# Patient Record
Sex: FEMALE | Race: WHITE | Employment: OTHER | URBAN - METROPOLITAN AREA
[De-identification: names, ages, dates, MRNs, and addresses within clinical notes are randomized per-mention and may not be internally consistent; named-entity substitution may affect disease eponyms.]

---

## 2017-01-09 RX ORDER — LEVOTHYROXINE SODIUM 0.05 MG/1
TABLET ORAL
Qty: 90 TABLET | Refills: 2 | Status: SHIPPED | OUTPATIENT
Start: 2017-01-09 | End: 2017-05-06

## 2017-04-08 RX ORDER — FLUOXETINE HYDROCHLORIDE 20 MG/1
CAPSULE ORAL
Qty: 90 CAPSULE | Refills: 0 | Status: SHIPPED | OUTPATIENT
Start: 2017-04-08 | End: 2017-07-09

## 2017-04-28 NOTE — TELEPHONE ENCOUNTER
Per pt, she is waiting for her rx med and she is at the pharmacy [facility] and would like to know if Dr Federico Gibson can send her rx med now. pls see msg below. Pls call pt to her phone# on file when it was fax to the pharmacy. Pls advise.

## 2017-04-28 NOTE — TELEPHONE ENCOUNTER
Pt states that she is in [place] and forget her medications. Pt would like a 5 day supply of the generic Prozac and synthroid. Pt did not have the names. Pt would like them phoned into the [facility] [phone].  Pt states that she is out of

## 2017-05-06 RX ORDER — FLUOXETINE HYDROCHLORIDE 20 MG/1
20 CAPSULE ORAL
Qty: 90 CAPSULE | Refills: 0 | OUTPATIENT
Start: 2017-05-06

## 2017-05-06 RX ORDER — LEVOTHYROXINE SODIUM 0.05 MG/1
50 TABLET ORAL
Qty: 90 TABLET | Refills: 2 | Status: SHIPPED | OUTPATIENT
Start: 2017-05-06 | End: 2017-10-06

## 2017-05-06 NOTE — TELEPHONE ENCOUNTER
Dr. Martin Valentin, Rx request for Levothyroxine and Fluoxetine, please see message below. UNABLE to fill d/t being out of state. Thank you.     Hypothyroid Medications  Protocol Criteria:  Appointment scheduled in the past 12 months or the next 3 months  TSH resulte

## 2017-05-08 ENCOUNTER — TELEPHONE (OUTPATIENT)
Dept: INTERNAL MEDICINE CLINIC | Facility: CLINIC | Age: 72
End: 2017-05-08

## 2017-05-08 NOTE — TELEPHONE ENCOUNTER
Pt asking to cancel prescription for Levothyroxine that was sent to mail order, pt does not want to be charged. Please advise.

## 2017-05-09 NOTE — TELEPHONE ENCOUNTER
Attempted to call pt to clarify her request.    Spoke to [facility], they have 2 prescriptions for Levothyroxine. One written in January 2017 that is due to be shipped in July 2017.   The second one was written on 5/6/18 and was due to be shipped in J

## 2017-05-10 NOTE — TELEPHONE ENCOUNTER
Advised patient of Leif Goins RN's note below. Patient verbalized understanding and stated that is now back in [place].

## 2017-07-10 RX ORDER — FLUOXETINE HYDROCHLORIDE 20 MG/1
CAPSULE ORAL
Qty: 90 CAPSULE | Refills: 0 | Status: SHIPPED | OUTPATIENT
Start: 2017-07-10 | End: 2017-10-09

## 2017-07-20 ENCOUNTER — PATIENT MESSAGE (OUTPATIENT)
Dept: INTERNAL MEDICINE CLINIC | Facility: CLINIC | Age: 72
End: 2017-07-20

## 2017-07-21 NOTE — TELEPHONE ENCOUNTER
From: Justin Juarez  To: Russell Swift MD  Sent: 7/20/2017 1:45 PM CDT  Subject: Other    Please update my health information. I received a flu vaccine and pneumococcal vaccine on 10/20/2016 at [address] [place] [address]., Velia Anderson    Thank you.     Thurman Frankel

## 2017-08-04 ENCOUNTER — OFFICE VISIT (OUTPATIENT)
Dept: INTERNAL MEDICINE CLINIC | Facility: CLINIC | Age: 72
End: 2017-08-04

## 2017-08-04 VITALS
HEIGHT: 62 IN | HEART RATE: 60 BPM | WEIGHT: 217 LBS | BODY MASS INDEX: 39.93 KG/M2 | SYSTOLIC BLOOD PRESSURE: 96 MMHG | DIASTOLIC BLOOD PRESSURE: 65 MMHG

## 2017-08-04 DIAGNOSIS — Z00.00 ROUTINE GENERAL MEDICAL EXAMINATION AT A HEALTH CARE FACILITY: Primary | ICD-10-CM

## 2017-08-04 DIAGNOSIS — E89.0 POSTOPERATIVE HYPOTHYROIDISM: ICD-10-CM

## 2017-08-04 DIAGNOSIS — Z13.1 SCREENING FOR DIABETES MELLITUS: ICD-10-CM

## 2017-08-04 DIAGNOSIS — Z12.39 SCREENING FOR MALIGNANT NEOPLASM OF BREAST: ICD-10-CM

## 2017-08-04 DIAGNOSIS — Z13.820 SCREENING FOR OSTEOPOROSIS: ICD-10-CM

## 2017-08-04 PROCEDURE — G0439 PPPS, SUBSEQ VISIT: HCPCS | Performed by: INTERNAL MEDICINE

## 2017-08-04 NOTE — PROGRESS NOTES
HPI:    Patient ID: Norman Andrews is a 80year old female.     Patient presents for annual wellness exam.      HISTORY:  Past Medical History:   Diagnosis Date   • H/O bone density study 5/6/15   • Lipid screening 11-   • Post-operative hypothyroidi to person, place, and time. She appears well-developed and well-nourished. Cardiovascular: Normal rate, regular rhythm, normal heart sounds and intact distal pulses. Pulmonary/Chest: Effort normal and breath sounds normal.   Abdominal: Soft.  Bowel geraldine

## 2017-08-14 ENCOUNTER — APPOINTMENT (OUTPATIENT)
Dept: LAB | Age: 72
End: 2017-08-14
Attending: INTERNAL MEDICINE
Payer: MEDICARE

## 2017-08-14 DIAGNOSIS — Z13.1 SCREENING FOR DIABETES MELLITUS: ICD-10-CM

## 2017-08-14 DIAGNOSIS — E89.0 POSTOPERATIVE HYPOTHYROIDISM: ICD-10-CM

## 2017-08-14 LAB
ALBUMIN SERPL BCP-MCNC: 3.7 G/DL (ref 3.5–4.8)
ALBUMIN/GLOB SERPL: 1.4 {RATIO} (ref 1–2)
ALP SERPL-CCNC: 92 U/L (ref 32–100)
ALT SERPL-CCNC: 19 U/L (ref 14–54)
ANION GAP SERPL CALC-SCNC: 8 MMOL/L (ref 0–18)
AST SERPL-CCNC: 23 U/L (ref 15–41)
BILIRUB SERPL-MCNC: 0.3 MG/DL (ref 0.3–1.2)
BUN SERPL-MCNC: 17 MG/DL (ref 8–20)
BUN/CREAT SERPL: 19.5 (ref 10–20)
CALCIUM SERPL-MCNC: 9.2 MG/DL (ref 8.5–10.5)
CHLORIDE SERPL-SCNC: 106 MMOL/L (ref 95–110)
CO2 SERPL-SCNC: 24 MMOL/L (ref 22–32)
CREAT SERPL-MCNC: 0.87 MG/DL (ref 0.5–1.5)
GLOBULIN PLAS-MCNC: 2.7 G/DL (ref 2.5–3.7)
GLUCOSE SERPL-MCNC: 87 MG/DL (ref 70–99)
OSMOLALITY UR CALC.SUM OF ELEC: 287 MOSM/KG (ref 275–295)
POTASSIUM SERPL-SCNC: 4.6 MMOL/L (ref 3.3–5.1)
PROT SERPL-MCNC: 6.4 G/DL (ref 5.9–8.4)
SODIUM SERPL-SCNC: 138 MMOL/L (ref 136–144)
TSH SERPL-ACNC: 4.01 UIU/ML (ref 0.45–5.33)

## 2017-08-14 PROCEDURE — 84443 ASSAY THYROID STIM HORMONE: CPT

## 2017-08-14 PROCEDURE — 36415 COLL VENOUS BLD VENIPUNCTURE: CPT

## 2017-08-14 PROCEDURE — 80053 COMPREHEN METABOLIC PANEL: CPT

## 2017-09-05 ENCOUNTER — HOSPITAL ENCOUNTER (OUTPATIENT)
Dept: BONE DENSITY | Age: 72
Discharge: HOME | End: 2017-09-05
Attending: INTERNAL MEDICINE
Payer: MEDICARE

## 2017-09-05 ENCOUNTER — HOSPITAL ENCOUNTER (OUTPATIENT)
Dept: MAMMOGRAPHY | Age: 72
Discharge: HOME | End: 2017-09-05
Attending: INTERNAL MEDICINE
Payer: MEDICARE

## 2017-09-05 DIAGNOSIS — Z12.39 SCREENING FOR MALIGNANT NEOPLASM OF BREAST: ICD-10-CM

## 2017-09-05 DIAGNOSIS — Z13.820 SCREENING FOR OSTEOPOROSIS: ICD-10-CM

## 2017-09-05 PROCEDURE — 77067 SCR MAMMO BI INCL CAD: CPT | Performed by: INTERNAL MEDICINE

## 2017-09-05 PROCEDURE — 77080 DXA BONE DENSITY AXIAL: CPT | Performed by: INTERNAL MEDICINE

## 2017-09-12 ENCOUNTER — TELEPHONE (OUTPATIENT)
Dept: OTHER | Age: 72
End: 2017-09-12

## 2017-09-12 DIAGNOSIS — M85.80 OSTEOPENIA, UNSPECIFIED LOCATION: Primary | ICD-10-CM

## 2017-09-12 NOTE — TELEPHONE ENCOUNTER
LMTCB transfer to triage      Notes Recorded by Ute Jaime MD on 9/6/2017 at 9:55 AM CDT  Severe osteopenia not yet osteoporosis.  Please have her get vit d level then schedule ov to disc options

## 2017-09-13 ENCOUNTER — APPOINTMENT (OUTPATIENT)
Dept: LAB | Age: 72
End: 2017-09-13
Attending: INTERNAL MEDICINE
Payer: MEDICARE

## 2017-09-13 DIAGNOSIS — M85.80 OSTEOPENIA, UNSPECIFIED LOCATION: ICD-10-CM

## 2017-09-13 LAB — 25(OH)D3 SERPL-MCNC: 35.6 NG/ML

## 2017-09-13 PROCEDURE — 36415 COLL VENOUS BLD VENIPUNCTURE: CPT

## 2017-09-13 PROCEDURE — 82306 VITAMIN D 25 HYDROXY: CPT

## 2017-09-21 ENCOUNTER — OFFICE VISIT (OUTPATIENT)
Dept: INTERNAL MEDICINE CLINIC | Facility: CLINIC | Age: 72
End: 2017-09-21

## 2017-09-21 VITALS
BODY MASS INDEX: 39 KG/M2 | HEART RATE: 54 BPM | WEIGHT: 214.31 LBS | SYSTOLIC BLOOD PRESSURE: 115 MMHG | DIASTOLIC BLOOD PRESSURE: 76 MMHG

## 2017-09-21 DIAGNOSIS — M85.89 OSTEOPENIA OF MULTIPLE SITES: Primary | ICD-10-CM

## 2017-09-21 PROCEDURE — G0463 HOSPITAL OUTPT CLINIC VISIT: HCPCS | Performed by: INTERNAL MEDICINE

## 2017-09-21 PROCEDURE — 99213 OFFICE O/P EST LOW 20 MIN: CPT | Performed by: INTERNAL MEDICINE

## 2017-09-21 RX ORDER — RISEDRONATE SODIUM 35 MG/1
35 TABLET, FILM COATED ORAL
Qty: 12 TABLET | Refills: 3 | Status: SHIPPED | OUTPATIENT
Start: 2017-09-21 | End: 2018-08-05

## 2017-09-21 NOTE — PROGRESS NOTES
HPI:    Patient ID: Haven Montes is a 80year old female. HPI    Bone Loss  This is a chronic problem. The first episode began more than one year ago. The problem occurs daily and is gradually improving.  The patient recently had a bone density test do Oral Capsule Delayed Release Take  by mouth. Disp:  Rfl:      Allergies:No Known Allergies   PHYSICAL EXAM:   Physical Exam   Constitutional: She appears well-developed and well-nourished. No distress. HENT:   Head: Normocephalic and atraumatic.    Eyes: described in this documentation. All medical record entries made by the scribe were at my direction and in my presence.   I have reviewed the chart and discharge instructions (if applicable) and agree that the record reflects my personal performance and is

## 2017-10-02 ENCOUNTER — OFFICE VISIT (OUTPATIENT)
Dept: INTERNAL MEDICINE CLINIC | Facility: CLINIC | Age: 72
End: 2017-10-02

## 2017-10-02 ENCOUNTER — TELEPHONE (OUTPATIENT)
Dept: OTHER | Age: 72
End: 2017-10-02

## 2017-10-02 VITALS
SYSTOLIC BLOOD PRESSURE: 97 MMHG | WEIGHT: 215.38 LBS | DIASTOLIC BLOOD PRESSURE: 63 MMHG | HEART RATE: 53 BPM | BODY MASS INDEX: 39 KG/M2

## 2017-10-02 DIAGNOSIS — S76.312A HAMSTRING MUSCLE STRAIN, LEFT, INITIAL ENCOUNTER: Primary | ICD-10-CM

## 2017-10-02 PROCEDURE — 99213 OFFICE O/P EST LOW 20 MIN: CPT | Performed by: INTERNAL MEDICINE

## 2017-10-02 PROCEDURE — G0463 HOSPITAL OUTPT CLINIC VISIT: HCPCS | Performed by: INTERNAL MEDICINE

## 2017-10-02 NOTE — TELEPHONE ENCOUNTER
Pt seeking appt for today -c/c pain behind left knee x a week no swelling hurts to walk-ASA helps but comes back-appt made

## 2017-10-06 ENCOUNTER — PATIENT MESSAGE (OUTPATIENT)
Dept: INTERNAL MEDICINE CLINIC | Facility: CLINIC | Age: 72
End: 2017-10-06

## 2017-10-06 RX ORDER — LEVOTHYROXINE SODIUM 0.05 MG/1
50 TABLET ORAL
Qty: 90 TABLET | Refills: 2
Start: 2017-10-06

## 2017-10-06 RX ORDER — LEVOTHYROXINE SODIUM 0.05 MG/1
50 TABLET ORAL
Qty: 90 TABLET | Refills: 0 | Status: SHIPPED
Start: 2017-10-06 | End: 2018-01-04

## 2017-10-06 RX ORDER — LEVOTHYROXINE SODIUM 0.05 MG/1
TABLET ORAL
Qty: 90 TABLET | Refills: 2 | OUTPATIENT
Start: 2017-10-06

## 2017-10-06 NOTE — TELEPHONE ENCOUNTER
Pending Prescriptions Disp Refills    LEVOTHYROXINE SODIUM 50 MCG Oral Tab [Pharmacy Med Name: L-THYROXINE (SYNTHROID) TABS 50MCG] 90 tablet 2     Sig: TAKE 1 TABLET DAILY           Last Office Visit with PCP: 10/2/2017  Last Blood Pressures:  BP Reading

## 2017-10-06 NOTE — TELEPHONE ENCOUNTER
Hypothyroid Medications Medication refilled for 90 days per protocol for Levothryroxine.     Protocol Criteria:  Appointment scheduled in the past 12 months or the next 3 months  TSH resulted in the past 12 months that is normal  Recent Outpatient Visits

## 2017-10-06 NOTE — TELEPHONE ENCOUNTER
Pt is calling for status of her medication refill request. Pt would like to know why her medication has been denied and she can be reached at [phone].

## 2017-10-07 NOTE — TELEPHONE ENCOUNTER
Called pt and informed that rx sent in May 2017 was for #90 plus 2 refills. Pt should not need a refill yet.

## 2017-10-10 RX ORDER — FLUOXETINE HYDROCHLORIDE 20 MG/1
CAPSULE ORAL
Qty: 90 CAPSULE | Refills: 0 | Status: SHIPPED | OUTPATIENT
Start: 2017-10-10 | End: 2018-01-07

## 2017-10-10 NOTE — TELEPHONE ENCOUNTER
From: Belen Lundberg  To: Luis Eldridge MD  Sent: 10/6/2017 8:34 PM CDT  Subject: Prescription Question    I received an email from my [employer] mail delivery this evening that my request for the renewal of my generic synthroid has been denied.  I ma

## 2017-10-10 NOTE — PROGRESS NOTES
HPI:    Patient ID: Jose Antonio Arshad is a 80year old female. Knee Pain    The pain is present in the left knee. This is a new problem. The current episode started in the past 7 days. There has been no history of extremity trauma.  The problem occurs const There is some tenderness to palpation of the distal insertion of the left lateral hamstring. Decreased range of motion left knee. Slightly. Right knee normal.   Skin: Skin is warm and dry. She is not diaphoretic.    Psychiatric: She has a normal mood and

## 2018-01-05 RX ORDER — LEVOTHYROXINE SODIUM 0.05 MG/1
TABLET ORAL
Qty: 90 TABLET | Refills: 0 | Status: SHIPPED | OUTPATIENT
Start: 2018-01-05 | End: 2018-04-04

## 2018-01-05 NOTE — TELEPHONE ENCOUNTER
Refilled per protocol    Hypothyroid Medications  Protocol Criteria:  Appointment scheduled in the past 12 months or the next 3 months  TSH resulted in the past 12 months that is normal  Recent Outpatient Visits            3 months ago Hamstring muscle str

## 2018-01-08 RX ORDER — FLUOXETINE HYDROCHLORIDE 20 MG/1
CAPSULE ORAL
Qty: 90 CAPSULE | Refills: 3 | Status: SHIPPED | OUTPATIENT
Start: 2018-01-08 | End: 2019-01-06

## 2018-04-05 RX ORDER — LEVOTHYROXINE SODIUM 0.05 MG/1
TABLET ORAL
Qty: 90 TABLET | Refills: 0 | Status: SHIPPED | OUTPATIENT
Start: 2018-04-05 | End: 2018-07-03

## 2018-07-05 RX ORDER — LEVOTHYROXINE SODIUM 0.05 MG/1
TABLET ORAL
Qty: 90 TABLET | Refills: 0 | Status: SHIPPED | OUTPATIENT
Start: 2018-07-05 | End: 2018-10-03

## 2018-08-05 RX ORDER — RISEDRONATE SODIUM 35 MG/1
TABLET, FILM COATED ORAL
Qty: 12 TABLET | Refills: 3 | Status: SHIPPED | OUTPATIENT
Start: 2018-08-05 | End: 2019-07-09

## 2018-08-24 ENCOUNTER — OFFICE VISIT (OUTPATIENT)
Dept: INTERNAL MEDICINE CLINIC | Facility: CLINIC | Age: 73
End: 2018-08-24
Payer: MEDICARE

## 2018-08-24 ENCOUNTER — APPOINTMENT (OUTPATIENT)
Dept: LAB | Age: 73
End: 2018-08-24
Attending: INTERNAL MEDICINE
Payer: MEDICARE

## 2018-08-24 VITALS
DIASTOLIC BLOOD PRESSURE: 62 MMHG | BODY MASS INDEX: 38.65 KG/M2 | HEART RATE: 56 BPM | WEIGHT: 218.13 LBS | TEMPERATURE: 98 F | SYSTOLIC BLOOD PRESSURE: 98 MMHG | HEIGHT: 63 IN

## 2018-08-24 DIAGNOSIS — Z00.00 ROUTINE GENERAL MEDICAL EXAMINATION AT A HEALTH CARE FACILITY: Primary | ICD-10-CM

## 2018-08-24 DIAGNOSIS — Z12.39 SCREENING BREAST EXAMINATION: ICD-10-CM

## 2018-08-24 DIAGNOSIS — E89.0 POSTOPERATIVE HYPOTHYROIDISM: ICD-10-CM

## 2018-08-24 LAB — TSH SERPL-ACNC: 1.87 UIU/ML (ref 0.45–5.33)

## 2018-08-24 PROCEDURE — 84443 ASSAY THYROID STIM HORMONE: CPT

## 2018-08-24 PROCEDURE — 36415 COLL VENOUS BLD VENIPUNCTURE: CPT

## 2018-08-24 PROCEDURE — G0439 PPPS, SUBSEQ VISIT: HCPCS | Performed by: INTERNAL MEDICINE

## 2018-08-24 NOTE — PROGRESS NOTES
HPI:    Patient ID: Geraldine Melgoza is a 71year old female. Pt presents for annual Medicare physical. She states some mild fatigue but denies any light-headedness or dizziness. Thyroid Problem   This is a chronic problem.  The current episode start Calcium Carbonate-Vitamin D (CALCIUM-VITAMIN D) 600-200 MG-UNIT Oral Cap Take  by mouth. Disp:  Rfl:    Multiple Vitamins Oral Tab Take  by mouth.  Disp:  Rfl:    Omega-3 Fatty Acids (KP OMEGA-3 FISH OIL) 1200 MG Oral Capsule Delayed Release Take  by mout more than 10 pounds without trying?: 2 - No    Has your appetite been poor?: No    Type of Diet: Balanced         How would you describe your daily physical activity?: Moderate    How would you describe your current health state?: Good    How do you mainta No    Do you have a living will?: No     Hearing Assessment (Required for AWV/SWV)      Hearing Screening    Time taken:  8/24/2018 10:11 AM  Screening Method:  Questionnaire  I have a problem hearing over the telephone:  No I have trouble following the co (FSB)Annually   Glucose (mg/dL)   Date Value   08/14/2017 87   10/27/2008 93   ----------  GLUCOSE (P) (mg/dL)   Date Value   08/03/2016 94   ---------- No flowsheet data found.    Cardiovascular Disease Screening     LDL Annually LDL Cholesterol Calc (mg/d applicable     SPECIFIC DISEASE MONITORING Internal Lab or Procedure External Lab or Procedure   Annual Monitoring of Persistent     Medications (ACE/ARB, digoxin, diuretics)    Potassium  Annually Potassium (mmol/L)   Date Value   08/14/2017 4.6   10/27/2 this documentation has been prepared under the direction and in the presence of DIANE Carlson MD.   Electronically Signed: Frandy Sumner, 8/24/2018, 10:04 AM.    IDIANE MD,  personally performed the services described in this documentati

## 2018-09-18 ENCOUNTER — HOSPITAL ENCOUNTER (OUTPATIENT)
Dept: MAMMOGRAPHY | Age: 73
Discharge: HOME | End: 2018-09-18
Attending: INTERNAL MEDICINE
Payer: MEDICARE

## 2018-09-18 DIAGNOSIS — Z12.39 SCREENING BREAST EXAMINATION: ICD-10-CM

## 2018-09-18 PROCEDURE — 77067 SCR MAMMO BI INCL CAD: CPT | Performed by: INTERNAL MEDICINE

## 2018-10-04 RX ORDER — LEVOTHYROXINE SODIUM 0.05 MG/1
TABLET ORAL
Qty: 90 TABLET | Refills: 0 | Status: SHIPPED | OUTPATIENT
Start: 2018-10-04 | End: 2019-01-01

## 2019-01-02 RX ORDER — LEVOTHYROXINE SODIUM 0.05 MG/1
TABLET ORAL
Qty: 90 TABLET | Refills: 0 | Status: SHIPPED | OUTPATIENT
Start: 2019-01-02 | End: 2019-04-01

## 2019-01-02 NOTE — TELEPHONE ENCOUNTER
Refill passed per [facility] protocol.   Hypothyroid Medications  Protocol Criteria:  Appointment scheduled in the past 12 months or the next 3 months  TSH resulted in the past 12 months that is normal  Recent Outpatient Visits            4 months ago Routine general medical examination at a health care facility    [facility], Tea Travis MD    Office Visit    1 year ago Hamstring muscle strain, left, initial encounter    [address] MD    Office Visit    1 year ago Osteopenia of multiple sites    [facility], Tea Travis MD    Office Visit    1 year ago Routine general medical examination at a health care facility    [facility], Tea Travis MD    Office Visit    2 years ago [address] Martino MD    Office Visit          Lab Results   Component Value Date    TSH 1.87 08/24/2018    [facility] 2.74 08/03/2016

## 2019-01-07 RX ORDER — FLUOXETINE HYDROCHLORIDE 20 MG/1
CAPSULE ORAL
Qty: 90 CAPSULE | Refills: 0 | Status: SHIPPED | OUTPATIENT
Start: 2019-01-07 | End: 2019-04-07

## 2019-04-02 RX ORDER — LEVOTHYROXINE SODIUM 0.05 MG/1
TABLET ORAL
Qty: 90 TABLET | Refills: 0 | Status: SHIPPED | OUTPATIENT
Start: 2019-04-02 | End: 2019-07-01

## 2019-04-02 NOTE — TELEPHONE ENCOUNTER
Refill passed per [facility] protocol  Hypothyroid Medications  Protocol Criteria:  Appointment scheduled in the past 12 months or the next 3 months  TSH resulted in the past 12 months that is normal  Recent Outpatient Visits            7 months ago Routine general medical examination at a health care facility    [address] Rosi Chough, MD    Office Visit    1 year ago Hamstring muscle strain, left, initial encounter    Aracelis Jacob MD    Office Visit    1 year ago Osteopenia of multiple sites    [address] Rosi Chough, MD    Office Visit    1 year ago Routine general medical examination at a health care facility    [address] Rosi Chough, MD    Office Visit    2 years ago [address] Aracelis Martino MD    Office Visit          Lab Results   Component Value Date    TSH 1.87 08/24/2018    [facility] 2.74 08/03/2016

## 2019-04-02 NOTE — TELEPHONE ENCOUNTER
Refill passed per [facility] protocol  Hypothyroid Medications  Protocol Criteria:  Appointment scheduled in the past 12 months or the next 3 months  TSH resulted in the past 12 months that is normal  Recent Outpatient Visits            7 months ago Routine general medical examination at a health care facility    [facility]    Office Visit    1 year ago Hamstring muscle strain, left, initial encounter    [address] Connor MD    Office Visit    1 year ago Osteopenia of multiple sites    [facility]    Office Visit    1 year ago Routine general medical examination at a health care facility    [facility], Lalo Wesley MD    Office Visit    [facility]    [facility]          Lab Results   Component Value Date    TSH 1.87 08/24/2018    [facility] 2.74 08/03/2016

## 2019-04-08 RX ORDER — FLUOXETINE HYDROCHLORIDE 20 MG/1
CAPSULE ORAL
Qty: 90 CAPSULE | Refills: 0 | Status: SHIPPED | OUTPATIENT
Start: 2019-04-08 | End: 2019-04-16

## 2019-04-08 NOTE — TELEPHONE ENCOUNTER
Failed protocol due to no appt. rx sent per protocol 90 days. Routed to call center to assist pt with scheduling appointment. 24 Quant message sent to the patient to schedule follow up.    Requested Prescriptions     Pending Prescriptions Disp Refills   • FL

## 2019-04-16 ENCOUNTER — OFFICE VISIT (OUTPATIENT)
Dept: INTERNAL MEDICINE CLINIC | Facility: CLINIC | Age: 74
End: 2019-04-16
Payer: MEDICARE

## 2019-04-16 VITALS
WEIGHT: 218.13 LBS | DIASTOLIC BLOOD PRESSURE: 60 MMHG | SYSTOLIC BLOOD PRESSURE: 101 MMHG | TEMPERATURE: 100 F | BODY MASS INDEX: 39 KG/M2 | HEART RATE: 69 BPM

## 2019-04-16 DIAGNOSIS — G25.0 BENIGN ESSENTIAL TREMOR: ICD-10-CM

## 2019-04-16 DIAGNOSIS — E89.0 POSTOPERATIVE HYPOTHYROIDISM: Primary | ICD-10-CM

## 2019-04-16 DIAGNOSIS — M25.561 CHRONIC PAIN OF RIGHT KNEE: ICD-10-CM

## 2019-04-16 DIAGNOSIS — M54.2 NECK PAIN: ICD-10-CM

## 2019-04-16 DIAGNOSIS — G89.29 CHRONIC PAIN OF RIGHT KNEE: ICD-10-CM

## 2019-04-16 DIAGNOSIS — M79.641 HAND PAIN, RIGHT: ICD-10-CM

## 2019-04-16 PROCEDURE — G0463 HOSPITAL OUTPT CLINIC VISIT: HCPCS | Performed by: INTERNAL MEDICINE

## 2019-04-16 PROCEDURE — 99215 OFFICE O/P EST HI 40 MIN: CPT | Performed by: INTERNAL MEDICINE

## 2019-04-16 RX ORDER — FLUOXETINE HYDROCHLORIDE 20 MG/1
20 CAPSULE ORAL
Qty: 90 CAPSULE | Refills: 0 | Status: SHIPPED | OUTPATIENT
Start: 2019-04-16 | End: 2019-09-11

## 2019-04-16 NOTE — PROGRESS NOTES
HPI:    Patient ID: Unique Erazo is a 68year old female. Patient presents with: Anxiety: fup   Pain: c/o right groin pain. Neck Pain  Finger Pain: bilateral index and thumbs.    Knee Pain: right    HPI  Hand pain  Patient c/o thumb/index finger pain gait problem. Patient states that stretches/exercises that her daughter-in-law, who is a PT, recommended provide significant relief of Sx. Review of Systems   Constitutional: Negative for chills and diaphoresis.    HENT: Negative for dental problem, faci LEVOTHYROXINE SODIUM 50 MCG Oral Tab TAKE 1 TABLET DAILY Disp: 90 tablet Rfl: 0   RISEDRONATE SODIUM 35 MG Oral Tab TAKE 1 TABLET EVERY 7 DAYS Disp: 12 tablet Rfl: 3   Melatonin 5 MG Oral Tab Take 1 tablet by mouth daily.  Disp:  Rfl:    Calcium Carbonate is not disoriented. She displays tremor. Positive for right forearm/hand tremor. Skin: Skin is warm and dry. No rash noted. No erythema. Psychiatric: She has a normal mood and affect.  Her speech is normal and behavior is normal. Judgment and thought negatives include no associated numbness. PMHx of osteoarthritis.  Physical exam unremarkable.    (M25.561,  G89.29) Chronic pain of right knee  Plan: Patient presents today for continual treatment of dull right knee pain constantly with associated limited

## 2019-05-06 ENCOUNTER — PATIENT MESSAGE (OUTPATIENT)
Dept: INTERNAL MEDICINE CLINIC | Facility: CLINIC | Age: 74
End: 2019-05-06

## 2019-05-06 ENCOUNTER — OFFICE VISIT (OUTPATIENT)
Dept: INTERNAL MEDICINE CLINIC | Facility: CLINIC | Age: 74
End: 2019-05-06
Payer: MEDICARE

## 2019-05-06 VITALS — TEMPERATURE: 99 F | HEART RATE: 60 BPM | SYSTOLIC BLOOD PRESSURE: 116 MMHG | DIASTOLIC BLOOD PRESSURE: 81 MMHG

## 2019-05-06 DIAGNOSIS — M54.2 NECK PAIN: Primary | ICD-10-CM

## 2019-05-06 PROCEDURE — 99214 OFFICE O/P EST MOD 30 MIN: CPT | Performed by: INTERNAL MEDICINE

## 2019-05-06 PROCEDURE — G0463 HOSPITAL OUTPT CLINIC VISIT: HCPCS | Performed by: INTERNAL MEDICINE

## 2019-05-06 NOTE — TELEPHONE ENCOUNTER
From: Jeannine Fiore  To: Josue Phillip MD  Sent: 5/6/2019 12:37 PM CDT  Subject: Visit Follow-up Question    I just had an appointment with Dr. Ruchi Desouza concerning my neck and groin pain. When I got home, I noticed the referral for PT was only for my neck.

## 2019-05-06 NOTE — PROGRESS NOTES
HPI:    Patient ID: Belen Lundberg is a 68year old female. Patient presents with:  Neck Pain: fup   Other: right groin pain.  fup     HPI  Back pain  Patient c/o left cervical spine pain, as well as lower right back pain that radiates through right leg/gr pain. Negative for neck stiffness. Skin: Negative for color change and pallor. Neurological: Positive for numbness (lower right leg). Negative for dizziness, tremors, facial asymmetry and speech difficulty.    Psychiatric/Behavioral: Negative for agitat Conjunctivae and EOM are normal.   Neck: Normal range of motion. Neck supple. Cardiovascular: Normal rate, regular rhythm, normal heart sounds and intact distal pulses. Exam reveals no gallop and no friction rub. No murmur heard.   Pulmonary/Chest: Effo 25 08/03/2016    ALT 17 11/27/2012          ASSESSMENT/PLAN:   Neck pain  (primary encounter diagnosis)    (M54.2) Neck pain  (primary encounter diagnosis)  Plan: Patient c/o left cervical spine pain, as well as lower right back pain that radiates through

## 2019-05-06 NOTE — TELEPHONE ENCOUNTER
Dr. Sari Narvaez Pt is asking about Groin pain. (see copied note below)     I thought I was also getting a PT referral for my groin/leg pain.   He did tell me to take 4 ibprofens 3 times a day.  Is that all I need to do for that problem? Coni Gandhi

## 2019-05-07 NOTE — TELEPHONE ENCOUNTER
Message sent to pt.      Josue Phillip MD routed conversation to [facility]/Cma 13 hours ago (8:55 PM)      Josue Phillip MD 13 hours ago (8:52 PM)         Please let pt know there is a referral for PT in the systrem         Documentation

## 2019-05-29 ENCOUNTER — OFFICE VISIT (OUTPATIENT)
Dept: PHYSICAL THERAPY | Age: 74
End: 2019-05-29
Attending: INTERNAL MEDICINE
Payer: MEDICARE

## 2019-05-29 DIAGNOSIS — R10.30 INGUINAL PAIN, UNSPECIFIED LATERALITY: ICD-10-CM

## 2019-05-29 DIAGNOSIS — M54.50 LOW BACK PAIN WITHOUT SCIATICA, UNSPECIFIED BACK PAIN LATERALITY, UNSPECIFIED CHRONICITY: ICD-10-CM

## 2019-05-29 DIAGNOSIS — M54.2 NECK PAIN: ICD-10-CM

## 2019-05-29 PROCEDURE — 97110 THERAPEUTIC EXERCISES: CPT | Performed by: PHYSICAL THERAPIST

## 2019-05-29 PROCEDURE — 97161 PT EVAL LOW COMPLEX 20 MIN: CPT | Performed by: PHYSICAL THERAPIST

## 2019-05-29 NOTE — PROGRESS NOTES
P.T. EVALUATION:   Referring Physician: Dr. Rachelle Colón  Diagnosis: Low back pain without sciatica, unspecified back pain laterality, unspecified chronicity (M54.5)  Inguinal pain, unspecified laterality (R10.30)    Neck pain (M54.2)  Date of Onset: 1/1/2019 Adriano worse than L    Strength/MMT: Upper back strength is grossly 3/5 into extension, Trunk strength is grossly 3/5.  R hip is grossly 4/5 into all planes of motion and 5/5 throughout    Sensation: WNL throughout B UE and LE to light touch    Special tests: (-) findings, precautions, and treatment options and has agreed to actively participate in planning and for this course of care. Thank you for your referral. Please co-sign or sign and return this letter via fax as soon as possible to [phone].  If you h

## 2019-05-31 ENCOUNTER — OFFICE VISIT (OUTPATIENT)
Dept: PHYSICAL THERAPY | Age: 74
End: 2019-05-31
Attending: INTERNAL MEDICINE
Payer: MEDICARE

## 2019-05-31 DIAGNOSIS — M54.2 NECK PAIN: ICD-10-CM

## 2019-05-31 PROCEDURE — 97110 THERAPEUTIC EXERCISES: CPT | Performed by: PHYSICAL THERAPIST

## 2019-05-31 NOTE — PROGRESS NOTES
Physical Therapy Treatment and Discharge Summary    Patient Name: Natalie Herrera MRN: [id number]  Date: 5/31/2019  Referring Physician: Dr. Reza Shay    Diagnosis: Low back pain without sciatica, unspecified back pain laterality, unspecified more than 1/10 during activity. 3. Increase cervical spine ROM to WNL into all planes to improve neck mobility to WNL. 4. Increase upper back and trunk strength to 5/5 throughout to be able to follow good posture when resuming previous activities.   5. In

## 2019-06-04 ENCOUNTER — APPOINTMENT (OUTPATIENT)
Dept: PHYSICAL THERAPY | Age: 74
End: 2019-06-04
Attending: INTERNAL MEDICINE
Payer: MEDICARE

## 2019-06-06 ENCOUNTER — APPOINTMENT (OUTPATIENT)
Dept: PHYSICAL THERAPY | Age: 74
End: 2019-06-06
Attending: INTERNAL MEDICINE
Payer: MEDICARE

## 2019-06-10 ENCOUNTER — APPOINTMENT (OUTPATIENT)
Dept: PHYSICAL THERAPY | Age: 74
End: 2019-06-10
Attending: INTERNAL MEDICINE
Payer: MEDICARE

## 2019-06-12 ENCOUNTER — APPOINTMENT (OUTPATIENT)
Dept: PHYSICAL THERAPY | Age: 74
End: 2019-06-12
Attending: INTERNAL MEDICINE
Payer: MEDICARE

## 2019-06-13 ENCOUNTER — OFFICE VISIT (OUTPATIENT)
Dept: PHYSICAL THERAPY | Age: 74
End: 2019-06-13
Attending: INTERNAL MEDICINE
Payer: MEDICARE

## 2019-06-13 NOTE — PROGRESS NOTES
Physical Therapy Progress Report    Patient Name: Mg Napier MRN: [id number]  Date: 6/13/2019  Referring Physician: Dr. Thea Nicholas    Diagnosis: Low back pain without sciatica, unspecified back pain laterality, unspecified chronicity (M54.5) Danny Joseph, PT

## 2019-06-17 ENCOUNTER — APPOINTMENT (OUTPATIENT)
Dept: PHYSICAL THERAPY | Age: 74
End: 2019-06-17
Attending: INTERNAL MEDICINE
Payer: MEDICARE

## 2019-06-19 ENCOUNTER — APPOINTMENT (OUTPATIENT)
Dept: PHYSICAL THERAPY | Age: 74
End: 2019-06-19
Attending: INTERNAL MEDICINE
Payer: MEDICARE

## 2019-06-20 ENCOUNTER — TELEPHONE (OUTPATIENT)
Dept: INTERNAL MEDICINE CLINIC | Facility: CLINIC | Age: 74
End: 2019-06-20

## 2019-06-20 DIAGNOSIS — M25.559 ARTHRALGIA OF HIP, UNSPECIFIED LATERALITY: Primary | ICD-10-CM

## 2019-06-21 NOTE — TELEPHONE ENCOUNTER
Relayed Nurse message, Pt has pain in right hip /groin,receiveing PT, Pt styated she's trying to do PT exercises at home, PT asked if she has had an Xray    Please sign off referral

## 2019-06-21 NOTE — TELEPHONE ENCOUNTER
----- Message from Karlee Gandhi sent at 6/20/2019  6:44 PM CDT -----  Regarding: RE: Other  Contact: [phone]  I have not yet heard from Doctor Kiana Pittman.  Since I'm not getting the care I feel I need, I would like to make an appointment with Dr. Julia Ayala

## 2019-06-21 NOTE — TELEPHONE ENCOUNTER
Please call patient and find out laterality for the referral. Dr. Sheldon Hermosillo wants her to see Dr. Derick Diggs. I pended referral but need that info. Also let her know she can switch her primary care doctor. She should let her insurance company know.  If she does wan

## 2019-06-24 ENCOUNTER — HOSPITAL ENCOUNTER (OUTPATIENT)
Dept: GENERAL RADIOLOGY | Age: 74
Discharge: HOME | End: 2019-06-24
Attending: INTERNAL MEDICINE
Payer: MEDICARE

## 2019-06-24 ENCOUNTER — OFFICE VISIT (OUTPATIENT)
Dept: INTERNAL MEDICINE CLINIC | Facility: CLINIC | Age: 74
End: 2019-06-24
Payer: MEDICARE

## 2019-06-24 VITALS
WEIGHT: 212 LBS | HEART RATE: 60 BPM | SYSTOLIC BLOOD PRESSURE: 102 MMHG | DIASTOLIC BLOOD PRESSURE: 68 MMHG | RESPIRATION RATE: 12 BRPM | BODY MASS INDEX: 37.56 KG/M2 | HEIGHT: 63 IN | TEMPERATURE: 99 F

## 2019-06-24 DIAGNOSIS — I87.2 PERIPHERAL VENOUS INSUFFICIENCY: ICD-10-CM

## 2019-06-24 DIAGNOSIS — G89.29 CHRONIC NECK PAIN: ICD-10-CM

## 2019-06-24 DIAGNOSIS — M25.551 PAIN OF RIGHT HIP JOINT: Primary | ICD-10-CM

## 2019-06-24 DIAGNOSIS — M25.551 PAIN OF RIGHT HIP JOINT: ICD-10-CM

## 2019-06-24 DIAGNOSIS — M54.2 CHRONIC NECK PAIN: ICD-10-CM

## 2019-06-24 PROCEDURE — 72040 X-RAY EXAM NECK SPINE 2-3 VW: CPT | Performed by: INTERNAL MEDICINE

## 2019-06-24 PROCEDURE — G0463 HOSPITAL OUTPT CLINIC VISIT: HCPCS | Performed by: INTERNAL MEDICINE

## 2019-06-24 PROCEDURE — 99214 OFFICE O/P EST MOD 30 MIN: CPT | Performed by: INTERNAL MEDICINE

## 2019-06-24 PROCEDURE — 73502 X-RAY EXAM HIP UNI 2-3 VIEWS: CPT | Performed by: INTERNAL MEDICINE

## 2019-06-24 NOTE — PROGRESS NOTES
HPI:    Patient ID: Tapan Amin is a 68year old female. Neck Pain    This is a chronic problem. The current episode started more than 1 year ago. The problem occurs constantly. The problem has been gradually improving.  The pain is associated with no Negative. Genitourinary: Negative. Musculoskeletal: Positive for stiffness. Negative for gout and joint swelling.         Right groin pain            Current Outpatient Medications:  FLUoxetine HCl 20 MG Oral Cap Take 1 capsule (20 mg total) by mouth (M25.551) Pain of right hip joint  (primary encounter diagnosis)  Plan: XR HIP W OR WO PELVIS 2 OR 3 VIEWS, RIGHT         (CPT=73502)        Suspect she has OA of the right hip; we will get xray of hip joint; she will continue with physical therapy for n

## 2019-06-25 ENCOUNTER — TELEPHONE (OUTPATIENT)
Dept: INTERNAL MEDICINE CLINIC | Facility: CLINIC | Age: 74
End: 2019-06-25

## 2019-06-25 DIAGNOSIS — M16.11 OSTEOARTHRITIS OF ONE HIP, RIGHT: Primary | ICD-10-CM

## 2019-06-27 ENCOUNTER — APPOINTMENT (OUTPATIENT)
Dept: PHYSICAL THERAPY | Age: 74
End: 2019-06-27
Attending: INTERNAL MEDICINE
Payer: MEDICARE

## 2019-07-01 RX ORDER — LEVOTHYROXINE SODIUM 0.05 MG/1
50 TABLET ORAL
Qty: 90 TABLET | Refills: 0 | Status: SHIPPED | OUTPATIENT
Start: 2019-07-01 | End: 2019-09-30

## 2019-07-01 NOTE — TELEPHONE ENCOUNTER
Refill request   Current Outpatient Medications:   •  LEVOTHYROXINE SODIUM 50 MCG Oral Tab, TAKE 1 TABLET DAILY, Disp: 90 tablet, Rfl: 0

## 2019-07-02 NOTE — TELEPHONE ENCOUNTER
Refill passed per [facility] protocol.   Hypothyroid Medications  Protocol Criteria:  Appointment scheduled in the past 12 months or the next 3 months  TSH resulted in the past 12 months that is normal  Recent Outpatient Visits            1 week ago Pa

## 2019-07-09 RX ORDER — RISEDRONATE SODIUM 35 MG/1
TABLET, FILM COATED ORAL
Qty: 12 TABLET | Refills: 1 | Status: SHIPPED | OUTPATIENT
Start: 2019-07-09 | End: 2019-12-24

## 2019-07-09 NOTE — TELEPHONE ENCOUNTER
[facility] received requesting refill for Risedronate sod tabs 35mg take 1 tab every 7 days. Message routed to RN.

## 2019-07-10 NOTE — TELEPHONE ENCOUNTER
Refill passed per [address] protocol.   Refill Protocol Appointment Criteria  · Appointment scheduled in the past 12 months or in the next 3 months  Recent Outpatient Visits            2 weeks ago Pain of right hip joint    [address]

## 2019-07-23 PROBLEM — M16.11 PRIMARY OSTEOARTHRITIS OF RIGHT HIP: Status: ACTIVE | Noted: 2019-07-23

## 2019-07-23 PROBLEM — M85.80 OSTEOPENIA: Status: ACTIVE | Noted: 2019-07-23

## 2019-08-05 ENCOUNTER — HOSPITAL ENCOUNTER (OUTPATIENT)
Dept: MRI IMAGING | Age: 74
Discharge: HOME | End: 2019-08-05
Attending: ORTHOPAEDIC SURGERY
Payer: MEDICARE

## 2019-08-05 DIAGNOSIS — M25.551 PAIN OF RIGHT HIP JOINT: ICD-10-CM

## 2019-08-05 PROCEDURE — 73721 MRI JNT OF LWR EXTRE W/O DYE: CPT | Performed by: ORTHOPAEDIC SURGERY

## 2019-08-08 PROBLEM — M43.16 SPONDYLOLISTHESIS OF LUMBAR REGION: Status: ACTIVE | Noted: 2019-08-08

## 2019-08-10 ENCOUNTER — PATIENT MESSAGE (OUTPATIENT)
Dept: INTERNAL MEDICINE CLINIC | Facility: CLINIC | Age: 74
End: 2019-08-10

## 2019-08-10 NOTE — TELEPHONE ENCOUNTER
See other [employer] message 8/10/19. From: Paul Kimball  To: Zaheer Tinoco MD  Sent: 8/10/2019  7:38 AM CDT  Subject: Test Results Question    This is in regard to my previous message.  The ovarian cyst appears to be 3.1 cm and my follow-up vis

## 2019-08-13 ENCOUNTER — HOSPITAL ENCOUNTER (OUTPATIENT)
Dept: GENERAL RADIOLOGY | Age: 74
Discharge: HOME | End: 2019-08-13
Attending: ORTHOPAEDIC SURGERY
Payer: MEDICARE

## 2019-08-13 ENCOUNTER — HOSPITAL ENCOUNTER (OUTPATIENT)
Dept: MRI IMAGING | Age: 74
Discharge: HOME | End: 2019-08-13
Attending: ORTHOPAEDIC SURGERY
Payer: MEDICARE

## 2019-08-13 DIAGNOSIS — M25.551 RIGHT HIP PAIN: ICD-10-CM

## 2019-08-13 DIAGNOSIS — M54.42 CHRONIC LOW BACK PAIN WITH BILATERAL SCIATICA: ICD-10-CM

## 2019-08-13 DIAGNOSIS — M85.80 OSTEOPENIA, UNSPECIFIED LOCATION: ICD-10-CM

## 2019-08-13 DIAGNOSIS — M54.41 CHRONIC LOW BACK PAIN WITH BILATERAL SCIATICA: ICD-10-CM

## 2019-08-13 DIAGNOSIS — M16.11 OSTEOARTHRITIS OF RIGHT HIP: ICD-10-CM

## 2019-08-13 DIAGNOSIS — M43.16 SPONDYLOLISTHESIS OF LUMBAR REGION: ICD-10-CM

## 2019-08-13 DIAGNOSIS — G89.29 CHRONIC LOW BACK PAIN WITH BILATERAL SCIATICA: ICD-10-CM

## 2019-08-13 PROCEDURE — 72148 MRI LUMBAR SPINE W/O DYE: CPT | Performed by: ORTHOPAEDIC SURGERY

## 2019-08-13 PROCEDURE — 72100 X-RAY EXAM L-S SPINE 2/3 VWS: CPT | Performed by: ORTHOPAEDIC SURGERY

## 2019-08-14 PROBLEM — M54.42 CHRONIC BILATERAL LOW BACK PAIN WITH BILATERAL SCIATICA: Status: ACTIVE | Noted: 2019-08-14

## 2019-08-14 PROBLEM — G89.29 CHRONIC BILATERAL LOW BACK PAIN WITH BILATERAL SCIATICA: Status: ACTIVE | Noted: 2019-08-14

## 2019-08-14 PROBLEM — S32.401D CLOSED NONDISPLACED FRACTURE OF RIGHT ACETABULUM WITH ROUTINE HEALING: Status: ACTIVE | Noted: 2019-08-14

## 2019-08-14 PROBLEM — M54.41 CHRONIC BILATERAL LOW BACK PAIN WITH BILATERAL SCIATICA: Status: ACTIVE | Noted: 2019-08-14

## 2019-08-15 PROBLEM — D16.6: Status: ACTIVE | Noted: 2019-08-15

## 2019-08-22 ENCOUNTER — HOSPITAL ENCOUNTER (OUTPATIENT)
Dept: ULTRASOUND IMAGING | Age: 74
Discharge: HOME | End: 2019-08-22
Attending: INTERNAL MEDICINE
Payer: MEDICARE

## 2019-08-22 DIAGNOSIS — N83.202 LEFT OVARIAN CYST: ICD-10-CM

## 2019-08-22 PROCEDURE — 76830 TRANSVAGINAL US NON-OB: CPT | Performed by: INTERNAL MEDICINE

## 2019-08-22 PROCEDURE — 76856 US EXAM PELVIC COMPLETE: CPT | Performed by: INTERNAL MEDICINE

## 2019-08-27 ENCOUNTER — OFFICE VISIT (OUTPATIENT)
Dept: INTERNAL MEDICINE CLINIC | Facility: CLINIC | Age: 74
End: 2019-08-27
Payer: MEDICARE

## 2019-08-27 VITALS
HEIGHT: 63 IN | SYSTOLIC BLOOD PRESSURE: 124 MMHG | TEMPERATURE: 98 F | RESPIRATION RATE: 12 BRPM | DIASTOLIC BLOOD PRESSURE: 70 MMHG | HEART RATE: 75 BPM | BODY MASS INDEX: 37.21 KG/M2 | WEIGHT: 210 LBS

## 2019-08-27 DIAGNOSIS — Z12.31 ENCOUNTER FOR SCREENING MAMMOGRAM FOR BREAST CANCER: ICD-10-CM

## 2019-08-27 DIAGNOSIS — M16.11 PRIMARY OSTEOARTHRITIS OF RIGHT HIP: ICD-10-CM

## 2019-08-27 DIAGNOSIS — M81.0 AGE-RELATED OSTEOPOROSIS WITHOUT CURRENT PATHOLOGICAL FRACTURE: ICD-10-CM

## 2019-08-27 DIAGNOSIS — I87.2 PERIPHERAL VENOUS INSUFFICIENCY: ICD-10-CM

## 2019-08-27 DIAGNOSIS — N83.202 LEFT OVARIAN CYST: ICD-10-CM

## 2019-08-27 DIAGNOSIS — Z00.00 MEDICARE ANNUAL WELLNESS VISIT, SUBSEQUENT: Primary | ICD-10-CM

## 2019-08-27 DIAGNOSIS — E89.0 POSTOPERATIVE HYPOTHYROIDISM: ICD-10-CM

## 2019-08-27 PROCEDURE — G0439 PPPS, SUBSEQ VISIT: HCPCS | Performed by: INTERNAL MEDICINE

## 2019-08-28 PROBLEM — M85.80 OSTEOPENIA: Status: RESOLVED | Noted: 2019-07-23 | Resolved: 2019-08-28

## 2019-08-28 PROBLEM — M54.41 CHRONIC BILATERAL LOW BACK PAIN WITH BILATERAL SCIATICA: Status: RESOLVED | Noted: 2019-08-14 | Resolved: 2019-08-28

## 2019-08-28 PROBLEM — M54.42 CHRONIC BILATERAL LOW BACK PAIN WITH BILATERAL SCIATICA: Status: RESOLVED | Noted: 2019-08-14 | Resolved: 2019-08-28

## 2019-08-28 PROBLEM — S32.401D CLOSED NONDISPLACED FRACTURE OF RIGHT ACETABULUM WITH ROUTINE HEALING: Status: RESOLVED | Noted: 2019-08-14 | Resolved: 2019-08-28

## 2019-08-28 PROBLEM — S76.312A HAMSTRING MUSCLE STRAIN, LEFT, INITIAL ENCOUNTER: Status: RESOLVED | Noted: 2017-10-02 | Resolved: 2019-08-28

## 2019-08-28 PROBLEM — G89.29 CHRONIC BILATERAL LOW BACK PAIN WITH BILATERAL SCIATICA: Status: RESOLVED | Noted: 2019-08-14 | Resolved: 2019-08-28

## 2019-08-30 ENCOUNTER — LAB ENCOUNTER (OUTPATIENT)
Dept: LAB | Age: 74
End: 2019-08-30
Attending: INTERNAL MEDICINE
Payer: MEDICARE

## 2019-08-30 DIAGNOSIS — E89.0 POSTOPERATIVE HYPOTHYROIDISM: ICD-10-CM

## 2019-08-30 DIAGNOSIS — Z00.00 MEDICARE ANNUAL WELLNESS VISIT, SUBSEQUENT: ICD-10-CM

## 2019-08-30 DIAGNOSIS — M81.0 AGE-RELATED OSTEOPOROSIS WITHOUT CURRENT PATHOLOGICAL FRACTURE: ICD-10-CM

## 2019-08-30 LAB
25(OH)D3 SERPL-MCNC: 44.4 NG/ML (ref 30–100)
ALBUMIN SERPL-MCNC: 3.4 G/DL (ref 3.4–5)
ALBUMIN/GLOB SERPL: 1 {RATIO} (ref 1–2)
ALP LIVER SERPL-CCNC: 74 U/L (ref 55–142)
ALT SERPL-CCNC: 19 U/L (ref 13–56)
ANION GAP SERPL CALC-SCNC: 6 MMOL/L (ref 0–18)
AST SERPL-CCNC: 16 U/L (ref 15–37)
BASOPHILS # BLD AUTO: 0.08 X10(3) UL (ref 0–0.2)
BASOPHILS NFR BLD AUTO: 1.2 %
BILIRUB SERPL-MCNC: 0.3 MG/DL (ref 0.1–2)
BUN BLD-MCNC: 24 MG/DL (ref 7–18)
BUN/CREAT SERPL: 27.3 (ref 10–20)
CALCIUM BLD-MCNC: 8.9 MG/DL (ref 8.5–10.1)
CHLORIDE SERPL-SCNC: 112 MMOL/L (ref 98–112)
CHOLEST SMN-MCNC: 125 MG/DL (ref ?–200)
CO2 SERPL-SCNC: 25 MMOL/L (ref 21–32)
CREAT BLD-MCNC: 0.88 MG/DL (ref 0.55–1.02)
DEPRECATED RDW RBC AUTO: 51.4 FL (ref 35.1–46.3)
EOSINOPHIL # BLD AUTO: 0.23 X10(3) UL (ref 0–0.7)
EOSINOPHIL NFR BLD AUTO: 3.3 %
ERYTHROCYTE [DISTWIDTH] IN BLOOD BY AUTOMATED COUNT: 13.4 % (ref 11–15)
GLOBULIN PLAS-MCNC: 3.4 G/DL (ref 2.8–4.4)
GLUCOSE BLD-MCNC: 83 MG/DL (ref 70–99)
HCT VFR BLD AUTO: 40.1 % (ref 35–48)
HDLC SERPL-MCNC: 45 MG/DL (ref 40–59)
HGB BLD-MCNC: 13.2 G/DL (ref 12–16)
IMM GRANULOCYTES # BLD AUTO: 0.01 X10(3) UL (ref 0–1)
IMM GRANULOCYTES NFR BLD: 0.1 %
LDLC SERPL CALC-MCNC: 61 MG/DL (ref ?–100)
LYMPHOCYTES # BLD AUTO: 2.06 X10(3) UL (ref 1–4)
LYMPHOCYTES NFR BLD AUTO: 29.9 %
M PROTEIN MFR SERPL ELPH: 6.8 G/DL (ref 6.4–8.2)
MCH RBC QN AUTO: 34.2 PG (ref 26–34)
MCHC RBC AUTO-ENTMCNC: 32.9 G/DL (ref 31–37)
MCV RBC AUTO: 103.9 FL (ref 80–100)
MONOCYTES # BLD AUTO: 0.68 X10(3) UL (ref 0.1–1)
MONOCYTES NFR BLD AUTO: 9.9 %
NEUTROPHILS # BLD AUTO: 3.83 X10 (3) UL (ref 1.5–7.7)
NEUTROPHILS # BLD AUTO: 3.83 X10(3) UL (ref 1.5–7.7)
NEUTROPHILS NFR BLD AUTO: 55.6 %
NONHDLC SERPL-MCNC: 80 MG/DL (ref ?–130)
OSMOLALITY SERPL CALC.SUM OF ELEC: 299 MOSM/KG (ref 275–295)
PATIENT FASTING: YES
PATIENT FASTING: YES
PLATELET # BLD AUTO: 228 10(3)UL (ref 150–450)
POTASSIUM SERPL-SCNC: 4.2 MMOL/L (ref 3.5–5.1)
RBC # BLD AUTO: 3.86 X10(6)UL (ref 3.8–5.3)
SODIUM SERPL-SCNC: 143 MMOL/L (ref 136–145)
TRIGL SERPL-MCNC: 96 MG/DL (ref 30–149)
TSI SER-ACNC: 2.65 MIU/ML (ref 0.36–3.74)
VLDLC SERPL CALC-MCNC: 19 MG/DL (ref 0–30)
WBC # BLD AUTO: 6.9 X10(3) UL (ref 4–11)

## 2019-08-30 PROCEDURE — 82306 VITAMIN D 25 HYDROXY: CPT

## 2019-08-30 PROCEDURE — 36415 COLL VENOUS BLD VENIPUNCTURE: CPT

## 2019-08-30 PROCEDURE — 80061 LIPID PANEL: CPT

## 2019-08-30 PROCEDURE — 85025 COMPLETE CBC W/AUTO DIFF WBC: CPT

## 2019-08-30 PROCEDURE — 84443 ASSAY THYROID STIM HORMONE: CPT

## 2019-08-30 PROCEDURE — 80053 COMPREHEN METABOLIC PANEL: CPT

## 2019-09-03 ENCOUNTER — OFFICE VISIT (OUTPATIENT)
Dept: PAIN CLINIC | Facility: HOSPITAL | Age: 74
End: 2019-09-03
Attending: ORTHOPAEDIC SURGERY
Payer: MEDICARE

## 2019-09-03 ENCOUNTER — TELEPHONE (OUTPATIENT)
Dept: INTERNAL MEDICINE CLINIC | Facility: CLINIC | Age: 74
End: 2019-09-03

## 2019-09-03 DIAGNOSIS — D75.89 MACROCYTOSIS WITHOUT ANEMIA: Primary | ICD-10-CM

## 2019-09-03 DIAGNOSIS — M16.11 PRIMARY OSTEOARTHRITIS OF RIGHT HIP: Primary | ICD-10-CM

## 2019-09-03 PROCEDURE — 99201 HC OUTPT EVAL AND MGNT NEW PT LEVEL 1: CPT

## 2019-09-03 NOTE — PROGRESS NOTES
Pt presents to [facility] ambulatory for Right hip pain and right groin pain referred by Dr. Holden James. Pt started back in 02/2019, started as a spasm all over body and pt, felt nauseous, sweaty and eventually passed out.  Pt noticed since that day she had R hip p

## 2019-09-03 NOTE — CHRONIC PAIN
Initial Consultation Note  CC: right hip pain   HISTORY OF PRESENT ILLNESS:  Jeannie Philip is a 68year old old female referred to the pain clinic by Dr. Jacinta Grajeda for right hip pain. The pain has been present since February 2019.   She states that she was 2012    • GABBY NEEDLE LOCALIZATION 1 SITE LEFT (CPT=19281)  1980s or early 90s   • OTHER SURGICAL HISTORY      s/p thyroidectomy for benign thyroid nodules   • THYROIDECTOMY     • TONSILLECTOMY         REVIEW OF SYSTEMS:   Bowel/Bladder Incontinence: as abo Food insecurity:        Worry: Not on file        Inability: Not on file      Transportation needs:        Medical: Not on file        Non-medical: Not on file    Tobacco Use      Smoking status: Former Smoker        Packs/day: 1.00      Smokeless tobacco: answers questions appropriately   Head: normocephalic, atraumatic  Eyes: anicteric; no injection  Ears: no obvious deformities noted   Nose: externally grossly within normal limits, no unusual discharge or rhinorrhea   Throat: lips grossly within normal li osseous lesion is evident. A well-circumscribed intrinsically T1 hyperintense, T2 hyperintense lesion involving the L1 segment of the vertebral body measures 1.6 x 1.3 x 1.6 cm, likely reflecting a hemangioma.  Multilevel   reciprocal endplate signal abnor configuration of the thecal sac, bilateral subarticular zone impingement of the descending L5 nerve roots, and mild right greater than left foraminal encroachment of the exiting L4 nerve roots.   L5-S1:   A moderate, diffuse disc bulge is accentuated by sli osteoarthritis of right hip with subchondral cystic changes in the femoral head, and subchondral cystic change and edema in the acetabulum.   A subchondral insufficiency fracture anterior superior acetabulum with associated edema   (image 14 series 5, image Patient to follow up two weeks following the injection      Comprehensive analgesic plan was formulated. Conservative vs. Aggressive measures were discussed at length including pharmacotherapy (eg.  Anti- inflammatories, muscle relaxants, neuropathic medi

## 2019-09-04 ENCOUNTER — TELEPHONE (OUTPATIENT)
Dept: OBGYN CLINIC | Facility: CLINIC | Age: 74
End: 2019-09-04

## 2019-09-04 ENCOUNTER — OFFICE VISIT (OUTPATIENT)
Dept: OBGYN CLINIC | Facility: CLINIC | Age: 74
End: 2019-09-04
Payer: MEDICARE

## 2019-09-04 VITALS
HEART RATE: 56 BPM | BODY MASS INDEX: 37 KG/M2 | SYSTOLIC BLOOD PRESSURE: 109 MMHG | WEIGHT: 211.19 LBS | DIASTOLIC BLOOD PRESSURE: 74 MMHG

## 2019-09-04 DIAGNOSIS — N95.0 POSTMENOPAUSAL BLEEDING: Primary | ICD-10-CM

## 2019-09-04 DIAGNOSIS — R93.89 INCREASED ENDOMETRIAL STRIPE THICKNESS: ICD-10-CM

## 2019-09-04 PROCEDURE — 99213 OFFICE O/P EST LOW 20 MIN: CPT | Performed by: OBSTETRICS & GYNECOLOGY

## 2019-09-04 NOTE — TELEPHONE ENCOUNTER
Schedule endosee with possible biopsy for this patient:  **Do referral if needed. **    Timing: anytime    Diagnosis:  bleed, thickened endometrial stripe    Blood tests: n/a    Medication prep: n/a    Pain med prep: aleve 2 tabs one hour before    Procedure room: needed    Rep Needed: No    Additional equipment: none

## 2019-09-04 NOTE — TELEPHONE ENCOUNTER
CALLED PT'S SECONDARY INSURANCE, [employer] AND WAS TOLD NO PRIOR AUTH IS NEEDED SINCE MEDICARE IS PRIMARY INSURANCE. SPOKE [facility] IS ALREADY SCHEDULED FOR 9-26-19 AT 1:40PM.  PT AWARE TO TAKE 2 ALEVE 1 HOUR PRIOR TO APPT. SHE HAD NO QUESTIONS AT THIS TIME BUT  ENCOURAGED TO CALL BACK WITH ANY QUESTIONS OR CONCERNS.

## 2019-09-05 ENCOUNTER — DOCUMENTATION ONLY (OUTPATIENT)
Dept: PAIN CLINIC | Facility: HOSPITAL | Age: 74
End: 2019-09-05

## 2019-09-05 NOTE — PROGRESS NOTES
Procedure code 68189 --scheduled for 09/09/19    No PA needed per pt's insurance    Order form sent to the surgery center, confirmation rcv'd

## 2019-09-06 ENCOUNTER — APPOINTMENT (OUTPATIENT)
Dept: LAB | Age: 74
End: 2019-09-06
Attending: INTERNAL MEDICINE
Payer: MEDICARE

## 2019-09-06 LAB
FOLATE SERPL-MCNC: >20 NG/ML (ref 8.7–?)
VIT B12 SERPL-MCNC: 636 PG/ML (ref 193–986)

## 2019-09-06 PROCEDURE — 36415 COLL VENOUS BLD VENIPUNCTURE: CPT | Performed by: INTERNAL MEDICINE

## 2019-09-06 PROCEDURE — 82607 VITAMIN B-12: CPT | Performed by: INTERNAL MEDICINE

## 2019-09-06 PROCEDURE — 82746 ASSAY OF FOLIC ACID SERUM: CPT | Performed by: INTERNAL MEDICINE

## 2019-09-08 ENCOUNTER — TELEPHONE (OUTPATIENT)
Dept: INTERNAL MEDICINE CLINIC | Facility: CLINIC | Age: 74
End: 2019-09-08

## 2019-09-08 DIAGNOSIS — D75.89 MACROCYTOSIS WITHOUT ANEMIA: Primary | ICD-10-CM

## 2019-09-10 NOTE — TELEPHONE ENCOUNTER
Current Outpatient Medications:   ••  FLUoxetine HCl 20 MG Oral Cap, Take 1 capsule (20 mg total) by mouth once daily. , Disp: 90 capsule, Rfl: 0  •

## 2019-09-11 PROBLEM — N95.0 POSTMENOPAUSAL BLEEDING: Status: ACTIVE | Noted: 2019-09-11

## 2019-09-11 PROBLEM — R93.89 INCREASED ENDOMETRIAL STRIPE THICKNESS: Status: ACTIVE | Noted: 2019-09-11

## 2019-09-11 RX ORDER — FLUOXETINE HYDROCHLORIDE 20 MG/1
20 CAPSULE ORAL
Qty: 90 CAPSULE | Refills: 1 | Status: SHIPPED | OUTPATIENT
Start: 2019-09-11 | End: 2020-03-10

## 2019-09-11 NOTE — TELEPHONE ENCOUNTER
Refill passed per [facility] protocol.   Refill Protocol Appointment Criteria  · Appointment scheduled in the past 6 months or in the next 3 months  Recent Outpatient Visits            1 week ago Postmenopausal bleeding    [facility]

## 2019-09-11 NOTE — PROGRESS NOTES
Veena Rubin is a 68year old female  No LMP recorded. Patient is postmenopausal. Patient presents with:  Gyn Problem: thickening in pelvis, brownish discharge x 5-6 months.  Hx D&C w/ Dr. Arlette Luna over 6 years for ?mass inside uterus -- does not recall Comment: rarely      Drug use: No      Sexual activity: Not on file    Lifestyle      Physical activity:        Days per week: Not on file        Minutes per session: Not on file      Stress: Not on file    Relationships      Social connections:         Ta MG Oral Capsule Delayed Release, Take  by mouth., Disp: , Rfl:     ALLERGIES:  No Known Allergies      Review of Systems:  Constitutional:    denies fatigue, night sweats, hot flashes  Cardiovascular:   denies chest pain or palpitations  Respiratory:    de (CPT=73721), 8/05/2019, 9:24. [address] [place] [address] Dr Michael, MRI SPINE LUMBAR (CPT=72148), 8/13/2019, 13:22. INDICATIONS: Postmenopausal times 15 years ovarian cyst, left side  TECHNIQUE: Pelvic ultrasound using transabdominal and transvaginal technique.   A

## 2019-09-13 ENCOUNTER — OFFICE VISIT (OUTPATIENT)
Dept: HEMATOLOGY/ONCOLOGY | Facility: HOSPITAL | Age: 74
End: 2019-09-13
Attending: INTERNAL MEDICINE
Payer: MEDICARE

## 2019-09-13 VITALS
TEMPERATURE: 98 F | HEIGHT: 63 IN | DIASTOLIC BLOOD PRESSURE: 68 MMHG | RESPIRATION RATE: 18 BRPM | SYSTOLIC BLOOD PRESSURE: 123 MMHG | WEIGHT: 208.75 LBS | BODY MASS INDEX: 36.99 KG/M2 | HEART RATE: 51 BPM | OXYGEN SATURATION: 97 %

## 2019-09-13 DIAGNOSIS — D75.89 MACROCYTOSIS: Primary | ICD-10-CM

## 2019-09-13 PROCEDURE — 99204 OFFICE O/P NEW MOD 45 MIN: CPT | Performed by: INTERNAL MEDICINE

## 2019-09-14 NOTE — CONSULTS
[facility]    PATIENT'S NAME: NEIDA NELA A   CONSULTING PHYSICIAN: Osman Gunter MD   PATIENT ACCOUNT #: [de-identified] LOCATION: [address] [place] [address] RECORD #: [id number] YOB: 1945   CONSULTATION DATE: 09/13/2019       CANCER CENT Temperature 98.1 Fahrenheit, blood pressure 123/68, pulse 51, respiratory rate 16, pulse ox 97% on room air. HEENT:  Moist mucous membranes. Oropharynx clear. NECK:  Supple. LUNGS:  Symmetric expansion, nonlabored breathing.   HEART:  Good distal pulses

## 2019-09-20 ENCOUNTER — HOSPITAL ENCOUNTER (OUTPATIENT)
Dept: MAMMOGRAPHY | Age: 74
Discharge: HOME | End: 2019-09-20
Attending: INTERNAL MEDICINE
Payer: MEDICARE

## 2019-09-20 ENCOUNTER — NURSE TRIAGE (OUTPATIENT)
Dept: INTERNAL MEDICINE CLINIC | Facility: CLINIC | Age: 74
End: 2019-09-20

## 2019-09-20 DIAGNOSIS — Z12.31 ENCOUNTER FOR SCREENING MAMMOGRAM FOR BREAST CANCER: ICD-10-CM

## 2019-09-20 PROCEDURE — 77063 BREAST TOMOSYNTHESIS BI: CPT | Performed by: INTERNAL MEDICINE

## 2019-09-20 PROCEDURE — 77067 SCR MAMMO BI INCL CAD: CPT | Performed by: INTERNAL MEDICINE

## 2019-09-20 NOTE — TELEPHONE ENCOUNTER
Action Requested: Summary for Provider     []  Critical Lab, Recommendations Needed  [] Need Additional Advice  []   FYI    []   Need Orders  [] Need Medications Sent to Pharmacy  []  Other     SUMMARY: Per protocol, advised office visit.  Appointment sched

## 2019-09-20 NOTE — TELEPHONE ENCOUNTER
----- Message from Karlee Gandhi sent at 9/20/2019  9:57 AM CDT -----  Regarding: Other  Contact: [phone]  Along with my groin pain and hip arthritis problems, I fell on my back Sept. 4th and it still is very painful!  Will it eventually get better w

## 2019-09-23 ENCOUNTER — TELEPHONE (OUTPATIENT)
Dept: INTERNAL MEDICINE CLINIC | Facility: CLINIC | Age: 74
End: 2019-09-23

## 2019-09-23 ENCOUNTER — OFFICE VISIT (OUTPATIENT)
Dept: INTERNAL MEDICINE CLINIC | Facility: CLINIC | Age: 74
End: 2019-09-23
Payer: MEDICARE

## 2019-09-23 ENCOUNTER — OFFICE VISIT (OUTPATIENT)
Dept: PAIN CLINIC | Facility: HOSPITAL | Age: 74
End: 2019-09-23
Attending: ANESTHESIOLOGY
Payer: MEDICARE

## 2019-09-23 VITALS
HEIGHT: 63 IN | SYSTOLIC BLOOD PRESSURE: 106 MMHG | HEART RATE: 76 BPM | DIASTOLIC BLOOD PRESSURE: 68 MMHG | WEIGHT: 206 LBS | BODY MASS INDEX: 36.5 KG/M2

## 2019-09-23 DIAGNOSIS — M16.11 PRIMARY OSTEOARTHRITIS OF RIGHT HIP: Primary | ICD-10-CM
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
## 2019-09-23 DIAGNOSIS — T14.8XXA MUSCLE STRAIN: Primary | ICD-10-CM

## 2019-09-23 PROCEDURE — G0463 HOSPITAL OUTPT CLINIC VISIT: HCPCS | Performed by: NURSE PRACTITIONER

## 2019-09-23 PROCEDURE — 99211 OFF/OP EST MAY X REQ PHY/QHP: CPT

## 2019-09-23 PROCEDURE — 99214 OFFICE O/P EST MOD 30 MIN: CPT | Performed by: NURSE PRACTITIONER

## 2019-09-23 RX ORDER — TIZANIDINE HYDROCHLORIDE 2 MG/1
2 CAPSULE, GELATIN COATED ORAL 3 TIMES DAILY
Qty: 30 CAPSULE | Refills: 0 | Status: SHIPPED | OUTPATIENT
Start: 2019-09-23 | End: 2019-09-23 | Stop reason: CLARIF

## 2019-09-23 RX ORDER — TIZANIDINE 2 MG/1
TABLET ORAL
Qty: 40 TABLET | Refills: 0 | Status: SHIPPED | OUTPATIENT
Start: 2019-09-23 | End: 2019-11-15 | Stop reason: ALTCHOICE

## 2019-09-23 NOTE — PROGRESS NOTES
Patient arrived at pain clinic today with , ambulatory using a cane. Here for 2 week follow up after injection to right hip, states she feels 100% denies any pain. Patient states she had a recent fall at home 9/4/2019.  Takes Aleve 2 tabs BID   MD in

## 2019-09-23 NOTE — TELEPHONE ENCOUNTER
Donemelony Bedolla    Patient will need a X-ray of her Spine, Pelvis, and Hip. Patient called and she will go to the [place] location to complete.     Dorothy Salcedo, ANP

## 2019-09-23 NOTE — PROGRESS NOTES
HPI:    Patient ID: Bella Kearns is a 68year old female. HPI- Patient here for lower back pain. Patient fell on Sept 4th. Patient fell in her house on a flat surface. She had an appointment on Sept 11 down at [facility].  She was being evaluated for a hema MG-UNIT Oral Cap Take  by mouth. Disp:  Rfl:    Multiple Vitamins Oral Tab Take  by mouth. Disp:  Rfl:    Omega-3 Fatty Acids (KP OMEGA-3 FISH OIL) 1200 MG Oral Capsule Delayed Release Take  by mouth.  Disp:  Rfl:      Allergies:No Known Allergies   PHYSICA instructed her to see her PCP. Her pain is in her lower back. She points to the right lower back. She has been taking aleve and a lidocaine patch. She still feels discomfort when she moves. Plan  - Muscle relaxant Zanaflex  2mg.  Take Zanaflex at night a

## 2019-09-23 NOTE — PATIENT INSTRUCTIONS
Back Sprain or Strain    Injury to the muscles (strain) or ligaments (sprain) around the spine can be troubling.  Injury may occur after a sudden forceful twisting or bending such as in a car accident, after a simple awkward movement, or after lifting pepe · You can alternate the ice and heat. Talk with your healthcare provider to find out the best treatment or therapy for your back pain. · Therapeutic massage can help relax the back muscles without stretching them. · Be aware of safe lifting methods.  James Cobb Call your healthcare provider right away if any of the following occur:  · Pain gets worse or spreads to your arms or legs  · Weakness or numbness in one or both arms or legs  · Numbness in the groin or genital area  Date Last Reviewed: 6/1/2016 © 2000-20

## 2019-09-23 NOTE — CHRONIC PAIN
Initial Consultation Note  CC: right hip pain   HISTORY OF PRESENT ILLNESS:  Jeremy Bone is a 68year old old female referred to the pain clinic by dr Donal Ho  for right hip pain. The pain has been present since February 2019.   She states that she was Surgical History:   Procedure Laterality Date   • BREAST BIOPSY Left 1990   • COLONOSCOPY      2012    • D & C  2013    \"mass\" inside uterus -- Dr. Rey Villa    s/p thyroidectomy for benign thyroid nodules   • TONSILLECTOMY resource strain: Not on file      Food insecurity:        Worry: Not on file        Inability: Not on file      Transportation needs:        Medical: Not on file        Non-medical: Not on file    Tobacco Use      Smoking status: Former Smoker        Packs appropriate disposition and demeanor, answers questions appropriately   Head: normocephalic, atraumatic  Eyes: anicteric; no injection  Ears: no obvious deformities noted   Nose: externally grossly within normal limits, no unusual discharge or rhinorrhea No fracture or suspicious osseous lesion is evident. A well-circumscribed intrinsically T1 hyperintense, T2 hyperintense lesion involving the L1 segment of the vertebral body measures 1.6 x 1.3 x 1.6 cm, likely reflecting a hemangioma.  Multileve spinal canal stenosis with a trefoil configuration of the thecal sac, bilateral subarticular zone impingement of the descending L5 nerve roots, and mild right greater than left foraminal encroachment of the exiting L4 nerve roots.   L5-S1:   A moderate, dif series 7     HIP JOINT:        Moderate osteoarthritis of right hip with subchondral cystic changes in the femoral head, and subchondral cystic change and edema in the acetabulum.   A subchondral insufficiency fracture anterior superior acetabulum with asso injection helped her out a lot  Tylenol as needed for pain control she can repeat the injection if needed       Comprehensive analgesic plan was formulated. Conservative vs. Aggressive measures were discussed at length including pharmacotherapy (eg.  Anti-

## 2019-09-24 ENCOUNTER — HOSPITAL ENCOUNTER (OUTPATIENT)
Dept: GENERAL RADIOLOGY | Age: 74
Discharge: HOME | End: 2019-09-24
Attending: NURSE PRACTITIONER
Payer: MEDICARE

## 2019-09-24 DIAGNOSIS — T14.8XXA MUSCLE STRAIN: ICD-10-CM
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
## 2019-09-24 PROCEDURE — 72110 X-RAY EXAM L-2 SPINE 4/>VWS: CPT | Performed by: NURSE PRACTITIONER

## 2019-09-24 PROCEDURE — 73502 X-RAY EXAM HIP UNI 2-3 VIEWS: CPT | Performed by: NURSE PRACTITIONER

## 2019-09-24 NOTE — ASSESSMENT & PLAN NOTE
A/P Patient fell on Sept 4th in her house on a flat surface. She fell backwards and she did not hither head. She had no LOC. She did not seek catracho medical attention after the fall. She had an appointment down at [facility] for evaluation of her spine.

## 2019-09-25 ENCOUNTER — TELEPHONE (OUTPATIENT)
Dept: INTERNAL MEDICINE CLINIC | Facility: CLINIC | Age: 74
End: 2019-09-25

## 2019-09-25 NOTE — TELEPHONE ENCOUNTER
Coni Gandhi      Follow up phone call on XR of Lumbar spine. She said she is feeling better. Moderate acute superior central endplate compression fracture at the L2 level has developed. She can follow up with her ortho. Dr. Christi Kearns.

## 2019-09-26 ENCOUNTER — TELEPHONE (OUTPATIENT)
Dept: OBGYN CLINIC | Facility: CLINIC | Age: 74
End: 2019-09-26

## 2019-09-26 ENCOUNTER — OFFICE VISIT (OUTPATIENT)
Dept: OBGYN CLINIC | Facility: CLINIC | Age: 74
End: 2019-09-26
Payer: MEDICARE

## 2019-09-26 VITALS
DIASTOLIC BLOOD PRESSURE: 78 MMHG | BODY MASS INDEX: 37 KG/M2 | SYSTOLIC BLOOD PRESSURE: 123 MMHG | WEIGHT: 207.81 LBS | HEART RATE: 57 BPM

## 2019-09-26 DIAGNOSIS — R93.89 INCREASED ENDOMETRIAL STRIPE THICKNESS: ICD-10-CM

## 2019-09-26 DIAGNOSIS — N95.0 POSTMENOPAUSAL BLEEDING: ICD-10-CM

## 2019-09-26 PROCEDURE — 58555 HYSTEROSCOPY DX SEP PROC: CPT | Performed by: OBSTETRICS & GYNECOLOGY

## 2019-09-26 RX ORDER — TIZANIDINE HYDROCHLORIDE 2 MG/1
CAPSULE, GELATIN COATED ORAL
COMMUNITY
Start: 2019-09-23 | End: 2019-10-16

## 2019-09-26 NOTE — TELEPHONE ENCOUNTER
Please schedule the following surgery once medical clearance obtained:    Procedure: operative hysteroscopic polypectomy    Date: any time    Diagnosis: endometrial polyp,  bleed    Admission:Day surgery    Anesth: general    Preop Medical Clearance needed:  Yes    Additional Orders: routine orders    Bowel Prep: No    Additional equipment:  Myosure    Rep needed: Yes    Additional surgery time needed: none    IPA tubal / hyst form signed: not applicable    Comments / Orders to Nurse: medical clearance needed    Discussed possible complications including but not limited to: Alternatives to surgical intervention discussed with patient in detail., Likely consequences of not undergoing procedure discussed with patient. , anesthesia risks, bleeding, infection, injury, bowel / bladder, perforation of uterus and postop DVT / PE

## 2019-09-26 NOTE — PROCEDURES
Endosee Procedure     LMP: n/a  Birth control method(s) used: n/a    Consent signed. Procedure discussed with the patient in detail including indication, risks, benefits, alternatives and complications.     Indication:  thickened endometrial strip,  b

## 2019-09-27 NOTE — TELEPHONE ENCOUNTER
Lmtcb. Called to inform pt that pre op clearance was needed and to discuss tentative procedure dates.

## 2019-10-02 RX ORDER — LEVOTHYROXINE SODIUM 0.05 MG/1
TABLET ORAL
Qty: 90 TABLET | Refills: 1 | Status: SHIPPED | OUTPATIENT
Start: 2019-10-02 | End: 2020-03-27

## 2019-10-02 NOTE — TELEPHONE ENCOUNTER
Hypothyroid Medications  Protocol Criteria:  Appointment scheduled in the past 12 months or the next 3 months  TSH resulted in the past 12 months that is normal  Recent Outpatient Visits            6 days ago Increased endometrial stripe thickness    [place]

## 2019-10-14 DIAGNOSIS — S32.020A COMPRESSION FRACTURE OF L2 LUMBAR VERTEBRA (HCC): Primary | ICD-10-CM

## 2019-10-14 PROBLEM — S32.020D WEDGE COMPRESSION FRACTURE OF SECOND LUMBAR VERTEBRA WITH ROUTINE HEALING: Status: ACTIVE | Noted: 2019-10-14

## 2019-10-14 NOTE — PLAN OF CARE
· You are scheduled to have a Kyphoplasty of Lumbar 2 With Dr Jeannie Perry   · Do NOT eat or drink anything 4 Hours prior to the procedure  · Medications you are allowed to take can be taken with a sip of water.    Use Betasept/ Hibiclens soap for 3 consecutive da

## 2019-10-17 ENCOUNTER — HOSPITAL ENCOUNTER (OUTPATIENT)
Dept: INTERVENTIONAL RADIOLOGY/VASCULAR | Facility: HOSPITAL | Age: 74
Discharge: HOME | End: 2019-10-17
Attending: RADIOLOGY | Admitting: RADIOLOGY
Payer: MEDICARE

## 2019-10-17 VITALS
RESPIRATION RATE: 15 BRPM | SYSTOLIC BLOOD PRESSURE: 100 MMHG | WEIGHT: 205 LBS | HEART RATE: 54 BPM | BODY MASS INDEX: 36 KG/M2 | DIASTOLIC BLOOD PRESSURE: 56 MMHG | OXYGEN SATURATION: 99 %

## 2019-10-17 DIAGNOSIS — S32.020A COMPRESSION FRACTURE OF L2 LUMBAR VERTEBRA (HCC): ICD-10-CM

## 2019-10-17 PROCEDURE — 0QU03JZ SUPPLEMENT LUMBAR VERTEBRA WITH SYNTHETIC SUBSTITUTE, PERCUTANEOUS APPROACH: ICD-10-PCS | Performed by: RADIOLOGY

## 2019-10-17 PROCEDURE — 99152 MOD SED SAME PHYS/QHP 5/>YRS: CPT

## 2019-10-17 PROCEDURE — 0QS03ZZ REPOSITION LUMBAR VERTEBRA, PERCUTANEOUS APPROACH: ICD-10-PCS | Performed by: RADIOLOGY

## 2019-10-17 PROCEDURE — 22514 PERQ VERTEBRAL AUGMENTATION: CPT

## 2019-10-17 PROCEDURE — 99153 MOD SED SAME PHYS/QHP EA: CPT

## 2019-10-17 RX ORDER — MIDAZOLAM HYDROCHLORIDE 1 MG/ML
INJECTION INTRAMUSCULAR; INTRAVENOUS
Status: COMPLETED
Start: 2019-10-17 | End: 2019-10-17

## 2019-10-17 RX ORDER — ONDANSETRON 2 MG/ML
4 INJECTION INTRAMUSCULAR; INTRAVENOUS ONCE
Status: COMPLETED | OUTPATIENT
Start: 2019-10-17 | End: 2019-10-17

## 2019-10-17 RX ORDER — SODIUM CHLORIDE 9 MG/ML
INJECTION, SOLUTION INTRAVENOUS CONTINUOUS
Status: DISCONTINUED | OUTPATIENT
Start: 2019-10-17 | End: 2019-10-17

## 2019-10-17 RX ORDER — ONDANSETRON 2 MG/ML
INJECTION INTRAMUSCULAR; INTRAVENOUS
Status: COMPLETED
Start: 2019-10-17 | End: 2019-10-17

## 2019-10-17 RX ORDER — SODIUM CHLORIDE 9 MG/ML
INJECTION, SOLUTION INTRAVENOUS
Status: COMPLETED
Start: 2019-10-17 | End: 2019-10-17

## 2019-10-17 RX ORDER — LIDOCAINE HYDROCHLORIDE 20 MG/ML
INJECTION, SOLUTION EPIDURAL; INFILTRATION; INTRACAUDAL; PERINEURAL
Status: COMPLETED
Start: 2019-10-17 | End: 2019-10-17

## 2019-10-17 RX ORDER — SODIUM CHLORIDE 9 MG/ML
INJECTION, SOLUTION INTRAVENOUS
Status: DISCONTINUED
Start: 2019-10-17 | End: 2019-10-17

## 2019-10-17 RX ORDER — CEFAZOLIN SODIUM/WATER 2 G/20 ML
SYRINGE (ML) INTRAVENOUS
Status: COMPLETED
Start: 2019-10-17 | End: 2019-10-17

## 2019-10-17 RX ADMIN — ONDANSETRON 4 MG: 2 INJECTION INTRAMUSCULAR; INTRAVENOUS at 13:41:00

## 2019-10-17 NOTE — IVS NOTE
DISCHARGE NOTE     Pt is able to sit up and ambulate with cane . Procedural site remains dry and intact    No signs and symptoms of bleeding/hematoma noted. Instruction provided, patient/family verbalizes understanding.    Dr. Anca Sharma spoke with patient/fa

## 2019-10-25 ENCOUNTER — OFFICE VISIT (OUTPATIENT)
Dept: PHYSICAL THERAPY | Age: 74
End: 2019-10-25
Attending: RADIOLOGY
Payer: MEDICARE

## 2019-10-25 PROCEDURE — 97110 THERAPEUTIC EXERCISES: CPT | Performed by: PHYSICAL THERAPIST

## 2019-10-25 PROCEDURE — 97161 PT EVAL LOW COMPLEX 20 MIN: CPT | Performed by: PHYSICAL THERAPIST

## 2019-10-25 NOTE — PROGRESS NOTES
P.T. EVALUATION:   Referring Physician: Dr. Jeannie Perry  Diagnosis: Compression fracture of L2 vertebra, initial encounter [facility]) (S32.020A)    Date of Onset: 10/17/2019 Date of Service: 10/25/2019     PATIENT SUMMARY   Sameera Zhao is a 68year old y/o female Ambulatory into department. Palpation: (+) tenderness over lower back area with deep palpation    ROM: Trunk ROM is minimally limited into all planes. Complains of mild back pain with repeated motion into all planes. No radicular symptoms were reported. manual therapy, therapeutic exercises, therapeutic activity, and instructions on a home program.    Education or treatment limitation: None    Rehab Potential:excellent    Patient/Family/Caregiver was advised of these findings, precautions, and treatment o

## 2019-10-29 ENCOUNTER — PATIENT MESSAGE (OUTPATIENT)
Dept: INTERNAL MEDICINE CLINIC | Facility: CLINIC | Age: 74
End: 2019-10-29

## 2019-10-29 ENCOUNTER — TELEPHONE (OUTPATIENT)
Dept: OBGYN CLINIC | Facility: CLINIC | Age: 74
End: 2019-10-29

## 2019-10-29 ENCOUNTER — OFFICE VISIT (OUTPATIENT)
Dept: PHYSICAL THERAPY | Age: 74
End: 2019-10-29
Attending: RADIOLOGY
Payer: MEDICARE

## 2019-10-29 DIAGNOSIS — N84.0 ENDOMETRIAL POLYP: Primary | ICD-10-CM

## 2019-10-29 PROCEDURE — 97110 THERAPEUTIC EXERCISES: CPT

## 2019-10-29 NOTE — TELEPHONE ENCOUNTER
SherriLÃ¡nzanost message sent. CSS=please call patient and assist for pre op OV. From: Khadijah Godwin  To:  Breezy Griggs MD  Sent: 10/29/2019  3:45 PM CDT  Subject: Other    I couldn't see where to schedule an appointment with Dr. Justin Bautista so I'm sendi

## 2019-10-29 NOTE — PROGRESS NOTES
Diagnosis: Compression fracture of L2 vertebra, initial encounter ([facility] [address] (S32.020A)    Date of Onset: 10/17/2019        Next MD visit: none scheduled  Fall Risk: standard         Precautions: n/a        Date of Onset: 10/17/2019  Medication Changes since la

## 2019-10-30 NOTE — TELEPHONE ENCOUNTER
She has medical clearance appt Nov 15th so you may schedule her surgery Nov 19th but warn her it may need to pushed back to Dec if she is not cleared by 18th.   Please schedule the following surgery:    Procedure: hysteroscopic polypectomy    Date: Nov 19th am -- make sure medically cleared by 18th    Diagnosis: endometrial polyp    Admission:Day surgery    Anesth: general    Preop Medical Clearance needed:  Yes    Additional Orders: routine orders    Bowel Prep: No    Additional equipment:  Myosure    Rep needed: Yes    Additional surgery time needed: none    IPA tubal / hyst form signed: not applicable    Comments / Orders to Nurse: none

## 2019-10-31 ENCOUNTER — APPOINTMENT (OUTPATIENT)
Dept: PHYSICAL THERAPY | Age: 74
End: 2019-10-31
Attending: RADIOLOGY
Payer: MEDICARE

## 2019-10-31 ENCOUNTER — TELEPHONE (OUTPATIENT)
Dept: PHYSICAL THERAPY | Age: 74
End: 2019-10-31

## 2019-10-31 NOTE — TELEPHONE ENCOUNTER
Pt confirms her Pre-Op appt is on 11/15. Notified pt [facility] is looking to schedule surgery on 11/19 but pt has to be cleared by 11/18. Advised pt to notify the clinic of clearance status. Informed pt message will be forwarded to staff to call pt with date, time and instructions. Pt is appreciative.

## 2019-10-31 NOTE — TELEPHONE ENCOUNTER
If she did, then tell her I need a letter from that doctor -- based on note I see from PCP RN -- appt is Nov 15th

## 2019-10-31 NOTE — TELEPHONE ENCOUNTER
Patient is scheduled 12/2/19 9:30am  hysteroscopic polypectomy [facility]. Pat orders. Instructions routed via my chart and the pt was informed.

## 2019-11-04 ENCOUNTER — OFFICE VISIT (OUTPATIENT)
Dept: PHYSICAL THERAPY | Age: 74
End: 2019-11-04
Attending: RADIOLOGY
Payer: MEDICARE

## 2019-11-04 PROCEDURE — 97110 THERAPEUTIC EXERCISES: CPT | Performed by: PHYSICAL THERAPIST

## 2019-11-04 NOTE — TELEPHONE ENCOUNTER
Pt states the letter recommends that pt's check with their insurance to see if procedure is covered. Pt states she spoke to someone on our office and was told not to worry, that medicare and her supplemental insurance will cover her surgery. Does pt still need to double check? Pt informed I will send message to the surgery scheduler for answer. Pt also states the letter says PAT with contact her for pre op testing. Pt states she already has an appt with her pcp for that. Pt informed PAT may still need further testing. Message to [address] [place] [address] for recs on insurance coverage.

## 2019-11-04 NOTE — PROGRESS NOTES
Diagnosis: Compression fracture of L2 vertebra, initial encounter ([facility] [address] (S32.020A)    Date of Onset: 10/17/2019        Next MD visit: none scheduled  Fall Risk: standard         Precautions: n/a        Date of Onset: 10/17/2019  Medication Changes since la report back pain of not more than 1/10 during activity. 3. Increase trunk ROM to [facility] into all planes to improve overall mobility.   4. Increase strength to grossly 5/5 throughout to be able to resume previous activities without any major postural deviation

## 2019-11-04 NOTE — TELEPHONE ENCOUNTER
Spoke with pt and advised that medicare does not require PA and clarified that she is scheduled for pre op clearance and the RN thought she was referring to PAT testing, which is completed within a week of her scheduled procedure.

## 2019-11-06 ENCOUNTER — OFFICE VISIT (OUTPATIENT)
Dept: PHYSICAL THERAPY | Age: 74
End: 2019-11-06
Attending: RADIOLOGY
Payer: MEDICARE

## 2019-11-06 PROCEDURE — 97110 THERAPEUTIC EXERCISES: CPT

## 2019-11-06 NOTE — PROGRESS NOTES
Diagnosis: Compression fracture of L2 vertebra, initial encounter ([facility] [address] (S32.020A)    Date of Onset: 10/17/2019        Next MD visit: none scheduled  Fall Risk: standard         Precautions: n/a        Date of Onset: 10/17/2019  Medication Changes since la extension 4 bands 10 x 2 sets (setting 4)  - R/L L/E shuttle knee extension 3 bands 10 x 2 each     - standing B heel raises (level 3) 10 x 2 sets  - standing R/L hip abduction/extension 10 x 2 sets each  - nu-step seat 8 level 5 x 7 minutes with U/E (sett

## 2019-11-11 ENCOUNTER — APPOINTMENT (OUTPATIENT)
Dept: PHYSICAL THERAPY | Age: 74
End: 2019-11-11
Attending: RADIOLOGY
Payer: MEDICARE

## 2019-11-13 ENCOUNTER — OFFICE VISIT (OUTPATIENT)
Dept: PHYSICAL THERAPY | Age: 74
End: 2019-11-13
Attending: CLINICAL NURSE SPECIALIST
Payer: MEDICARE

## 2019-11-13 PROCEDURE — 97110 THERAPEUTIC EXERCISES: CPT

## 2019-11-13 NOTE — PROGRESS NOTES
Diagnosis: Compression fracture of L2 vertebra, initial encounter ([facility] [address] (S32.020A)    Date of Onset: 10/17/2019        Next MD visit: none scheduled  Fall Risk: standard         Precautions: n/a        Date of Onset: 10/17/2019  Medication Changes since la bridging 10 x 5 sec hold  - hooklying R/L marching with 2.5lb wts 10 x 2  - hooklying adductor squeeze with ball 10 x 2  - sidelying R/L hip abduction 10 x 2 sets each  - Green sport cord shoulder rows 10 x 2  - red sport cord shoulder extensions 10 x 2  -

## 2019-11-15 ENCOUNTER — OFFICE VISIT (OUTPATIENT)
Dept: INTERNAL MEDICINE CLINIC | Facility: CLINIC | Age: 74
End: 2019-11-15
Payer: MEDICARE

## 2019-11-15 VITALS
RESPIRATION RATE: 12 BRPM | HEIGHT: 63 IN | HEART RATE: 76 BPM | TEMPERATURE: 98 F | WEIGHT: 208 LBS | BODY MASS INDEX: 36.86 KG/M2 | DIASTOLIC BLOOD PRESSURE: 70 MMHG | SYSTOLIC BLOOD PRESSURE: 110 MMHG

## 2019-11-15 DIAGNOSIS — Z01.818 PREOP GENERAL PHYSICAL EXAM: Primary | ICD-10-CM

## 2019-11-15 DIAGNOSIS — N84.0 UTERINE POLYP: ICD-10-CM

## 2019-11-15 DIAGNOSIS — E03.9 ACQUIRED HYPOTHYROIDISM: ICD-10-CM

## 2019-11-15 PROCEDURE — 93010 ELECTROCARDIOGRAM REPORT: CPT | Performed by: INTERNAL MEDICINE

## 2019-11-15 PROCEDURE — 99214 OFFICE O/P EST MOD 30 MIN: CPT | Performed by: INTERNAL MEDICINE

## 2019-11-15 PROCEDURE — G0463 HOSPITAL OUTPT CLINIC VISIT: HCPCS | Performed by: INTERNAL MEDICINE

## 2019-11-15 PROCEDURE — 93005 ELECTROCARDIOGRAM TRACING: CPT | Performed by: INTERNAL MEDICINE

## 2019-11-15 NOTE — PROGRESS NOTES
HPI:    Patient ID: Jeannine Fiore is a 69year old female.     Patient presents today for preop medical clearance as requested by Dr. Cabrera Ruddy gynecologist for upcoming hysteroscopic polypectomy scheduled for December 2, 2019 to be done at [facility] Right Ear: External ear normal.   Left Ear: External ear normal.   Nose: Nose normal.   Mouth/Throat: Oropharynx is clear and moist. No oropharyngeal exudate. Eyes: Pupils are equal, round, and reactive to light.  Conjunctivae and EOM are normal. Right encounter       Imaging & Referrals:  None       #[id number]

## 2019-11-20 ENCOUNTER — OFFICE VISIT (OUTPATIENT)
Dept: PHYSICAL THERAPY | Age: 74
End: 2019-11-20
Attending: RADIOLOGY
Payer: MEDICARE

## 2019-11-20 PROCEDURE — 97110 THERAPEUTIC EXERCISES: CPT

## 2019-11-20 NOTE — PROGRESS NOTES
Diagnosis: Compression fracture of L2 vertebra, initial encounter ([facility] [address] (S32.020A)    Date of Onset: 10/17/2019        Next MD visit: none scheduled  Fall Risk: standard         Precautions: n/a        Date of Onset: 10/17/2019  Medication Changes since la bands 10 x 3  - single LE shuttle 4 bands 10 x 3  - B heel raises 10 x 3  - R/L fwd step up opposite L/E march on 6 inch step 10 x 2 sets each   - Nustep level 7 x 7 minutes with U/E seat 8 - seated posture correction exercises  - supine hamstring stretch Assessment: Advanced proprioceptive exercises to address deficit without pain noted only fatigue. PT and patient goals are in progress. Goals: to be met in 6 weeks  1.  Patient will be independent with HEP, its progression, and management of re

## 2019-11-22 ENCOUNTER — OFFICE VISIT (OUTPATIENT)
Dept: PHYSICAL THERAPY | Age: 74
End: 2019-11-22
Attending: RADIOLOGY
Payer: MEDICARE

## 2019-11-22 PROCEDURE — 97110 THERAPEUTIC EXERCISES: CPT

## 2019-11-22 NOTE — PROGRESS NOTES
Diagnosis: Compression fracture of L2 vertebra, initial encounter ([facility] [address] (S32.020A)    Date of Onset: 10/17/2019        Next MD visit: none scheduled  Fall Risk: standard         Precautions: n/a        Date of Onset: 10/17/2019  Medication Changes since la correction exercises  - supine hamstring stretch 10 x 5 sec hold   - supine trunk rotation stretch 10x  - bridging 12 x 2 sets for 5 sec hold  - hooklying R/L marching with 2.5lb wts 10 x 3  - hooklying adductor squeeze with ball 10 x 2 for 5 second holds hooklying R/L BKFO with red t-band 10 x 2 sets each   - bridging 10 x 1 set  - hooklying PPT 10 x 2 sets for 5 second holds  - hooklying PPT R/L march 10 x 2 sets each  - sidelying R/L hip abduction 10 x 2 sets each  - B L/E shuttle knee extension 4 bands

## 2019-11-25 ENCOUNTER — OFFICE VISIT (OUTPATIENT)
Dept: PHYSICAL THERAPY | Age: 74
End: 2019-11-25
Attending: RADIOLOGY
Payer: MEDICARE

## 2019-11-25 PROCEDURE — 97110 THERAPEUTIC EXERCISES: CPT | Performed by: PHYSICAL THERAPIST

## 2019-11-25 NOTE — PROGRESS NOTES
Diagnosis: Compression fracture of L2 vertebra, initial encounter ([facility] [address] (S32.020A)    Date of Onset: 10/17/2019        Next MD visit: none scheduled  Fall Risk: standard         Precautions: n/a        Date of Onset: 10/17/2019  Medication Changes since la eyes closed 2 x 1 minute   - standing R/L hip abduction/extension with RTB above knee  - standing B heel raises on slantboard (level 3) 3 x 10  - R/L fwd step up opposite L/E march on 6 inch step 10 x 3 sets each   - Green sport cord shoulder rows 10 x 3

## 2019-11-27 ENCOUNTER — OFFICE VISIT (OUTPATIENT)
Dept: INTERNAL MEDICINE CLINIC | Facility: CLINIC | Age: 74
End: 2019-11-27
Payer: MEDICARE

## 2019-11-27 ENCOUNTER — APPOINTMENT (OUTPATIENT)
Dept: PHYSICAL THERAPY | Age: 74
End: 2019-11-27
Attending: RADIOLOGY
Payer: MEDICARE

## 2019-11-27 VITALS
SYSTOLIC BLOOD PRESSURE: 114 MMHG | HEIGHT: 63 IN | HEART RATE: 81 BPM | BODY MASS INDEX: 36.32 KG/M2 | WEIGHT: 205 LBS | DIASTOLIC BLOOD PRESSURE: 74 MMHG

## 2019-11-27 DIAGNOSIS — B35.1 ONYCHOMYCOSIS OF GREAT TOE: Primary | ICD-10-CM

## 2019-11-27 DIAGNOSIS — R22.1 NODULE OF SKIN OF NECK: ICD-10-CM

## 2019-11-27 PROCEDURE — G0463 HOSPITAL OUTPT CLINIC VISIT: HCPCS | Performed by: INTERNAL MEDICINE

## 2019-11-27 PROCEDURE — 99212 OFFICE O/P EST SF 10 MIN: CPT | Performed by: INTERNAL MEDICINE

## 2019-11-27 NOTE — PROGRESS NOTES
History of Present Illness   Patient ID: Kacie Villalta is a 69year old female.   Chief Complaint: Fungus Nails (bilateral great toes ) and Growth (back of head at neck ,mild itch ,denies bleed or pain x 3 weeks denies personal and family HX of SC)      Fu ANIONGAP 6 08/30/2019    GFR >59 10/27/2008    GFRNAA 66 08/30/2019    GFRAA 76 08/30/2019    CA 8.9 08/30/2019    OSMOCALC 299 (H) 08/30/2019    ALKPHO 74 08/30/2019    AST 16 08/30/2019    ALT 19 08/30/2019    ALKPHOS 89 08/03/2016    BILT 0.3 08/30/2 hypothyroidism    • Postmenopausal atrophic vaginitis 1/29/2015      Reviewed:  Family History   Problem Relation Age of Onset   • Stroke Father    • No Known Problems Sister    • Heart Disorder Brother    • Breast Cancer Neg        Reviewed:  Past Surgica (R22.1) Nodule of skin of neck  Plan: DERM - INTERNAL        Has 2 skin nodules on neck, one on the back and one at the side possibly cysts. Pt told to have derm evaluate             Follow Up:   No follow-ups on file.       Viktoriya Jacobs MD  Int

## 2019-11-29 ENCOUNTER — HOSPITAL ENCOUNTER (INPATIENT)
Facility: HOSPITAL | Age: 74
LOS: 5 days | Discharge: HOME | DRG: 329 | End: 2019-12-04
Attending: EMERGENCY MEDICINE | Admitting: HOSPITALIST
Payer: MEDICARE

## 2019-11-29 ENCOUNTER — APPOINTMENT (OUTPATIENT)
Dept: GENERAL RADIOLOGY | Facility: HOSPITAL | Age: 74
DRG: 329 | End: 2019-11-29
Attending: EMERGENCY MEDICINE
Payer: MEDICARE

## 2019-11-29 ENCOUNTER — ANESTHESIA (OUTPATIENT)
Dept: SURGERY | Facility: HOSPITAL | Age: 74
DRG: 329 | End: 2019-11-29
Payer: MEDICARE

## 2019-11-29 ENCOUNTER — ANESTHESIA EVENT (OUTPATIENT)
Dept: SURGERY | Facility: HOSPITAL | Age: 74
DRG: 329 | End: 2019-11-29
Payer: MEDICARE

## 2019-11-29 ENCOUNTER — APPOINTMENT (OUTPATIENT)
Dept: CT IMAGING | Facility: HOSPITAL | Age: 74
DRG: 329 | End: 2019-11-29
Attending: EMERGENCY MEDICINE
Payer: MEDICARE

## 2019-11-29 DIAGNOSIS — R10.9 ABDOMINAL PAIN, ACUTE: Primary | ICD-10-CM

## 2019-11-29 DIAGNOSIS — R11.2 NAUSEA AND VOMITING, INTRACTABILITY OF VOMITING NOT SPECIFIED, UNSPECIFIED VOMITING TYPE: ICD-10-CM

## 2019-11-29 DIAGNOSIS — K56.609 SMALL BOWEL OBSTRUCTION (HCC): ICD-10-CM

## 2019-11-29 DIAGNOSIS — R31.9 HEMATURIA, UNSPECIFIED TYPE: ICD-10-CM

## 2019-11-29 DIAGNOSIS — M54.50 BACK PAIN, LUMBOSACRAL: ICD-10-CM

## 2019-11-29 DIAGNOSIS — K55.9 ISCHEMIC BOWEL DISEASE (HCC): ICD-10-CM

## 2019-11-29 PROBLEM — R79.89 AZOTEMIA: Status: ACTIVE | Noted: 2019-11-29

## 2019-11-29 PROBLEM — E87.2 METABOLIC ACIDOSIS: Status: ACTIVE | Noted: 2019-11-29

## 2019-11-29 PROBLEM — R73.9 HYPERGLYCEMIA: Status: ACTIVE | Noted: 2019-11-29

## 2019-11-29 PROBLEM — E87.20 METABOLIC ACIDOSIS: Status: ACTIVE | Noted: 2019-11-29

## 2019-11-29 PROCEDURE — 0D9670Z DRAINAGE OF STOMACH WITH DRAINAGE DEVICE, VIA NATURAL OR ARTIFICIAL OPENING: ICD-10-PCS | Performed by: SURGERY

## 2019-11-29 PROCEDURE — 74177 CT ABD & PELVIS W/CONTRAST: CPT | Performed by: EMERGENCY MEDICINE

## 2019-11-29 PROCEDURE — 0DB80ZZ EXCISION OF SMALL INTESTINE, OPEN APPROACH: ICD-10-PCS | Performed by: SURGERY

## 2019-11-29 PROCEDURE — 44120 REMOVAL OF SMALL INTESTINE: CPT | Performed by: SURGERY

## 2019-11-29 PROCEDURE — 99221 1ST HOSP IP/OBS SF/LOW 40: CPT | Performed by: SURGERY

## 2019-11-29 PROCEDURE — 71045 X-RAY EXAM CHEST 1 VIEW: CPT | Performed by: EMERGENCY MEDICINE

## 2019-11-29 PROCEDURE — 0DNW0ZZ RELEASE PERITONEUM, OPEN APPROACH: ICD-10-PCS | Performed by: SURGERY

## 2019-11-29 PROCEDURE — 72100 X-RAY EXAM L-S SPINE 2/3 VWS: CPT | Performed by: EMERGENCY MEDICINE

## 2019-11-29 PROCEDURE — 99223 1ST HOSP IP/OBS HIGH 75: CPT | Performed by: HOSPITALIST

## 2019-11-29 RX ORDER — ONDANSETRON 2 MG/ML
4 INJECTION INTRAMUSCULAR; INTRAVENOUS ONCE AS NEEDED
Status: DISCONTINUED | OUTPATIENT
Start: 2019-11-29 | End: 2019-11-29 | Stop reason: HOSPADM

## 2019-11-29 RX ORDER — ONDANSETRON 2 MG/ML
4 INJECTION INTRAMUSCULAR; INTRAVENOUS EVERY 6 HOURS PRN
Status: DISCONTINUED | OUTPATIENT
Start: 2019-11-29 | End: 2019-12-04

## 2019-11-29 RX ORDER — LIDOCAINE HYDROCHLORIDE 10 MG/ML
INJECTION, SOLUTION EPIDURAL; INFILTRATION; INTRACAUDAL; PERINEURAL AS NEEDED
Status: DISCONTINUED | OUTPATIENT
Start: 2019-11-29 | End: 2019-11-29 | Stop reason: SURG

## 2019-11-29 RX ORDER — HYDROMORPHONE HYDROCHLORIDE 1 MG/ML
0.2 INJECTION, SOLUTION INTRAMUSCULAR; INTRAVENOUS; SUBCUTANEOUS EVERY 5 MIN PRN
Status: DISCONTINUED | OUTPATIENT
Start: 2019-11-29 | End: 2019-11-29 | Stop reason: HOSPADM

## 2019-11-29 RX ORDER — SODIUM CHLORIDE, SODIUM LACTATE, POTASSIUM CHLORIDE, CALCIUM CHLORIDE 600; 310; 30; 20 MG/100ML; MG/100ML; MG/100ML; MG/100ML
INJECTION, SOLUTION INTRAVENOUS CONTINUOUS
Status: DISCONTINUED | OUTPATIENT
Start: 2019-11-29 | End: 2019-12-04

## 2019-11-29 RX ORDER — SODIUM CHLORIDE, SODIUM LACTATE, POTASSIUM CHLORIDE, CALCIUM CHLORIDE 600; 310; 30; 20 MG/100ML; MG/100ML; MG/100ML; MG/100ML
INJECTION, SOLUTION INTRAVENOUS CONTINUOUS PRN
Status: DISCONTINUED | OUTPATIENT
Start: 2019-11-29 | End: 2019-11-29

## 2019-11-29 RX ORDER — MORPHINE SULFATE 2 MG/ML
2 INJECTION, SOLUTION INTRAMUSCULAR; INTRAVENOUS EVERY 2 HOUR PRN
Status: DISCONTINUED | OUTPATIENT
Start: 2019-11-29 | End: 2019-12-04

## 2019-11-29 RX ORDER — NALOXONE HYDROCHLORIDE 0.4 MG/ML
80 INJECTION, SOLUTION INTRAMUSCULAR; INTRAVENOUS; SUBCUTANEOUS AS NEEDED
Status: DISCONTINUED | OUTPATIENT
Start: 2019-11-29 | End: 2019-11-29 | Stop reason: HOSPADM

## 2019-11-29 RX ORDER — ONDANSETRON 2 MG/ML
4 INJECTION INTRAMUSCULAR; INTRAVENOUS ONCE
Status: COMPLETED | OUTPATIENT
Start: 2019-11-29 | End: 2019-11-29

## 2019-11-29 RX ORDER — MORPHINE SULFATE 4 MG/ML
2 INJECTION, SOLUTION INTRAMUSCULAR; INTRAVENOUS ONCE
Status: COMPLETED | OUTPATIENT
Start: 2019-11-29 | End: 2019-11-29

## 2019-11-29 RX ORDER — SODIUM CHLORIDE 9 MG/ML
INJECTION, SOLUTION INTRAVENOUS CONTINUOUS
Status: DISCONTINUED | OUTPATIENT
Start: 2019-11-29 | End: 2019-12-04

## 2019-11-29 RX ORDER — HALOPERIDOL 5 MG/ML
0.25 INJECTION INTRAMUSCULAR ONCE AS NEEDED
Status: DISCONTINUED | OUTPATIENT
Start: 2019-11-29 | End: 2019-11-29 | Stop reason: HOSPADM

## 2019-11-29 RX ORDER — MORPHINE SULFATE 4 MG/ML
4 INJECTION, SOLUTION INTRAMUSCULAR; INTRAVENOUS EVERY 10 MIN PRN
Status: DISCONTINUED | OUTPATIENT
Start: 2019-11-29 | End: 2019-11-29 | Stop reason: HOSPADM

## 2019-11-29 RX ORDER — HYDROCODONE BITARTRATE AND ACETAMINOPHEN 5; 325 MG/1; MG/1
1 TABLET ORAL AS NEEDED
Status: DISCONTINUED | OUTPATIENT
Start: 2019-11-29 | End: 2019-11-29 | Stop reason: HOSPADM

## 2019-11-29 RX ORDER — SODIUM CHLORIDE 9 MG/ML
INJECTION, SOLUTION INTRAVENOUS CONTINUOUS PRN
Status: DISCONTINUED | OUTPATIENT
Start: 2019-11-29 | End: 2019-11-29 | Stop reason: SURG

## 2019-11-29 RX ORDER — MORPHINE SULFATE 4 MG/ML
4 INJECTION, SOLUTION INTRAMUSCULAR; INTRAVENOUS EVERY 2 HOUR PRN
Status: DISCONTINUED | OUTPATIENT
Start: 2019-11-29 | End: 2019-12-04

## 2019-11-29 RX ORDER — SODIUM CHLORIDE, SODIUM LACTATE, POTASSIUM CHLORIDE, CALCIUM CHLORIDE 600; 310; 30; 20 MG/100ML; MG/100ML; MG/100ML; MG/100ML
INJECTION, SOLUTION INTRAVENOUS CONTINUOUS
Status: DISCONTINUED | OUTPATIENT
Start: 2019-11-29 | End: 2019-11-29 | Stop reason: HOSPADM

## 2019-11-29 RX ORDER — NEOSTIGMINE METHYLSULFATE 0.5 MG/ML
INJECTION INTRAVENOUS AS NEEDED
Status: DISCONTINUED | OUTPATIENT
Start: 2019-11-29 | End: 2019-11-29 | Stop reason: SURG

## 2019-11-29 RX ORDER — MORPHINE SULFATE 2 MG/ML
1 INJECTION, SOLUTION INTRAMUSCULAR; INTRAVENOUS EVERY 2 HOUR PRN
Status: DISCONTINUED | OUTPATIENT
Start: 2019-11-29 | End: 2019-12-04

## 2019-11-29 RX ORDER — HYDROMORPHONE HYDROCHLORIDE 1 MG/ML
0.6 INJECTION, SOLUTION INTRAMUSCULAR; INTRAVENOUS; SUBCUTANEOUS EVERY 5 MIN PRN
Status: DISCONTINUED | OUTPATIENT
Start: 2019-11-29 | End: 2019-11-29 | Stop reason: HOSPADM

## 2019-11-29 RX ORDER — SODIUM CHLORIDE 9 MG/ML
INJECTION, SOLUTION INTRAVENOUS CONTINUOUS PRN
Status: DISCONTINUED | OUTPATIENT
Start: 2019-11-29 | End: 2019-11-29

## 2019-11-29 RX ORDER — HYDROCODONE BITARTRATE AND ACETAMINOPHEN 5; 325 MG/1; MG/1
2 TABLET ORAL AS NEEDED
Status: DISCONTINUED | OUTPATIENT
Start: 2019-11-29 | End: 2019-11-29 | Stop reason: HOSPADM

## 2019-11-29 RX ORDER — PROCHLORPERAZINE EDISYLATE 5 MG/ML
5 INJECTION INTRAMUSCULAR; INTRAVENOUS ONCE AS NEEDED
Status: DISCONTINUED | OUTPATIENT
Start: 2019-11-29 | End: 2019-11-29 | Stop reason: HOSPADM

## 2019-11-29 RX ORDER — MORPHINE SULFATE 4 MG/ML
2 INJECTION, SOLUTION INTRAMUSCULAR; INTRAVENOUS EVERY 10 MIN PRN
Status: DISCONTINUED | OUTPATIENT
Start: 2019-11-29 | End: 2019-11-29 | Stop reason: HOSPADM

## 2019-11-29 RX ORDER — GLYCOPYRROLATE 0.2 MG/ML
INJECTION INTRAMUSCULAR; INTRAVENOUS AS NEEDED
Status: DISCONTINUED | OUTPATIENT
Start: 2019-11-29 | End: 2019-11-29 | Stop reason: SURG

## 2019-11-29 RX ORDER — ROCURONIUM BROMIDE 10 MG/ML
INJECTION, SOLUTION INTRAVENOUS AS NEEDED
Status: DISCONTINUED | OUTPATIENT
Start: 2019-11-29 | End: 2019-11-29 | Stop reason: SURG

## 2019-11-29 RX ORDER — HYDROMORPHONE HYDROCHLORIDE 1 MG/ML
0.4 INJECTION, SOLUTION INTRAMUSCULAR; INTRAVENOUS; SUBCUTANEOUS EVERY 5 MIN PRN
Status: DISCONTINUED | OUTPATIENT
Start: 2019-11-29 | End: 2019-11-29 | Stop reason: HOSPADM

## 2019-11-29 RX ORDER — ONDANSETRON 2 MG/ML
INJECTION INTRAMUSCULAR; INTRAVENOUS AS NEEDED
Status: DISCONTINUED | OUTPATIENT
Start: 2019-11-29 | End: 2019-11-29 | Stop reason: SURG

## 2019-11-29 RX ORDER — MORPHINE SULFATE 10 MG/ML
6 INJECTION, SOLUTION INTRAMUSCULAR; INTRAVENOUS EVERY 10 MIN PRN
Status: DISCONTINUED | OUTPATIENT
Start: 2019-11-29 | End: 2019-11-29 | Stop reason: HOSPADM

## 2019-11-29 RX ORDER — SODIUM CHLORIDE 0.9 % (FLUSH) 0.9 %
3 SYRINGE (ML) INJECTION AS NEEDED
Status: DISCONTINUED | OUTPATIENT
Start: 2019-11-29 | End: 2019-12-04

## 2019-11-29 RX ADMIN — LIDOCAINE HYDROCHLORIDE 50 MG: 10 INJECTION, SOLUTION EPIDURAL; INFILTRATION; INTRACAUDAL; PERINEURAL at 16:45:00

## 2019-11-29 RX ADMIN — GLYCOPYRROLATE 0.6 MG: 0.2 INJECTION INTRAMUSCULAR; INTRAVENOUS at 17:27:00

## 2019-11-29 RX ADMIN — ROCURONIUM BROMIDE 30 MG: 10 INJECTION, SOLUTION INTRAVENOUS at 16:55:00

## 2019-11-29 RX ADMIN — ONDANSETRON 4 MG: 2 INJECTION INTRAMUSCULAR; INTRAVENOUS at 17:18:00

## 2019-11-29 RX ADMIN — NEOSTIGMINE METHYLSULFATE 4 MG: 0.5 INJECTION INTRAVENOUS at 17:27:00

## 2019-11-29 RX ADMIN — SODIUM CHLORIDE: 9 INJECTION, SOLUTION INTRAVENOUS at 17:17:00

## 2019-11-29 RX ADMIN — SODIUM CHLORIDE: 9 INJECTION, SOLUTION INTRAVENOUS at 16:42:00

## 2019-11-29 NOTE — ED INITIAL ASSESSMENT (HPI)
Triage: pt arrived via EMS for abdominal pain and back pain 10/10 starting this am @0715, pt reports back surgery L2  kyphoplastyabout one month ago , pt reports shortness of breath with pain

## 2019-11-29 NOTE — H&P
[facility]    PATIENT'S NAME: Senait Rondon LITTLE   ATTENDING PHYSICIAN: Ralph Gu MD   PATIENT ACCOUNT#:   [id number]    LOCATION:  [place] [address]  MEDICAL RECORD #:   [id number]       YOB: 1945  ADMISSION DATE:       11/29/20 review of systems negative. PHYSICAL EXAMINATION:    GENERAL:  Alert. Oriented to time, place, and person. Moderate to severe distress. VITAL SIGNS:  Temperature 98.3, pulse 52, respiratory rate 24, blood pressure 130/93, pulse ox 100% on room air.

## 2019-11-29 NOTE — ED NOTES
Pt still with pain and nausea, remedicated per [facility], Dr. Chasity Landry aware, awaiting CT results

## 2019-11-29 NOTE — CONSULTS
[facility]    PATIENT'S NAME: NELA Wakler LITTLE   ATTENDING PHYSICIAN: Ralph Gu MD   CONSULTING PHYSICIAN: Angelina Stanley MD   PATIENT ACCOUNT#:   [id number]    LOCATION:  [place] [address]  MEDICAL RECORD #:   [id number]       DA you for this consultation.      Dictated By Yun Parish MD  d: 11/29/2019 16:18:09  t: 11/29/2019 16:44:37  Job [id number]  /

## 2019-11-29 NOTE — ED NOTES
Report to [facility] on floor OR calling for patient, Evangelina Gustafson from OR called for report at same, pt to OR first,  took all belonging, last liquid a sip of water @ 0830 with synthyroid and last food @ 2200 last night

## 2019-11-29 NOTE — ANESTHESIA PREPROCEDURE EVALUATION
Anesthesia PreOp Note    HPI:     Magda Mckenzie is a 69year old female who presents for preoperative consultation requested by: Marques Aguilar MD    Date of Surgery: 11/29/2019    Procedure(s):  EXPLORATORY LAPAROTOMY  Indication: Ischemic bowel disease Date Noted: 10/24/2008        Past Medical History:   Diagnosis Date   • Anxiety    • Back problem    • Disorder of thyroid    • High cholesterol    • Osteoarthritis    • Post-operative hypothyroidism    • Postmenopausal atrophic vaginitis 1/29/2015 Occupational History      Not on file    Social Needs      Financial resource strain: Not on file      Food insecurity:        Worry: Not on file        Inability: Not on file      Transportation needs:        Medical: Not on file        Non-medical: Not o 11/29/2019    MCV 98.7 11/29/2019    MCH 33.3 11/29/2019    MCHC 33.8 11/29/2019    RDW 13.7 11/29/2019    .0 11/29/2019     Lab Results   Component Value Date     11/29/2019    K 3.6 11/29/2019     (H) 11/29/2019    CO2 15.0 (L) 11/29/2 planned.   Eve Calvillo  11/29/2019 4:33 PM

## 2019-11-29 NOTE — ANESTHESIA POSTPROCEDURE EVALUATION
Patient: Sameera Ng    Procedure Summary     Date:  11/29/19 Room / Location:  [facility] OR 05 / [facility] OR    Anesthesia Start:  1318 Anesthesia Stop:  6160    Procedure:  EXPLORATORY LAPAROTOMY (N/A Abdomen) Diagnosis:       Ischemic bowel disease (H

## 2019-11-29 NOTE — ED NOTES
NG tube placed per orders, 14 Fr  To R nare @ 65 immediately with green output, hooked up to low intermittent suction, xray called for placement xray and Dr. Maribel Boyd at bedside

## 2019-11-29 NOTE — BRIEF OP NOTE
Pre-Operative Diagnosis: Ischemic bowel disease ([facility] [address] [K55.9]     Post-Operative Diagnosis: Ischemic bowel disease ([facility] [address] [K55.9] , secondary to internal hernia, approximately 2 feet of necrotic bowel     Procedure Performed:   Procedure(s):  EXPLORATORY LA

## 2019-11-29 NOTE — ANESTHESIA PROCEDURE NOTES
Airway  Urgency: Elective    Difficult airway    General Information and Staff    Patient location during procedure: OR  Anesthesiologist: Christie Morales DO  Resident/CRNA: Fernando Cardenasr, CRNA  Performed: CRNA     Indications and Patient Condition  I

## 2019-11-29 NOTE — ED PROVIDER NOTES
Patient Seen in: [facility] AND [facility] Emergency Department      History   Patient presents with:  Abdomen/Flank Pain (GI/)  Back Pain (musculoskeletal)    Stated Complaint: abdominal pain and back pain    HPI    Patient presents to the emergency departmen distress. Breath sounds: Normal breath sounds. Abdominal:      General: There is no distension. Palpations: Abdomen is soft. Tenderness: There is generalized tenderness. There is no guarding or rebound.    Musculoskeletal: Normal range of m Date: 11/29/2019  CONCLUSION:  1. Status post L2 kyphoplasty new from September 24, 2019. 2. The rest of the findings are stable. 3. Demineralization. 4. Osteoarthritis. 5. Osteitis pubis. Dictated by (CST):  Ron Chaidez MD on 11/29/2019 at 12:19 11/29/2019  2:52 PM         IVF, abx and pain medicine. NG tube placed. Discussed CT results with Dr. Willi Maynard. Will admit to Surgical floor.       Critical Care Documentation    There were greater than 30 minutes of critical care time spent directly on this p

## 2019-11-30 PROCEDURE — 99232 SBSQ HOSP IP/OBS MODERATE 35: CPT | Performed by: HOSPITALIST

## 2019-11-30 RX ORDER — ECHINACEA PURPUREA EXTRACT 125 MG
1 TABLET ORAL
Status: DISCONTINUED | OUTPATIENT
Start: 2019-11-30 | End: 2019-12-04

## 2019-11-30 NOTE — PROGRESS NOTES
[facility]    Progress Note    Allene Epley Patient Status:  Inpatient    1945 MRN [id number]   Location [facility] 4W/SW/SE Attending Heather Taylor MD   Hosp Day # 1 PCP Babs Wade MD     Assessment and Plan: I.V. -- 200 --    Volume (mL) (0.9% NaCl infusion) -- 1000 --    IV PIGGYBACK  --  100  --    Volume (mL) (Piperacillin Sod-Tazobactam So (ZOSYN) 3.375 g in dextrose 5 % 100 mL ADD-vantage) -- 100 --    Total Intake -- 1300 --       Output    Urine  -- Pelvis Iv Contrast, No Oral (er)    Result Date: 11/29/2019  CONCLUSION:   1. Findings compatible with high-grade/closed loop small bowel obstruction with discrete distal transition point in the central abdomen (series 2, image 78 and series 5, image 35).

## 2019-11-30 NOTE — PLAN OF CARE
Problem: PAIN - ADULT  Goal: Verbalizes/displays adequate comfort level or patient's stated pain goal  Description  INTERVENTIONS:  - Encourage pt to monitor pain and request assistance  - Assess pain using appropriate pain scale  - Administer analgesics including physical limitations  - Instruct pt to call for assistance with activity based on assessment  - Modify environment to reduce risk of injury  - Provide assistive devices as appropriate  - Consider OT/PT consult to assist with strengthening/mobilit mobilization and activity  - Obtain nutritional consult as needed  - Establish a toileting routine/schedule  - Consider collaborating with pharmacy to review patient's medication profile  Outcome: Progressing     Problem: SKIN/TISSUE [address]

## 2019-11-30 NOTE — PROGRESS NOTES
[facility]    Progress Note    Armathew Colin Patient Status:  Inpatient    1945 MRN [id number]   Location [facility] 4W/SW/SE Attending Kaleigh Curiel MD   Hosp Day # 1 PCP Vignesh Boo MD       Subjective:   Otis Gram Osteoarthritis. 5. Osteitis pubis. Dictated by (CST): Herber Prieto MD on 11/29/2019 at 12:19     Approved by (CST): Herber Prieto MD on 11/29/2019 at 12:23          Xr Chest Ap Portable  (cpt=71045)    Result Date: 11/29/2019  CONCLUSION:  1. 12-lead    Result Date: 11/29/2019  ECG Report  Interpretation  -------------------------- P-wave not clearly visible  Atrial fib Low voltage in precordial leads.  -Anteroseptal infarct -age undetermined.  ABNORMAL When compared with ECG of 01/11/2013 09

## 2019-11-30 NOTE — PLAN OF CARE
Patient up to room 438 from PACU s/p Diagnostic Lap by Dr. Sami Clarke. No pain overnight. NG to LIS. NPO status maintained. Larson intact. KYAW drain output closely monitored. Surgical Dressing CDI. Call light in reach. Will continue to monitor.   Problem: Patient/

## 2019-11-30 NOTE — OPERATIVE REPORT
[facility]    PATIENT'S NAME: Kaelyn Valentinokatelin FITCH   ATTENDING PHYSICIAN: Edgar Hancock MD   OPERATING PHYSICIAN: Vignesh Vela MD   PATIENT ACCOUNT#:   [id number]    LOCATION:  [address] [place] [address] RECORD #:   [id number]       DATE OF BIRTH:  [birth year]

## 2019-12-01 PROCEDURE — 99233 SBSQ HOSP IP/OBS HIGH 50: CPT | Performed by: HOSPITALIST

## 2019-12-01 RX ORDER — POTASSIUM CHLORIDE 14.9 MG/ML
20 INJECTION INTRAVENOUS ONCE
Status: COMPLETED | OUTPATIENT
Start: 2019-12-01 | End: 2019-12-01

## 2019-12-01 RX ORDER — BISACODYL 10 MG
10 SUPPOSITORY, RECTAL RECTAL
Status: DISCONTINUED | OUTPATIENT
Start: 2019-12-01 | End: 2019-12-04

## 2019-12-01 NOTE — PLAN OF CARE
Problem: Patient/Family Goals  Goal: Patient/Family Long Term Goal  Description  Patient's Long Term Goal: Return home    Interventions:  - Tolerating meals  -Pain management  - Ambulation  - See additional Care Plan goals for specific interventions   Ou period  Description  INTERVENTIONS  - Monitor WBC  - Administer growth factors as ordered  - Implement neutropenic guidelines  Outcome: Progressing     Problem: SAFETY ADULT - FALL  Goal: Free from fall injury  Description  INTERVENTIONS:  - Assess pt freq ordered antiemetic medications  - Provide nonpharmacologic comfort measures as appropriate  - Advance diet as tolerated, if ordered  - Obtain nutritional consult as needed  - Evaluate fluid balance  Outcome: Progressing  Goal: Maintains or returns to basel

## 2019-12-01 NOTE — PROGRESS NOTES
[facility]    Progress Note    Norman Andrews Patient Status:  Inpatient    1945 MRN [id number]   Location [facility] 4W/SW/SE Attending Kourtney Goyal MD   Hosp Day # 2 PCP Villa Stone MD       Subjective:   Rao Brothers anastamosis, lavage and KYAW placement- on 11/29  -POD # 2  -Pain control  -anit-emetics  -NG-out    Other medical Hx  HL  DJD   OA  Hypothyroidism    Prophylaxis        Greater than 35 minutes spent, >50% spent counseling re: treatment plan and work up.

## 2019-12-01 NOTE — PLAN OF CARE
A&Ox4. Upx1 walker. Midline dressing CDI. Left KYAW bloody output. [email or web address] Larson in place. NPO except ice chips for swab, tolerating well. Abd rounded but soft, pt denies any nausea at this time. Morphine for pain with relief. SCDs in place.  IS encourag techniques  - Monitor for opioid side effects  - Notify MD/LIP if interventions unsuccessful or patient reports new pain  - Anticipate increased pain with activity and pre-medicate as appropriate  Outcome: Progressing     Problem: RISK FOR INFECTION - ADUL or social support system  Outcome: Progressing     Problem: GASTROINTESTINAL - ADULT  Goal: Minimal or absence of nausea and vomiting  Description  INTERVENTIONS:  - Maintain adequate hydration with IV or PO as ordered and tolerated  - Nasogastric tube to

## 2019-12-01 NOTE — PLAN OF CARE
Problem: Patient/Family Goals  Goal: Patient/Family Long Term Goal  Description  Patient's Long Term Goal: Return home    Interventions:  - Tolerating meals  -Pain management  - Ambulation  - See additional Care Plan goals for specific interventions   Ou period  Description  INTERVENTIONS  - Monitor WBC  - Administer growth factors as ordered  - Implement neutropenic guidelines  Outcome: Progressing     Problem: SAFETY ADULT - FALL  Goal: Free from fall injury  Description  INTERVENTIONS:  - Assess pt freq ordered antiemetic medications  - Provide nonpharmacologic comfort measures as appropriate  - Advance diet as tolerated, if ordered  - Obtain nutritional consult as needed  - Evaluate fluid balance  Outcome: Progressing  Goal: Maintains or returns to basel also using her incentive spirometer as instructed. KYAW drain with serosanguinous output. Dressing to surgical site is clean, dry and intact. Plan of care reviewed with patient, in which she verbalized understanding.

## 2019-12-01 NOTE — PROGRESS NOTES
[facility]    Progress Note    Jeremy Must Patient Status:  Inpatient    1945 MRN [id number]   [facility] 4W/SW/SE Attending Leopold Mcalpine, MD   Hosp Day # 2 PCP Sue Villa MD     Assessment and Plan: 12/01/2019    HCT 28.8 (L) 12/01/2019    .0 12/01/2019    CREATSERUM 0.69 12/01/2019    BUN 13 12/01/2019     12/01/2019    K 3.8 12/01/2019     (H) 12/01/2019    CO2 27.0 12/01/2019    GLU 94 12/01/2019    CA 7.8 (L) 12/01/2019    ALB 3 intraperitoneal air at this time. Surgical assessment is recommended. 2. Small volume free fluid in the pelvis and along the hepatic margin, most likely reactive in nature. 3. Small hiatal hernia. 4. Colonic diverticulosis.  5. Probable bilateral renal cy

## 2019-12-02 ENCOUNTER — APPOINTMENT (OUTPATIENT)
Dept: PHYSICAL THERAPY | Age: 74
End: 2019-12-02
Attending: RADIOLOGY
Payer: MEDICARE

## 2019-12-02 PROCEDURE — 99233 SBSQ HOSP IP/OBS HIGH 50: CPT | Performed by: HOSPITALIST

## 2019-12-02 RX ORDER — ACETAMINOPHEN 10 MG/ML
1000 INJECTION, SOLUTION INTRAVENOUS ONCE
Status: COMPLETED | OUTPATIENT
Start: 2019-12-02 | End: 2019-12-02

## 2019-12-02 RX ORDER — ACETAMINOPHEN 325 MG/1
650 TABLET ORAL EVERY 6 HOURS PRN
Status: DISCONTINUED | OUTPATIENT
Start: 2019-12-02 | End: 2019-12-04

## 2019-12-02 NOTE — PLAN OF CARE
Problem: Patient/Family Goals  Goal: Patient/Family Short Term Goal  Description  Patient's Short Term Goal: Pain management    Interventions:   - PRN medications  - See additional Care Plan goals for specific interventions   Outcome: Progressing     Pro ordered  - Evaluate effectiveness of ordered antiemetic medications  - Provide nonpharmacologic comfort measures as appropriate  - Advance diet as tolerated, if ordered  - Obtain nutritional consult as needed  - Evaluate fluid balance  Outcome: Progressing

## 2019-12-02 NOTE — PROGRESS NOTES
[facility]    Progress Note    Paul Kimball Patient Status:  Inpatient    1945 MRN [id number]   Location [facility] 4W/SW/SE Attending Diana Segal MD   Hosp Day # 3 PCP Cassie Rose MD     Assessment and Plan: 12/02/2019     12/02/2019    CO2 28.0 12/02/2019    GLU 94 12/02/2019    CA 8.1 (L) 12/02/2019    ALB 3.5 11/29/2019    ALKPHO 88 11/29/2019    BILT 0.3 11/29/2019    TP 7.0 11/29/2019    AST 22 11/29/2019    ALT 22 11/29/2019    INR 1.1 (L) 10/23/20

## 2019-12-02 NOTE — PROGRESS NOTES
[facility]    Progress Note    Kacie Gear Patient Status:  Inpatient    1945 MRN [id number]   Location [facility] 4W/SW/SE Attending Galo Little MD   Hosp Day # 3 PCP Jeff Woodson MD       Subjective:   Patience Pollard primary anastamosis, lavage and KYAW placement- on 11/29  -POD # 3  -Pain control  -anit-emetics  -NG-out 11/30  -NPO advance diet per surgery    Other medical Hx  HL  DJD   OA  Hypothyroidism    Prophylaxis    Greater than 35 minutes spent, >50% spent couns

## 2019-12-02 NOTE — SPIRITUAL CARE NOTE
Pt stated that she had surgery last Friday and now she is recovering. Pt was very grateful for the quick diagnoses and surgery otherwise she said she could have . Patient identified as a Alevism.   provided a thanksgiving prayer for her timely

## 2019-12-03 PROCEDURE — 99232 SBSQ HOSP IP/OBS MODERATE 35: CPT | Performed by: HOSPITALIST

## 2019-12-03 NOTE — PROGRESS NOTES
[facility]    Progress Note    Burnzak Nazario Patient Status:  Inpatient    1945 MRN [id number]   Location [facility] 4W/SW/SE Attending Abena Coburn MD   Hosp Day # 4 PCP Jocelyne Patterson MD     Assessment and Plan: 11/29/2019    AST 22 11/29/2019    ALT 22 11/29/2019    INR 1.1 (L) 10/23/2007    TSH 2.650 08/30/2019     11/29/2019    MG 2.3 12/02/2019    TROP <0.045 11/29/2019    B12 636 09/06/2019                     Ora Dugan MD  12/3/2019

## 2019-12-03 NOTE — PROGRESS NOTES
[facility]    Progress Note    Allene Epley Patient Status:  Inpatient    1945 MRN [id number]   Location [facility] 4W/SW/SE Attending Heather Taylor MD   Hosp Day # 4 PCP Babs Wade MD       Subjective:   Haydee Libman control  -anit-emetics  -NG-out 11/30  -tolerating diet    Other medical Hx  HL  DJD   OA  Hypothyroidism    Prophylaxis    Dispo--> home soon    Greater than 35 minutes spent, >50% spent counseling re: treatment plan and work up.       Loris Primrose, MD

## 2019-12-03 NOTE — PLAN OF CARE
Pt is POD #3-4. Vital signs within normal limits. PRN Tylenol provided for headache. Alert and oriented x4. On room air. Tolerating clear liquids. Not yet passing gas. Voids freely. Dressing changed per order. Ambulates independently.  SCDs for DVT prophyla response  - Consider cultural and social influences on pain and pain management  - Manage/alleviate anxiety  - Utilize distraction and/or relaxation techniques  - Monitor for opioid side effects  - Notify MD/LIP if interventions unsuccessful or patient rep discharge planning if the patient needs post-hospital services based on physician/LIP order or complex needs related to functional status, cognitive ability or social support system  Outcome: Progressing     Problem: GASTROINTESTINAL - ADULT  Goal: Minimal

## 2019-12-04 ENCOUNTER — APPOINTMENT (OUTPATIENT)
Dept: PHYSICAL THERAPY | Age: 74
End: 2019-12-04
Attending: RADIOLOGY
Payer: MEDICARE

## 2019-12-04 VITALS
BODY MASS INDEX: 36.32 KG/M2 | TEMPERATURE: 99 F | HEIGHT: 63 IN | WEIGHT: 205 LBS | DIASTOLIC BLOOD PRESSURE: 69 MMHG | SYSTOLIC BLOOD PRESSURE: 130 MMHG | OXYGEN SATURATION: 100 % | RESPIRATION RATE: 18 BRPM | HEART RATE: 65 BPM

## 2019-12-04 PROCEDURE — 99239 HOSP IP/OBS DSCHRG MGMT >30: CPT | Performed by: HOSPITALIST

## 2019-12-04 RX ORDER — POTASSIUM CHLORIDE 20 MEQ/1
40 TABLET, EXTENDED RELEASE ORAL EVERY 4 HOURS
Status: COMPLETED | OUTPATIENT
Start: 2019-12-04 | End: 2019-12-04

## 2019-12-04 NOTE — PROGRESS NOTES
[facility]    Progress Note    Cornelius Lopes Patient Status:  Inpatient    1945 MRN [id number]   Location [facility] 4W/SW/SE Attending Argelia Holden MD   Hosp Day # 5 PCP Cande Hernandez MD     Assessment and Plan: Sherri Velázquez MD  12/4/2019

## 2019-12-04 NOTE — CM/SW NOTE
12/04/19 1100   Discharge disposition   Expected discharge disposition Home or Self   Discharge transportation Private car     Per nursing round cm made aware pt is ready for dc. IM given.  at bedside.  Both verbalize being ready for dc to home t

## 2019-12-04 NOTE — DIETARY NOTE
Educated pt and  on low fiber diet with progression to higher fiber diet as tolerated. Handout and contact information provided.       [address], [address] [facility]   Clinical Dietitian [id number]

## 2019-12-04 NOTE — DISCHARGE SUMMARY
[facility]    Discharge Summary    Jose Antonio Arshad Patient Status:  Inpatient     MRN [id number]   Location [facility] 4W/SW/SE Attending Leatha Elizondo MD   Hosp Day # 5 PCP Gunnar Addison MD     Date of Admiss organomegaly  Extremities: extremities normal, atraumatic, no cyanosis or edema  Psychiatric: calm        History of Present Illness:   Per Dr. Jaswinder Da Silva:  Closed loops small bowel obstruction.   HISTORY OF PRESENT ILLNESS:  The patient is a Take 1 capsule (20 mg total) by mouth once daily. Quantity:  90 capsule  Refills:  1     KP OMEGA-3 FISH OIL 1200 MG Cpdr      Take by mouth daily.    Refills:  0     Levothyroxine Sodium 50 MCG Tabs  Commonly known as:  SYNTHROID      TAKE 1 TABLET DAILY

## 2019-12-04 NOTE — PLAN OF CARE
Pass flatus. BM +.  UA remains to be collected. Jocy Ramseyick still in place. Low output. Sof diet. Tolerates well.   Biggest complaint is headache, treated w/ tylenol    Problem: Patient/Family Goals  Goal: Patient/Family Long Term Goal  Description  Patient's L appropriate  Outcome: Progressing     Problem: RISK FOR INFECTION - ADULT  Goal: Absence of fever/infection during anticipated neutropenic period  Description  INTERVENTIONS  - Monitor WBC  - Administer growth factors as ordered  - Implement neutropenic gu hydration with IV or PO as ordered and tolerated  - Nasogastric tube to low intermittent suction as ordered  - Evaluate effectiveness of ordered antiemetic medications  - Provide nonpharmacologic comfort measures as appropriate  - Advance diet as tolerated

## 2019-12-05 ENCOUNTER — PATIENT OUTREACH (OUTPATIENT)
Dept: CASE MANAGEMENT | Age: 74
End: 2019-12-05

## 2019-12-05 ENCOUNTER — NURSE TRIAGE (OUTPATIENT)
Dept: INTERNAL MEDICINE CLINIC | Facility: CLINIC | Age: 74
End: 2019-12-05

## 2019-12-05 DIAGNOSIS — K56.609 SMALL BOWEL OBSTRUCTION (HCC): ICD-10-CM

## 2019-12-05 DIAGNOSIS — Z02.9 ENCOUNTERS FOR ADMINISTRATIVE PURPOSE: ICD-10-CM

## 2019-12-05 PROCEDURE — 1111F DSCHRG MED/CURRENT MED MERGE: CPT

## 2019-12-05 NOTE — TELEPHONE ENCOUNTER
Patient dc'd 12/4/2019. She states that she woke up this morning \"breathless\". She denies having a hard time breathing but states it's more of a shortness of breath. She states that she is taking more frequent breaths.  She states that she has this even w

## 2019-12-05 NOTE — PROGRESS NOTES
Initial Post Discharge Follow Up   Discharge Date: 12/4/19  Contact Date: 12/5/2019    Consent Verification:  Assessment Completed With: Patient  HIPAA Verified?   Yes    Discharge Dx:   Abdominal pain, acute         General:   • How have you been since EVERY 7 DAYS 12 tablet 1   • Melatonin 5 MG Oral Tab Take 1 tablet by mouth nightly. • Calcium Carbonate-Vitamin D (CALCIUM-VITAMIN D) 600-200 MG-UNIT Oral Cap Take by mouth daily.  Takes 2 tabs in am and 1 tab at HS      • Multiple Vitamins Oral Tab wake up this morning a little breathless. She states it's more of a shortness of breath where she is taking more frequent breaths. She is not in any distress and states that she can take a deep breath without any problems.  She states that she has this tom

## 2019-12-05 NOTE — TELEPHONE ENCOUNTER
Action Requested: Summary for Provider     []  Critical Lab, Recommendations Needed  [] Need Additional Advice  []   FYI    []   Need Orders  [] Need Medications Sent to Pharmacy  []  Other     SUMMARY: Patient called- see note below.   She states she feels

## 2019-12-06 ENCOUNTER — TELEPHONE (OUTPATIENT)
Dept: OTHER | Age: 74
End: 2019-12-06

## 2019-12-06 ENCOUNTER — APPOINTMENT (OUTPATIENT)
Dept: PHYSICAL THERAPY | Age: 74
End: 2019-12-06
Attending: CLINICAL NURSE SPECIALIST
Payer: MEDICARE

## 2019-12-06 NOTE — TELEPHONE ENCOUNTER
Patient states returning a phone call, does not know regarding what, no message  Patient was called yesterday just returned home from hospital stay. Patient is feeling better each day. No shortness of breath, no cough, no fever.   Has an appointment with GAURAV

## 2019-12-09 ENCOUNTER — TELEPHONE (OUTPATIENT)
Dept: OBGYN CLINIC | Facility: CLINIC | Age: 74
End: 2019-12-09

## 2019-12-09 ENCOUNTER — OFFICE VISIT (OUTPATIENT)
Dept: SURGERY | Facility: CLINIC | Age: 74
End: 2019-12-09
Payer: MEDICARE

## 2019-12-09 ENCOUNTER — APPOINTMENT (OUTPATIENT)
Dept: PHYSICAL THERAPY | Age: 74
End: 2019-12-09
Attending: CLINICAL NURSE SPECIALIST
Payer: MEDICARE

## 2019-12-09 ENCOUNTER — APPOINTMENT (OUTPATIENT)
Dept: LAB | Age: 74
End: 2019-12-09
Attending: HOSPITALIST
Payer: MEDICARE

## 2019-12-09 VITALS — WEIGHT: 201 LBS | HEIGHT: 63 IN | BODY MASS INDEX: 35.61 KG/M2

## 2019-12-09 DIAGNOSIS — N84.0 ENDOMETRIAL POLYP: Primary | ICD-10-CM

## 2019-12-09 DIAGNOSIS — Z98.890 POST-OPERATIVE STATE: Primary | ICD-10-CM

## 2019-12-09 DIAGNOSIS — R31.9 HEMATURIA, UNSPECIFIED TYPE: ICD-10-CM

## 2019-12-09 PROCEDURE — 99024 POSTOP FOLLOW-UP VISIT: CPT | Performed by: SURGERY

## 2019-12-09 PROCEDURE — 81003 URINALYSIS AUTO W/O SCOPE: CPT

## 2019-12-09 PROCEDURE — G0463 HOSPITAL OUTPT CLINIC VISIT: HCPCS | Performed by: SURGERY

## 2019-12-09 NOTE — PROGRESS NOTES
Postoperative Patient Follow-up      12/9/2019    HPI  Patient presents with:  Post-Op: Emergency Colon rescection at [facility] on 11-29-19. Patient reports going to the ED at [facility] on 11-29-19 for severe pain in the abdomin and discharged on 12-4-19.   Here toda

## 2019-12-10 NOTE — TELEPHONE ENCOUNTER
Patient scheduled 1/20/20 9:30am hysteroscopic polypectomy CAROLE. Pat orders routed. Instructions were routed and pt was informed.    Pt asked if she needed pre op clearance and was advised that most likely, but, either myself or rn staff will follow up to c

## 2019-12-10 NOTE — TELEPHONE ENCOUNTER
Please schedule the following surgery:     Procedure: hysteroscopic polypectomy     Date: Nov 19th am -- make sure medically cleared by 18th     Diagnosis: endometrial polyp     Admission:Day surgery     Anesth: general     Preop Medical Clearance needed:

## 2019-12-12 ENCOUNTER — OFFICE VISIT (OUTPATIENT)
Dept: INTERNAL MEDICINE CLINIC | Facility: CLINIC | Age: 74
End: 2019-12-12
Payer: MEDICARE

## 2019-12-12 VITALS
TEMPERATURE: 98 F | SYSTOLIC BLOOD PRESSURE: 109 MMHG | HEIGHT: 63 IN | DIASTOLIC BLOOD PRESSURE: 68 MMHG | BODY MASS INDEX: 35.44 KG/M2 | HEART RATE: 56 BPM | WEIGHT: 200 LBS

## 2019-12-12 DIAGNOSIS — K56.609 SBO (SMALL BOWEL OBSTRUCTION) (HCC): Primary | ICD-10-CM

## 2019-12-12 DIAGNOSIS — E03.9 ACQUIRED HYPOTHYROIDISM: ICD-10-CM

## 2019-12-12 PROCEDURE — 99495 TRANSJ CARE MGMT MOD F2F 14D: CPT | Performed by: INTERNAL MEDICINE

## 2019-12-12 PROCEDURE — 1111F DSCHRG MED/CURRENT MED MERGE: CPT | Performed by: INTERNAL MEDICINE

## 2019-12-13 PROBLEM — R79.89 AZOTEMIA: Status: RESOLVED | Noted: 2019-11-29 | Resolved: 2019-12-13

## 2019-12-13 PROBLEM — M54.2 CHRONIC NECK PAIN: Status: RESOLVED | Noted: 2019-06-24 | Resolved: 2019-12-13

## 2019-12-13 PROBLEM — R10.9 ABDOMINAL PAIN, ACUTE: Status: RESOLVED | Noted: 2019-11-29 | Resolved: 2019-12-13

## 2019-12-13 PROBLEM — R11.2 NAUSEA AND VOMITING, INTRACTABILITY OF VOMITING NOT SPECIFIED, UNSPECIFIED VOMITING TYPE: Status: RESOLVED | Noted: 2019-11-29 | Resolved: 2019-12-13

## 2019-12-13 PROBLEM — R73.9 HYPERGLYCEMIA: Status: RESOLVED | Noted: 2019-11-29 | Resolved: 2019-12-13

## 2019-12-13 PROBLEM — E87.20 METABOLIC ACIDOSIS: Status: RESOLVED | Noted: 2019-11-29 | Resolved: 2019-12-13

## 2019-12-13 PROBLEM — G89.29 CHRONIC NECK PAIN: Status: RESOLVED | Noted: 2019-06-24 | Resolved: 2019-12-13

## 2019-12-13 PROBLEM — E87.2 METABOLIC ACIDOSIS: Status: RESOLVED | Noted: 2019-11-29 | Resolved: 2019-12-13
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
## 2019-12-14 NOTE — PROGRESS NOTES
HPI:    Jose Antonio Arshad is a 69year old female here today for hospital follow up.    She was discharged from [facility]patient hospital[facility] [place] [facility]  to Home   Admission Date: 11/29/19   Discharge Date: 12/4/19  Hospital Discharge Diagnoses (since 11/13/2019) Calcium Carbonate-Vitamin D (CALCIUM-VITAMIN D) 600-200 MG-UNIT Oral Cap, Take by mouth daily. Takes 2 tabs in am and 1 tab at HS   Multiple Vitamins Oral Tab, Take 1 tablet by mouth daily.     Omega-3 Fatty Acids ( OMEGA-3 FISH OIL) 1200 MG Oral Capsu following:    Height as of this encounter: 5' 3\" (1.6 m). Weight as of this encounter: 200 lb (90.7 kg).    /68 (BP Location: Right arm, Patient Position: Sitting, Cuff Size: large)   Pulse 56   Temp 97.6 °F (36.4 °C) (Oral)   Ht 5' 3\" (1.6 m) Elijah Jaime MD, 12/13/2019

## 2019-12-24 RX ORDER — RISEDRONATE SODIUM 35 MG/1
TABLET, FILM COATED ORAL
Qty: 12 TABLET | Refills: 1 | Status: SHIPPED | OUTPATIENT
Start: 2019-12-24 | End: 2020-06-09

## 2019-12-24 NOTE — TELEPHONE ENCOUNTER
Refill passed per [place] [employer] [place], [facility] protocol.   Refill Protocol Appointment Criteria  · Appointment scheduled in the past 12 months or in the next 3 months  Recent Outpatient Visits            1 week ago SBO (small bowel obstruction) [facility])    [facility], [facility],

## 2020-01-13 ENCOUNTER — APPOINTMENT (OUTPATIENT)
Dept: LAB | Age: 75
End: 2020-01-13
Attending: INTERNAL MEDICINE
Payer: MEDICARE

## 2020-01-13 ENCOUNTER — LAB ENCOUNTER (OUTPATIENT)
Dept: LAB | Age: 75
End: 2020-01-13
Attending: INTERNAL MEDICINE
Payer: MEDICARE

## 2020-01-13 ENCOUNTER — OFFICE VISIT (OUTPATIENT)
Dept: INTERNAL MEDICINE CLINIC | Facility: CLINIC | Age: 75
End: 2020-01-13
Payer: MEDICARE

## 2020-01-13 VITALS
TEMPERATURE: 98 F | DIASTOLIC BLOOD PRESSURE: 62 MMHG | WEIGHT: 199.44 LBS | HEART RATE: 66 BPM | RESPIRATION RATE: 17 BRPM | SYSTOLIC BLOOD PRESSURE: 102 MMHG | BODY MASS INDEX: 35.34 KG/M2 | HEIGHT: 63 IN

## 2020-01-13 DIAGNOSIS — Z01.818 PREOP GENERAL PHYSICAL EXAM: ICD-10-CM

## 2020-01-13 DIAGNOSIS — N84.0 ENDOMETRIAL POLYP: ICD-10-CM

## 2020-01-13 DIAGNOSIS — E03.9 ACQUIRED HYPOTHYROIDISM: ICD-10-CM

## 2020-01-13 DIAGNOSIS — Z01.818 PREOP GENERAL PHYSICAL EXAM: Primary | ICD-10-CM

## 2020-01-13 PROBLEM — K56.609 SMALL BOWEL OBSTRUCTION (HCC): Status: RESOLVED | Noted: 2019-11-29 | Resolved: 2020-01-13

## 2020-01-13 LAB
ANION GAP SERPL CALC-SCNC: 6 MMOL/L (ref 0–18)
BASOPHILS # BLD AUTO: 0.06 X10(3) UL (ref 0–0.2)
BASOPHILS NFR BLD AUTO: 0.9 %
BUN BLD-MCNC: 19 MG/DL (ref 7–18)
BUN/CREAT SERPL: 20.9 (ref 10–20)
CALCIUM BLD-MCNC: 8.8 MG/DL (ref 8.5–10.1)
CHLORIDE SERPL-SCNC: 110 MMOL/L (ref 98–112)
CO2 SERPL-SCNC: 26 MMOL/L (ref 21–32)
CREAT BLD-MCNC: 0.91 MG/DL (ref 0.55–1.02)
DEPRECATED RDW RBC AUTO: 52.6 FL (ref 35.1–46.3)
EOSINOPHIL # BLD AUTO: 0.13 X10(3) UL (ref 0–0.7)
EOSINOPHIL NFR BLD AUTO: 2 %
ERYTHROCYTE [DISTWIDTH] IN BLOOD BY AUTOMATED COUNT: 13.3 % (ref 11–15)
GLUCOSE BLD-MCNC: 67 MG/DL (ref 70–99)
HCT VFR BLD AUTO: 39.5 % (ref 35–48)
HGB BLD-MCNC: 12.6 G/DL (ref 12–16)
IMM GRANULOCYTES # BLD AUTO: 0.01 X10(3) UL (ref 0–1)
IMM GRANULOCYTES NFR BLD: 0.2 %
LYMPHOCYTES # BLD AUTO: 1.78 X10(3) UL (ref 1–4)
LYMPHOCYTES NFR BLD AUTO: 27.6 %
MCH RBC QN AUTO: 33.6 PG (ref 26–34)
MCHC RBC AUTO-ENTMCNC: 31.9 G/DL (ref 31–37)
MCV RBC AUTO: 105.3 FL (ref 80–100)
MONOCYTES # BLD AUTO: 0.76 X10(3) UL (ref 0.1–1)
MONOCYTES NFR BLD AUTO: 11.8 %
NEUTROPHILS # BLD AUTO: 3.72 X10 (3) UL (ref 1.5–7.7)
NEUTROPHILS # BLD AUTO: 3.72 X10(3) UL (ref 1.5–7.7)
NEUTROPHILS NFR BLD AUTO: 57.5 %
OSMOLALITY SERPL CALC.SUM OF ELEC: 295 MOSM/KG (ref 275–295)
PATIENT FASTING Y/N/NP: NO
PLATELET # BLD AUTO: 297 10(3)UL (ref 150–450)
POTASSIUM SERPL-SCNC: 4.5 MMOL/L (ref 3.5–5.1)
RBC # BLD AUTO: 3.75 X10(6)UL (ref 3.8–5.3)
SODIUM SERPL-SCNC: 142 MMOL/L (ref 136–145)
WBC # BLD AUTO: 6.5 X10(3) UL (ref 4–11)

## 2020-01-13 PROCEDURE — 80048 BASIC METABOLIC PNL TOTAL CA: CPT

## 2020-01-13 PROCEDURE — 36415 COLL VENOUS BLD VENIPUNCTURE: CPT

## 2020-01-13 PROCEDURE — 93010 ELECTROCARDIOGRAM REPORT: CPT | Performed by: INTERNAL MEDICINE

## 2020-01-13 PROCEDURE — 99214 OFFICE O/P EST MOD 30 MIN: CPT | Performed by: INTERNAL MEDICINE

## 2020-01-13 PROCEDURE — 93005 ELECTROCARDIOGRAM TRACING: CPT

## 2020-01-13 PROCEDURE — G0463 HOSPITAL OUTPT CLINIC VISIT: HCPCS | Performed by: INTERNAL MEDICINE

## 2020-01-13 PROCEDURE — 85025 COMPLETE CBC W/AUTO DIFF WBC: CPT

## 2020-01-13 NOTE — PROGRESS NOTES
HPI:    Patient ID: Jeannine Fiore is a 69year old female.     Patient presents today for preop physical has been requested by Dr. Mary Razo for a planned hysteroscopy hysteroscopic endometrial polypectomy scheduled in 1 week at [facility].  This has bee • Multiple Vitamins Oral Tab Take 1 tablet by mouth daily. • Omega-3 Fatty Acids (KP OMEGA-3 FISH OIL) 1200 MG Oral Capsule Delayed Release Take by mouth daily.          Allergies:No Known Allergies   PHYSICAL EXAM:   Physical Exam   Constitutiona planned. Addendum: cbc showed normal wbc and no anemia, bmp was normal.  ekg done in hospital showed atrial braydycardia, no ST T wave changes when compared to old ekg.    Patient may proceed with her surgery.     (N84.0) Endometrial polyp  Plan: as pe

## 2020-01-15 NOTE — TELEPHONE ENCOUNTER
MESSAGE TO DR. Brian Singh. CAN YOU REVIEW LABS AND LET US KNOW IF PT IS CLEARED FOR HER SURGERY ON MON, 1-20-20?

## 2020-01-19 NOTE — H&P
[facility] Patient Status:  Hospital Outpatient Surgery     MRN [id number]   Location [place] [address] Attending Michelle Humphrey MD   Hosp Day # 0 [facility] Ruchi Cesar • Visual impairment     glasses       Past Surgical History:  Past Surgical History:   Procedure Laterality Date   • BREAST BIOPSY Left 1990   • COLONOSCOPY      2012    • D & C  2013    \"mass\" inside uterus -- Dr. Wesley Oliveira   • EXPLORATORY LAPAROTOMY N/A 11 without tenderness on motion   Uterus: normal in size, contour, position, mobility, without tenderness   Adnexa: normal without masses or tenderness   Perineum: normal   Anus: no hemorroids   Extremities: No calf tenderness        Results:     Data Review: Negative. Bladder appears normal.                =====  CONCLUSION:                1. Postmenopausal uterus with peripheral dystrophic calcifications. 2. Abnormally thickened endometrium measures 7.4 mm.   Differential would include medication

## 2020-01-20 ENCOUNTER — ANESTHESIA (OUTPATIENT)
Dept: SURGERY | Facility: HOSPITAL | Age: 75
End: 2020-01-20
Payer: MEDICARE

## 2020-01-20 ENCOUNTER — HOSPITAL ENCOUNTER (OUTPATIENT)
Facility: HOSPITAL | Age: 75
Setting detail: HOSPITAL OUTPATIENT SURGERY
Discharge: HOME | End: 2020-01-20
Attending: OBSTETRICS & GYNECOLOGY | Admitting: OBSTETRICS & GYNECOLOGY
Payer: MEDICARE

## 2020-01-20 ENCOUNTER — ANESTHESIA EVENT (OUTPATIENT)
Dept: SURGERY | Facility: HOSPITAL | Age: 75
End: 2020-01-20
Payer: MEDICARE

## 2020-01-20 VITALS
HEIGHT: 63 IN | BODY MASS INDEX: 34.55 KG/M2 | RESPIRATION RATE: 17 BRPM | HEART RATE: 59 BPM | OXYGEN SATURATION: 100 % | WEIGHT: 195 LBS | DIASTOLIC BLOOD PRESSURE: 69 MMHG | SYSTOLIC BLOOD PRESSURE: 129 MMHG | TEMPERATURE: 98 F

## 2020-01-20 DIAGNOSIS — N84.0 ENDOMETRIAL POLYP: ICD-10-CM

## 2020-01-20 PROCEDURE — 58558 HYSTEROSCOPY BIOPSY: CPT | Performed by: OBSTETRICS & GYNECOLOGY

## 2020-01-20 PROCEDURE — 0UB98ZX EXCISION OF UTERUS, VIA NATURAL OR ARTIFICIAL OPENING ENDOSCOPIC, DIAGNOSTIC: ICD-10-PCS | Performed by: OBSTETRICS & GYNECOLOGY

## 2020-01-20 RX ORDER — ONDANSETRON 4 MG/1
4 TABLET, FILM COATED ORAL EVERY 8 HOURS PRN
Status: CANCELLED | OUTPATIENT
Start: 2020-01-20

## 2020-01-20 RX ORDER — SODIUM CHLORIDE, SODIUM LACTATE, POTASSIUM CHLORIDE, CALCIUM CHLORIDE 600; 310; 30; 20 MG/100ML; MG/100ML; MG/100ML; MG/100ML
INJECTION, SOLUTION INTRAVENOUS CONTINUOUS
Status: DISCONTINUED | OUTPATIENT
Start: 2020-01-20 | End: 2020-01-20

## 2020-01-20 RX ORDER — HYDROMORPHONE HYDROCHLORIDE 1 MG/ML
0.2 INJECTION, SOLUTION INTRAMUSCULAR; INTRAVENOUS; SUBCUTANEOUS EVERY 5 MIN PRN
Status: DISCONTINUED | OUTPATIENT
Start: 2020-01-20 | End: 2020-01-20

## 2020-01-20 RX ORDER — SODIUM CHLORIDE 0.9 % (FLUSH) 0.9 %
10 SYRINGE (ML) INJECTION AS NEEDED
Status: CANCELLED | OUTPATIENT
Start: 2020-01-20

## 2020-01-20 RX ORDER — NALOXONE HYDROCHLORIDE 0.4 MG/ML
80 INJECTION, SOLUTION INTRAMUSCULAR; INTRAVENOUS; SUBCUTANEOUS AS NEEDED
Status: DISCONTINUED | OUTPATIENT
Start: 2020-01-20 | End: 2020-01-20

## 2020-01-20 RX ORDER — EPHEDRINE SULFATE 50 MG/ML
INJECTION, SOLUTION INTRAVENOUS AS NEEDED
Status: DISCONTINUED | OUTPATIENT
Start: 2020-01-20 | End: 2020-01-20 | Stop reason: SURG

## 2020-01-20 RX ORDER — CEFAZOLIN SODIUM/WATER 2 G/20 ML
2 SYRINGE (ML) INTRAVENOUS ONCE
Status: DISCONTINUED | OUTPATIENT
Start: 2020-01-20 | End: 2020-01-20

## 2020-01-20 RX ORDER — ONDANSETRON 2 MG/ML
INJECTION INTRAMUSCULAR; INTRAVENOUS AS NEEDED
Status: DISCONTINUED | OUTPATIENT
Start: 2020-01-20 | End: 2020-01-20 | Stop reason: SURG

## 2020-01-20 RX ORDER — DEXAMETHASONE SODIUM PHOSPHATE 4 MG/ML
VIAL (ML) INJECTION AS NEEDED
Status: DISCONTINUED | OUTPATIENT
Start: 2020-01-20 | End: 2020-01-20 | Stop reason: SURG

## 2020-01-20 RX ORDER — HYDROMORPHONE HYDROCHLORIDE 1 MG/ML
0.4 INJECTION, SOLUTION INTRAMUSCULAR; INTRAVENOUS; SUBCUTANEOUS EVERY 5 MIN PRN
Status: DISCONTINUED | OUTPATIENT
Start: 2020-01-20 | End: 2020-01-20

## 2020-01-20 RX ORDER — ONDANSETRON 2 MG/ML
4 INJECTION INTRAMUSCULAR; INTRAVENOUS EVERY 8 HOURS PRN
Status: CANCELLED | OUTPATIENT
Start: 2020-01-20

## 2020-01-20 RX ORDER — PROCHLORPERAZINE EDISYLATE 5 MG/ML
5 INJECTION INTRAMUSCULAR; INTRAVENOUS ONCE AS NEEDED
Status: DISCONTINUED | OUTPATIENT
Start: 2020-01-20 | End: 2020-01-20

## 2020-01-20 RX ORDER — HYDROMORPHONE HYDROCHLORIDE 1 MG/ML
0.6 INJECTION, SOLUTION INTRAMUSCULAR; INTRAVENOUS; SUBCUTANEOUS EVERY 5 MIN PRN
Status: DISCONTINUED | OUTPATIENT
Start: 2020-01-20 | End: 2020-01-20

## 2020-01-20 RX ORDER — MORPHINE SULFATE 10 MG/ML
6 INJECTION, SOLUTION INTRAMUSCULAR; INTRAVENOUS EVERY 10 MIN PRN
Status: DISCONTINUED | OUTPATIENT
Start: 2020-01-20 | End: 2020-01-20

## 2020-01-20 RX ORDER — ONDANSETRON 2 MG/ML
4 INJECTION INTRAMUSCULAR; INTRAVENOUS ONCE AS NEEDED
Status: DISCONTINUED | OUTPATIENT
Start: 2020-01-20 | End: 2020-01-20

## 2020-01-20 RX ORDER — MORPHINE SULFATE 4 MG/ML
2 INJECTION, SOLUTION INTRAMUSCULAR; INTRAVENOUS EVERY 10 MIN PRN
Status: DISCONTINUED | OUTPATIENT
Start: 2020-01-20 | End: 2020-01-20

## 2020-01-20 RX ORDER — KETOROLAC TROMETHAMINE 30 MG/ML
INJECTION, SOLUTION INTRAMUSCULAR; INTRAVENOUS AS NEEDED
Status: DISCONTINUED | OUTPATIENT
Start: 2020-01-20 | End: 2020-01-20 | Stop reason: SURG

## 2020-01-20 RX ORDER — MORPHINE SULFATE 4 MG/ML
4 INJECTION, SOLUTION INTRAMUSCULAR; INTRAVENOUS EVERY 10 MIN PRN
Status: DISCONTINUED | OUTPATIENT
Start: 2020-01-20 | End: 2020-01-20

## 2020-01-20 RX ORDER — HALOPERIDOL 5 MG/ML
0.25 INJECTION INTRAMUSCULAR ONCE AS NEEDED
Status: DISCONTINUED | OUTPATIENT
Start: 2020-01-20 | End: 2020-01-20

## 2020-01-20 RX ORDER — LIDOCAINE HYDROCHLORIDE 10 MG/ML
INJECTION, SOLUTION EPIDURAL; INFILTRATION; INTRACAUDAL; PERINEURAL AS NEEDED
Status: DISCONTINUED | OUTPATIENT
Start: 2020-01-20 | End: 2020-01-20 | Stop reason: SURG

## 2020-01-20 RX ORDER — HYDROCODONE BITARTRATE AND ACETAMINOPHEN 5; 325 MG/1; MG/1
1 TABLET ORAL AS NEEDED
Status: DISCONTINUED | OUTPATIENT
Start: 2020-01-20 | End: 2020-01-20

## 2020-01-20 RX ORDER — FAMOTIDINE 20 MG/1
20 TABLET ORAL ONCE
Status: COMPLETED | OUTPATIENT
Start: 2020-01-20 | End: 2020-01-20

## 2020-01-20 RX ORDER — ACETAMINOPHEN 500 MG
1000 TABLET ORAL ONCE
Status: COMPLETED | OUTPATIENT
Start: 2020-01-20 | End: 2020-01-20

## 2020-01-20 RX ORDER — HYDROCODONE BITARTRATE AND ACETAMINOPHEN 5; 325 MG/1; MG/1
2 TABLET ORAL AS NEEDED
Status: DISCONTINUED | OUTPATIENT
Start: 2020-01-20 | End: 2020-01-20

## 2020-01-20 RX ADMIN — DEXAMETHASONE SODIUM PHOSPHATE 4 MG: 4 MG/ML VIAL (ML) INJECTION at 09:10:00

## 2020-01-20 RX ADMIN — KETOROLAC TROMETHAMINE 15 MG: 30 INJECTION, SOLUTION INTRAMUSCULAR; INTRAVENOUS at 09:30:00

## 2020-01-20 RX ADMIN — ONDANSETRON 4 MG: 2 INJECTION INTRAMUSCULAR; INTRAVENOUS at 09:10:00

## 2020-01-20 RX ADMIN — LIDOCAINE HYDROCHLORIDE 50 MG: 10 INJECTION, SOLUTION EPIDURAL; INFILTRATION; INTRACAUDAL; PERINEURAL at 09:01:00

## 2020-01-20 RX ADMIN — EPHEDRINE SULFATE 15 MG: 50 INJECTION, SOLUTION INTRAVENOUS at 09:16:00

## 2020-01-20 RX ADMIN — SODIUM CHLORIDE, SODIUM LACTATE, POTASSIUM CHLORIDE, CALCIUM CHLORIDE: 600; 310; 30; 20 INJECTION, SOLUTION INTRAVENOUS at 09:33:00

## 2020-01-20 NOTE — ANESTHESIA PREPROCEDURE EVALUATION
Anesthesia PreOp Note    HPI:     Khadijah Godwin is a 69year old female who presents for preoperative consultation requested by: Negar Juarez MD    Date of Surgery: 1/20/2020    Procedure(s):   MYOMECTOMY HYSTEROSCOPIC  Indication: Endometrial polyp [B9 Performed by Trenton Stein MD at [facility] OR   • [address] [place] [address]    s/p thyroidectomy for benign thyroid nodules   • SPINE SURGERY PROCEDURE UNLISTED  10/2019    kyphoplasty    • THYROIDECTOMY  1986    nodules removed   • TONSILLECTOMY of education: Not on file      Highest education level: Not on file    Occupational History      Not on file    Social Needs      Financial resource strain: Not on file      Food insecurity:        Worry: Not on file        Inability: Not on file      Suri Gallego 01/13/2020    RBC 3.75 (L) 01/13/2020    HGB 12.6 01/13/2020    HCT 39.5 01/13/2020    .3 (H) 01/13/2020    MCH 33.6 01/13/2020    MCHC 31.9 01/13/2020    RDW 13.3 01/13/2020    .0 01/13/2020     Lab Results   Component Value Date     0

## 2020-01-20 NOTE — ANESTHESIA PROCEDURE NOTES
Airway  Urgency: Elective      General Information and Staff    Patient location during procedure: OR  Anesthesiologist: Nila Clarke MD  Resident/CRNA: Jamie Soriano CRNA  Performed: CRNA     Indications and Patient Condition  Indications for airwa

## 2020-01-20 NOTE — OPERATIVE REPORT
[address] [facility] Detailed Operative Note    Shelley Jorge Patient Status:  Hospital Outpatient Surgery     MRN [id number]   Location [facility] POST ANESTHESIA CARE UNIT Attending Kee Love MD   Hosp Day # 0 PCP

## 2020-01-20 NOTE — ANESTHESIA POSTPROCEDURE EVALUATION
Patient: Cornelius Lopes    Procedure Summary     Date:  01/20/20 Room / Location:  [facility] OR  / [facility] OR    Anesthesia Start:  0600 Anesthesia Stop:      Procedure:  MYOMECTOMY HYSTEROSCOPIC (N/A Uterus) Diagnosis:       Endometrial polyp      (Endo

## 2020-01-20 NOTE — INTERVAL H&P NOTE
Pre-op Diagnosis: Endometrial polyp [N84.0]    The above referenced H&P was reviewed by Neri Daly MD on 1/20/2020, the patient was examined and no significant changes have occurred in the patient's condition since the H&P was performed.   I discussed

## 2020-01-21 ENCOUNTER — TELEPHONE (OUTPATIENT)
Dept: OBGYN CLINIC | Facility: CLINIC | Age: 75
End: 2020-01-21

## 2020-01-21 NOTE — TELEPHONE ENCOUNTER
CAROLE has been trying to reach pt regarding test results. [address] message sent to pt. Per CAROLE if pt calls back tonight she is available on her cell.  CAROLE is available tomorrow (1/22/2020) on her cell except between 9am-10am. CAROLE placed appt for pt on hold to

## 2020-01-22 ENCOUNTER — OFFICE VISIT (OUTPATIENT)
Dept: OBGYN CLINIC | Facility: CLINIC | Age: 75
End: 2020-01-22
Payer: MEDICARE

## 2020-01-22 VITALS — DIASTOLIC BLOOD PRESSURE: 76 MMHG | HEART RATE: 60 BPM | SYSTOLIC BLOOD PRESSURE: 148 MMHG

## 2020-01-22 DIAGNOSIS — C54.1 ADENOCARCINOMA OF ENDOMETRIUM (HCC): Primary | ICD-10-CM

## 2020-01-22 PROCEDURE — 99213 OFFICE O/P EST LOW 20 MIN: CPT | Performed by: OBSTETRICS & GYNECOLOGY

## 2020-01-23 ENCOUNTER — TELEPHONE (OUTPATIENT)
Dept: OBGYN CLINIC | Facility: CLINIC | Age: 75
End: 2020-01-23

## 2020-01-23 DIAGNOSIS — C54.1 ENDOMETRIAL CANCER (HCC): Primary | ICD-10-CM

## 2020-01-23 PROBLEM — R93.89 INCREASED ENDOMETRIAL STRIPE THICKNESS: Status: RESOLVED | Noted: 2019-09-11 | Resolved: 2020-01-23

## 2020-01-24 NOTE — PROGRESS NOTES
HPI:    Patient ID: Nel Bedolla is a 69year old female. HPI   with LMP around age 55 and recent history of  bleeding. SHe saw Dr Mony Holden who performed office [facility] hysteroscopy and polypoid structure seen.  She scheduled and performed outpa Take 1 capsule (20 mg total) by mouth once daily. (Patient taking differently: Take 20 mg by mouth nightly.  ) 90 capsule 1   • Melatonin 5 MG Oral Tab Take 1 tablet by mouth nightly.        • Calcium Carbonate-Vitamin D (CALCIUM-VITAMIN D) 600-200 MG-UNIT

## 2020-01-24 NOTE — TELEPHONE ENCOUNTER
REVIEWED POSSIBLE DATES WITH BASSAM:  SAT 2-1  MORNING MORNING  FRI 2-14 AFTERNOON  FRI 2-21 AFTERNOON  SAT 2-22 MORNING  BASSAM IS AWARE I NEED TO GET OTHER OPTIONS FROM DELMY AND CONFIRM DATES I JUST GAVE HER.

## 2020-01-24 NOTE — TELEPHONE ENCOUNTER
OB GYN SURGICAL SCHEDULING    Assessment: Adenocarcinoma of Endometrium, grade 2    Pre-Operative Procedure:  TLH- BSO and staging procedure with DaVinci    Side: bilateral    In / on:     Date:  ASAP    Admission:  AM Admit    Anesthesia: General    Addit

## 2020-01-24 NOTE — TELEPHONE ENCOUNTER
BASSAM CALLED BACK TO INFORM ME ROBOTICS ARE NOT DONE ON THE WEEKENDS. WILL HAVE TO GET MORE OPTIONS FROM DELMY AND CALL HER BACK Monday.

## 2020-01-29 ENCOUNTER — PATIENT MESSAGE (OUTPATIENT)
Dept: OBGYN CLINIC | Facility: CLINIC | Age: 75
End: 2020-01-29

## 2020-01-29 NOTE — TELEPHONE ENCOUNTER
From: Aide Zelaya  To: Deepa West DO  Sent: 1/29/2020 7:35 AM CST  Subject: Other    Good morning Dr. Jenifer Jones,    I don't want to be a pest, but I was wondering how the scheduling of my upcoming surgery is coming along.     Thank you,    Kevin Staplteon

## 2020-01-29 NOTE — TELEPHONE ENCOUNTER
BASSAM SAID 2-7 IS NOT AN OPTION. SHE WILL CHECK WITH DR. Vamshi Hercules FOR FRI, 2-14. I CONFIRMED WITH SURGERY THERE IS A 12:30PM OPENING ON 2-14. PT NOTIFIED.   I REASSURED HER I AM EXPECTING A CONFIRMATION CALL THIS AFTERNOON FROM [facility] BUT WILL CALL HER JOSE M

## 2020-01-29 NOTE — TELEPHONE ENCOUNTER
BASSAM CALLED BACK AND OFFERED TUES, 2-4-20 AT 7:30AM WITH DR. Neena Swanson. THE ROBOTIC S1 ROOM IS AVAILABLE. [facility] APPROVED. TRIED TO CALL PT BUT MESSAGE STATES VOICE MAIL BOX DOES NOT WORK.

## 2020-01-30 ENCOUNTER — TELEPHONE (OUTPATIENT)
Dept: OBGYN CLINIC | Facility: CLINIC | Age: 75
End: 2020-01-30

## 2020-01-30 NOTE — TELEPHONE ENCOUNTER
MESSAGE REVIEWED WITH DELMY OVER THE PHONE. HE AGREED WITH INSTRUCTIONS. PT NOTIFIED DELMY CONFIRMED INSTRUCTIONS GIVEN EARLIER.

## 2020-01-30 NOTE — TELEPHONE ENCOUNTER
PT GOT A 10 OUNCE BOTTLE OF MAG CITRATE. STATES DIRECTIONS ARE FOR TAKING DAILY DOSES. ADVISED TO TAKE 1/2 BOTTLE AROUND 4 PM AND DRINK THE OTHER HALF OF THE BOTTLE AROUND 6 PM.  WILL ALSO CONFIRM WITH DELMY AND CALL BACK IF DIRECTIONS ARE DIFFERENT.

## 2020-01-31 ENCOUNTER — LAB ENCOUNTER (OUTPATIENT)
Dept: LAB | Age: 75
End: 2020-01-31
Attending: OBSTETRICS & GYNECOLOGY
Payer: MEDICARE

## 2020-01-31 DIAGNOSIS — Z01.818 PREOP TESTING: ICD-10-CM

## 2020-01-31 LAB
ANTIBODY SCREEN: POSITIVE
DIRECT COOMBS POLY: NEGATIVE
RH BLOOD TYPE: POSITIVE

## 2020-01-31 PROCEDURE — 86905 BLOOD TYPING RBC ANTIGENS: CPT

## 2020-01-31 PROCEDURE — 86901 BLOOD TYPING SEROLOGIC RH(D): CPT

## 2020-01-31 PROCEDURE — 86870 RBC ANTIBODY IDENTIFICATION: CPT

## 2020-01-31 PROCEDURE — 86850 RBC ANTIBODY SCREEN: CPT

## 2020-01-31 PROCEDURE — 36415 COLL VENOUS BLD VENIPUNCTURE: CPT

## 2020-01-31 PROCEDURE — 86880 COOMBS TEST DIRECT: CPT

## 2020-01-31 PROCEDURE — 86077 PHYS BLOOD BANK SERV XMATCH: CPT

## 2020-01-31 PROCEDURE — 86900 BLOOD TYPING SEROLOGIC ABO: CPT

## 2020-01-31 RX ORDER — METOCLOPRAMIDE 10 MG/1
10 TABLET ORAL ONCE
Status: CANCELLED | OUTPATIENT
Start: 2020-01-31 | End: 2020-01-31

## 2020-02-03 LAB
C ANTIGEN: NEGATIVE
K ANTIGEN: NEGATIVE
M ANTIGEN: NEGATIVE
RGTSCRN: 6

## 2020-02-04 ENCOUNTER — HOSPITAL ENCOUNTER (OUTPATIENT)
Facility: HOSPITAL | Age: 75
Discharge: HOME | End: 2020-02-05
Attending: OBSTETRICS & GYNECOLOGY | Admitting: OBSTETRICS & GYNECOLOGY
Payer: MEDICARE

## 2020-02-04 ENCOUNTER — ANESTHESIA (OUTPATIENT)
Dept: SURGERY | Facility: HOSPITAL | Age: 75
End: 2020-02-04
Payer: MEDICARE

## 2020-02-04 ENCOUNTER — ANESTHESIA EVENT (OUTPATIENT)
Dept: SURGERY | Facility: HOSPITAL | Age: 75
End: 2020-02-04
Payer: MEDICARE

## 2020-02-04 DIAGNOSIS — C54.1 ENDOMETRIAL CANCER (HCC): ICD-10-CM

## 2020-02-04 DIAGNOSIS — Z01.818 PREOP TESTING: Primary | ICD-10-CM

## 2020-02-04 PROBLEM — Z90.710 STATUS POST LAPAROSCOPIC HYSTERECTOMY: Status: ACTIVE | Noted: 2020-02-04

## 2020-02-04 PROCEDURE — 0UT24ZZ RESECTION OF BILATERAL OVARIES, PERCUTANEOUS ENDOSCOPIC APPROACH: ICD-10-PCS | Performed by: OBSTETRICS & GYNECOLOGY

## 2020-02-04 PROCEDURE — 88363 XM ARCHIVE TISSUE MOLEC ANAL: CPT | Performed by: OBSTETRICS & GYNECOLOGY

## 2020-02-04 PROCEDURE — 0UT94ZZ RESECTION OF UTERUS, PERCUTANEOUS ENDOSCOPIC APPROACH: ICD-10-PCS | Performed by: OBSTETRICS & GYNECOLOGY

## 2020-02-04 PROCEDURE — 0UT74ZZ RESECTION OF BILATERAL FALLOPIAN TUBES, PERCUTANEOUS ENDOSCOPIC APPROACH: ICD-10-PCS | Performed by: OBSTETRICS & GYNECOLOGY

## 2020-02-04 PROCEDURE — 88309 TISSUE EXAM BY PATHOLOGIST: CPT | Performed by: OBSTETRICS & GYNECOLOGY

## 2020-02-04 PROCEDURE — 88341 IMHCHEM/IMCYTCHM EA ADD ANTB: CPT | Performed by: OBSTETRICS & GYNECOLOGY

## 2020-02-04 PROCEDURE — 0TJB8ZZ INSPECTION OF BLADDER, VIA NATURAL OR ARTIFICIAL OPENING ENDOSCOPIC: ICD-10-PCS | Performed by: OBSTETRICS & GYNECOLOGY

## 2020-02-04 PROCEDURE — 88342 IMHCHEM/IMCYTCHM 1ST ANTB: CPT | Performed by: OBSTETRICS & GYNECOLOGY

## 2020-02-04 PROCEDURE — 88305 TISSUE EXAM BY PATHOLOGIST: CPT | Performed by: OBSTETRICS & GYNECOLOGY

## 2020-02-04 PROCEDURE — 81288 MLH1 GENE: CPT

## 2020-02-04 PROCEDURE — 88112 CYTOPATH CELL ENHANCE TECH: CPT | Performed by: OBSTETRICS & GYNECOLOGY

## 2020-02-04 PROCEDURE — 88307 TISSUE EXAM BY PATHOLOGIST: CPT | Performed by: OBSTETRICS & GYNECOLOGY

## 2020-02-04 PROCEDURE — 07BC4ZX EXCISION OF PELVIS LYMPHATIC, PERCUTANEOUS ENDOSCOPIC APPROACH, DIAGNOSTIC: ICD-10-PCS | Performed by: OBSTETRICS & GYNECOLOGY

## 2020-02-04 PROCEDURE — 86922 COMPATIBILITY TEST ANTIGLOB: CPT

## 2020-02-04 PROCEDURE — 8E0W4CZ ROBOTIC ASSISTED PROCEDURE OF TRUNK REGION, PERCUTANEOUS ENDOSCOPIC APPROACH: ICD-10-PCS | Performed by: OBSTETRICS & GYNECOLOGY

## 2020-02-04 RX ORDER — ONDANSETRON 2 MG/ML
4 INJECTION INTRAMUSCULAR; INTRAVENOUS ONCE AS NEEDED
Status: COMPLETED | OUTPATIENT
Start: 2020-02-04 | End: 2020-02-04

## 2020-02-04 RX ORDER — SODIUM CHLORIDE, SODIUM LACTATE, POTASSIUM CHLORIDE, CALCIUM CHLORIDE 600; 310; 30; 20 MG/100ML; MG/100ML; MG/100ML; MG/100ML
INJECTION, SOLUTION INTRAVENOUS CONTINUOUS
Status: DISCONTINUED | OUTPATIENT
Start: 2020-02-04 | End: 2020-02-04

## 2020-02-04 RX ORDER — SODIUM CHLORIDE 9 MG/ML
INJECTION, SOLUTION INTRAVENOUS CONTINUOUS PRN
Status: DISCONTINUED | OUTPATIENT
Start: 2020-02-04 | End: 2020-02-04 | Stop reason: SURG

## 2020-02-04 RX ORDER — DOCUSATE SODIUM 100 MG/1
100 CAPSULE, LIQUID FILLED ORAL DAILY
Status: DISCONTINUED | OUTPATIENT
Start: 2020-02-04 | End: 2020-02-05

## 2020-02-04 RX ORDER — HYDROCODONE BITARTRATE AND ACETAMINOPHEN 5; 325 MG/1; MG/1
2 TABLET ORAL AS NEEDED
Status: DISCONTINUED | OUTPATIENT
Start: 2020-02-04 | End: 2020-02-04 | Stop reason: HOSPADM

## 2020-02-04 RX ORDER — SODIUM CHLORIDE, SODIUM LACTATE, POTASSIUM CHLORIDE, CALCIUM CHLORIDE 600; 310; 30; 20 MG/100ML; MG/100ML; MG/100ML; MG/100ML
INJECTION, SOLUTION INTRAVENOUS CONTINUOUS
Status: DISCONTINUED | OUTPATIENT
Start: 2020-02-04 | End: 2020-02-05

## 2020-02-04 RX ORDER — HEPARIN SODIUM 5000 [USP'U]/ML
5000 INJECTION, SOLUTION INTRAVENOUS; SUBCUTANEOUS ONCE
Status: COMPLETED | OUTPATIENT
Start: 2020-02-04 | End: 2020-02-04

## 2020-02-04 RX ORDER — ONDANSETRON 4 MG/1
4 TABLET, FILM COATED ORAL EVERY 8 HOURS PRN
Status: DISCONTINUED | OUTPATIENT
Start: 2020-02-04 | End: 2020-02-05

## 2020-02-04 RX ORDER — SODIUM CHLORIDE 0.9 % (FLUSH) 0.9 %
10 SYRINGE (ML) INJECTION AS NEEDED
Status: DISCONTINUED | OUTPATIENT
Start: 2020-02-04 | End: 2020-02-05

## 2020-02-04 RX ORDER — INDOCYANINE GREEN AND WATER 25 MG
KIT INJECTION AS NEEDED
Status: DISCONTINUED | OUTPATIENT
Start: 2020-02-04 | End: 2020-02-04

## 2020-02-04 RX ORDER — BUPIVACAINE HYDROCHLORIDE 5 MG/ML
INJECTION, SOLUTION EPIDURAL; INTRACAUDAL AS NEEDED
Status: DISCONTINUED | OUTPATIENT
Start: 2020-02-04 | End: 2020-02-04

## 2020-02-04 RX ORDER — HYDROMORPHONE HYDROCHLORIDE 1 MG/ML
0.6 INJECTION, SOLUTION INTRAMUSCULAR; INTRAVENOUS; SUBCUTANEOUS EVERY 5 MIN PRN
Status: DISCONTINUED | OUTPATIENT
Start: 2020-02-04 | End: 2020-02-04 | Stop reason: HOSPADM

## 2020-02-04 RX ORDER — MORPHINE SULFATE 4 MG/ML
4 INJECTION, SOLUTION INTRAMUSCULAR; INTRAVENOUS EVERY 10 MIN PRN
Status: DISCONTINUED | OUTPATIENT
Start: 2020-02-04 | End: 2020-02-04 | Stop reason: HOSPADM

## 2020-02-04 RX ORDER — METOCLOPRAMIDE HYDROCHLORIDE 5 MG/ML
10 INJECTION INTRAMUSCULAR; INTRAVENOUS EVERY 6 HOURS PRN
Status: DISCONTINUED | OUTPATIENT
Start: 2020-02-04 | End: 2020-02-05

## 2020-02-04 RX ORDER — MORPHINE SULFATE 10 MG/ML
6 INJECTION, SOLUTION INTRAMUSCULAR; INTRAVENOUS EVERY 10 MIN PRN
Status: DISCONTINUED | OUTPATIENT
Start: 2020-02-04 | End: 2020-02-04 | Stop reason: HOSPADM

## 2020-02-04 RX ORDER — FLUOXETINE HYDROCHLORIDE 20 MG/1
20 CAPSULE ORAL NIGHTLY
Status: DISCONTINUED | OUTPATIENT
Start: 2020-02-04 | End: 2020-02-05

## 2020-02-04 RX ORDER — MIDAZOLAM HYDROCHLORIDE 1 MG/ML
INJECTION INTRAMUSCULAR; INTRAVENOUS AS NEEDED
Status: DISCONTINUED | OUTPATIENT
Start: 2020-02-04 | End: 2020-02-04 | Stop reason: SURG

## 2020-02-04 RX ORDER — HYDROCODONE BITARTRATE AND ACETAMINOPHEN 5; 325 MG/1; MG/1
1 TABLET ORAL EVERY 4 HOURS PRN
Status: DISCONTINUED | OUTPATIENT
Start: 2020-02-04 | End: 2020-02-05

## 2020-02-04 RX ORDER — CEFAZOLIN SODIUM/WATER 2 G/20 ML
2 SYRINGE (ML) INTRAVENOUS ONCE
Status: COMPLETED | OUTPATIENT
Start: 2020-02-04 | End: 2020-02-04

## 2020-02-04 RX ORDER — FUROSEMIDE 10 MG/ML
INJECTION INTRAMUSCULAR; INTRAVENOUS AS NEEDED
Status: DISCONTINUED | OUTPATIENT
Start: 2020-02-04 | End: 2020-02-04 | Stop reason: SURG

## 2020-02-04 RX ORDER — HYDROMORPHONE HYDROCHLORIDE 1 MG/ML
0.2 INJECTION, SOLUTION INTRAMUSCULAR; INTRAVENOUS; SUBCUTANEOUS EVERY 5 MIN PRN
Status: DISCONTINUED | OUTPATIENT
Start: 2020-02-04 | End: 2020-02-04 | Stop reason: HOSPADM

## 2020-02-04 RX ORDER — KETOROLAC TROMETHAMINE 15 MG/ML
15 INJECTION, SOLUTION INTRAMUSCULAR; INTRAVENOUS EVERY 6 HOURS
Status: COMPLETED | OUTPATIENT
Start: 2020-02-04 | End: 2020-02-05

## 2020-02-04 RX ORDER — NALOXONE HYDROCHLORIDE 0.4 MG/ML
80 INJECTION, SOLUTION INTRAMUSCULAR; INTRAVENOUS; SUBCUTANEOUS AS NEEDED
Status: DISCONTINUED | OUTPATIENT
Start: 2020-02-04 | End: 2020-02-04 | Stop reason: HOSPADM

## 2020-02-04 RX ORDER — FAMOTIDINE 20 MG/1
20 TABLET ORAL ONCE
Status: COMPLETED | OUTPATIENT
Start: 2020-02-04 | End: 2020-02-04

## 2020-02-04 RX ORDER — BISACODYL 10 MG
10 SUPPOSITORY, RECTAL RECTAL
Status: DISCONTINUED | OUTPATIENT
Start: 2020-02-04 | End: 2020-02-05

## 2020-02-04 RX ORDER — KETOROLAC TROMETHAMINE 30 MG/ML
INJECTION, SOLUTION INTRAMUSCULAR; INTRAVENOUS AS NEEDED
Status: DISCONTINUED | OUTPATIENT
Start: 2020-02-04 | End: 2020-02-04 | Stop reason: SURG

## 2020-02-04 RX ORDER — GLYCOPYRROLATE 0.2 MG/ML
INJECTION INTRAMUSCULAR; INTRAVENOUS AS NEEDED
Status: DISCONTINUED | OUTPATIENT
Start: 2020-02-04 | End: 2020-02-04 | Stop reason: SURG

## 2020-02-04 RX ORDER — EPHEDRINE SULFATE 50 MG/ML
INJECTION, SOLUTION INTRAVENOUS AS NEEDED
Status: DISCONTINUED | OUTPATIENT
Start: 2020-02-04 | End: 2020-02-04 | Stop reason: SURG

## 2020-02-04 RX ORDER — ONDANSETRON 2 MG/ML
INJECTION INTRAMUSCULAR; INTRAVENOUS AS NEEDED
Status: DISCONTINUED | OUTPATIENT
Start: 2020-02-04 | End: 2020-02-04 | Stop reason: SURG

## 2020-02-04 RX ORDER — ACETAMINOPHEN 500 MG
1000 TABLET ORAL ONCE
Status: COMPLETED | OUTPATIENT
Start: 2020-02-04 | End: 2020-02-04

## 2020-02-04 RX ORDER — ONDANSETRON 2 MG/ML
4 INJECTION INTRAMUSCULAR; INTRAVENOUS EVERY 8 HOURS PRN
Status: DISCONTINUED | OUTPATIENT
Start: 2020-02-04 | End: 2020-02-05

## 2020-02-04 RX ORDER — HYDROMORPHONE HYDROCHLORIDE 1 MG/ML
0.4 INJECTION, SOLUTION INTRAMUSCULAR; INTRAVENOUS; SUBCUTANEOUS EVERY 5 MIN PRN
Status: DISCONTINUED | OUTPATIENT
Start: 2020-02-04 | End: 2020-02-04 | Stop reason: HOSPADM

## 2020-02-04 RX ORDER — MORPHINE SULFATE 4 MG/ML
2 INJECTION, SOLUTION INTRAMUSCULAR; INTRAVENOUS EVERY 10 MIN PRN
Status: DISCONTINUED | OUTPATIENT
Start: 2020-02-04 | End: 2020-02-04 | Stop reason: HOSPADM

## 2020-02-04 RX ORDER — NEOSTIGMINE METHYLSULFATE 0.5 MG/ML
INJECTION INTRAVENOUS AS NEEDED
Status: DISCONTINUED | OUTPATIENT
Start: 2020-02-04 | End: 2020-02-04 | Stop reason: SURG

## 2020-02-04 RX ORDER — DEXAMETHASONE SODIUM PHOSPHATE 4 MG/ML
VIAL (ML) INJECTION AS NEEDED
Status: DISCONTINUED | OUTPATIENT
Start: 2020-02-04 | End: 2020-02-04 | Stop reason: SURG

## 2020-02-04 RX ORDER — HEPARIN SODIUM 5000 [USP'U]/ML
5000 INJECTION, SOLUTION INTRAVENOUS; SUBCUTANEOUS EVERY 8 HOURS SCHEDULED
Status: DISCONTINUED | OUTPATIENT
Start: 2020-02-04 | End: 2020-02-05

## 2020-02-04 RX ORDER — PROCHLORPERAZINE EDISYLATE 5 MG/ML
5 INJECTION INTRAMUSCULAR; INTRAVENOUS ONCE AS NEEDED
Status: DISCONTINUED | OUTPATIENT
Start: 2020-02-04 | End: 2020-02-04 | Stop reason: HOSPADM

## 2020-02-04 RX ORDER — SODIUM CHLORIDE, SODIUM LACTATE, POTASSIUM CHLORIDE, CALCIUM CHLORIDE 600; 310; 30; 20 MG/100ML; MG/100ML; MG/100ML; MG/100ML
INJECTION, SOLUTION INTRAVENOUS CONTINUOUS
Status: DISCONTINUED | OUTPATIENT
Start: 2020-02-04 | End: 2020-02-04 | Stop reason: HOSPADM

## 2020-02-04 RX ORDER — ROCURONIUM BROMIDE 10 MG/ML
INJECTION, SOLUTION INTRAVENOUS AS NEEDED
Status: DISCONTINUED | OUTPATIENT
Start: 2020-02-04 | End: 2020-02-04 | Stop reason: SURG

## 2020-02-04 RX ORDER — HYDROCODONE BITARTRATE AND ACETAMINOPHEN 5; 325 MG/1; MG/1
1 TABLET ORAL AS NEEDED
Status: DISCONTINUED | OUTPATIENT
Start: 2020-02-04 | End: 2020-02-04 | Stop reason: HOSPADM

## 2020-02-04 RX ORDER — LIDOCAINE HYDROCHLORIDE 10 MG/ML
INJECTION, SOLUTION EPIDURAL; INFILTRATION; INTRACAUDAL; PERINEURAL AS NEEDED
Status: DISCONTINUED | OUTPATIENT
Start: 2020-02-04 | End: 2020-02-04 | Stop reason: SURG

## 2020-02-04 RX ORDER — LEVOTHYROXINE SODIUM 0.05 MG/1
50 TABLET ORAL
Status: DISCONTINUED | OUTPATIENT
Start: 2020-02-05 | End: 2020-02-05

## 2020-02-04 RX ORDER — HALOPERIDOL 5 MG/ML
0.25 INJECTION INTRAMUSCULAR ONCE AS NEEDED
Status: DISCONTINUED | OUTPATIENT
Start: 2020-02-04 | End: 2020-02-04 | Stop reason: HOSPADM

## 2020-02-04 RX ORDER — POLYETHYLENE GLYCOL 3350 17 G/17G
17 POWDER, FOR SOLUTION ORAL DAILY PRN
Status: DISCONTINUED | OUTPATIENT
Start: 2020-02-04 | End: 2020-02-05

## 2020-02-04 RX ADMIN — GLYCOPYRROLATE 0.2 MG: 0.2 INJECTION INTRAMUSCULAR; INTRAVENOUS at 08:15:00

## 2020-02-04 RX ADMIN — ONDANSETRON 4 MG: 2 INJECTION INTRAMUSCULAR; INTRAVENOUS at 09:38:00

## 2020-02-04 RX ADMIN — CEFAZOLIN SODIUM/WATER 2 G: 2 G/20 ML SYRINGE (ML) INTRAVENOUS at 07:50:00

## 2020-02-04 RX ADMIN — SODIUM CHLORIDE: 9 INJECTION, SOLUTION INTRAVENOUS at 08:26:00

## 2020-02-04 RX ADMIN — NEOSTIGMINE METHYLSULFATE 4 MG: 0.5 INJECTION INTRAVENOUS at 09:51:00

## 2020-02-04 RX ADMIN — SODIUM CHLORIDE: 9 INJECTION, SOLUTION INTRAVENOUS at 07:45:00

## 2020-02-04 RX ADMIN — MIDAZOLAM HYDROCHLORIDE 2 MG: 1 INJECTION INTRAMUSCULAR; INTRAVENOUS at 07:30:00

## 2020-02-04 RX ADMIN — KETOROLAC TROMETHAMINE 15 MG: 30 INJECTION, SOLUTION INTRAMUSCULAR; INTRAVENOUS at 09:39:00

## 2020-02-04 RX ADMIN — FUROSEMIDE 10 MG: 10 INJECTION INTRAMUSCULAR; INTRAVENOUS at 10:00:00

## 2020-02-04 RX ADMIN — EPHEDRINE SULFATE 10 MG: 50 INJECTION, SOLUTION INTRAVENOUS at 08:23:00

## 2020-02-04 RX ADMIN — GLYCOPYRROLATE 0.6 MG: 0.2 INJECTION INTRAMUSCULAR; INTRAVENOUS at 09:51:00

## 2020-02-04 RX ADMIN — DEXAMETHASONE SODIUM PHOSPHATE 4 MG: 4 MG/ML VIAL (ML) INJECTION at 07:37:00

## 2020-02-04 RX ADMIN — SODIUM CHLORIDE: 9 INJECTION, SOLUTION INTRAVENOUS at 09:54:00

## 2020-02-04 RX ADMIN — ROCURONIUM BROMIDE 50 MG: 10 INJECTION, SOLUTION INTRAVENOUS at 07:37:00

## 2020-02-04 RX ADMIN — ROCURONIUM BROMIDE 10 MG: 10 INJECTION, SOLUTION INTRAVENOUS at 08:26:00

## 2020-02-04 RX ADMIN — SODIUM CHLORIDE, SODIUM LACTATE, POTASSIUM CHLORIDE, CALCIUM CHLORIDE: 600; 310; 30; 20 INJECTION, SOLUTION INTRAVENOUS at 07:37:00

## 2020-02-04 RX ADMIN — LIDOCAINE HYDROCHLORIDE 50 MG: 10 INJECTION, SOLUTION EPIDURAL; INFILTRATION; INTRACAUDAL; PERINEURAL at 07:37:00

## 2020-02-04 RX ADMIN — SODIUM CHLORIDE, SODIUM LACTATE, POTASSIUM CHLORIDE, CALCIUM CHLORIDE: 600; 310; 30; 20 INJECTION, SOLUTION INTRAVENOUS at 08:26:00

## 2020-02-04 NOTE — OPERATIVE REPORT
MRN: [id number]    Patient Name: Anton Porter  Date: 2/4/2020      DATE OF PROCEDURE: 2/4/2020      SURGEON: Susan Larios, [facility]    PREOPERATIVE DIAGNOSIS: Clinical stage 1, grade 2 adenocarcinoma of   the endometrium.     PO A Veress needle was used to create a pneumoperitoneum. We then placed the left lateral robot port. We used the ligasure device to resect the adhesions of the anterior abdominal wall and omentum careful to note the bowel to be out of harms way.   We then m obturator space and identified the obturator nerve. We then developed the retroperitoneal spaces on the left side. We identified the ureter. We noted the sentinel lymph node with the in fared camera.   This was on the external ileac artery and vein near t

## 2020-02-04 NOTE — H&P
[facility]    History and Physical    Maverick Minium Patient Status:  Outpatient in a Bed    1945 MRN [id number]   Location [place] [address] Attending Georgi Monteiro, [facility] Tad Chris Day # 0 PCP Garrett Patel MD THYROIDECTOMY  1986    nodules removed   • TONSILLECTOMY       Family History   Problem Relation Age of Onset   • Stroke Father    • No Known Problems Sister    • Heart Disorder Brother    • Breast Cancer Neg      Social History:  Social History    Tobacco 01/13/2020    K Negative 01/31/2020     01/13/2020    CO2 26.0 01/13/2020    GLU 67 (L) 01/13/2020    CA 8.8 01/13/2020    ALB 3.5 11/29/2019    ALKPHO 88 11/29/2019    BILT 0.3 11/29/2019    TP 7.0 11/29/2019    AST 22 11/29/2019    ALT 22 11/29/201

## 2020-02-04 NOTE — ANESTHESIA PREPROCEDURE EVALUATION
Anesthesia PreOp Note    HPI:     Sierra Cason is a 69year old female who presents for preoperative consultation requested by: Osmani Prescott DO    Date of Surgery: 2/4/2020    Procedure(s):  ROBOT-ASSISTED LAPAROSCOPIC HYSTERECTOMY  ROBOT-ASSISTED L COLONOSCOPY      2012    • D & C  2013    \"mass\" inside uterus -- Dr. Kuldip Deutsch   • [address] N/A 11/29/2019    Performed by Ilan Ontiveros MD at [facility] OR   • MYOMECTOMY HYSTEROSCOPIC N/A 1/20/2020    Performed by aMrco Padilla MD at [facility] Brother    • Breast Cancer Neg      Social History    Socioeconomic History      Marital status:       Spouse name: Not on file      Number of children: Not on file      Years of education: Not on file      Highest education level: Not on file    Oc Bike Helmet: Not Asked        Seat Belt: Not Asked        Self-Exams: Not Asked    Social History Narrative      Not on file      Available pre-op labs reviewed.   Lab Results   Component Value Date    WBC 6.5 01/13/2020    RBC 3.75 (L) 01/13/2020    HGB management. All of the patient's questions were answered to the best of my ability. The patient desires the anesthetic management as planned.   Ely Landaverde  2/4/2020 6:44 AM

## 2020-02-04 NOTE — BRIEF OP NOTE
Pre-Operative Diagnosis: Endometrial cancer ([facility] [C54.1]     Post-Operative Diagnosis: Endometrial cancer ([facility] [C54.1] , intraperitoneal adhesions     Procedure Performed:   Procedure(s):  Total laparoscopic hysterectomy with bilateral salpingo-oophorect

## 2020-02-04 NOTE — INTERVAL H&P NOTE
Pre-op Diagnosis: Endometrial cancer ([facility] [C54.1]    The above referenced H&P was reviewed by Bob Abbasi. [address] [place] [address] on 2/4/2020, the patient was examined and no significant changes have occurred in the patient's condition since the H&P was performed.   I di

## 2020-02-04 NOTE — PLAN OF CARE
Pt admitted to 4se. VS stable. Pt c/o nausea- reglan ordered. Toradol given for pain.      Problem: Patient Centered Care  Goal: Patient preferences are identified and integrated in the patient's plan of care  Description  Interventions:  - What would you l patient/family/discharge partner  - Complete POLST form as appropriate  - Assess patient's ability to be responsible for managing their own health  - Refer to Case Management Department for coordinating discharge planning if the patient needs post-hospital

## 2020-02-04 NOTE — ANESTHESIA PROCEDURE NOTES
Airway    General Information and Staff    Patient location during procedure: OR  Anesthesiologist: Desire Robles MD  Resident/CRNA: Blessing Xavier CRNA    Indications and Patient Condition  Indications for airway management: airway protection

## 2020-02-04 NOTE — ANESTHESIA POSTPROCEDURE EVALUATION
Patient: Tapan Amin    Procedure Summary     Date:  02/04/20 Room / Location:  [facility] OR  / [facility] OR    Anesthesia Start:  0730 Anesthesia Stop:  7963    Procedure:  ROBOT-ASSISTED LAPAROSCOPIC HYSTERECTOMY (Bilateral ) Diagnosis:       Endometr

## 2020-02-05 VITALS
DIASTOLIC BLOOD PRESSURE: 75 MMHG | WEIGHT: 193 LBS | TEMPERATURE: 98 F | HEIGHT: 63 IN | BODY MASS INDEX: 34.2 KG/M2 | SYSTOLIC BLOOD PRESSURE: 123 MMHG | OXYGEN SATURATION: 99 % | HEART RATE: 72 BPM | RESPIRATION RATE: 18 BRPM

## 2020-02-05 LAB
BASOPHILS # BLD AUTO: 0.03 X10(3) UL (ref 0–0.2)
BASOPHILS NFR BLD AUTO: 0.3 %
DEPRECATED RDW RBC AUTO: 48.1 FL (ref 35.1–46.3)
EOSINOPHIL # BLD AUTO: 0 X10(3) UL (ref 0–0.7)
EOSINOPHIL NFR BLD AUTO: 0 %
ERYTHROCYTE [DISTWIDTH] IN BLOOD BY AUTOMATED COUNT: 12.7 % (ref 11–15)
HCT VFR BLD AUTO: 32.5 % (ref 35–48)
HGB BLD-MCNC: 11 G/DL (ref 12–16)
IMM GRANULOCYTES # BLD AUTO: 0.03 X10(3) UL (ref 0–1)
IMM GRANULOCYTES NFR BLD: 0.3 %
LYMPHOCYTES # BLD AUTO: 1.4 X10(3) UL (ref 1–4)
LYMPHOCYTES NFR BLD AUTO: 14.6 %
MCH RBC QN AUTO: 35.3 PG (ref 26–34)
MCHC RBC AUTO-ENTMCNC: 33.8 G/DL (ref 31–37)
MCV RBC AUTO: 104.2 FL (ref 80–100)
MONOCYTES # BLD AUTO: 1.1 X10(3) UL (ref 0.1–1)
MONOCYTES NFR BLD AUTO: 11.5 %
NEUTROPHILS # BLD AUTO: 7 X10 (3) UL (ref 1.5–7.7)
NEUTROPHILS # BLD AUTO: 7 X10(3) UL (ref 1.5–7.7)
NEUTROPHILS NFR BLD AUTO: 73.3 %
PLATELET # BLD AUTO: 204 10(3)UL (ref 150–450)
RBC # BLD AUTO: 3.12 X10(6)UL (ref 3.8–5.3)
WBC # BLD AUTO: 9.6 X10(3) UL (ref 4–11)

## 2020-02-05 PROCEDURE — 85025 COMPLETE CBC W/AUTO DIFF WBC: CPT | Performed by: OBSTETRICS & GYNECOLOGY

## 2020-02-05 RX ORDER — IBUPROFEN 600 MG/1
600 TABLET ORAL EVERY 6 HOURS PRN
Qty: 20 TABLET | Refills: 0 | Status: SHIPPED | OUTPATIENT
Start: 2020-02-05 | End: 2020-03-11

## 2020-02-05 RX ORDER — HYDROCODONE BITARTRATE AND ACETAMINOPHEN 5; 325 MG/1; MG/1
1 TABLET ORAL EVERY 6 HOURS PRN
Qty: 15 TABLET | Refills: 0 | Status: SHIPPED | OUTPATIENT
Start: 2020-02-05 | End: 2020-06-24

## 2020-02-05 NOTE — DISCHARGE SUMMARY
[facility]  Discharge Summary    Dedrick Flowers Patient Status:  Inpatient    1945 MRN [id number]   Location [facility] 4W/SW/SE Attending Cheyenne Del Rosario MD   Hosp Day # 1 PCP Val Perez MD           Date of A

## 2020-02-05 NOTE — PLAN OF CARE
Problem: Patient Centered Care  Goal: Patient preferences are identified and integrated in the patient's plan of care  Description  Interventions:  - What would you like us to know as we care for you?   - Provide timely, complete, and accurate informatio their own health  - Refer to Case Management Department for coordinating discharge planning if the patient needs post-hospital services based on physician/LIP order or complex needs related to functional status, cognitive ability or social support system

## 2020-02-05 NOTE — OPERATIVE REPORT
[facility]    PATIENT'S NAME: NELA CARRASQUILLO LITTLE   ATTENDING PHYSICIAN: Tonya Pham DO   OPERATING PHYSICIAN: Lidia Koroma.  DO Parisa   PATIENT ACCOUNT#:   [de-identified]    LOCATION:  [address] [place] [address] #:   [id number]       DATE OF BIRTH: needle was placed, placement tested, and insufflation was begun. After adequate pneumoperitoneum was observed, the Veress needle was removed, and a 5 mm Visiport trocar was placed.   Through this, we used the Visiport with the scope to enter the peritoneal the uterosacral ligament on the right. Once this was accomplished, we were ready for colpotomy. We performed this without difficulty, releasing the uterine specimen. The uterine specimen with adnexa attached was brought out vaginally by Dr. Domitila Henderson.   I t

## 2020-02-05 NOTE — PLAN OF CARE
Pt voiding freely. Pain managed with PRN Toradol. She is tolerating a general diet and passing gas. Discharge instructions given to patient. All prescriptions given. IV removed and patient discharged with .     Problem: Patient Centered Care care, etc)  - Arrange for interpreters to assist at discharge as needed  - Consider post-discharge preferences of patient/family/discharge partner  - Complete POLST form as appropriate  - Assess patient's ability to be responsible for managing their own he

## 2020-02-05 NOTE — PROGRESS NOTES
[facility]    OB/GYNE Progress Note      Doneta Chioma Patient Status:  Inpatient    1945 MRN [id number]   Location [facility] 4W/SW/SE Attending Harlan Terry MD   Hosp Day # 1 PCP Claudene Newborn, MD [place] GLU 67 (L) 01/13/2020    CA 8.8 01/13/2020    ALB 3.5 11/29/2019    ALKPHO 88 11/29/2019    BILT 0.3 11/29/2019    TP 7.0 11/29/2019    AST 22 11/29/2019    ALT 22 11/29/2019    INR 1.1 (L) 10/23/2007    TSH 2.650 08/30/2019     11/29/2019       Lab

## 2020-02-08 LAB
BLOOD TYPE BARCODE: 5100
BLOOD TYPE BARCODE: 9500

## 2020-02-10 ENCOUNTER — LAB ENCOUNTER (OUTPATIENT)
Dept: LAB | Age: 75
End: 2020-02-10
Attending: INTERNAL MEDICINE
Payer: MEDICARE

## 2020-02-10 ENCOUNTER — OFFICE VISIT (OUTPATIENT)
Dept: INTERNAL MEDICINE CLINIC | Facility: CLINIC | Age: 75
End: 2020-02-10
Payer: MEDICARE

## 2020-02-10 ENCOUNTER — PATIENT MESSAGE (OUTPATIENT)
Dept: OBGYN CLINIC | Facility: CLINIC | Age: 75
End: 2020-02-10

## 2020-02-10 ENCOUNTER — TELEPHONE (OUTPATIENT)
Dept: INTERNAL MEDICINE CLINIC | Facility: CLINIC | Age: 75
End: 2020-02-10

## 2020-02-10 ENCOUNTER — NURSE TRIAGE (OUTPATIENT)
Dept: INTERNAL MEDICINE CLINIC | Facility: CLINIC | Age: 75
End: 2020-02-10

## 2020-02-10 VITALS
TEMPERATURE: 98 F | HEART RATE: 66 BPM | BODY MASS INDEX: 32.99 KG/M2 | SYSTOLIC BLOOD PRESSURE: 120 MMHG | DIASTOLIC BLOOD PRESSURE: 76 MMHG | WEIGHT: 198 LBS | HEIGHT: 65 IN

## 2020-02-10 DIAGNOSIS — T14.8XXA BRUISING: ICD-10-CM

## 2020-02-10 DIAGNOSIS — T14.8XXA BRUISING: Primary | ICD-10-CM

## 2020-02-10 LAB
BASOPHILS # BLD AUTO: 0.07 X10(3) UL (ref 0–0.2)
BASOPHILS NFR BLD AUTO: 0.8 %
DEPRECATED RDW RBC AUTO: 48 FL (ref 35.1–46.3)
EOSINOPHIL # BLD AUTO: 0.7 X10(3) UL (ref 0–0.7)
EOSINOPHIL NFR BLD AUTO: 8.2 %
ERYTHROCYTE [DISTWIDTH] IN BLOOD BY AUTOMATED COUNT: 12.7 % (ref 11–15)
HCT VFR BLD AUTO: 35.7 % (ref 35–48)
HGB BLD-MCNC: 11.8 G/DL (ref 12–16)
IMM GRANULOCYTES # BLD AUTO: 0.02 X10(3) UL (ref 0–1)
IMM GRANULOCYTES NFR BLD: 0.2 %
LYMPHOCYTES # BLD AUTO: 2.31 X10(3) UL (ref 1–4)
LYMPHOCYTES NFR BLD AUTO: 27 %
MCH RBC QN AUTO: 34.3 PG (ref 26–34)
MCHC RBC AUTO-ENTMCNC: 33.1 G/DL (ref 31–37)
MCV RBC AUTO: 103.8 FL (ref 80–100)
MONOCYTES # BLD AUTO: 0.81 X10(3) UL (ref 0.1–1)
MONOCYTES NFR BLD AUTO: 9.5 %
NEUTROPHILS # BLD AUTO: 4.66 X10 (3) UL (ref 1.5–7.7)
NEUTROPHILS # BLD AUTO: 4.66 X10(3) UL (ref 1.5–7.7)
NEUTROPHILS NFR BLD AUTO: 54.3 %
PLATELET # BLD AUTO: 280 10(3)UL (ref 150–450)
RBC # BLD AUTO: 3.44 X10(6)UL (ref 3.8–5.3)
WBC # BLD AUTO: 8.6 X10(3) UL (ref 4–11)

## 2020-02-10 PROCEDURE — 99214 OFFICE O/P EST MOD 30 MIN: CPT | Performed by: INTERNAL MEDICINE

## 2020-02-10 PROCEDURE — 85025 COMPLETE CBC W/AUTO DIFF WBC: CPT

## 2020-02-10 PROCEDURE — 36415 COLL VENOUS BLD VENIPUNCTURE: CPT

## 2020-02-10 PROCEDURE — G0463 HOSPITAL OUTPT CLINIC VISIT: HCPCS | Performed by: INTERNAL MEDICINE

## 2020-02-10 NOTE — TELEPHONE ENCOUNTER
Talked to the patient who stated that she had a total hysterectomy  done. On Saturday her abdomin was swollen and bruised. Today is better and the bruise and swelling are going down.     She called her surgeon who stated she should have it checked by her

## 2020-02-10 NOTE — TELEPHONE ENCOUNTER
Spoke with patient (verified name and ), OV made and agrees.     Future Appointments   Date Time Provider Renay Graves   2/10/2020 11:30 AM Mara Peabody, MD [facility]

## 2020-02-10 NOTE — TELEPHONE ENCOUNTER
Patient would like to know if Dr. Sandy Escalante see her today since she is having some bruising and swelling where she had surgery.  Patient already talked to the surgeon and was advised to see speak with her pcp for further eval.     Patient stated that today the ar

## 2020-02-10 NOTE — TELEPHONE ENCOUNTER
From: Magda Mckenzie  To: Bob Abbasi. Arabella Covington,   Sent: 2/10/2020 8:28 AM CST  Subject: Other    Please disregard my previous message. The bruising and pain are fading and the swelling is going down. Thank you.   Dorian Devine

## 2020-02-10 NOTE — TELEPHONE ENCOUNTER
Please reply to pool: EM RN TRIAGE    Action Requested: Summary for Provider     []  Critical Lab, Recommendations Needed  [x] Need Additional Advice  []   FYI    [x]   Need Orders  [] Need Medications Sent to Pharmacy  []  Other     SUMMARY: Only wants to

## 2020-02-10 NOTE — PROGRESS NOTES
HPI:    Patient ID: Khadijah Godwin is a 69year old female. Bruising   This is a new (pt had noted bruising around the abdominal incisions and below it .) problem. The current episode started in the past 7 days (3 days). The problem occurs constantly.  Dayanara Fung 2/10/2020 ) 15 tablet 0   • ibuprofen 600 MG Oral Tab Take 1 tablet (600 mg total) by mouth every 6 (six) hours as needed for Pain.  (Patient not taking: Reported on 2/10/2020 ) 20 tablet 0     Allergies:No Known Allergies   PHYSICAL EXAM:   Physical Exam fading already and likely due to bp cuff during her surgery last week. She has no signs to suggest bleeding disorder. We will do cbc.  Pt had been taking advil since after her surgery so I told her to stop taking this and can switch to tylenol I fshe still

## 2020-03-10 ENCOUNTER — OFFICE VISIT (OUTPATIENT)
Dept: OBGYN CLINIC | Facility: CLINIC | Age: 75
End: 2020-03-10
Payer: MEDICARE

## 2020-03-10 VITALS — HEART RATE: 61 BPM | SYSTOLIC BLOOD PRESSURE: 119 MMHG | DIASTOLIC BLOOD PRESSURE: 76 MMHG

## 2020-03-10 DIAGNOSIS — Z12.31 SCREENING MAMMOGRAM, ENCOUNTER FOR: ICD-10-CM

## 2020-03-10 DIAGNOSIS — Z98.890 POST-OPERATIVE STATE: Primary | ICD-10-CM

## 2020-03-10 PROCEDURE — 99024 POSTOP FOLLOW-UP VISIT: CPT | Performed by: OBSTETRICS & GYNECOLOGY

## 2020-03-11 RX ORDER — FLUOXETINE HYDROCHLORIDE 20 MG/1
CAPSULE ORAL
Qty: 90 CAPSULE | Refills: 1 | Status: SHIPPED | OUTPATIENT
Start: 2020-03-11 | End: 2020-09-05

## 2020-03-11 NOTE — TELEPHONE ENCOUNTER
Refill passed per [place] [facility] protocol. However pls review drug-drug interaction  Drug-Drug: ibuprofen and FLUoxetine HCl   Toxic effects may be increased with concurrent administration of ibuprofen and Selective Serotonin Reuptake Inhibitors.  The ri

## 2020-03-13 ENCOUNTER — APPOINTMENT (OUTPATIENT)
Dept: HEMATOLOGY/ONCOLOGY | Facility: HOSPITAL | Age: 75
End: 2020-03-13
Attending: INTERNAL MEDICINE
Payer: MEDICARE

## 2020-03-16 NOTE — PROGRESS NOTES
POST OP visit: Presents with Brooke  today. No complaints with respect to pain, bleeding, bowel or bladder. Using no analgesics. Saw Dr Jf Stein and also the radiation oncologist. She will start RADS soon. PE: Incisions healed well.  Abdomen is non-distende

## 2020-03-19 ENCOUNTER — TELEPHONE (OUTPATIENT)
Dept: OBGYN CLINIC | Facility: CLINIC | Age: 75
End: 2020-03-19

## 2020-03-27 RX ORDER — LEVOTHYROXINE SODIUM 0.05 MG/1
TABLET ORAL
Qty: 90 TABLET | Refills: 3 | Status: SHIPPED | OUTPATIENT
Start: 2020-03-27 | End: 2021-01-11

## 2020-06-09 RX ORDER — RISEDRONATE SODIUM 35 MG/1
TABLET, FILM COATED ORAL
Qty: 12 TABLET | Refills: 1 | Status: SHIPPED | OUTPATIENT
Start: 2020-06-09 | End: 2020-11-25

## 2020-06-22 ENCOUNTER — OFFICE VISIT (OUTPATIENT)
Dept: INTERNAL MEDICINE CLINIC | Facility: CLINIC | Age: 75
End: 2020-06-22
Payer: MEDICARE

## 2020-06-22 ENCOUNTER — NURSE TRIAGE (OUTPATIENT)
Dept: INTERNAL MEDICINE CLINIC | Facility: CLINIC | Age: 75
End: 2020-06-22

## 2020-06-22 VITALS
WEIGHT: 186 LBS | TEMPERATURE: 98 F | DIASTOLIC BLOOD PRESSURE: 69 MMHG | RESPIRATION RATE: 12 BRPM | BODY MASS INDEX: 34.23 KG/M2 | HEART RATE: 61 BPM | HEIGHT: 62 IN | SYSTOLIC BLOOD PRESSURE: 104 MMHG

## 2020-06-22 DIAGNOSIS — M25.551 HIP PAIN, RIGHT: Primary | ICD-10-CM

## 2020-06-22 PROCEDURE — G0463 HOSPITAL OUTPT CLINIC VISIT: HCPCS | Performed by: INTERNAL MEDICINE

## 2020-06-22 PROCEDURE — 99214 OFFICE O/P EST MOD 30 MIN: CPT | Performed by: INTERNAL MEDICINE

## 2020-06-22 NOTE — TELEPHONE ENCOUNTER
Please reply to pool: EM RN TRIAGE    Action Requested: Summary for Provider     []  Critical Lab, Recommendations Needed  [] Need Additional Advice  []   FYI    []   Need Orders  [] Need Medications Sent to Pharmacy  []  Other     SUMMARY: Offered office

## 2020-06-24 ENCOUNTER — HOSPITAL ENCOUNTER (OUTPATIENT)
Dept: GENERAL RADIOLOGY | Facility: HOSPITAL | Age: 75
Discharge: HOME | End: 2020-06-24
Attending: ORTHOPAEDIC SURGERY
Payer: MEDICARE

## 2020-06-24 ENCOUNTER — OFFICE VISIT (OUTPATIENT)
Dept: ORTHOPEDICS CLINIC | Facility: CLINIC | Age: 75
End: 2020-06-24
Payer: MEDICARE

## 2020-06-24 VITALS — HEIGHT: 62 IN | WEIGHT: 186 LBS | BODY MASS INDEX: 34.23 KG/M2

## 2020-06-24 DIAGNOSIS — M16.11 PRIMARY OSTEOARTHRITIS OF RIGHT HIP: ICD-10-CM

## 2020-06-24 DIAGNOSIS — M25.551 RIGHT HIP PAIN: ICD-10-CM

## 2020-06-24 DIAGNOSIS — M25.551 RIGHT HIP PAIN: Primary | ICD-10-CM

## 2020-06-24 PROCEDURE — G0463 HOSPITAL OUTPT CLINIC VISIT: HCPCS | Performed by: ORTHOPAEDIC SURGERY

## 2020-06-24 PROCEDURE — 73502 X-RAY EXAM HIP UNI 2-3 VIEWS: CPT | Performed by: ORTHOPAEDIC SURGERY

## 2020-06-24 PROCEDURE — 99204 OFFICE O/P NEW MOD 45 MIN: CPT | Performed by: ORTHOPAEDIC SURGERY

## 2020-06-24 NOTE — PROGRESS NOTES
NURSING INTAKE COMMENTS: Patient presents with:  Consult: right groin pain -- Onset over a year. Saw Dr Ren Gabriel for this and  tried PT. Pt wanting to discuss surgery. Rates pain 6/10 today. RLE has numbness and tingling. Xrays taken 09/2019.        HPI: T removed   • TONSILLECTOMY       Current Outpatient Medications   Medication Sig Dispense Refill   • VITAMIN B COMPLEX-C OR      • RISEDRONATE SODIUM 35 MG Oral Tab TAKE 1 TABLET EVERY 7 DAYS 12 tablet 1   • LEVOTHYROXINE SODIUM 50 MCG Oral Tab TAKE 1 TABLE or anxiety  HEMATOLOGIC: no hx of blood dyscrasia  ENDOCRINE: no thyroid or diabetes issues  ALL/ASTHMA: no new hx of severe allergy or asthma    Physical Examination:    Ht 5' 2\" (1.575 m)   Wt 186 lb (84.4 kg)   BMI 34.02 kg/m²   Constitutional: appears Surgery has been scheduled pending medical clearance. Plan: We specifically discussed the anterior versus the posterior approach.   She has a fairly large abdominal pannus which would increase the risk of wound infection somewhat through the anterior a

## 2020-08-25 ENCOUNTER — TELEPHONE (OUTPATIENT)
Dept: ORTHOPEDICS CLINIC | Facility: CLINIC | Age: 75
End: 2020-08-25

## 2020-08-25 NOTE — TELEPHONE ENCOUNTER
Spoke with pt in regards to dental clearance for upcoming surgery with Dr Merritt Segura on 9/11/2020, per pt saw dentist on 8/24/2020 and was advised clearance will be faxed to office.  As of 8/25/2020 no clearance received, will contact dentist Dr Josefa Dumont

## 2020-08-28 ENCOUNTER — APPOINTMENT (OUTPATIENT)
Dept: LAB | Age: 75
End: 2020-08-28
Attending: INTERNAL MEDICINE
Payer: MEDICARE

## 2020-08-28 ENCOUNTER — LAB ENCOUNTER (OUTPATIENT)
Dept: LAB | Age: 75
End: 2020-08-28
Attending: INTERNAL MEDICINE
Payer: MEDICARE

## 2020-08-28 ENCOUNTER — OFFICE VISIT (OUTPATIENT)
Dept: INTERNAL MEDICINE CLINIC | Facility: CLINIC | Age: 75
End: 2020-08-28
Payer: MEDICARE

## 2020-08-28 VITALS
BODY MASS INDEX: 31.36 KG/M2 | DIASTOLIC BLOOD PRESSURE: 78 MMHG | RESPIRATION RATE: 12 BRPM | TEMPERATURE: 99 F | HEART RATE: 64 BPM | SYSTOLIC BLOOD PRESSURE: 117 MMHG | HEIGHT: 63 IN | WEIGHT: 177 LBS

## 2020-08-28 DIAGNOSIS — E03.9 ACQUIRED HYPOTHYROIDISM: ICD-10-CM

## 2020-08-28 DIAGNOSIS — C54.1 ADENOCARCINOMA OF ENDOMETRIUM (HCC): ICD-10-CM

## 2020-08-28 DIAGNOSIS — Z01.818 PREOP TESTING: ICD-10-CM

## 2020-08-28 DIAGNOSIS — Z01.818 PREOP GENERAL PHYSICAL EXAM: Primary | ICD-10-CM

## 2020-08-28 DIAGNOSIS — M16.11 PRIMARY OSTEOARTHRITIS OF RIGHT HIP: ICD-10-CM

## 2020-08-28 DIAGNOSIS — Z01.812 PRE-OPERATIVE LABORATORY EXAMINATION: ICD-10-CM

## 2020-08-28 DIAGNOSIS — M81.0 AGE-RELATED OSTEOPOROSIS WITHOUT CURRENT PATHOLOGICAL FRACTURE: ICD-10-CM

## 2020-08-28 LAB
ALBUMIN SERPL-MCNC: 3.4 G/DL (ref 3.4–5)
ALBUMIN/GLOB SERPL: 0.9 {RATIO} (ref 1–2)
ALP LIVER SERPL-CCNC: 63 U/L (ref 55–142)
ALT SERPL-CCNC: 22 U/L (ref 13–56)
ANION GAP SERPL CALC-SCNC: 6 MMOL/L (ref 0–18)
APTT PPP: 33.1 SECONDS (ref 23.2–35.3)
AST SERPL-CCNC: 30 U/L (ref 15–37)
BASOPHILS # BLD AUTO: 0.06 X10(3) UL (ref 0–0.2)
BASOPHILS NFR BLD AUTO: 1.1 %
BILIRUB SERPL-MCNC: 0.5 MG/DL (ref 0.1–2)
BILIRUB UR QL: NEGATIVE
BUN BLD-MCNC: 20 MG/DL (ref 7–18)
BUN/CREAT SERPL: 18.7 (ref 10–20)
CALCIUM BLD-MCNC: 9.4 MG/DL (ref 8.5–10.1)
CHLORIDE SERPL-SCNC: 110 MMOL/L (ref 98–112)
CO2 SERPL-SCNC: 26 MMOL/L (ref 21–32)
COLOR UR: YELLOW
CREAT BLD-MCNC: 1.07 MG/DL (ref 0.55–1.02)
DEPRECATED RDW RBC AUTO: 49.2 FL (ref 35.1–46.3)
EOSINOPHIL # BLD AUTO: 0.13 X10(3) UL (ref 0–0.7)
EOSINOPHIL NFR BLD AUTO: 2.3 %
ERYTHROCYTE [DISTWIDTH] IN BLOOD BY AUTOMATED COUNT: 13.2 % (ref 11–15)
GLOBULIN PLAS-MCNC: 3.6 G/DL (ref 2.8–4.4)
GLUCOSE BLD-MCNC: 81 MG/DL (ref 70–99)
GLUCOSE UR-MCNC: NEGATIVE MG/DL
HCT VFR BLD AUTO: 37.6 % (ref 35–48)
HGB BLD-MCNC: 12.5 G/DL (ref 12–16)
HGB UR QL STRIP.AUTO: NEGATIVE
IMM GRANULOCYTES # BLD AUTO: 0.01 X10(3) UL (ref 0–1)
IMM GRANULOCYTES NFR BLD: 0.2 %
INR BLD: 1.13 (ref 0.9–1.2)
KETONES UR-MCNC: NEGATIVE MG/DL
LYMPHOCYTES # BLD AUTO: 1.53 X10(3) UL (ref 1–4)
LYMPHOCYTES NFR BLD AUTO: 26.9 %
M PROTEIN MFR SERPL ELPH: 7 G/DL (ref 6.4–8.2)
MCH RBC QN AUTO: 33.9 PG (ref 26–34)
MCHC RBC AUTO-ENTMCNC: 33.2 G/DL (ref 31–37)
MCV RBC AUTO: 101.9 FL (ref 80–100)
MONOCYTES # BLD AUTO: 0.56 X10(3) UL (ref 0.1–1)
MONOCYTES NFR BLD AUTO: 9.9 %
NEUTROPHILS # BLD AUTO: 3.39 X10 (3) UL (ref 1.5–7.7)
NEUTROPHILS # BLD AUTO: 3.39 X10(3) UL (ref 1.5–7.7)
NEUTROPHILS NFR BLD AUTO: 59.6 %
NITRITE UR QL STRIP.AUTO: NEGATIVE
OSMOLALITY SERPL CALC.SUM OF ELEC: 296 MOSM/KG (ref 275–295)
PATIENT FASTING Y/N/NP: YES
PH UR: 6 [PH] (ref 5–8)
PLATELET # BLD AUTO: 208 10(3)UL (ref 150–450)
POTASSIUM SERPL-SCNC: 4.1 MMOL/L (ref 3.5–5.1)
PROT UR-MCNC: NEGATIVE MG/DL
PROTHROMBIN TIME: 14.3 SECONDS (ref 11.8–14.5)
RBC # BLD AUTO: 3.69 X10(6)UL (ref 3.8–5.3)
RBC #/AREA URNS AUTO: 1 /HPF
SODIUM SERPL-SCNC: 142 MMOL/L (ref 136–145)
SP GR UR STRIP: 1.02 (ref 1–1.03)
UROBILINOGEN UR STRIP-ACNC: <2
WBC # BLD AUTO: 5.7 X10(3) UL (ref 4–11)
WBC #/AREA URNS AUTO: 2 /HPF

## 2020-08-28 PROCEDURE — 99214 OFFICE O/P EST MOD 30 MIN: CPT | Performed by: INTERNAL MEDICINE

## 2020-08-28 PROCEDURE — 93005 ELECTROCARDIOGRAM TRACING: CPT

## 2020-08-28 PROCEDURE — 85025 COMPLETE CBC W/AUTO DIFF WBC: CPT

## 2020-08-28 PROCEDURE — 36415 COLL VENOUS BLD VENIPUNCTURE: CPT

## 2020-08-28 PROCEDURE — 85610 PROTHROMBIN TIME: CPT

## 2020-08-28 PROCEDURE — G0463 HOSPITAL OUTPT CLINIC VISIT: HCPCS | Performed by: INTERNAL MEDICINE

## 2020-08-28 PROCEDURE — 87086 URINE CULTURE/COLONY COUNT: CPT

## 2020-08-28 PROCEDURE — 85730 THROMBOPLASTIN TIME PARTIAL: CPT

## 2020-08-28 PROCEDURE — 80053 COMPREHEN METABOLIC PANEL: CPT

## 2020-08-28 PROCEDURE — 81001 URINALYSIS AUTO W/SCOPE: CPT

## 2020-08-28 PROCEDURE — 87641 MR-STAPH DNA AMP PROBE: CPT

## 2020-08-28 PROCEDURE — 93010 ELECTROCARDIOGRAM REPORT: CPT | Performed by: INTERNAL MEDICINE

## 2020-08-28 NOTE — PROGRESS NOTES
HPI:    Patient ID: Belen Lundberg is a 69year old female. Patient presents today for preop medical clearance as requested by Dr Ant Jha for the right hip joint replacement surgery scheduduled on Sept 11,2020 at [facility].  Patient had been diagnosed with sev round, and reactive to light. Conjunctivae and EOM are normal. Right eye exhibits no discharge. Left eye exhibits no discharge. Neck: Normal range of motion. Neck supple. No JVD present.    Cardiovascular: Normal rate, regular rhythm and intact distal pul therapy which she can hold for now and restart 4 weeks postop. No orders of the defined types were placed in this encounter.       Meds This Visit:  Requested Prescriptions      No prescriptions requested or ordered in this encounter       Imaging &

## 2020-08-29 LAB — MRSA DNA SPEC QL NAA+PROBE: NEGATIVE

## 2020-09-01 ENCOUNTER — TELEPHONE (OUTPATIENT)
Dept: INTERNAL MEDICINE CLINIC | Facility: CLINIC | Age: 75
End: 2020-09-01

## 2020-09-01 DIAGNOSIS — R79.89 ELEVATED SERUM CREATININE: Primary | ICD-10-CM

## 2020-09-02 ENCOUNTER — LAB ENCOUNTER (OUTPATIENT)
Dept: LAB | Age: 75
End: 2020-09-02
Attending: INTERNAL MEDICINE
Payer: MEDICARE

## 2020-09-02 DIAGNOSIS — R79.89 ELEVATED SERUM CREATININE: ICD-10-CM

## 2020-09-02 LAB
ANION GAP SERPL CALC-SCNC: 4 MMOL/L (ref 0–18)
BUN BLD-MCNC: 17 MG/DL (ref 7–18)
BUN/CREAT SERPL: 18.9 (ref 10–20)
CALCIUM BLD-MCNC: 8.9 MG/DL (ref 8.5–10.1)
CHLORIDE SERPL-SCNC: 111 MMOL/L (ref 98–112)
CO2 SERPL-SCNC: 26 MMOL/L (ref 21–32)
CREAT BLD-MCNC: 0.9 MG/DL (ref 0.55–1.02)
GLUCOSE BLD-MCNC: 80 MG/DL (ref 70–99)
OSMOLALITY SERPL CALC.SUM OF ELEC: 293 MOSM/KG (ref 275–295)
PATIENT FASTING Y/N/NP: NO
POTASSIUM SERPL-SCNC: 4.1 MMOL/L (ref 3.5–5.1)
SODIUM SERPL-SCNC: 141 MMOL/L (ref 136–145)

## 2020-09-02 PROCEDURE — 80048 BASIC METABOLIC PNL TOTAL CA: CPT

## 2020-09-02 PROCEDURE — 36415 COLL VENOUS BLD VENIPUNCTURE: CPT

## 2020-09-04 ENCOUNTER — OFFICE VISIT (OUTPATIENT)
Dept: ORTHOPEDICS CLINIC | Facility: CLINIC | Age: 75
End: 2020-09-04
Payer: MEDICARE

## 2020-09-04 DIAGNOSIS — M16.11 PRIMARY OSTEOARTHRITIS OF RIGHT HIP: Primary | ICD-10-CM

## 2020-09-04 PROCEDURE — G0463 HOSPITAL OUTPT CLINIC VISIT: HCPCS | Performed by: ORTHOPAEDIC SURGERY

## 2020-09-04 PROCEDURE — 99212 OFFICE O/P EST SF 10 MIN: CPT | Performed by: ORTHOPAEDIC SURGERY

## 2020-09-04 NOTE — PROGRESS NOTES
NURSING INTAKE COMMENTS: Patient presents with:  Pre-Op Exam: Right SHERRI  will have sx on 9/11/2020 - has pain rated as 7/10 on and off - had medical clerance done       HPI: This 69year old female presents today preoperative consultation.   She is schedule 1   • Melatonin 5 MG Oral Tab Take 1 tablet by mouth nightly. • Calcium Carbonate-Vitamin D (CALCIUM-VITAMIN D) 600-200 MG-UNIT Oral Cap Take by mouth daily.  Takes 2 tabs in am and 1 tab at HS      • Multiple Vitamins Oral Tab Take 1 tablet by mouth 08/28/2020    HGB 12.5 08/28/2020    .0 08/28/2020      Lab Results   Component Value Date    GLU 80 09/02/2020    BUN 17 09/02/2020    CREATSERUM 0.90 09/02/2020    GFR >59 10/27/2008    GFRNAA 64 09/02/2020    GFRAA 73 09/02/2020        Assessment

## 2020-09-05 ENCOUNTER — TELEPHONE (OUTPATIENT)
Dept: INTERNAL MEDICINE CLINIC | Facility: CLINIC | Age: 75
End: 2020-09-05

## 2020-09-05 RX ORDER — FLUOXETINE HYDROCHLORIDE 20 MG/1
CAPSULE ORAL
Qty: 90 CAPSULE | Refills: 3 | Status: SHIPPED | OUTPATIENT
Start: 2020-09-05 | End: 2021-02-24

## 2020-09-05 NOTE — TELEPHONE ENCOUNTER
Medical Clearance from Dr. Rashaad Amezquita faxed and confirmed sent to Orthopedic Specialist, Dr. Darin Box. Fax confirmation received.

## 2020-09-08 ENCOUNTER — APPOINTMENT (OUTPATIENT)
Dept: LAB | Age: 75
DRG: 470 | End: 2020-09-08
Attending: ORTHOPAEDIC SURGERY
Payer: MEDICARE

## 2020-09-08 ENCOUNTER — LAB ENCOUNTER (OUTPATIENT)
Dept: LAB | Age: 75
DRG: 470 | End: 2020-09-08
Attending: ORTHOPAEDIC SURGERY
Payer: MEDICARE

## 2020-09-08 DIAGNOSIS — Z01.818 PREOP TESTING: ICD-10-CM

## 2020-09-08 LAB
ANTIBODY SCREEN: NEGATIVE
RH BLOOD TYPE: POSITIVE

## 2020-09-08 PROCEDURE — 86850 RBC ANTIBODY SCREEN: CPT

## 2020-09-08 PROCEDURE — 36415 COLL VENOUS BLD VENIPUNCTURE: CPT

## 2020-09-08 PROCEDURE — 86905 BLOOD TYPING RBC ANTIGENS: CPT

## 2020-09-08 PROCEDURE — 86900 BLOOD TYPING SEROLOGIC ABO: CPT

## 2020-09-08 PROCEDURE — 86901 BLOOD TYPING SEROLOGIC RH(D): CPT

## 2020-09-09 LAB — SARS-COV-2 RNA RESP QL NAA+PROBE: NOT DETECTED

## 2020-09-10 LAB — RGTSCRN: 3

## 2020-09-10 NOTE — H&P
[facility] Patient Status:  Surgery Admit Dk Cota     MRN [id number]   [place] [address] Attending Elijah Marquez, *   Hosp Day # 0 PCP Cassius Rios, obstruction   • ROBOT-ASSISTED LAPAROSCOPIC HYSTERECTOMY Bilateral 2/4/2020    Performed by Osvaldo Lott MD at [facility] OR   • [address]  10/2019    kyphoplasty    • THYROIDECTOMY  1986    nodules removed   • TONSILLECTOMY MG 2.1 12/04/2019    TROP <0.045 11/29/2019    B12 543 12/04/2019       No results found. Assessment/Plan:   She has advanced osteoarthritis of the right hip that has not responded to conservative management.   We discussed right total hip replacemen

## 2020-09-11 ENCOUNTER — APPOINTMENT (OUTPATIENT)
Dept: GENERAL RADIOLOGY | Facility: HOSPITAL | Age: 75
DRG: 470 | End: 2020-09-11
Attending: ORTHOPAEDIC SURGERY
Payer: MEDICARE

## 2020-09-11 ENCOUNTER — APPOINTMENT (OUTPATIENT)
Dept: GENERAL RADIOLOGY | Facility: HOSPITAL | Age: 75
DRG: 470 | End: 2020-09-11
Attending: PHYSICIAN ASSISTANT
Payer: MEDICARE

## 2020-09-11 ENCOUNTER — ANESTHESIA (OUTPATIENT)
Dept: SURGERY | Facility: HOSPITAL | Age: 75
DRG: 470 | End: 2020-09-11
Payer: MEDICARE

## 2020-09-11 ENCOUNTER — ANESTHESIA EVENT (OUTPATIENT)
Dept: SURGERY | Facility: HOSPITAL | Age: 75
DRG: 470 | End: 2020-09-11
Payer: MEDICARE

## 2020-09-11 ENCOUNTER — HOSPITAL ENCOUNTER (INPATIENT)
Facility: HOSPITAL | Age: 75
LOS: 1 days | Discharge: HOME | DRG: 470 | End: 2020-09-12
Attending: ORTHOPAEDIC SURGERY | Admitting: ORTHOPAEDIC SURGERY
Payer: MEDICARE

## 2020-09-11 DIAGNOSIS — M16.11 PRIMARY OSTEOARTHRITIS OF RIGHT HIP: ICD-10-CM

## 2020-09-11 DIAGNOSIS — Z01.818 PREOP TESTING: Primary | ICD-10-CM

## 2020-09-11 LAB
DEPRECATED RDW RBC AUTO: 49.9 FL (ref 35.1–46.3)
ERYTHROCYTE [DISTWIDTH] IN BLOOD BY AUTOMATED COUNT: 13.2 % (ref 11–15)
HCT VFR BLD AUTO: 34.1 % (ref 35–48)
HGB BLD-MCNC: 11.2 G/DL (ref 12–16)
MCH RBC QN AUTO: 33.9 PG (ref 26–34)
MCHC RBC AUTO-ENTMCNC: 32.8 G/DL (ref 31–37)
MCV RBC AUTO: 103.3 FL (ref 80–100)
PLATELET # BLD AUTO: 178 10(3)UL (ref 150–450)
RBC # BLD AUTO: 3.3 X10(6)UL (ref 3.8–5.3)
WBC # BLD AUTO: 7.1 X10(3) UL (ref 4–11)

## 2020-09-11 PROCEDURE — 76000 FLUOROSCOPY <1 HR PHYS/QHP: CPT | Performed by: ORTHOPAEDIC SURGERY

## 2020-09-11 PROCEDURE — 73502 X-RAY EXAM HIP UNI 2-3 VIEWS: CPT | Performed by: PHYSICIAN ASSISTANT

## 2020-09-11 PROCEDURE — 0SR90JA REPLACEMENT OF RIGHT HIP JOINT WITH SYNTHETIC SUBSTITUTE, UNCEMENTED, OPEN APPROACH: ICD-10-PCS | Performed by: ORTHOPAEDIC SURGERY

## 2020-09-11 PROCEDURE — 99232 SBSQ HOSP IP/OBS MODERATE 35: CPT | Performed by: HOSPITALIST

## 2020-09-11 DEVICE — FLAT PE  HC LINER Ø 32 / E
Type: IMPLANTABLE DEVICE | Site: HIP | Status: FUNCTIONAL
Brand: MPACT ACETABULAR SYSTEM

## 2020-09-11 DEVICE — FEMORAL HEAD Ø 32 SIZE M
Type: IMPLANTABLE DEVICE | Site: HIP | Status: FUNCTIONAL
Brand: MECTACER BIOLOX DELTA FEMORAL BALL HEAD

## 2020-09-11 DEVICE — CANCELLOUS BONE SCREW FLAT HEAD Ø 6,5 L35
Type: IMPLANTABLE DEVICE | Site: HIP | Status: FUNCTIONAL
Brand: MPACT ACETABULAR SYSTEM

## 2020-09-11 DEVICE — TOTAL HIP W/CER HEAD: Type: IMPLANTABLE DEVICE | Status: FUNCTIONAL

## 2020-09-11 RX ORDER — HYDROMORPHONE HYDROCHLORIDE 1 MG/ML
0.4 INJECTION, SOLUTION INTRAMUSCULAR; INTRAVENOUS; SUBCUTANEOUS
Status: ACTIVE | OUTPATIENT
Start: 2020-09-11 | End: 2020-09-12

## 2020-09-11 RX ORDER — ONDANSETRON 2 MG/ML
4 INJECTION INTRAMUSCULAR; INTRAVENOUS EVERY 4 HOURS PRN
Status: DISCONTINUED | OUTPATIENT
Start: 2020-09-11 | End: 2020-09-12

## 2020-09-11 RX ORDER — CELECOXIB 200 MG/1
400 CAPSULE ORAL ONCE
Status: COMPLETED | OUTPATIENT
Start: 2020-09-11 | End: 2020-09-11

## 2020-09-11 RX ORDER — DEXTROSE, SODIUM CHLORIDE, AND POTASSIUM CHLORIDE 5; .45; .15 G/100ML; G/100ML; G/100ML
INJECTION INTRAVENOUS CONTINUOUS
Status: DISCONTINUED | OUTPATIENT
Start: 2020-09-11 | End: 2020-09-12

## 2020-09-11 RX ORDER — MORPHINE SULFATE 4 MG/ML
2 INJECTION, SOLUTION INTRAMUSCULAR; INTRAVENOUS EVERY 10 MIN PRN
Status: DISCONTINUED | OUTPATIENT
Start: 2020-09-11 | End: 2020-09-11 | Stop reason: HOSPADM

## 2020-09-11 RX ORDER — FLUOXETINE HYDROCHLORIDE 20 MG/1
20 CAPSULE ORAL NIGHTLY
Status: DISCONTINUED | OUTPATIENT
Start: 2020-09-11 | End: 2020-09-12

## 2020-09-11 RX ORDER — TRANEXAMIC ACID 10 MG/ML
1000 INJECTION, SOLUTION INTRAVENOUS ONCE
Status: COMPLETED | OUTPATIENT
Start: 2020-09-11 | End: 2020-09-11

## 2020-09-11 RX ORDER — POLYETHYLENE GLYCOL 3350 17 G/17G
17 POWDER, FOR SOLUTION ORAL DAILY PRN
Status: DISCONTINUED | OUTPATIENT
Start: 2020-09-11 | End: 2020-09-12

## 2020-09-11 RX ORDER — DIPHENHYDRAMINE HCL 25 MG
25 CAPSULE ORAL EVERY 4 HOURS PRN
Status: ACTIVE | OUTPATIENT
Start: 2020-09-11 | End: 2020-09-12

## 2020-09-11 RX ORDER — PROCHLORPERAZINE EDISYLATE 5 MG/ML
5 INJECTION INTRAMUSCULAR; INTRAVENOUS ONCE AS NEEDED
Status: DISCONTINUED | OUTPATIENT
Start: 2020-09-11 | End: 2020-09-11 | Stop reason: HOSPADM

## 2020-09-11 RX ORDER — DIPHENHYDRAMINE HCL 25 MG
25 CAPSULE ORAL EVERY 4 HOURS PRN
Status: DISCONTINUED | OUTPATIENT
Start: 2020-09-11 | End: 2020-09-12

## 2020-09-11 RX ORDER — ASPIRIN 325 MG
325 TABLET ORAL 2 TIMES DAILY
Status: DISCONTINUED | OUTPATIENT
Start: 2020-09-11 | End: 2020-09-12

## 2020-09-11 RX ORDER — BUPIVACAINE HYDROCHLORIDE 7.5 MG/ML
INJECTION, SOLUTION INTRASPINAL
Status: COMPLETED | OUTPATIENT
Start: 2020-09-11 | End: 2020-09-11

## 2020-09-11 RX ORDER — ACETAMINOPHEN 325 MG/1
650 TABLET ORAL EVERY 6 HOURS PRN
Status: ACTIVE | OUTPATIENT
Start: 2020-09-11 | End: 2020-09-12

## 2020-09-11 RX ORDER — MORPHINE SULFATE 4 MG/ML
4 INJECTION, SOLUTION INTRAMUSCULAR; INTRAVENOUS EVERY 10 MIN PRN
Status: DISCONTINUED | OUTPATIENT
Start: 2020-09-11 | End: 2020-09-11 | Stop reason: HOSPADM

## 2020-09-11 RX ORDER — SENNOSIDES 8.6 MG
17.2 TABLET ORAL NIGHTLY
Status: DISCONTINUED | OUTPATIENT
Start: 2020-09-11 | End: 2020-09-12

## 2020-09-11 RX ORDER — LIDOCAINE HYDROCHLORIDE 10 MG/ML
INJECTION, SOLUTION INFILTRATION; PERINEURAL
Status: COMPLETED | OUTPATIENT
Start: 2020-09-11 | End: 2020-09-11

## 2020-09-11 RX ORDER — LIDOCAINE HYDROCHLORIDE 10 MG/ML
INJECTION, SOLUTION EPIDURAL; INFILTRATION; INTRACAUDAL; PERINEURAL AS NEEDED
Status: DISCONTINUED | OUTPATIENT
Start: 2020-09-11 | End: 2020-09-11 | Stop reason: SURG

## 2020-09-11 RX ORDER — EPHEDRINE SULFATE 50 MG/ML
INJECTION, SOLUTION INTRAVENOUS AS NEEDED
Status: DISCONTINUED | OUTPATIENT
Start: 2020-09-11 | End: 2020-09-11 | Stop reason: SURG

## 2020-09-11 RX ORDER — MORPHINE SULFATE 2 MG/ML
2 INJECTION, SOLUTION INTRAMUSCULAR; INTRAVENOUS EVERY 2 HOUR PRN
Status: DISCONTINUED | OUTPATIENT
Start: 2020-09-11 | End: 2020-09-12

## 2020-09-11 RX ORDER — SODIUM PHOSPHATE, DIBASIC AND SODIUM PHOSPHATE, MONOBASIC 7; 19 G/133ML; G/133ML
1 ENEMA RECTAL ONCE AS NEEDED
Status: DISCONTINUED | OUTPATIENT
Start: 2020-09-11 | End: 2020-09-12

## 2020-09-11 RX ORDER — HYDROMORPHONE HYDROCHLORIDE 1 MG/ML
0.4 INJECTION, SOLUTION INTRAMUSCULAR; INTRAVENOUS; SUBCUTANEOUS EVERY 5 MIN PRN
Status: DISCONTINUED | OUTPATIENT
Start: 2020-09-11 | End: 2020-09-11 | Stop reason: HOSPADM

## 2020-09-11 RX ORDER — BISACODYL 10 MG
10 SUPPOSITORY, RECTAL RECTAL
Status: DISCONTINUED | OUTPATIENT
Start: 2020-09-11 | End: 2020-09-12

## 2020-09-11 RX ORDER — HALOPERIDOL 5 MG/ML
0.5 INJECTION INTRAMUSCULAR ONCE AS NEEDED
Status: ACTIVE | OUTPATIENT
Start: 2020-09-11 | End: 2020-09-11

## 2020-09-11 RX ORDER — HYDROCODONE BITARTRATE AND ACETAMINOPHEN 7.5; 325 MG/1; MG/1
2 TABLET ORAL EVERY 6 HOURS PRN
Status: ACTIVE | OUTPATIENT
Start: 2020-09-11 | End: 2020-09-12

## 2020-09-11 RX ORDER — NALOXONE HYDROCHLORIDE 0.4 MG/ML
80 INJECTION, SOLUTION INTRAMUSCULAR; INTRAVENOUS; SUBCUTANEOUS AS NEEDED
Status: DISCONTINUED | OUTPATIENT
Start: 2020-09-11 | End: 2020-09-11 | Stop reason: HOSPADM

## 2020-09-11 RX ORDER — GLYCOPYRROLATE 0.2 MG/ML
INJECTION, SOLUTION INTRAMUSCULAR; INTRAVENOUS AS NEEDED
Status: DISCONTINUED | OUTPATIENT
Start: 2020-09-11 | End: 2020-09-11 | Stop reason: SURG

## 2020-09-11 RX ORDER — ONDANSETRON 2 MG/ML
4 INJECTION INTRAMUSCULAR; INTRAVENOUS ONCE AS NEEDED
Status: ACTIVE | OUTPATIENT
Start: 2020-09-11 | End: 2020-09-11

## 2020-09-11 RX ORDER — MIDAZOLAM HYDROCHLORIDE 1 MG/ML
INJECTION INTRAMUSCULAR; INTRAVENOUS AS NEEDED
Status: DISCONTINUED | OUTPATIENT
Start: 2020-09-11 | End: 2020-09-11 | Stop reason: SURG

## 2020-09-11 RX ORDER — HYDROCODONE BITARTRATE AND ACETAMINOPHEN 5; 325 MG/1; MG/1
2 TABLET ORAL EVERY 4 HOURS PRN
Status: DISCONTINUED | OUTPATIENT
Start: 2020-09-11 | End: 2020-09-12

## 2020-09-11 RX ORDER — PROCHLORPERAZINE EDISYLATE 5 MG/ML
5 INJECTION INTRAMUSCULAR; INTRAVENOUS ONCE AS NEEDED
Status: ACTIVE | OUTPATIENT
Start: 2020-09-11 | End: 2020-09-11

## 2020-09-11 RX ORDER — LEVOTHYROXINE SODIUM 0.05 MG/1
50 TABLET ORAL
Status: DISCONTINUED | OUTPATIENT
Start: 2020-09-12 | End: 2020-09-12

## 2020-09-11 RX ORDER — ACETAMINOPHEN 500 MG
1000 TABLET ORAL ONCE
Status: DISCONTINUED | OUTPATIENT
Start: 2020-09-11 | End: 2020-09-11 | Stop reason: HOSPADM

## 2020-09-11 RX ORDER — GABAPENTIN 600 MG/1
600 TABLET ORAL ONCE
Status: COMPLETED | OUTPATIENT
Start: 2020-09-11 | End: 2020-09-11

## 2020-09-11 RX ORDER — DIPHENHYDRAMINE HYDROCHLORIDE 50 MG/ML
12.5 INJECTION INTRAMUSCULAR; INTRAVENOUS EVERY 4 HOURS PRN
Status: DISCONTINUED | OUTPATIENT
Start: 2020-09-11 | End: 2020-09-12

## 2020-09-11 RX ORDER — SODIUM CHLORIDE, SODIUM LACTATE, POTASSIUM CHLORIDE, CALCIUM CHLORIDE 600; 310; 30; 20 MG/100ML; MG/100ML; MG/100ML; MG/100ML
INJECTION, SOLUTION INTRAVENOUS CONTINUOUS
Status: DISCONTINUED | OUTPATIENT
Start: 2020-09-11 | End: 2020-09-11 | Stop reason: HOSPADM

## 2020-09-11 RX ORDER — MORPHINE SULFATE 4 MG/ML
4 INJECTION, SOLUTION INTRAMUSCULAR; INTRAVENOUS EVERY 2 HOUR PRN
Status: DISCONTINUED | OUTPATIENT
Start: 2020-09-11 | End: 2020-09-12

## 2020-09-11 RX ORDER — METOCLOPRAMIDE 10 MG/1
10 TABLET ORAL ONCE
Status: DISCONTINUED | OUTPATIENT
Start: 2020-09-11 | End: 2020-09-11 | Stop reason: HOSPADM

## 2020-09-11 RX ORDER — NALOXONE HYDROCHLORIDE 0.4 MG/ML
0.08 INJECTION, SOLUTION INTRAMUSCULAR; INTRAVENOUS; SUBCUTANEOUS
Status: ACTIVE | OUTPATIENT
Start: 2020-09-11 | End: 2020-09-12

## 2020-09-11 RX ORDER — HYDROCODONE BITARTRATE AND ACETAMINOPHEN 5; 325 MG/1; MG/1
1 TABLET ORAL EVERY 4 HOURS PRN
Status: DISCONTINUED | OUTPATIENT
Start: 2020-09-11 | End: 2020-09-12

## 2020-09-11 RX ORDER — FAMOTIDINE 20 MG/1
20 TABLET ORAL ONCE
Status: COMPLETED | OUTPATIENT
Start: 2020-09-11 | End: 2020-09-11

## 2020-09-11 RX ORDER — HYDROCODONE BITARTRATE AND ACETAMINOPHEN 7.5; 325 MG/1; MG/1
1 TABLET ORAL EVERY 6 HOURS PRN
Status: ACTIVE | OUTPATIENT
Start: 2020-09-11 | End: 2020-09-12

## 2020-09-11 RX ORDER — SODIUM CHLORIDE, SODIUM LACTATE, POTASSIUM CHLORIDE, CALCIUM CHLORIDE 600; 310; 30; 20 MG/100ML; MG/100ML; MG/100ML; MG/100ML
INJECTION, SOLUTION INTRAVENOUS CONTINUOUS
Status: DISCONTINUED | OUTPATIENT
Start: 2020-09-11 | End: 2020-09-12

## 2020-09-11 RX ORDER — HYDROCODONE BITARTRATE AND ACETAMINOPHEN 5; 325 MG/1; MG/1
1 TABLET ORAL AS NEEDED
Status: DISCONTINUED | OUTPATIENT
Start: 2020-09-11 | End: 2020-09-11 | Stop reason: HOSPADM

## 2020-09-11 RX ORDER — CEFAZOLIN SODIUM/WATER 2 G/20 ML
2 SYRINGE (ML) INTRAVENOUS ONCE
Status: COMPLETED | OUTPATIENT
Start: 2020-09-11 | End: 2020-09-11

## 2020-09-11 RX ORDER — ACETAMINOPHEN 325 MG/1
650 TABLET ORAL EVERY 4 HOURS PRN
Status: DISCONTINUED | OUTPATIENT
Start: 2020-09-11 | End: 2020-09-12

## 2020-09-11 RX ORDER — CELECOXIB 200 MG/1
200 CAPSULE ORAL EVERY 12 HOURS SCHEDULED
Status: DISCONTINUED | OUTPATIENT
Start: 2020-09-12 | End: 2020-09-12

## 2020-09-11 RX ORDER — DIPHENHYDRAMINE HYDROCHLORIDE 50 MG/ML
25 INJECTION INTRAMUSCULAR; INTRAVENOUS ONCE AS NEEDED
Status: ACTIVE | OUTPATIENT
Start: 2020-09-11 | End: 2020-09-11

## 2020-09-11 RX ORDER — HYDROCODONE BITARTRATE AND ACETAMINOPHEN 5; 325 MG/1; MG/1
2 TABLET ORAL AS NEEDED
Status: DISCONTINUED | OUTPATIENT
Start: 2020-09-11 | End: 2020-09-11 | Stop reason: HOSPADM

## 2020-09-11 RX ORDER — MELATONIN
325
Status: DISCONTINUED | OUTPATIENT
Start: 2020-09-12 | End: 2020-09-12

## 2020-09-11 RX ORDER — DOCUSATE SODIUM 100 MG/1
100 CAPSULE, LIQUID FILLED ORAL 2 TIMES DAILY
Status: DISCONTINUED | OUTPATIENT
Start: 2020-09-11 | End: 2020-09-12

## 2020-09-11 RX ORDER — DIPHENHYDRAMINE HYDROCHLORIDE 50 MG/ML
12.5 INJECTION INTRAMUSCULAR; INTRAVENOUS EVERY 4 HOURS PRN
Status: ACTIVE | OUTPATIENT
Start: 2020-09-11 | End: 2020-09-12

## 2020-09-11 RX ORDER — ONDANSETRON 2 MG/ML
4 INJECTION INTRAMUSCULAR; INTRAVENOUS ONCE AS NEEDED
Status: DISCONTINUED | OUTPATIENT
Start: 2020-09-11 | End: 2020-09-11 | Stop reason: HOSPADM

## 2020-09-11 RX ORDER — HALOPERIDOL 5 MG/ML
0.25 INJECTION INTRAMUSCULAR ONCE AS NEEDED
Status: DISCONTINUED | OUTPATIENT
Start: 2020-09-11 | End: 2020-09-11 | Stop reason: HOSPADM

## 2020-09-11 RX ORDER — HYDROMORPHONE HYDROCHLORIDE 1 MG/ML
0.6 INJECTION, SOLUTION INTRAMUSCULAR; INTRAVENOUS; SUBCUTANEOUS EVERY 5 MIN PRN
Status: DISCONTINUED | OUTPATIENT
Start: 2020-09-11 | End: 2020-09-11 | Stop reason: HOSPADM

## 2020-09-11 RX ORDER — MORPHINE SULFATE 1 MG/ML
INJECTION, SOLUTION EPIDURAL; INTRATHECAL; INTRAVENOUS
Status: COMPLETED | OUTPATIENT
Start: 2020-09-11 | End: 2020-09-11

## 2020-09-11 RX ORDER — METOCLOPRAMIDE HYDROCHLORIDE 5 MG/ML
10 INJECTION INTRAMUSCULAR; INTRAVENOUS EVERY 6 HOURS PRN
Status: DISCONTINUED | OUTPATIENT
Start: 2020-09-11 | End: 2020-09-12

## 2020-09-11 RX ORDER — FAMOTIDINE 20 MG/1
20 TABLET ORAL DAILY
Status: DISCONTINUED | OUTPATIENT
Start: 2020-09-11 | End: 2020-09-12

## 2020-09-11 RX ORDER — PHENYLEPHRINE HCL 10 MG/ML
VIAL (ML) INJECTION AS NEEDED
Status: DISCONTINUED | OUTPATIENT
Start: 2020-09-11 | End: 2020-09-11 | Stop reason: SURG

## 2020-09-11 RX ORDER — MORPHINE SULFATE 10 MG/ML
6 INJECTION, SOLUTION INTRAMUSCULAR; INTRAVENOUS EVERY 10 MIN PRN
Status: DISCONTINUED | OUTPATIENT
Start: 2020-09-11 | End: 2020-09-11 | Stop reason: HOSPADM

## 2020-09-11 RX ORDER — HYDROMORPHONE HYDROCHLORIDE 1 MG/ML
0.2 INJECTION, SOLUTION INTRAMUSCULAR; INTRAVENOUS; SUBCUTANEOUS EVERY 5 MIN PRN
Status: DISCONTINUED | OUTPATIENT
Start: 2020-09-11 | End: 2020-09-11 | Stop reason: HOSPADM

## 2020-09-11 RX ORDER — HYDROMORPHONE HYDROCHLORIDE 1 MG/ML
0.6 INJECTION, SOLUTION INTRAMUSCULAR; INTRAVENOUS; SUBCUTANEOUS
Status: ACTIVE | OUTPATIENT
Start: 2020-09-11 | End: 2020-09-12

## 2020-09-11 RX ORDER — FAMOTIDINE 10 MG/ML
20 INJECTION, SOLUTION INTRAVENOUS DAILY
Status: DISCONTINUED | OUTPATIENT
Start: 2020-09-11 | End: 2020-09-12

## 2020-09-11 RX ORDER — PROCHLORPERAZINE EDISYLATE 5 MG/ML
10 INJECTION INTRAMUSCULAR; INTRAVENOUS EVERY 6 HOURS PRN
Status: DISCONTINUED | OUTPATIENT
Start: 2020-09-11 | End: 2020-09-12

## 2020-09-11 RX ORDER — MORPHINE SULFATE 2 MG/ML
1 INJECTION, SOLUTION INTRAMUSCULAR; INTRAVENOUS EVERY 2 HOUR PRN
Status: DISCONTINUED | OUTPATIENT
Start: 2020-09-11 | End: 2020-09-12

## 2020-09-11 RX ADMIN — EPHEDRINE SULFATE 5 MG: 50 INJECTION, SOLUTION INTRAVENOUS at 09:17:00

## 2020-09-11 RX ADMIN — SODIUM CHLORIDE, SODIUM LACTATE, POTASSIUM CHLORIDE, CALCIUM CHLORIDE: 600; 310; 30; 20 INJECTION, SOLUTION INTRAVENOUS at 08:50:00

## 2020-09-11 RX ADMIN — PHENYLEPHRINE HCL 100 MCG: 10 MG/ML VIAL (ML) INJECTION at 08:41:00

## 2020-09-11 RX ADMIN — SODIUM CHLORIDE, SODIUM LACTATE, POTASSIUM CHLORIDE, CALCIUM CHLORIDE: 600; 310; 30; 20 INJECTION, SOLUTION INTRAVENOUS at 08:51:00

## 2020-09-11 RX ADMIN — MIDAZOLAM HYDROCHLORIDE 1 MG: 1 INJECTION INTRAMUSCULAR; INTRAVENOUS at 08:10:00

## 2020-09-11 RX ADMIN — EPHEDRINE SULFATE 5 MG: 50 INJECTION, SOLUTION INTRAVENOUS at 08:20:00

## 2020-09-11 RX ADMIN — CEFAZOLIN SODIUM/WATER 2 G: 2 G/20 ML SYRINGE (ML) INTRAVENOUS at 08:05:00

## 2020-09-11 RX ADMIN — EPHEDRINE SULFATE 5 MG: 50 INJECTION, SOLUTION INTRAVENOUS at 08:34:00

## 2020-09-11 RX ADMIN — LIDOCAINE HYDROCHLORIDE 5 ML: 10 INJECTION, SOLUTION INFILTRATION; PERINEURAL at 07:50:00

## 2020-09-11 RX ADMIN — PHENYLEPHRINE HCL 100 MCG: 10 MG/ML VIAL (ML) INJECTION at 09:17:00

## 2020-09-11 RX ADMIN — EPHEDRINE SULFATE 5 MG: 50 INJECTION, SOLUTION INTRAVENOUS at 08:26:00

## 2020-09-11 RX ADMIN — GLYCOPYRROLATE 0.2 MG: 0.2 INJECTION, SOLUTION INTRAMUSCULAR; INTRAVENOUS at 08:40:00

## 2020-09-11 RX ADMIN — PHENYLEPHRINE HCL 100 MCG: 10 MG/ML VIAL (ML) INJECTION at 09:07:00

## 2020-09-11 RX ADMIN — MORPHINE SULFATE 0.3 MG: 1 INJECTION, SOLUTION EPIDURAL; INTRATHECAL; INTRAVENOUS at 07:50:00

## 2020-09-11 RX ADMIN — PHENYLEPHRINE HCL 50 MCG: 10 MG/ML VIAL (ML) INJECTION at 08:57:00

## 2020-09-11 RX ADMIN — BUPIVACAINE HYDROCHLORIDE 1.4 ML: 7.5 INJECTION, SOLUTION INTRASPINAL at 07:50:00

## 2020-09-11 RX ADMIN — PHENYLEPHRINE HCL 50 MCG: 10 MG/ML VIAL (ML) INJECTION at 09:22:00

## 2020-09-11 RX ADMIN — EPHEDRINE SULFATE 5 MG: 50 INJECTION, SOLUTION INTRAVENOUS at 08:14:00

## 2020-09-11 RX ADMIN — LIDOCAINE HYDROCHLORIDE 50 MG: 10 INJECTION, SOLUTION EPIDURAL; INFILTRATION; INTRACAUDAL; PERINEURAL at 08:05:00

## 2020-09-11 RX ADMIN — PHENYLEPHRINE HCL 50 MCG: 10 MG/ML VIAL (ML) INJECTION at 09:05:00

## 2020-09-11 RX ADMIN — MIDAZOLAM HYDROCHLORIDE 1 MG: 1 INJECTION INTRAMUSCULAR; INTRAVENOUS at 07:45:00

## 2020-09-11 RX ADMIN — SODIUM CHLORIDE, SODIUM LACTATE, POTASSIUM CHLORIDE, CALCIUM CHLORIDE: 600; 310; 30; 20 INJECTION, SOLUTION INTRAVENOUS at 09:49:00

## 2020-09-11 RX ADMIN — TRANEXAMIC ACID 1000 MG: 10 INJECTION, SOLUTION INTRAVENOUS at 08:05:00

## 2020-09-11 NOTE — CM/SW NOTE
SW received MDO for fresh post op. STEFANIE met with pt to complete an initial assessment. SW confirmed pt's address and phone number with the pt. Pt lives in a 2 level  house with spouse. There is/are 1 steps into the home and 7 steps to the bedrooms.  Pt states

## 2020-09-11 NOTE — OPERATIVE REPORT
OPERATIVE REPORT     PREOPERATIVE DIAGNOSIS:  Osteoarthritis, right hip. POSTOPERATIVE DIAGNOSIS:  Osteoarthritis, right hip. PROCEDURE:  right total hip arthroplasty via anterior approach with C-arm control.      ASSISTANT:  Sheryle Royals, MD;  Emma inclination and version were verified with the C-arm. A Medacta Mpact 2-hole acetabulum was inserted in the appropriate version and inclination and obtained a good press-fit.   A screw was placed in the ilium for supplementary fixation and obtained excelle

## 2020-09-11 NOTE — PLAN OF CARE
Problem: Patient Centered Care  Goal: Patient preferences are identified and integrated in the patient's plan of care  Description  Interventions:  - What would you like us to know as we care for you?  To keep me and my family updated   - Provide timely, for signs and symptoms of hyperglycemia and hypoglycemia  - Administer ordered medications to maintain glucose within target range  - Assess barriers to adequate nutritional intake and initiate nutrition consult as needed  - Instruct patient on self manage unsuccessful or patient reports new pain  - Anticipate increased pain with activity and pre-medicate as appropriate  Outcome: Progressing     Problem: RISK FOR INFECTION - ADULT  Goal: Absence of fever/infection during anticipated neutropenic period  Descr x4. Patient has gotten up to the chair with therapy. Patient denied any dizziness/ drowsiness. Patient has been complaining of immense nausea/ vomiting. Medications have been given to help with the nausea.  Patient has been really unable to tolerate anythin

## 2020-09-11 NOTE — ANESTHESIA POSTPROCEDURE EVALUATION
Patient: Wallie Sprain    Procedure Summary     Date:  09/11/20 Room / Location:  [address] [place] [address] MAIN OR 09 / [address] [facility] MAIN OR    Anesthesia Start:  0740 Anesthesia Stop:  1030    Procedure:  HIP TOTAL ANTERIOR APPROACH (Right Hip) Diagnosis:  (primary osteoarthritis of

## 2020-09-11 NOTE — ANESTHESIA PROCEDURE NOTES
Spinal Block  Date/Time: 9/11/2020 7:50 AM  Performed by: Alyce Westbrook MD  Authorized by: Alyce Westbrook MD       General Information and Staff    Start Time:  9/11/2020 7:40 AM  End Time:  9/11/2020 7:50 AM  Anesthesiologist:  Zeferino Moss

## 2020-09-11 NOTE — ANESTHESIA PREPROCEDURE EVALUATION
Anesthesia PreOp Note    HPI:     Geraldine Melgoza is a 69year old female who presents for preoperative consultation requested by: Sada Nieves MD    Date of Surgery: 9/11/2020    Procedure(s):  HIP TOTAL ANTERIOR APPROACH  Indication: primary os Past Surgical History:   Procedure Laterality Date   • BREAST BIOPSY Left 1990   • COLECTOMY     • COLONOSCOPY      2012    • D & C  2013    \"mass\" inside uterus -- Dr. Dorita Rose   • EXPLORATORY LAPAROTOMY N/A 11/29/2019    Performed by Corrinne Cable, MD 09/11/20 0629  acetaminophen (TYLENOL EXTRA STRENGTH) tab 1,000 mg, 1,000 mg, Oral, Once, Javier Crain MD  Metoclopramide HCl (REGLAN) tab 10 mg, 10 mg, Oral, Once, Javier Crain MD  tranexamic acid (CYKLOKAPRON) IVPB premix 1,000 mg, file    Relationships      Social connections:        Talks on phone: Not on file        Gets together: Not on file        Attends Ca[employer] service: Not on file        Active member of club or organization: Not on file        Attends meetings of clubs or BP:  132/84   Pulse:  80   Resp:  18   Temp:  98 °F (36.7 °C)   TempSrc:  Oral   SpO2:  97%   Weight: 79.4 kg (175 lb) 79.6 kg (175 lb 6.4 oz)   Height: 1.588 m (5' 2.5\")         Anesthesia Evaluation     Patient summary reviewed and Nursing notes revie

## 2020-09-11 NOTE — PROGRESS NOTES
[facility]    Progress Note    Sameera Zhao Patient Status:  Inpatient    1945 MRN [id number]   Location [address] UNIT Attending Evangelista Auguste MD   Hosp Day # 0 PCP Daniel Rockwell MD PT/TO. Hypothyroidism  CONT HOME MEDS.              Results:     Lab Results   Component Value Date    WBC 7.1 09/11/2020    HGB 11.2 (L) 09/11/2020    HCT 34.1 (L) 09/11/2020    .0 09/11/2020    CREATSERUM 0.90 09/02/2020    BUN 17 09/02/2020

## 2020-09-11 NOTE — PHYSICAL THERAPY NOTE
Chart reviewed,  RN approve participation . Pt currently declining participation due to nausea and not feeling well. Therapy will re-attempt for POD 0 later in day as schedule allows. Discussed pt with RN , RN to provide nausea meds .

## 2020-09-11 NOTE — INTERVAL H&P NOTE
Pre-op Diagnosis: primary osteoarthritis of right hip    The above referenced H&P was reviewed by Patty Huff MD on 9/11/2020, the patient was examined and no significant changes have occurred in the patient's condition since the H&P was perform

## 2020-09-11 NOTE — BRIEF OP NOTE
Pre-Operative Diagnosis: primary osteoarthritis of right hip     Post-Operative Diagnosis: primary osteoarthritis of right hip      Procedure Performed:   Procedure(s):  right total hip arthroplasty anterior approach    Surgeon(s) and Role:     * Sandra

## 2020-09-12 VITALS
HEIGHT: 62.5 IN | SYSTOLIC BLOOD PRESSURE: 105 MMHG | WEIGHT: 175.38 LBS | RESPIRATION RATE: 16 BRPM | DIASTOLIC BLOOD PRESSURE: 69 MMHG | HEART RATE: 76 BPM | OXYGEN SATURATION: 98 % | BODY MASS INDEX: 31.47 KG/M2 | TEMPERATURE: 99 F

## 2020-09-12 PROCEDURE — 99239 HOSP IP/OBS DSCHRG MGMT >30: CPT | Performed by: HOSPITALIST

## 2020-09-12 RX ORDER — HYDROCODONE BITARTRATE AND ACETAMINOPHEN 5; 325 MG/1; MG/1
1 TABLET ORAL EVERY 6 HOURS PRN
Qty: 30 TABLET | Refills: 0 | Status: SHIPPED | OUTPATIENT
Start: 2020-09-12 | End: 2020-09-25

## 2020-09-12 NOTE — DISCHARGE SUMMARY
[facility] Hospitalist Discharge Summary   Patient ID:  Juvenal Ricks  [id number]  63 year old  11/14/1945    Admit date: 9/11/2020  Discharge date: 9/12/2020  Primary Care Physician: Marbella Ortega MD   Attending Physician: Giles Dai MD   Cons (CST): Fabio Vee MD on 9/11/2020 at 10:19 AM          Xr Hip W Or Wo Pelvis 2 Or 3 Views, Right (cpt=73502)    Result Date: 9/11/2020  CONCLUSION:  Postoperative changes from a recent right total hip arthroplasty.   The hardware is intact and in cus Code        Discharge Instructions       FU with Surgeon as directed.            Important follow up:      -PCP in [] within 7 days [] within 14 days [] other     Disposition: home  Discharged Condition: good    Hospital Discharge Diagnoses: OA    Lace+ Sco

## 2020-09-12 NOTE — PHYSICAL THERAPY NOTE
PHYSICAL THERAPY HIP TREATMENT NOTE - INPATIENT    Room Number: 432/432-A            Presenting Problem: OA right hip , pt is s/p anterior SHERRI . No anterior precautions noted in chart / pt is Dr Allen Mcdermott patient . pt is WBAT for right LE post sx.    Hue Frank chair with arms (e.g., wheelchair, bedside commode, etc.): A Little   -   Moving from lying on back to sitting on the side of the bed?: A Lot   How much help from another person does the patient currently need. ..   -   Moving to and from a bed to a chair ( program for ROM/strengthening per the instructions provided in preparation for discharge.    Goal #6  Current Status In progress

## 2020-09-12 NOTE — PLAN OF CARE
Papers given for discharge, patient has medications send to her pharmacy per Dr. Kimberly Rivera.    Problem: Patient Centered Care  Goal: Patient preferences are identified and integrated in the patient's plan of care  Description  Interventions:  - What would you range  Description  INTERVENTIONS:  - Monitor Blood Glucose as ordered  - Assess for signs and symptoms of hyperglycemia and hypoglycemia  - Administer ordered medications to maintain glucose within target range  - Assess barriers to adequate nutritional i relaxation techniques  - Monitor for opioid side effects  - Notify MD/LIP if interventions unsuccessful or patient reports new pain  - Anticipate increased pain with activity and pre-medicate as appropriate  Outcome: Progressing     Problem: RISK FOR INFEC cognitive ability or social support system  Outcome: Progressing

## 2020-09-12 NOTE — PROGRESS NOTES
[facility]    Progress Note    Suly Grossman Patient Status:  Inpatient    1945 MRN [id number]   Location [facility] 4W/SW/SE Attending Vahid Edwards MD   Hosp Day # 1 PCP Jimmy Cortez MD        Subjective:   Giovanny Hitchcock 09/02/2020     09/02/2020    K 4.1 09/02/2020     09/02/2020    CO2 26.0 09/02/2020    GLU 80 09/02/2020    CA 8.9 09/02/2020    ALB 3.4 08/28/2020    ALKPHO 63 08/28/2020    BILT 0.5 08/28/2020    TP 7.0 08/28/2020    AST 30 08/28/2020    ALT

## 2020-09-12 NOTE — ANESTHESIA POST-OP FOLLOW-UP NOTE
POD 1. Had moderate N/V yesterday. Much improved today. No HA/pruritis. No other anesthesia followup needed at this time.

## 2020-09-12 NOTE — HOME CARE LIAISON
Spoke with patient, confirmed agreeable to [facility]. All questions addressed and answered.

## 2020-09-12 NOTE — CM/SW NOTE
Per Velma Leigh RN, patient discharging to home today. Velma Leigh confirmed plan with Sylvie w/ [facility].     Horacio Gillis, [address]

## 2020-09-12 NOTE — OCCUPATIONAL THERAPY NOTE
OCCUPATIONAL THERAPY EVALUATION - INPATIENT      Room Number: 432/432-A  Evaluation Date: 9/12/2020  Type of Evaluation: Initial  Presenting Problem: (OA of R hip)    Physician Order: IP Consult to Occupational Therapy  Reason for Therapy: ADL/IADL Dysfunc spv.    DISCHARGE RECOMMENDATIONS  OT Discharge Recommendations: 24 hour care/supervision;Home with home health PT/OT  OT Device Recommendations: Transfer tub bench;Grab bars; Reacher    PLAN  OT Treatment Plan: Balance activities; Energy conservation/work s arms  Shower/Tub and Equipment: Tub-shower combo  Other Equipment: (cane)    Occupation/Status: retired   Hand Dominance: Right  Drives:  Other (Comment)  Patient Regularly Uses: Glasses    Stairs in Home: 7 to bedroom  Assistive Device(s) U Transfer: NT   Chair Transfer: SBA   Car Transfer: NT     Bedroom Mobility: SBA-CGA with RW cues for safety and body mechanics    BALANCE ASSESSMENT  Static Sitting: good  Dynamic Sitting: good  Static Standing: fair+  Dynamic Standing: fair    FUNCTIONAL

## 2020-09-15 LAB — BLOOD TYPE BARCODE: 5100

## 2020-09-17 ENCOUNTER — TELEPHONE (OUTPATIENT)
Dept: ORTHOPEDICS CLINIC | Facility: CLINIC | Age: 75
End: 2020-09-17

## 2020-09-17 NOTE — TELEPHONE ENCOUNTER
Patient had surgery 9/11 and has a 3wk f/u with Dr. Eligio Dinh. Her aftercare summary instructs her to also scheduled a 2wk post op with Dr. Liz Mcclelland. Patient is asking if she needs to see both drs.  Please advise

## 2020-09-22 ENCOUNTER — PATIENT MESSAGE (OUTPATIENT)
Dept: ORTHOPEDICS CLINIC | Facility: CLINIC | Age: 75
End: 2020-09-22

## 2020-09-22 DIAGNOSIS — Z96.641 STATUS POST TOTAL REPLACEMENT OF RIGHT HIP: Primary | ICD-10-CM

## 2020-09-22 NOTE — TELEPHONE ENCOUNTER
From: Kacie Villalta  To: Osmani Fragoso MD  Sent: 9/22/2020 7:51 AM CDT  Subject: Non-Urgent Medical Question    I finished my aspirin prescription yesterday. Would it be ok to now take extra strength Tylenol?  Also, tomorrow's my last day for PT.

## 2020-09-25 ENCOUNTER — TELEPHONE (OUTPATIENT)
Dept: ORTHOPEDICS CLINIC | Facility: CLINIC | Age: 75
End: 2020-09-25

## 2020-09-25 ENCOUNTER — TELEPHONE (OUTPATIENT)
Dept: PHYSICAL THERAPY | Facility: HOSPITAL | Age: 75
End: 2020-09-25

## 2020-09-25 RX ORDER — HYDROCODONE BITARTRATE AND ACETAMINOPHEN 5; 325 MG/1; MG/1
1 TABLET ORAL EVERY 6 HOURS PRN
Qty: 30 TABLET | Refills: 0 | Status: SHIPPED | OUTPATIENT
Start: 2020-09-25 | End: 2020-10-05

## 2020-09-25 NOTE — TELEPHONE ENCOUNTER
Pt notified that Norco rx sent. I advised to not take tylenol with Norco as norco has tylenol in it. She verbalized understanding.

## 2020-09-25 NOTE — TELEPHONE ENCOUNTER
Viry dias from [facility]  States pt is out of the following medication please advise         HYDROcodone-acetaminophen 5-325 MG Oral Tab 30 tablet 0 9/12/2020    Sig:   Take 1 tablet by mouth every 6 (six) hours as needed for Pain.      Route:   Oral     PRN R

## 2020-09-25 NOTE — TELEPHONE ENCOUNTER
Pt calling for update in regards to message below. Pt is also needing an order for outpatient rehab.  Please advise

## 2020-10-01 ENCOUNTER — OFFICE VISIT (OUTPATIENT)
Dept: PHYSICAL THERAPY | Age: 75
End: 2020-10-01
Attending: PHYSICIAN ASSISTANT
Payer: MEDICARE

## 2020-10-01 DIAGNOSIS — Z96.641 STATUS POST TOTAL REPLACEMENT OF RIGHT HIP: ICD-10-CM

## 2020-10-01 PROCEDURE — 97161 PT EVAL LOW COMPLEX 20 MIN: CPT | Performed by: PHYSICAL THERAPIST

## 2020-10-01 PROCEDURE — 97110 THERAPEUTIC EXERCISES: CPT | Performed by: PHYSICAL THERAPIST

## 2020-10-01 NOTE — PROGRESS NOTES
P.T. EVALUATION:   Referring Physician: Dr. Lyndsey Taveras  Diagnosis: Status post total replacement of right hip (R81.544)    Date of Onset: 9/11/2020 Date of Service: 10/1/2020     PATIENT SUMMARY   Patient verbalized consent for Physical Therapy treatment.   I Dependent on assistive device when walking. Patient goes up and down stairs one step at a time with bilateral rail support.  Leo Atkinson would benefit from skilled Physical Therapy to address the above impairments to improve LE mobility and resume previous activ day period and as patient's schedule allows.  Treatment will include: Modalities prn, manual therapy, therapeutic exercises, therapeutic activity, and instructions on a home program.    Education or treatment limitation: None    Rehab Potential:good    Felicia

## 2020-10-05 ENCOUNTER — TELEPHONE (OUTPATIENT)
Dept: ORTHOPEDICS CLINIC | Facility: CLINIC | Age: 75
End: 2020-10-05

## 2020-10-05 RX ORDER — HYDROCODONE BITARTRATE AND ACETAMINOPHEN 5; 325 MG/1; MG/1
1 TABLET ORAL EVERY 6 HOURS PRN
Qty: 30 TABLET | Refills: 0 | Status: SHIPPED | OUTPATIENT
Start: 2020-10-05 | End: 2020-10-30 | Stop reason: ALTCHOICE

## 2020-10-05 NOTE — TELEPHONE ENCOUNTER
Spoke with patient. PT increasing pain, rates 8/10. Ambulating with walker. Taking Nrco q 6hrs.   Please advise on refill request.

## 2020-10-06 ENCOUNTER — OFFICE VISIT (OUTPATIENT)
Dept: PHYSICAL THERAPY | Age: 75
End: 2020-10-06
Attending: INTERNAL MEDICINE
Payer: MEDICARE

## 2020-10-06 NOTE — PROGRESS NOTES
Patient came in today complaining of increased pain. Describes pain over R groin area. Patient denies falling or trauma but feels she may have overdone activities and exercises over the weekend. States she was feeling great last week. No PT today.  Patient

## 2020-10-07 ENCOUNTER — OFFICE VISIT (OUTPATIENT)
Dept: ORTHOPEDICS CLINIC | Facility: CLINIC | Age: 75
End: 2020-10-07
Payer: MEDICARE

## 2020-10-07 ENCOUNTER — TELEPHONE (OUTPATIENT)
Dept: ORTHOPEDICS CLINIC | Facility: CLINIC | Age: 75
End: 2020-10-07

## 2020-10-07 ENCOUNTER — HOSPITAL ENCOUNTER (OUTPATIENT)
Dept: GENERAL RADIOLOGY | Facility: HOSPITAL | Age: 75
Discharge: HOME | End: 2020-10-07
Attending: PHYSICIAN ASSISTANT
Payer: MEDICARE

## 2020-10-07 VITALS — WEIGHT: 174 LBS | HEIGHT: 63 IN | BODY MASS INDEX: 30.83 KG/M2

## 2020-10-07 DIAGNOSIS — Z47.89 ORTHOPEDIC AFTERCARE: ICD-10-CM

## 2020-10-07 DIAGNOSIS — Z47.89 ORTHOPEDIC AFTERCARE: Primary | ICD-10-CM

## 2020-10-07 PROCEDURE — 99024 POSTOP FOLLOW-UP VISIT: CPT | Performed by: ORTHOPAEDIC SURGERY

## 2020-10-07 PROCEDURE — G0463 HOSPITAL OUTPT CLINIC VISIT: HCPCS | Performed by: ORTHOPAEDIC SURGERY

## 2020-10-07 PROCEDURE — 1111F DSCHRG MED/CURRENT MED MERGE: CPT | Performed by: ORTHOPAEDIC SURGERY

## 2020-10-07 PROCEDURE — 73502 X-RAY EXAM HIP UNI 2-3 VIEWS: CPT | Performed by: PHYSICIAN ASSISTANT

## 2020-10-07 NOTE — TELEPHONE ENCOUNTER
Carmina Stone from [facility] home [facility] called. Pt is being discharged from home health. Pt has started out pt therapy.

## 2020-10-07 NOTE — PROGRESS NOTES
NURSING INTAKE COMMENTS: Patient presents with:  Post-Op: s/p  right SHERRI -- Sx on 09/11/20. Rates pain 5/10. Denies fever or calf pain. Pt doing rehab.        HPI: This 69year old female presents today for follow-up after a right total hip arthroplasty th Take 1 tablet by mouth every 6 (six) hours as needed for Pain. 30 tablet 0   • aspirin 325 MG Oral Tab EC Take 1 tablet (325 mg total) by mouth 2 (two) times daily.  60 tablet 0   • FLUOXETINE HCL 20 MG Oral Cap TAKE 1 CAPSULE DAILY (Patient taking differen incontinence  MUSCULOSKELETAL: no other musculoskeletal complaints other than in HPI  NEURO: no numbness or tingling, no weakness or balance disorder  PSYCHE: no depression or anxiety  HEMATOLOGIC: no hx of blood dyscrasia  ENDOCRINE: no thyroid or diabete

## 2020-10-08 ENCOUNTER — OFFICE VISIT (OUTPATIENT)
Dept: PHYSICAL THERAPY | Age: 75
End: 2020-10-08
Attending: INTERNAL MEDICINE
Payer: MEDICARE

## 2020-10-08 PROCEDURE — 97110 THERAPEUTIC EXERCISES: CPT

## 2020-10-08 NOTE — PROGRESS NOTES
Diagnosis: Status post total replacement of right hip (E51.315)    Date of Onset: 9/11/2020        Next MD visit: none scheduled  Fall Risk: standard         Precautions: n/a          Medication Changes since last visit?: No     Subjective: Patient reports

## 2020-10-13 ENCOUNTER — OFFICE VISIT (OUTPATIENT)
Dept: PHYSICAL THERAPY | Age: 75
End: 2020-10-13
Attending: INTERNAL MEDICINE
Payer: MEDICARE

## 2020-10-13 PROCEDURE — 97110 THERAPEUTIC EXERCISES: CPT

## 2020-10-13 NOTE — PROGRESS NOTES
Diagnosis: Status post total replacement of right hip (L77.037)    Date of Onset: 9/11/2020        Next MD visit: none scheduled  Fall Risk: standard         Precautions: n/a          Medication Changes since last visit?: No     Subjective: Patient reports verbalized understanding. PT and patient goals are in progress. Goals: to be met in 8 weeks  1. Patient will be independent with an updated home program, its progression, and management of residual symptoms.   2. Patient will report R hip pain of not mor

## 2020-10-15 ENCOUNTER — TELEPHONE (OUTPATIENT)
Dept: ORTHOPEDICS CLINIC | Facility: CLINIC | Age: 75
End: 2020-10-15

## 2020-10-15 ENCOUNTER — HOSPITAL ENCOUNTER (EMERGENCY)
Facility: HOSPITAL | Age: 75
Discharge: HOME | End: 2020-10-15
Attending: EMERGENCY MEDICINE
Payer: MEDICARE

## 2020-10-15 ENCOUNTER — APPOINTMENT (OUTPATIENT)
Dept: GENERAL RADIOLOGY | Facility: HOSPITAL | Age: 75
End: 2020-10-15
Attending: EMERGENCY MEDICINE
Payer: MEDICARE

## 2020-10-15 ENCOUNTER — OFFICE VISIT (OUTPATIENT)
Dept: PHYSICAL THERAPY | Age: 75
End: 2020-10-15
Attending: INTERNAL MEDICINE
Payer: MEDICARE

## 2020-10-15 VITALS
SYSTOLIC BLOOD PRESSURE: 123 MMHG | HEART RATE: 66 BPM | RESPIRATION RATE: 18 BRPM | BODY MASS INDEX: 30.48 KG/M2 | DIASTOLIC BLOOD PRESSURE: 89 MMHG | TEMPERATURE: 99 F | OXYGEN SATURATION: 98 % | HEIGHT: 63 IN | WEIGHT: 172 LBS

## 2020-10-15 DIAGNOSIS — S86.911A MUSCLE STRAIN OF RIGHT LOWER LEG, INITIAL ENCOUNTER: ICD-10-CM
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
## 2020-10-15 PROCEDURE — 73502 X-RAY EXAM HIP UNI 2-3 VIEWS: CPT | Performed by: EMERGENCY MEDICINE

## 2020-10-15 PROCEDURE — 97110 THERAPEUTIC EXERCISES: CPT

## 2020-10-15 PROCEDURE — 73552 X-RAY EXAM OF FEMUR 2/>: CPT | Performed by: EMERGENCY MEDICINE

## 2020-10-15 PROCEDURE — 99284 EMERGENCY DEPT VISIT MOD MDM: CPT

## 2020-10-15 RX ORDER — HYDROCODONE BITARTRATE AND ACETAMINOPHEN 5; 325 MG/1; MG/1
1 TABLET ORAL EVERY 6 HOURS PRN
Qty: 10 TABLET | Refills: 0 | Status: SHIPPED | OUTPATIENT
Start: 2020-10-15 | End: 2020-10-22

## 2020-10-15 RX ORDER — HYDROCODONE BITARTRATE AND ACETAMINOPHEN 5; 325 MG/1; MG/1
1 TABLET ORAL ONCE
Status: COMPLETED | OUTPATIENT
Start: 2020-10-15 | End: 2020-10-15

## 2020-10-15 NOTE — TELEPHONE ENCOUNTER
Per pt has strained her hip while trying to keep herself from falling.  States is in pain and wanting to know what to do next, please advise

## 2020-10-15 NOTE — CM/SW NOTE
Spoke with patient and patient's , Tonny Nickerson, regarding the possible need for [facility]  service of PT. Patient is currently going to Outpatient PT X 2 week in [facility] in [address] [place] [address]. She twisted right leg and body and foot never moved.

## 2020-10-15 NOTE — ED PROVIDER NOTES
Patient Seen in: [facility] AND [facility] Emergency Department      History   Patient presents with:  Musculoskeletal Problem    Stated Complaint: right hip pain    HPI    25-year-old female with history of anxiety, cancer, here after twisting her right leg whi HYSTERECTOMY Bilateral 2/4/2020    Performed by Parish Bloom MD at [facility] OR   • [address]  10/2019    kyphoplasty    • THYROIDECTOMY  1986    nodules removed   • TONSILLECTOMY                      Social History    Tobacco Regular rhythm. Pulmonary:      Effort: Pulmonary effort is normal.   Abdominal:      Palpations: Abdomen is soft. Musculoskeletal:         General: No deformity.       Comments: R proximal quadriceps and groin tender to palpation, able to lift RLE off informed of their elevated blood pressure reading in the Emergency Department. They were informed of the dangers of undiagnosed and untreated hypertension.   Education regarding lifestyle modifications and the need for appropriate follow-up with their PCP every 6 (six) hours as needed. Qty: 10 tablet Refills: 0    !! - Potential duplicate medications found. Please discuss with provider.

## 2020-10-15 NOTE — PROGRESS NOTES
Diagnosis: Status post total replacement of right hip (O21.917)    Date of Onset: 9/11/2020        Next MD visit: none scheduled  Fall Risk: standard         Precautions: n/a          Medication Changes since last visit?: No     Subjective: Patient reports nu-step seat 11 (level 3) with U/E (setting 14) x 4 minutes  - supine ankle PF/DF 30 x each  - supine quad sets 10 x 3 for 5\" holds   - supine glute sets 10 x 3 for 5\" holds   - hooklying hip adduction squeeze of ball 10 x 3 for 5\" holds  - supine R SAQ

## 2020-10-15 NOTE — ED INITIAL ASSESSMENT (HPI)
Patient presents to ER with c/o right hip/thigh pain. Patient states she was walking with her walker earlier today when her foot caught and caused patient to misstep.  Denies falling, hitting head, or LOC

## 2020-10-15 NOTE — TELEPHONE ENCOUNTER
Spoke to pt and she strained her right hip today at noon while turning to say hi. States foot did not turn with leg and she almost fell but has her walker. Pt did not fall nor did she hit her head. Rates pain 8/10. Denies any numbness or tingling.  States R

## 2020-10-19 ENCOUNTER — TELEPHONE (OUTPATIENT)
Dept: PHYSICAL THERAPY | Facility: HOSPITAL | Age: 75
End: 2020-10-19

## 2020-10-20 ENCOUNTER — APPOINTMENT (OUTPATIENT)
Dept: PHYSICAL THERAPY | Age: 75
End: 2020-10-20
Attending: INTERNAL MEDICINE
Payer: MEDICARE

## 2020-10-21 ENCOUNTER — MED REC SCAN ONLY (OUTPATIENT)
Dept: INTERNAL MEDICINE CLINIC | Facility: CLINIC | Age: 75
End: 2020-10-21

## 2020-10-21 ENCOUNTER — OFFICE VISIT (OUTPATIENT)
Dept: INTERNAL MEDICINE CLINIC | Facility: CLINIC | Age: 75
End: 2020-10-21
Payer: MEDICARE

## 2020-10-21 VITALS
RESPIRATION RATE: 12 BRPM | BODY MASS INDEX: 30.83 KG/M2 | HEIGHT: 63 IN | TEMPERATURE: 97 F | WEIGHT: 174 LBS | SYSTOLIC BLOOD PRESSURE: 99 MMHG | HEART RATE: 62 BPM | DIASTOLIC BLOOD PRESSURE: 64 MMHG

## 2020-10-21 DIAGNOSIS — R03.0 ELEVATED BP WITHOUT DIAGNOSIS OF HYPERTENSION: ICD-10-CM

## 2020-10-21 DIAGNOSIS — S76.911D MUSCLE STRAIN OF RIGHT THIGH, SUBSEQUENT ENCOUNTER: Primary | ICD-10-CM

## 2020-10-21 PROCEDURE — G0463 HOSPITAL OUTPT CLINIC VISIT: HCPCS | Performed by: INTERNAL MEDICINE

## 2020-10-21 PROCEDURE — 99213 OFFICE O/P EST LOW 20 MIN: CPT | Performed by: INTERNAL MEDICINE

## 2020-10-21 NOTE — PROGRESS NOTES
HPI:    Patient ID: Jeannine Fiore is a 69year old female. Hip Pain   The pain is present in the right hip. This is a new problem. The current episode started in the past 7 days.  There has been a history of trauma (pt twisted her right thigh and hip wh OMEGA-3 FISH OIL) 1200 MG Oral Capsule Delayed Release Take by mouth daily. • HYDROcodone-acetaminophen 5-325 MG Oral Tab Take 1 tablet by mouth every 6 (six) hours as needed.  10 tablet 0   • HYDROcodone-acetaminophen 5-325 MG Oral Tab Take 1 tablet fracture. Pt had likely strained her thigh and already much improved now with only minimal pain so expect to resolve completely.  Call back if persist/worsens.     (R03.0) Elevated BP without diagnosis of hypertension  Plan: had and elevated bp in ER but no

## 2020-10-23 ENCOUNTER — OFFICE VISIT (OUTPATIENT)
Dept: PHYSICAL THERAPY | Age: 75
End: 2020-10-23
Attending: INTERNAL MEDICINE
Payer: MEDICARE

## 2020-10-23 PROCEDURE — 97110 THERAPEUTIC EXERCISES: CPT

## 2020-10-23 NOTE — PROGRESS NOTES
Diagnosis: Status post total replacement of right hip (Q37.945)    Date of Onset: 9/11/2020        Next MD visit: none scheduled  Fall Risk: standard         Precautions: n/a          Medication Changes since last visit?: No     Subjective: Patient reports x 1    - standing R hip abduction 10 x 3, L hip abduction 5 x 3 sets  - standing R/L march 10 x 2 each   - sidestepping with therapist assist 7 feet x 3 each R/L  - sit to/from stand from Rolator chair 10 x 2  - CKC fwd step up on 2 inch step 10 x 3   - SL resume previous activities without deviations. 4. Patient will resume walking, going up and down stairs without an assistive device and without gait devaitions. Plan: Reassess patient symptoms.      Charges: EX 3       Total Timed Treatment: 45 min  Tot

## 2020-10-28 ENCOUNTER — OFFICE VISIT (OUTPATIENT)
Dept: PHYSICAL THERAPY | Age: 75
End: 2020-10-28
Attending: INTERNAL MEDICINE
Payer: MEDICARE

## 2020-10-28 PROCEDURE — 97110 THERAPEUTIC EXERCISES: CPT

## 2020-10-28 NOTE — PROGRESS NOTES
Diagnosis: Status post total replacement of right hip (L96.578)    Date of Onset: 9/11/2020        Next MD visit: none scheduled  Fall Risk: standard         Precautions: n/a          Medication Changes since last visit?: No     Subjective: Patient reports each with YTB @ ankle  - sidestepping with therapist assist 8 feet x 3 each R/L  - CKC R fwd step up on 2 inch step 10 x 2   - CKC R lateral step up on 2 inch step 10 x 1   - SLS R with B U/E 5\" holds x 5 reps   - B L/E shuttle knee extension 6 bands 3 x

## 2020-10-30 ENCOUNTER — LAB ENCOUNTER (OUTPATIENT)
Dept: LAB | Age: 75
End: 2020-10-30
Attending: INTERNAL MEDICINE
Payer: MEDICARE

## 2020-10-30 ENCOUNTER — OFFICE VISIT (OUTPATIENT)
Dept: INTERNAL MEDICINE CLINIC | Facility: CLINIC | Age: 75
End: 2020-10-30
Payer: MEDICARE

## 2020-10-30 ENCOUNTER — OFFICE VISIT (OUTPATIENT)
Dept: PHYSICAL THERAPY | Age: 75
End: 2020-10-30
Attending: INTERNAL MEDICINE
Payer: MEDICARE

## 2020-10-30 VITALS
HEART RATE: 86 BPM | BODY MASS INDEX: 30.48 KG/M2 | DIASTOLIC BLOOD PRESSURE: 74 MMHG | SYSTOLIC BLOOD PRESSURE: 111 MMHG | WEIGHT: 172 LBS | HEIGHT: 63 IN

## 2020-10-30 DIAGNOSIS — M81.0 AGE-RELATED OSTEOPOROSIS WITHOUT CURRENT PATHOLOGICAL FRACTURE: ICD-10-CM

## 2020-10-30 DIAGNOSIS — Z00.00 MEDICARE ANNUAL WELLNESS VISIT, SUBSEQUENT: ICD-10-CM

## 2020-10-30 DIAGNOSIS — E03.9 ACQUIRED HYPOTHYROIDISM: ICD-10-CM

## 2020-10-30 DIAGNOSIS — Z91.81 RISK FOR FALLS: ICD-10-CM

## 2020-10-30 DIAGNOSIS — C54.1 ADENOCARCINOMA OF ENDOMETRIUM (HCC): ICD-10-CM

## 2020-10-30 DIAGNOSIS — Z12.31 ENCOUNTER FOR SCREENING MAMMOGRAM FOR BREAST CANCER: ICD-10-CM

## 2020-10-30 DIAGNOSIS — B35.1 ONYCHOMYCOSIS OF GREAT TOE: ICD-10-CM

## 2020-10-30 DIAGNOSIS — D75.89 MACROCYTOSIS: ICD-10-CM

## 2020-10-30 DIAGNOSIS — Z00.00 MEDICARE ANNUAL WELLNESS VISIT, SUBSEQUENT: Primary | ICD-10-CM

## 2020-10-30 DIAGNOSIS — M43.16 SPONDYLOLISTHESIS OF LUMBAR REGION: ICD-10-CM

## 2020-10-30 DIAGNOSIS — S32.020D CLOSED WEDGE COMPRESSION FRACTURE OF L2 VERTEBRA WITH ROUTINE HEALING, SUBSEQUENT ENCOUNTER: ICD-10-CM

## 2020-10-30 PROBLEM — M25.551 PAIN OF RIGHT HIP JOINT: Status: RESOLVED | Noted: 2019-06-24 | Resolved: 2020-10-30

## 2020-10-30 PROBLEM — N95.0 POSTMENOPAUSAL BLEEDING: Status: RESOLVED | Noted: 2019-09-11 | Resolved: 2020-10-30

## 2020-10-30 PROBLEM — N84.0 ENDOMETRIAL POLYP: Status: RESOLVED | Noted: 2020-01-20 | Resolved: 2020-10-30

## 2020-10-30 PROBLEM — M16.11 PRIMARY OSTEOARTHRITIS OF RIGHT HIP: Status: RESOLVED | Noted: 2019-07-23 | Resolved: 2020-10-30

## 2020-10-30 PROBLEM — Z90.710 STATUS POST LAPAROSCOPIC HYSTERECTOMY: Status: RESOLVED | Noted: 2020-02-04 | Resolved: 2020-10-30

## 2020-10-30 PROCEDURE — 36415 COLL VENOUS BLD VENIPUNCTURE: CPT

## 2020-10-30 PROCEDURE — 80053 COMPREHEN METABOLIC PANEL: CPT

## 2020-10-30 PROCEDURE — G0439 PPPS, SUBSEQ VISIT: HCPCS | Performed by: INTERNAL MEDICINE

## 2020-10-30 PROCEDURE — 97110 THERAPEUTIC EXERCISES: CPT

## 2020-10-30 PROCEDURE — 82306 VITAMIN D 25 HYDROXY: CPT

## 2020-10-30 PROCEDURE — 85025 COMPLETE CBC W/AUTO DIFF WBC: CPT

## 2020-10-30 PROCEDURE — 84443 ASSAY THYROID STIM HORMONE: CPT

## 2020-10-30 NOTE — PROGRESS NOTES
Diagnosis: Status post total replacement of right hip (X63.803)    Date of Onset: 9/11/2020        Next MD visit: 11/9/20  Fall Risk: standard         Precautions: n/a          Medication Changes since last visit?: No     Subjective: Patient reports 0-1/10 clam 10 x 10\" holds with YTB   - sidelying R hip abduction 10 x 1    - seated R LAQ 4# 10 x 2 for 5\" holds  - seated R ham curl with blue t-band 10 x 3    - standing R/L hip abduction 10 x 3 ea with RTB @ ankle  - standing R/L march 10 x 3 each with RTB without deviations. 4. Patient will resume walking, going up and down stairs without an assistive device and without gait devaitions. Plan: Reassess patient symptoms. Patient will need progress note per medicare in next 1 to 2 visits.      Charges: EX 3

## 2020-10-30 NOTE — PROGRESS NOTES
HPI:   Jeannine Fiore is a 69year old female who presents for a Medicare Subsequent Annual Wellness visit (Pt already had Initial Annual Wellness).       Her last annual assessment has been over 1 year: Annual Physical due on 08/27/2020         Fall/Risk A of vertebral column, excluding sacrum and coccyx     Wedge compression fracture of second lumbar vertebra with routine healing     Back pain, lumbosacral     Adenocarcinoma of endometrium (HCC)     Onychomycosis of great toe     Macrocytosis     Risk for f current alcohol use. She reports that she does not use drugs.      REVIEW OF SYSTEMS:   GENERAL: feels well otherwise  SKIN: denies any unusual skin lesions  EYES: denies blurred vision or double vision  HEENT: denies nasal congestion, sinus pain or ST  DAKOTA murmur, rub, or gallop   Abdomen:   Soft, non-tender, bowel sounds active all four quadrants,  no masses, no organomegaly   Pelvic: Deferred   Extremities: Extremities normal, atraumatic, no cyanosis or edema   Pulses: 2+ and symmetric; great toe nails are (Z12.31) Encounter for screening mammogram for breast cancer  Plan: [facility] 2D+3D SCREENING BILAT         (CPT=77067/90241)        Order given for mammogram.     (B35.1) Onychomycosis of great toe  Plan: PODIATRY - INTERNAL       Refer to podiatrist medically necessary Electrocardiogram date08/28/2020       Colorectal Cancer Screening      Colonoscopy Screen every 10 years Colonoscopy due on 12/17/2022 Update Health Maintenance if applicable    Flex Sigmoidoscopy Screen every 10 years No results found with your prescription benefits, but Medicare does not cover unless Medically needed    Zoster  Not covered by Medicare Part B No vaccine history found This may be covered with your pharmacy  prescription benefits                        Template: ANA RICHARD

## 2020-11-01 ENCOUNTER — TELEPHONE (OUTPATIENT)
Dept: INTERNAL MEDICINE CLINIC | Facility: CLINIC | Age: 75
End: 2020-11-01

## 2020-11-01 DIAGNOSIS — D75.89 MACROCYTOSIS: Primary | ICD-10-CM

## 2020-11-02 ENCOUNTER — OFFICE VISIT (OUTPATIENT)
Dept: PHYSICAL THERAPY | Age: 75
End: 2020-11-02
Attending: INTERNAL MEDICINE
Payer: MEDICARE

## 2020-11-02 PROCEDURE — 97110 THERAPEUTIC EXERCISES: CPT | Performed by: PHYSICAL THERAPIST

## 2020-11-02 NOTE — PROGRESS NOTES
Diagnosis: Status post total replacement of right hip (R94.266)    Date of Onset: 9/11/2020        Next MD visit: 11/9/20  Fall Risk: standard         Precautions: n/a          Medication Changes since last visit?: No     Subjective: Patient reports 0-1/10 sidestepping with GTB @ ankles with therapist assist 10 feet x 3 each R/L  - CKC R fwd step up on 4 inch step 10 x 3   - CKC R lateral step up on 2 inch step 10 x 3   - standing B heel raises 10 x 3   - standing B rockerboard A/P taps 20 x each with B U/E

## 2020-11-04 ENCOUNTER — OFFICE VISIT (OUTPATIENT)
Dept: HEMATOLOGY/ONCOLOGY | Facility: HOSPITAL | Age: 75
End: 2020-11-04
Attending: INTERNAL MEDICINE
Payer: MEDICARE

## 2020-11-04 VITALS
HEIGHT: 63 IN | TEMPERATURE: 98 F | SYSTOLIC BLOOD PRESSURE: 116 MMHG | BODY MASS INDEX: 30.48 KG/M2 | RESPIRATION RATE: 18 BRPM | OXYGEN SATURATION: 98 % | DIASTOLIC BLOOD PRESSURE: 64 MMHG | WEIGHT: 172 LBS | HEART RATE: 66 BPM

## 2020-11-04 DIAGNOSIS — D53.9 MACROCYTIC ANEMIA: ICD-10-CM

## 2020-11-04 DIAGNOSIS — E03.9 HYPOTHYROIDISM, UNSPECIFIED TYPE: Primary | ICD-10-CM

## 2020-11-04 PROCEDURE — 99214 OFFICE O/P EST MOD 30 MIN: CPT | Performed by: INTERNAL MEDICINE

## 2020-11-04 NOTE — PROGRESS NOTES
Cancer Center Progress Note    Patient Name: Nel Bedolla   YOB: 1945   Medical Record Number: [id number]   Attending Physician: Juancarlos Funez M.D.      Chief Complaint:  Macrocytic anemia    History of Present Illness:  60-year-old female b s/p thyroidectomy for benign thyroid nodules   • OTHER SURGICAL HISTORY  11/29/2019    bowel obstruction   • ROBOT-ASSISTED LAPAROSCOPIC HYSTERECTOMY Bilateral 2/4/2020    Performed by Adriel Zhang MD at [address] [place] [address] OMEGA-3 FISH OIL) 1200 MG Oral Capsule Delayed Release, Take by mouth daily.   , Disp: , Rfl:       •  FLUOXETINE HCL 20 MG Oral Cap, TAKE 1 CAPSULE DAILY (Patient taking differently: nightly.  ), Disp: 90 capsule, Rfl: 3    •  VITAMIN B COMPLEX-C OR, , Dis palpable peripheral lymphadenopathy   Chest: Symmetric expansion, nonlabored breathing  Cardiovascular: Regular with palpable distal pulses   Abdomen: Soft, non tender. Extremities: No edema. Neurological: 5/5 motor x4.         Laboratory:  Recent Labs

## 2020-11-06 ENCOUNTER — OFFICE VISIT (OUTPATIENT)
Dept: PHYSICAL THERAPY | Age: 75
End: 2020-11-06
Attending: INTERNAL MEDICINE
Payer: MEDICARE

## 2020-11-06 PROCEDURE — 97110 THERAPEUTIC EXERCISES: CPT | Performed by: PHYSICAL THERAPIST

## 2020-11-06 NOTE — PROGRESS NOTES
Diagnosis: Status post total replacement of right hip (R84.098)    Date of Onset: 9/11/2020        Next MD visit: 11/9/20  Fall Risk: standard         Precautions: n/a          Medication Changes since last visit?: No     Subjective: Patient reports 0-1/10 10   - R L/E shuttle knee extension 5 bands 3 x 10  - stairclimbing x 4 steps alternating steps  - single leg stance on R LE 5 x 10 sec hold  - nu-step seat 11 level 5 with U/E (setting 14) x 5 minutes   - hooklying hip adduction squeeze of ball 10 x 1 for symptoms. 2. Patient will report R hip pain of not more than 1/10 during activity. 3. Restore full LE strength to be able to resume previous activities without deviations.   4. Patient will resume walking, going up and down stairs without an assistive dev

## 2020-11-09 ENCOUNTER — OFFICE VISIT (OUTPATIENT)
Dept: ORTHOPEDICS CLINIC | Facility: CLINIC | Age: 75
End: 2020-11-09
Payer: MEDICARE

## 2020-11-09 VITALS — BODY MASS INDEX: 30.48 KG/M2 | WEIGHT: 172 LBS | HEIGHT: 63 IN

## 2020-11-09 DIAGNOSIS — Z47.89 ORTHOPEDIC AFTERCARE: Primary | ICD-10-CM

## 2020-11-09 PROCEDURE — 99024 POSTOP FOLLOW-UP VISIT: CPT | Performed by: ORTHOPAEDIC SURGERY

## 2020-11-09 PROCEDURE — G0463 HOSPITAL OUTPT CLINIC VISIT: HCPCS | Performed by: ORTHOPAEDIC SURGERY

## 2020-11-09 NOTE — PROGRESS NOTES
NURSING INTAKE COMMENTS: Patient presents with:  Post-Op: pt in for 6 week post op, Right side hip replacement, denies any pain today      HPI: This 69year old female presents today for follow-up after her right hip replacement.   Her surgery was 2 months PROVIDER) 90 tablet 3   • Melatonin 5 MG Oral Tab Take 1 tablet by mouth nightly. • Calcium Carbonate-Vitamin D (CALCIUM-VITAMIN D) 600-200 MG-UNIT Oral Cap Take by mouth daily.  Takes 2 tabs in am and 1 tab at HS      • Multiple Vitamins Oral Tab Jacklyn Cancer m)   Wt 172 lb (78 kg)   BMI 30.47 kg/m²   Constitutional: appears well hydrated, alert and responsive, no acute distress noted  Extremities: She was able to get in and out of the chair without any difficulty. She walks without any noticeable limp.   The i

## 2020-11-10 ENCOUNTER — OFFICE VISIT (OUTPATIENT)
Dept: PHYSICAL THERAPY | Age: 75
End: 2020-11-10
Attending: INTERNAL MEDICINE
Payer: MEDICARE

## 2020-11-10 PROCEDURE — 97110 THERAPEUTIC EXERCISES: CPT

## 2020-11-10 NOTE — PROGRESS NOTES
Diagnosis: Status post total replacement of right hip (T31.658)    Date of Onset: 9/11/2020        Next MD visit: 11/9/20  Fall Risk: standard         Precautions: n/a          Medication Changes since last visit?: No     Subjective: Patient reports 0/10 R L/E shuttle knee extension 7 bands 3 x 10   - R L/E shuttle knee extension 5 bands 3 x 10     - Nustep level 5 with UE x 5min  - hooklying hip adduction squeeze 10 x 10 sec hold  - supine bridge 10 x 3 x 5 sec hold  - supine R/L march with 4# 10 x 3 each

## 2020-11-12 ENCOUNTER — APPOINTMENT (OUTPATIENT)
Dept: PHYSICAL THERAPY | Age: 75
End: 2020-11-12
Attending: INTERNAL MEDICINE
Payer: MEDICARE

## 2020-11-14 ENCOUNTER — NURSE TRIAGE (OUTPATIENT)
Dept: INTERNAL MEDICINE CLINIC | Facility: CLINIC | Age: 75
End: 2020-11-14

## 2020-11-14 NOTE — TELEPHONE ENCOUNTER
Action Requested: Summary for Provider     []  Critical Lab, Recommendations Needed  [] Need Additional Advice  [x]   FYI    []   Need Orders  [] Need Medications Sent to Pharmacy  []  Other     SUMMARY: Patient stated that has been have left lower back pa

## 2020-11-14 NOTE — TELEPHONE ENCOUNTER
----- Message from Karlee Gandhi sent at 11/14/2020  7:54 AM CST -----  Regarding: Other  Contact: [phone]  I have a stabbing pain on the left side in my lower back. I think I overdid it the other day when I did my bridges for my hip exercises.  Can

## 2020-11-16 ENCOUNTER — OFFICE VISIT (OUTPATIENT)
Dept: PODIATRY CLINIC | Facility: CLINIC | Age: 75
End: 2020-11-16
Payer: MEDICARE

## 2020-11-16 DIAGNOSIS — B35.1 ONYCHOMYCOSIS: ICD-10-CM

## 2020-11-16 DIAGNOSIS — M79.674 PAIN IN TOES OF BOTH FEET: Primary | ICD-10-CM

## 2020-11-16 DIAGNOSIS — M79.675 PAIN IN TOES OF BOTH FEET: Primary | ICD-10-CM

## 2020-11-16 PROCEDURE — G0463 HOSPITAL OUTPT CLINIC VISIT: HCPCS | Performed by: PODIATRIST

## 2020-11-16 PROCEDURE — 11720 DEBRIDE NAIL 1-5: CPT | Performed by: PODIATRIST

## 2020-11-16 PROCEDURE — 99202 OFFICE O/P NEW SF 15 MIN: CPT | Performed by: PODIATRIST

## 2020-11-16 NOTE — PROGRESS NOTES
HPI:    Patient ID: Jose Antonio Arshad is a 77year old female. This pleasant 64-year-old female presents as a new patient to me and states that she is referred by her  who is with her today.   On specific questioning the reason she is here is because o reality patient's best and only option is periodic debridement. After discussion I reduced these 2 great toenails by more than one third. I reduced nail, subungual debris, and some keratosis. No ulcerations or infections were noted upon debridement.   I

## 2020-11-17 ENCOUNTER — APPOINTMENT (OUTPATIENT)
Dept: PHYSICAL THERAPY | Age: 75
End: 2020-11-17
Attending: INTERNAL MEDICINE
Payer: MEDICARE

## 2020-11-19 ENCOUNTER — APPOINTMENT (OUTPATIENT)
Dept: PHYSICAL THERAPY | Age: 75
End: 2020-11-19
Attending: INTERNAL MEDICINE
Payer: MEDICARE

## 2020-11-23 ENCOUNTER — APPOINTMENT (OUTPATIENT)
Dept: PHYSICAL THERAPY | Age: 75
End: 2020-11-23
Attending: INTERNAL MEDICINE
Payer: MEDICARE

## 2020-11-25 ENCOUNTER — HOSPITAL ENCOUNTER (OUTPATIENT)
Dept: MAMMOGRAPHY | Age: 75
Discharge: HOME | End: 2020-11-25
Attending: INTERNAL MEDICINE
Payer: MEDICARE

## 2020-11-25 ENCOUNTER — APPOINTMENT (OUTPATIENT)
Dept: PHYSICAL THERAPY | Age: 75
End: 2020-11-25
Attending: INTERNAL MEDICINE
Payer: MEDICARE

## 2020-11-25 ENCOUNTER — HOSPITAL ENCOUNTER (OUTPATIENT)
Dept: BONE DENSITY | Age: 75
Discharge: HOME | End: 2020-11-25
Attending: INTERNAL MEDICINE
Payer: MEDICARE

## 2020-11-25 DIAGNOSIS — M81.0 AGE-RELATED OSTEOPOROSIS WITHOUT CURRENT PATHOLOGICAL FRACTURE: ICD-10-CM

## 2020-11-25 DIAGNOSIS — Z12.31 ENCOUNTER FOR SCREENING MAMMOGRAM FOR BREAST CANCER: ICD-10-CM

## 2020-11-25 PROCEDURE — 77063 BREAST TOMOSYNTHESIS BI: CPT | Performed by: INTERNAL MEDICINE

## 2020-11-25 PROCEDURE — 77080 DXA BONE DENSITY AXIAL: CPT | Performed by: INTERNAL MEDICINE

## 2020-11-25 PROCEDURE — 77067 SCR MAMMO BI INCL CAD: CPT | Performed by: INTERNAL MEDICINE

## 2020-11-25 RX ORDER — RISEDRONATE SODIUM 35 MG/1
TABLET, FILM COATED ORAL
Qty: 12 TABLET | Refills: 1 | Status: SHIPPED | OUTPATIENT
Start: 2020-11-25 | End: 2021-02-24

## 2020-11-28 ENCOUNTER — TELEPHONE (OUTPATIENT)
Dept: INTERNAL MEDICINE CLINIC | Facility: CLINIC | Age: 75
End: 2020-11-28

## 2020-11-28 DIAGNOSIS — M81.0 AGE-RELATED OSTEOPOROSIS WITHOUT CURRENT PATHOLOGICAL FRACTURE: Primary | ICD-10-CM

## 2020-11-30 ENCOUNTER — PATIENT MESSAGE (OUTPATIENT)
Dept: RHEUMATOLOGY | Facility: CLINIC | Age: 75
End: 2020-11-30

## 2020-12-01 NOTE — TELEPHONE ENCOUNTER
From: Jeannine Fiore  To: Denia Baker MD  Sent: 11/30/2020 10:57 AM CST  Subject: Other    I just scheduled an appointment with Dr. Darlene Abraham for December 8th, 1:30 p.m. I was sent a questionnaire for an OB visit by mistake.  I'm seeing Dr. Darlene Abraham

## 2020-12-08 ENCOUNTER — OFFICE VISIT (OUTPATIENT)
Dept: RHEUMATOLOGY | Facility: CLINIC | Age: 75
End: 2020-12-08
Payer: MEDICARE

## 2020-12-08 ENCOUNTER — LAB ENCOUNTER (OUTPATIENT)
Dept: LAB | Age: 75
End: 2020-12-08
Attending: INTERNAL MEDICINE
Payer: MEDICARE

## 2020-12-08 VITALS
BODY MASS INDEX: 30.3 KG/M2 | HEIGHT: 63 IN | DIASTOLIC BLOOD PRESSURE: 69 MMHG | HEART RATE: 70 BPM | WEIGHT: 171 LBS | SYSTOLIC BLOOD PRESSURE: 110 MMHG

## 2020-12-08 DIAGNOSIS — M81.0 AGE-RELATED OSTEOPOROSIS WITHOUT CURRENT PATHOLOGICAL FRACTURE: Primary | ICD-10-CM

## 2020-12-08 DIAGNOSIS — D53.9 MACROCYTIC ANEMIA: ICD-10-CM

## 2020-12-08 DIAGNOSIS — M15.9 PRIMARY OSTEOARTHRITIS INVOLVING MULTIPLE JOINTS: ICD-10-CM

## 2020-12-08 PROCEDURE — 83883 ASSAY NEPHELOMETRY NOT SPEC: CPT

## 2020-12-08 PROCEDURE — 99204 OFFICE O/P NEW MOD 45 MIN: CPT | Performed by: INTERNAL MEDICINE

## 2020-12-08 PROCEDURE — 83010 ASSAY OF HAPTOGLOBIN QUANT: CPT

## 2020-12-08 PROCEDURE — 84165 PROTEIN E-PHORESIS SERUM: CPT

## 2020-12-08 PROCEDURE — 86334 IMMUNOFIX E-PHORESIS SERUM: CPT

## 2020-12-08 PROCEDURE — 85025 COMPLETE CBC W/AUTO DIFF WBC: CPT

## 2020-12-08 PROCEDURE — 36415 COLL VENOUS BLD VENIPUNCTURE: CPT

## 2020-12-08 PROCEDURE — G0463 HOSPITAL OUTPT CLINIC VISIT: HCPCS | Performed by: INTERNAL MEDICINE

## 2020-12-08 PROCEDURE — 83615 LACTATE (LD) (LDH) ENZYME: CPT

## 2020-12-08 NOTE — PROGRESS NOTES
Dear Peter Russell:    I saw your patient Gabriela Donato in consultation this afternoon at your request, for evaluation of osteoporosis. As you know, she is a 67-year-old woman who underwent menopause in her mid to late 46s.   She was on estrogens for 1-1/2 years lower extremity edema right greater than left leg. She had a tonsillectomy. She had a hysterectomy for endometrial cancer in February 2020. She had 2 feet of bowel removed for bowel obstruction. She had a benign thyroid nodule removed in 1986.   She is nontender. Assessment and plan:    1. Osteoarthritis. 2.  Osteoporosis. Her hip T score has worsened since September of 2017. She has been on risedronate for 1 year. We do not know what her bone density was a year ago.   Her bone density appears t

## 2020-12-16 ENCOUNTER — OFFICE VISIT (OUTPATIENT)
Dept: HEMATOLOGY/ONCOLOGY | Facility: HOSPITAL | Age: 75
End: 2020-12-16
Attending: INTERNAL MEDICINE
Payer: MEDICARE

## 2020-12-16 VITALS
HEIGHT: 63 IN | BODY MASS INDEX: 29.95 KG/M2 | SYSTOLIC BLOOD PRESSURE: 112 MMHG | DIASTOLIC BLOOD PRESSURE: 65 MMHG | TEMPERATURE: 99 F | OXYGEN SATURATION: 100 % | WEIGHT: 169 LBS | RESPIRATION RATE: 16 BRPM | HEART RATE: 55 BPM

## 2020-12-16 DIAGNOSIS — D53.9 MACROCYTIC ANEMIA: Primary | ICD-10-CM

## 2020-12-16 DIAGNOSIS — C54.1 ENDOMETRIAL CANCER (HCC): ICD-10-CM

## 2020-12-16 DIAGNOSIS — E03.9 HYPOTHYROIDISM, UNSPECIFIED TYPE: ICD-10-CM

## 2020-12-16 PROCEDURE — 99213 OFFICE O/P EST LOW 20 MIN: CPT | Performed by: INTERNAL MEDICINE

## 2020-12-16 NOTE — PROGRESS NOTES
Cancer Center Progress Note    Patient Name: Jessica Colin   YOB: 1945   Medical Record Number: [id number]   Attending Physician: Kaila Alegria M.D.      Chief Complaint:  Macrocytic anemia    History of Present Illness:  32-year-old female b MYOMECTOMY HYSTEROSCOPIC N/A 1/20/2020    Performed by Michelle Humphrey MD at [address] [facility]    s/p thyroidectomy for benign thyroid nodules   • OTHER SURGICAL HISTORY  11/29/2019    bowel obstruction   • ROBOT-ASSISTED LAPAROS Disp: , Rfl:   •  Multiple Vitamins Oral Tab, Take 1 tablet by mouth daily. , Disp: , Rfl:   •  Omega-3 Fatty Acids (KP OMEGA-3 FISH OIL) 1200 MG Oral Capsule Delayed Release, Take by mouth daily.   , Disp: , Rfl:       •  Risedronate Sodium 35 MG Oral Tab Laboratory:  Recent Labs   Lab 12/08/20  0935   WBC 5.0   HGB 12.6   .0   NE 2.94         Lab Results   Component Value Date    GLU 83 10/30/2020    BUN 19 (H) 10/30/2020    BUNCREA 25.7 (H) 10/30/2020    CREATSERUM 0.74 10/30/2020    ANIONGAP

## 2021-01-08 ENCOUNTER — TELEPHONE (OUTPATIENT)
Dept: INTERNAL MEDICINE CLINIC | Facility: CLINIC | Age: 76
End: 2021-01-08

## 2021-01-08 NOTE — TELEPHONE ENCOUNTER
•  FLUOXETINE HCL 20 MG Oral Cap, TAKE 1 CAPSULE DAILY (Patient taking differently: nightly.  ), Disp: 90 capsule, Rfl: 3

## 2021-01-08 NOTE — TELEPHONE ENCOUNTER
Refill not appropriate, was sent on 9/5/20 for 90 tablets with 3 refills.      Disp Refills Start End    FLUOXETINE HCL 20 MG Oral Cap 90 capsule 3 9/5/2020     Sig: TAKE 1 CAPSULE DAILY    Patient taking differently: nightly.          Sent to pharmacy as:

## 2021-01-12 RX ORDER — LEVOTHYROXINE SODIUM 0.05 MG/1
50 TABLET ORAL
Qty: 90 TABLET | Refills: 1 | Status: SHIPPED | OUTPATIENT
Start: 2021-01-12 | End: 2021-05-07

## 2021-02-24 RX ORDER — FLUOXETINE HYDROCHLORIDE 20 MG/1
20 CAPSULE ORAL DAILY
Qty: 90 CAPSULE | Refills: 3 | Status: SHIPPED | OUTPATIENT
Start: 2021-02-24 | End: 2021-12-06

## 2021-02-24 RX ORDER — RISEDRONATE SODIUM 35 MG/1
TABLET, FILM COATED ORAL
Qty: 12 TABLET | Refills: 1 | Status: SHIPPED | OUTPATIENT
Start: 2021-02-24 | End: 2021-06-11

## 2021-02-24 NOTE — TELEPHONE ENCOUNTER
Per pharmacy pt is requesting refill for the following medications.       •  Risedronate Sodium 35 MG Oral Tab, TAKE 1 TABLET EVERY 7 DAYS, Disp: 12 tablet, Rfl: 1    •  FLUOXETINE HCL 20 MG Oral Cap, TAKE 1 CAPSULE DAILY (Patient taking differently: nightl

## 2021-03-03 DIAGNOSIS — Z23 NEED FOR VACCINATION: ICD-10-CM

## 2021-04-01 ENCOUNTER — LAB ENCOUNTER (OUTPATIENT)
Dept: LAB | Age: 76
End: 2021-04-01
Attending: INTERNAL MEDICINE
Payer: MEDICARE

## 2021-04-01 DIAGNOSIS — C54.1 ENDOMETRIAL CANCER (HCC): ICD-10-CM

## 2021-04-01 DIAGNOSIS — D53.9 MACROCYTIC ANEMIA: ICD-10-CM

## 2021-04-01 DIAGNOSIS — E03.9 HYPOTHYROIDISM, UNSPECIFIED TYPE: ICD-10-CM

## 2021-04-01 PROCEDURE — 36415 COLL VENOUS BLD VENIPUNCTURE: CPT

## 2021-04-01 PROCEDURE — 85025 COMPLETE CBC W/AUTO DIFF WBC: CPT

## 2021-04-02 ENCOUNTER — APPOINTMENT (OUTPATIENT)
Dept: HEMATOLOGY/ONCOLOGY | Facility: HOSPITAL | Age: 76
End: 2021-04-02
Payer: MEDICARE

## 2021-04-05 ENCOUNTER — OFFICE VISIT (OUTPATIENT)
Dept: HEMATOLOGY/ONCOLOGY | Facility: HOSPITAL | Age: 76
End: 2021-04-05
Attending: INTERNAL MEDICINE
Payer: MEDICARE

## 2021-04-05 VITALS
RESPIRATION RATE: 18 BRPM | HEART RATE: 60 BPM | WEIGHT: 169 LBS | HEIGHT: 63 IN | DIASTOLIC BLOOD PRESSURE: 79 MMHG | BODY MASS INDEX: 29.95 KG/M2 | OXYGEN SATURATION: 100 % | SYSTOLIC BLOOD PRESSURE: 130 MMHG | TEMPERATURE: 99 F

## 2021-04-05 DIAGNOSIS — E03.9 HYPOTHYROIDISM, UNSPECIFIED TYPE: ICD-10-CM

## 2021-04-05 DIAGNOSIS — D53.9 MACROCYTIC ANEMIA: Primary | ICD-10-CM

## 2021-04-05 DIAGNOSIS — C54.1 ENDOMETRIAL CANCER (HCC): ICD-10-CM

## 2021-04-05 PROCEDURE — 99213 OFFICE O/P EST LOW 20 MIN: CPT | Performed by: INTERNAL MEDICINE

## 2021-04-05 NOTE — PROGRESS NOTES
Cancer Center Progress Note    Patient Name: Maverick Fitzgerald   YOB: 1945   Medical Record Number: [id number]   Attending Physician: Sharon Barclay M.D.      Chief Complaint:  Macrocytic anemia    History of Present Illness:  78-year-old female b MYOMECTOMY HYSTEROSCOPIC N/A 1/20/2020    Performed by Sebastián Handley MD at [address] [facility]    s/p thyroidectomy for benign thyroid nodules   • OTHER SURGICAL HISTORY  11/29/2019    bowel obstruction   • ROBOT-ASSISTED LAPAROS Clubs or Organizations:       Attends Club or Organization Meetings:       Marital Status:   Intimate Partner Violence:       Fear of Current or Ex-Partner:       Emotionally Abused:       Physically Abused:       Sexually Abused:       Current Medications visit.         Allergies:  No Known Allergies     Review of Systems:  All other systems reviewed and negative x12    Vital Signs:  /79 (BP Location: Left arm, Patient Position: Sitting, Cuff Size: adult)   Pulse 60   Temp 98.7 °F (37.1 °C) (Oral)   Re FIGO grade 2 Stage 1b followed by VBT    –No hemolysis.   Serum protein electrophoresis and free light chains normal.  –We have discussed bone marrow biopsy however most recent hemoglobin slightly improved into normal range on consecutive labs we will repea

## 2021-04-19 ENCOUNTER — NURSE TRIAGE (OUTPATIENT)
Dept: INTERNAL MEDICINE CLINIC | Facility: CLINIC | Age: 76
End: 2021-04-19

## 2021-04-19 ENCOUNTER — OFFICE VISIT (OUTPATIENT)
Dept: INTERNAL MEDICINE CLINIC | Facility: CLINIC | Age: 76
End: 2021-04-19
Payer: COMMERCIAL

## 2021-04-19 ENCOUNTER — PATIENT MESSAGE (OUTPATIENT)
Dept: INTERNAL MEDICINE CLINIC | Facility: CLINIC | Age: 76
End: 2021-04-19

## 2021-04-19 VITALS
DIASTOLIC BLOOD PRESSURE: 71 MMHG | WEIGHT: 169 LBS | BODY MASS INDEX: 29.95 KG/M2 | HEART RATE: 58 BPM | TEMPERATURE: 98 F | SYSTOLIC BLOOD PRESSURE: 120 MMHG | HEIGHT: 63 IN

## 2021-04-19 DIAGNOSIS — K42.9 UMBILICAL HERNIA WITHOUT OBSTRUCTION AND WITHOUT GANGRENE: Primary | ICD-10-CM

## 2021-04-19 PROCEDURE — 99213 OFFICE O/P EST LOW 20 MIN: CPT | Performed by: INTERNAL MEDICINE

## 2021-04-19 NOTE — PATIENT INSTRUCTIONS
Hernia (Adult)    A hernia can happen when there is a weakness or defect in the wall of the abdomen or groin. Intestines or nearby tissues may move from their usual location and push through the weakness in the wall.  This can cause a hernia (bulge) you m in. If the incarcerated hernia isn’t treated, it may become strangulated. This means the area loses blood supply and the tissue may die. This requires emergency surgery. You need treatment right away. In most cases, a hernia will not heal on its own. You m gas  · Fever of 100.4°F (38°C) or higher, or as directed by your healthcare provider  Rissa last reviewed this educational content on 3/1/2018  © 4907-7321 [employer]. All rights reserved.  This information is not intended as a substitute instructions. What Is a Hernia? A hernia is when an organ or tissue pushes through a weak area in the belly (abdominal) wall. This weak area may be there at birth. Or it may be caused by abdominal strain over time.  If not treated, a hernia can get that hernia surgery can be done quickly and safely. Most hernias are fixed with laparoscopic surgery. This type of surgery is done through several very small cuts. Other hernias may need open surgery. A larger cut is made in the belly.  In some cases, you

## 2021-04-19 NOTE — PROGRESS NOTES
History of Present Illness   Patient ID: Bella Kearns is a 77year old female.   Chief Complaint: Lump (yesterday morning lump above navel)    Erin Salas RN         4/19/21 10:38 AM  Note     Pt stated that on Sunday she noticed a lump above her Extraocular movements intact. Conjunctiva/sclera: Conjunctivae normal.   Cardiovascular:      Rate and Rhythm: Normal rate. Heart sounds: Normal heart sounds.    Pulmonary:      Effort: Pulmonary effort is normal.   Abdominal:      Palpations: Abd 10/30/2020    AGRATIO 1.1 08/03/2016     10/30/2020    K 4.2 10/30/2020     (H) 10/30/2020    CO2 24.0 10/30/2020     No results found for: EAG, A1C  Lab Results   Component Value Date    WBC 5.1 04/01/2021    RBC 3.51 (L) 04/01/2021    HGB 12. total) by mouth daily. 90 capsule 3   • Levothyroxine Sodium 50 MCG Oral Tab Take 1 tablet (50 mcg total) by mouth before breakfast. 90 tablet 1   • VITAMIN B COMPLEX-C OR      • Melatonin 5 MG Oral Tab Take 1 tablet by mouth nightly.        • Calcium Carbo groin  · Pain or swelling in the scrotum  Types of hernias  There are different types of hernia. The type you have depends on its location:  · Inguinal. This type is in the groin or scrotum. It is more common in men. But, women can get this hernia, too.   · or lose excess weight. This can help reduce strain on abdominal muscles and tissues. · Stop smoking. This can help prevent coughing that may also strain abdominal muscles and tissues.   Follow-up care  Follow up with your healthcare provider, or as directe bulge. The bulge may flatten when you lie down or push against it.  This is called a reducible hernia and does not cause any immediate danger.             The intestine may become trapped. The sac containing the intestine may become trapped by muscle (incar in the groin at the internal ring. · A direct inguinal hernia occurs in the groin near the internal ring. · A femoral hernia occurs just below the groin. · An epigastric hernia occurs in the upper abdomen at the midline.   Other types of hernias can occu

## 2021-04-20 NOTE — TELEPHONE ENCOUNTER
From: Nadine Ortega  To: Davide Sevilla MD  Sent: 4/19/2021 4:06 PM CDT  Subject: Other    I am attaching a photo of my covid-19 vaccine card for your records.

## 2021-04-20 NOTE — TELEPHONE ENCOUNTER
Pt immunization record is now updated. The vaccine order has been cancelled and also removed the invitation to receive the vaccine with us,  so that will stop the notification reminders in [address]

## 2021-04-26 ENCOUNTER — OFFICE VISIT (OUTPATIENT)
Dept: SURGERY | Facility: CLINIC | Age: 76
End: 2021-04-26
Payer: COMMERCIAL

## 2021-04-26 ENCOUNTER — LAB ENCOUNTER (OUTPATIENT)
Dept: LAB | Facility: HOSPITAL | Age: 76
End: 2021-04-26
Attending: SURGERY
Payer: MEDICARE

## 2021-04-26 ENCOUNTER — TELEPHONE (OUTPATIENT)
Dept: SURGERY | Facility: CLINIC | Age: 76
End: 2021-04-26

## 2021-04-26 VITALS — WEIGHT: 169 LBS | HEIGHT: 63 IN | BODY MASS INDEX: 29.95 KG/M2

## 2021-04-26 DIAGNOSIS — D53.9 MACROCYTIC ANEMIA: ICD-10-CM

## 2021-04-26 DIAGNOSIS — K43.9 VENTRAL HERNIA WITHOUT OBSTRUCTION OR GANGRENE: ICD-10-CM

## 2021-04-26 DIAGNOSIS — K43.9 VENTRAL HERNIA WITHOUT OBSTRUCTION OR GANGRENE: Primary | ICD-10-CM

## 2021-04-26 PROCEDURE — 93005 ELECTROCARDIOGRAM TRACING: CPT

## 2021-04-26 PROCEDURE — 80053 COMPREHEN METABOLIC PANEL: CPT

## 2021-04-26 PROCEDURE — 85025 COMPLETE CBC W/AUTO DIFF WBC: CPT

## 2021-04-26 PROCEDURE — 93010 ELECTROCARDIOGRAM REPORT: CPT | Performed by: SURGERY

## 2021-04-26 PROCEDURE — 99214 OFFICE O/P EST MOD 30 MIN: CPT | Performed by: SURGERY

## 2021-04-26 PROCEDURE — 3008F BODY MASS INDEX DOCD: CPT | Performed by: SURGERY

## 2021-04-26 PROCEDURE — 36415 COLL VENOUS BLD VENIPUNCTURE: CPT

## 2021-04-26 NOTE — TELEPHONE ENCOUNTER
Pt called stating pt had an appointment today 4-26-21 at 2:15pm.  Beni Arevalo to have blood test.  Received results on [employer]. Ordering doctor of the test is listed Dr. Stef Knott. Should be Dr. Robbi Burger. Call with any question.

## 2021-04-26 NOTE — PATIENT INSTRUCTIONS
Obtain preoperative testing    Plan laparoscopic repair of ventral hernia with mesh at [facility] same-day surgery    Same-day surgery will call the day before with instructions. Avoid aspirin or NSAIDs for 5 days prior to surgery.

## 2021-04-26 NOTE — H&P
History and Physical      HPI   Patient presents with:  Umbilical Hernia: Pt referred by Dr. Jaya Perez regarding umbilical hernia a few yrs ago. Pt states she notice bulge is larger. Pt denies dif. w/ bm's or urination.        HPI  Belen Lundberg is a 77 year Tab Take 1 tablet by mouth daily. • Omega-3 Fatty Acids ( OMEGA-3 FISH OIL) 1200 MG Oral Capsule Delayed Release Take by mouth daily.          ALLERGIES  No Known Allergies    Social History    Socioeconomic History      Marital status:  ASSESSMENT/PLAN   Assessment   Ventral hernia without obstruction or gangrene  (primary encounter diagnosis)    77year old female with supraumbilical ventral/incisional hernia  We have discussed the surgical risks, benefits, alternatives, and expected

## 2021-04-26 NOTE — H&P (VIEW-ONLY)
History and Physical      HPI   Patient presents with:  Umbilical Hernia: Pt referred by Dr. Barry Wilder regarding umbilical hernia a few yrs ago. Pt states she notice bulge is larger. Pt denies dif. w/ bm's or urination.        HPI  Nel Bedolla is a 77 year Tab Take 1 tablet by mouth daily. • Omega-3 Fatty Acids ( OMEGA-3 FISH OIL) 1200 MG Oral Capsule Delayed Release Take by mouth daily.          ALLERGIES  No Known Allergies    Social History    Socioeconomic History      Marital status:  ASSESSMENT/PLAN   Assessment   Ventral hernia without obstruction or gangrene  (primary encounter diagnosis)    77year old female with supraumbilical ventral/incisional hernia  We have discussed the surgical risks, benefits, alternatives, and expected

## 2021-05-07 RX ORDER — LEVOTHYROXINE SODIUM 0.05 MG/1
50 TABLET ORAL
Qty: 90 TABLET | Refills: 1 | Status: SHIPPED | OUTPATIENT
Start: 2021-05-07 | End: 2021-10-20

## 2021-05-11 ENCOUNTER — LAB ENCOUNTER (OUTPATIENT)
Dept: LAB | Age: 76
End: 2021-05-11
Attending: SURGERY
Payer: MEDICARE

## 2021-05-11 DIAGNOSIS — Z01.818 PREOPERATIVE TESTING: ICD-10-CM

## 2021-05-13 ENCOUNTER — ANESTHESIA EVENT (OUTPATIENT)
Dept: SURGERY | Facility: HOSPITAL | Age: 76
End: 2021-05-13
Payer: MEDICARE

## 2021-05-13 ENCOUNTER — HOSPITAL ENCOUNTER (OUTPATIENT)
Facility: HOSPITAL | Age: 76
Setting detail: HOSPITAL OUTPATIENT SURGERY
Discharge: HOME | End: 2021-05-13
Attending: SURGERY | Admitting: SURGERY
Payer: MEDICARE

## 2021-05-13 ENCOUNTER — ANESTHESIA (OUTPATIENT)
Dept: SURGERY | Facility: HOSPITAL | Age: 76
End: 2021-05-13
Payer: MEDICARE

## 2021-05-13 VITALS
BODY MASS INDEX: 29.64 KG/M2 | DIASTOLIC BLOOD PRESSURE: 54 MMHG | SYSTOLIC BLOOD PRESSURE: 128 MMHG | HEIGHT: 63 IN | TEMPERATURE: 98 F | OXYGEN SATURATION: 97 % | HEART RATE: 70 BPM | RESPIRATION RATE: 16 BRPM | WEIGHT: 167.31 LBS

## 2021-05-13 DIAGNOSIS — Z01.818 PREOPERATIVE TESTING: Primary | ICD-10-CM

## 2021-05-13 DIAGNOSIS — K43.9 VENTRAL HERNIA WITHOUT OBSTRUCTION OR GANGRENE: ICD-10-CM

## 2021-05-13 PROCEDURE — 0WUF4JZ SUPPLEMENT ABDOMINAL WALL WITH SYNTHETIC SUBSTITUTE, PERCUTANEOUS ENDOSCOPIC APPROACH: ICD-10-PCS | Performed by: SURGERY

## 2021-05-13 PROCEDURE — 49654 LAP INC HERNIA REPAIR: CPT | Performed by: SURGERY

## 2021-05-13 DEVICE — VENTRALIGHT ST MESH WITH ECHO PS POSITONING SYSTEM
Type: IMPLANTABLE DEVICE | Site: ABDOMEN | Status: FUNCTIONAL
Brand: VENTRALIGHT ST MESH WITH ECHO PS POSITONING SYSTEM

## 2021-05-13 RX ORDER — HALOPERIDOL 5 MG/ML
0.25 INJECTION INTRAMUSCULAR ONCE AS NEEDED
Status: DISCONTINUED | OUTPATIENT
Start: 2021-05-13 | End: 2021-05-13

## 2021-05-13 RX ORDER — CEFAZOLIN SODIUM/WATER 2 G/20 ML
2 SYRINGE (ML) INTRAVENOUS ONCE
Status: COMPLETED | OUTPATIENT
Start: 2021-05-13 | End: 2021-05-13

## 2021-05-13 RX ORDER — IBUPROFEN 600 MG/1
600 TABLET ORAL EVERY 6 HOURS PRN
Qty: 15 TABLET | Refills: 1 | Status: SHIPPED | OUTPATIENT
Start: 2021-05-13 | End: 2021-05-20

## 2021-05-13 RX ORDER — KETOROLAC TROMETHAMINE 30 MG/ML
INJECTION, SOLUTION INTRAMUSCULAR; INTRAVENOUS AS NEEDED
Status: DISCONTINUED | OUTPATIENT
Start: 2021-05-13 | End: 2021-05-13 | Stop reason: SURG

## 2021-05-13 RX ORDER — LIDOCAINE HYDROCHLORIDE 10 MG/ML
INJECTION, SOLUTION EPIDURAL; INFILTRATION; INTRACAUDAL; PERINEURAL AS NEEDED
Status: DISCONTINUED | OUTPATIENT
Start: 2021-05-13 | End: 2021-05-13 | Stop reason: SURG

## 2021-05-13 RX ORDER — ONDANSETRON 2 MG/ML
4 INJECTION INTRAMUSCULAR; INTRAVENOUS ONCE AS NEEDED
Status: COMPLETED | OUTPATIENT
Start: 2021-05-13 | End: 2021-05-13

## 2021-05-13 RX ORDER — LIDOCAINE HYDROCHLORIDE 40 MG/ML
SOLUTION TOPICAL AS NEEDED
Status: DISCONTINUED | OUTPATIENT
Start: 2021-05-13 | End: 2021-05-13 | Stop reason: SURG

## 2021-05-13 RX ORDER — ACETAMINOPHEN 500 MG
1000 TABLET ORAL ONCE
Status: COMPLETED | OUTPATIENT
Start: 2021-05-13 | End: 2021-05-13

## 2021-05-13 RX ORDER — HYDROMORPHONE HYDROCHLORIDE 1 MG/ML
0.6 INJECTION, SOLUTION INTRAMUSCULAR; INTRAVENOUS; SUBCUTANEOUS EVERY 5 MIN PRN
Status: DISCONTINUED | OUTPATIENT
Start: 2021-05-13 | End: 2021-05-13

## 2021-05-13 RX ORDER — BUPIVACAINE HYDROCHLORIDE 2.5 MG/ML
INJECTION, SOLUTION EPIDURAL; INFILTRATION; INTRACAUDAL AS NEEDED
Status: DISCONTINUED | OUTPATIENT
Start: 2021-05-13 | End: 2021-05-13 | Stop reason: HOSPADM

## 2021-05-13 RX ORDER — SODIUM CHLORIDE, SODIUM LACTATE, POTASSIUM CHLORIDE, CALCIUM CHLORIDE 600; 310; 30; 20 MG/100ML; MG/100ML; MG/100ML; MG/100ML
INJECTION, SOLUTION INTRAVENOUS CONTINUOUS
Status: DISCONTINUED | OUTPATIENT
Start: 2021-05-13 | End: 2021-05-13

## 2021-05-13 RX ORDER — PROCHLORPERAZINE EDISYLATE 5 MG/ML
5 INJECTION INTRAMUSCULAR; INTRAVENOUS ONCE AS NEEDED
Status: COMPLETED | OUTPATIENT
Start: 2021-05-13 | End: 2021-05-13

## 2021-05-13 RX ORDER — MORPHINE SULFATE 10 MG/ML
6 INJECTION, SOLUTION INTRAMUSCULAR; INTRAVENOUS EVERY 10 MIN PRN
Status: DISCONTINUED | OUTPATIENT
Start: 2021-05-13 | End: 2021-05-13

## 2021-05-13 RX ORDER — GLYCOPYRROLATE 0.2 MG/ML
INJECTION, SOLUTION INTRAMUSCULAR; INTRAVENOUS AS NEEDED
Status: DISCONTINUED | OUTPATIENT
Start: 2021-05-13 | End: 2021-05-13 | Stop reason: SURG

## 2021-05-13 RX ORDER — HYDROCODONE BITARTRATE AND ACETAMINOPHEN 5; 325 MG/1; MG/1
1 TABLET ORAL EVERY 6 HOURS PRN
Qty: 20 TABLET | Refills: 0 | Status: SHIPPED | OUTPATIENT
Start: 2021-05-13 | End: 2021-08-09 | Stop reason: ALTCHOICE

## 2021-05-13 RX ORDER — ONDANSETRON 2 MG/ML
INJECTION INTRAMUSCULAR; INTRAVENOUS AS NEEDED
Status: DISCONTINUED | OUTPATIENT
Start: 2021-05-13 | End: 2021-05-13 | Stop reason: SURG

## 2021-05-13 RX ORDER — NEOSTIGMINE METHYLSULFATE 1 MG/ML
INJECTION INTRAVENOUS AS NEEDED
Status: DISCONTINUED | OUTPATIENT
Start: 2021-05-13 | End: 2021-05-13 | Stop reason: SURG

## 2021-05-13 RX ORDER — HYDROMORPHONE HYDROCHLORIDE 1 MG/ML
0.4 INJECTION, SOLUTION INTRAMUSCULAR; INTRAVENOUS; SUBCUTANEOUS EVERY 5 MIN PRN
Status: DISCONTINUED | OUTPATIENT
Start: 2021-05-13 | End: 2021-05-13

## 2021-05-13 RX ORDER — HYDROMORPHONE HYDROCHLORIDE 1 MG/ML
0.2 INJECTION, SOLUTION INTRAMUSCULAR; INTRAVENOUS; SUBCUTANEOUS EVERY 5 MIN PRN
Status: DISCONTINUED | OUTPATIENT
Start: 2021-05-13 | End: 2021-05-13

## 2021-05-13 RX ORDER — MORPHINE SULFATE 4 MG/ML
4 INJECTION, SOLUTION INTRAMUSCULAR; INTRAVENOUS EVERY 10 MIN PRN
Status: DISCONTINUED | OUTPATIENT
Start: 2021-05-13 | End: 2021-05-13

## 2021-05-13 RX ORDER — EPHEDRINE SULFATE 50 MG/ML
INJECTION, SOLUTION INTRAVENOUS AS NEEDED
Status: DISCONTINUED | OUTPATIENT
Start: 2021-05-13 | End: 2021-05-13 | Stop reason: SURG

## 2021-05-13 RX ORDER — ROCURONIUM BROMIDE 10 MG/ML
INJECTION, SOLUTION INTRAVENOUS AS NEEDED
Status: DISCONTINUED | OUTPATIENT
Start: 2021-05-13 | End: 2021-05-13 | Stop reason: SURG

## 2021-05-13 RX ORDER — DEXAMETHASONE SODIUM PHOSPHATE 4 MG/ML
VIAL (ML) INJECTION AS NEEDED
Status: DISCONTINUED | OUTPATIENT
Start: 2021-05-13 | End: 2021-05-13 | Stop reason: SURG

## 2021-05-13 RX ORDER — HYDROCODONE BITARTRATE AND ACETAMINOPHEN 5; 325 MG/1; MG/1
2 TABLET ORAL AS NEEDED
Status: DISCONTINUED | OUTPATIENT
Start: 2021-05-13 | End: 2021-05-13

## 2021-05-13 RX ORDER — MORPHINE SULFATE 4 MG/ML
2 INJECTION, SOLUTION INTRAMUSCULAR; INTRAVENOUS EVERY 10 MIN PRN
Status: DISCONTINUED | OUTPATIENT
Start: 2021-05-13 | End: 2021-05-13

## 2021-05-13 RX ORDER — NALOXONE HYDROCHLORIDE 0.4 MG/ML
80 INJECTION, SOLUTION INTRAMUSCULAR; INTRAVENOUS; SUBCUTANEOUS AS NEEDED
Status: DISCONTINUED | OUTPATIENT
Start: 2021-05-13 | End: 2021-05-13

## 2021-05-13 RX ORDER — HYDROCODONE BITARTRATE AND ACETAMINOPHEN 5; 325 MG/1; MG/1
1 TABLET ORAL AS NEEDED
Status: DISCONTINUED | OUTPATIENT
Start: 2021-05-13 | End: 2021-05-13

## 2021-05-13 RX ADMIN — ROCURONIUM BROMIDE 30 MG: 10 INJECTION, SOLUTION INTRAVENOUS at 11:36:00

## 2021-05-13 RX ADMIN — CEFAZOLIN SODIUM/WATER 2 G: 2 G/20 ML SYRINGE (ML) INTRAVENOUS at 11:42:00

## 2021-05-13 RX ADMIN — LIDOCAINE HYDROCHLORIDE 4 ML: 40 SOLUTION TOPICAL at 11:38:00

## 2021-05-13 RX ADMIN — ONDANSETRON 4 MG: 2 INJECTION INTRAMUSCULAR; INTRAVENOUS at 12:08:00

## 2021-05-13 RX ADMIN — KETOROLAC TROMETHAMINE 15 MG: 30 INJECTION, SOLUTION INTRAMUSCULAR; INTRAVENOUS at 12:20:00

## 2021-05-13 RX ADMIN — GLYCOPYRROLATE 0.2 MG: 0.2 INJECTION, SOLUTION INTRAMUSCULAR; INTRAVENOUS at 11:42:00

## 2021-05-13 RX ADMIN — GLYCOPYRROLATE 0.6 MG: 0.2 INJECTION, SOLUTION INTRAMUSCULAR; INTRAVENOUS at 12:08:00

## 2021-05-13 RX ADMIN — EPHEDRINE SULFATE 5 MG: 50 INJECTION, SOLUTION INTRAVENOUS at 11:51:00

## 2021-05-13 RX ADMIN — DEXAMETHASONE SODIUM PHOSPHATE 4 MG: 4 MG/ML VIAL (ML) INJECTION at 11:42:00

## 2021-05-13 RX ADMIN — LIDOCAINE HYDROCHLORIDE 50 MG: 10 INJECTION, SOLUTION EPIDURAL; INFILTRATION; INTRACAUDAL; PERINEURAL at 11:36:00

## 2021-05-13 RX ADMIN — NEOSTIGMINE METHYLSULFATE 4 MG: 1 INJECTION INTRAVENOUS at 12:08:00

## 2021-05-13 NOTE — ANESTHESIA PREPROCEDURE EVALUATION
Anesthesia PreOp Note    HPI:     Hanh De Los Santos is a 77year old female who presents for preoperative consultation requested by: Ritu Bender MD    Date of Surgery: 5/13/2021    Procedure(s):  LAPAROSCOPIC VENTRAL HERNIA REPAIR with mesh  Indication: Ve uterus -- Dr. Neo Carrasco   • HIP REPLACEMENT SURGERY     • HYSTERECTOMY     • [facility]    s/p thyroidectomy for benign thyroid nodules   • OTHER SURGICAL HISTORY  11/29/2019    bowel obstruction   • SPINE SURGERY PROCEDURE UNLISTED  10/2019 file      Number of children: 3      Years of education: Not on file      Highest education level: Not on file    Occupational History      Occupation: Retired    Tobacco Use      Smoking status: Former Smoker        Packs/day: 1.00      Smokeless tobacco: Sexually Abused:     Available pre-op labs reviewed.   Lab Results   Component Value Date    WBC 6.0 04/26/2021    RBC 3.63 (L) 04/26/2021    HGB 12.2 04/26/2021    HCT 37.3 04/26/2021    .8 (H) 04/26/2021    MCH 33.6 04/26/2021    MCHC 32.7 04/26/20 ETT  Informed Consent Plan and Risks Discussed With:  Patient      I have informed Aide Zelaya and/or legal guardian or family member of the nature of the anesthetic plan, benefits, risks including possible dental damage if relevant, major complications

## 2021-05-13 NOTE — ANESTHESIA PROCEDURE NOTES
Airway  Date/Time: 5/13/2021 11:35 AM  Urgency: Elective    Airway not difficult    General Information and Staff    Patient location during procedure: OR  Anesthesiologist: Adriano Aaron MD  Performed: anesthesiologist     Indications and Patient Horacio Caal

## 2021-05-13 NOTE — ANESTHESIA POSTPROCEDURE EVALUATION
Patient: Geraldine Melgoza    Procedure Summary     Date: 05/13/21 Room / Location: [address] [place] [address] MAIN OR 02 / [address] [facility] MAIN OR    Anesthesia Start: 5494 Anesthesia Stop: 0033    Procedure: LAPAROSCOPIC VENTRAL HERNIA REPAIR with mesh (N/A Abdomen) Diagnosis:       Ventral

## 2021-05-13 NOTE — INTERVAL H&P NOTE
Pre-op Diagnosis: Ventral hernia without obstruction or gangrene [K43.9]    The above referenced H&P was reviewed by Dulce Maria Ayala MD on 5/13/2021, the patient was examined and no significant changes have occurred in the patient's condition since the H&P w

## 2021-05-14 ENCOUNTER — TELEPHONE (OUTPATIENT)
Dept: SURGERY | Facility: CLINIC | Age: 76
End: 2021-05-14

## 2021-05-14 NOTE — TELEPHONE ENCOUNTER
Per pt needed a 2wk post op (around 5/27) and is scheduled 5/24. Dr. Geovanny Pagan is out the next week, pt asking if 5/24 is too early.  Please advise

## 2021-05-14 NOTE — OPERATIVE REPORT
[facility]    PATIENT'S NAME: NELA Walker LITTLE   ATTENDING PHYSICIAN: Glenna Bryan MD   OPERATING PHYSICIAN: Glenna Bryan MD   PATIENT ACCOUNT#:   [de-identified]    LOCATION:  [facility] 6 [facility] 10  MEDICAL RECORD #:   [id number]       DATE OF BIRTH:

## 2021-05-14 NOTE — TELEPHONE ENCOUNTER
Called patient back. Dr. Jailyn Ingram isn't out of the office that week, and Monday 5/14 is fine for PO apt.

## 2021-05-24 ENCOUNTER — OFFICE VISIT (OUTPATIENT)
Dept: SURGERY | Facility: CLINIC | Age: 76
End: 2021-05-24
Payer: COMMERCIAL

## 2021-05-24 VITALS
SYSTOLIC BLOOD PRESSURE: 111 MMHG | TEMPERATURE: 99 F | DIASTOLIC BLOOD PRESSURE: 65 MMHG | WEIGHT: 167 LBS | HEIGHT: 63 IN | BODY MASS INDEX: 29.59 KG/M2

## 2021-05-24 DIAGNOSIS — Z98.890 POST-OPERATIVE STATE: Primary | ICD-10-CM

## 2021-05-24 PROCEDURE — 3078F DIAST BP <80 MM HG: CPT | Performed by: SURGERY

## 2021-05-24 PROCEDURE — 3074F SYST BP LT 130 MM HG: CPT | Performed by: SURGERY

## 2021-05-24 PROCEDURE — 99024 POSTOP FOLLOW-UP VISIT: CPT | Performed by: SURGERY

## 2021-05-24 PROCEDURE — 3008F BODY MASS INDEX DOCD: CPT | Performed by: SURGERY

## 2021-05-24 NOTE — PROGRESS NOTES
Postoperative Patient Follow-up      5/24/2021    HPI  Patient presents with:  Post-Op: Patient here for post op Laparoscopic Ventral Hernia on 5-.   Patient reports discomfort near the incison for the first five days after surgery that now is decrea

## 2021-06-11 RX ORDER — RISEDRONATE SODIUM 35 MG/1
TABLET, FILM COATED ORAL
Qty: 12 TABLET | Refills: 1 | Status: SHIPPED | OUTPATIENT
Start: 2021-06-11 | End: 2021-11-15

## 2021-08-05 ENCOUNTER — LAB ENCOUNTER (OUTPATIENT)
Dept: LAB | Age: 76
End: 2021-08-05
Attending: INTERNAL MEDICINE
Payer: MEDICARE

## 2021-08-05 DIAGNOSIS — D53.9 MACROCYTIC ANEMIA: Primary | ICD-10-CM

## 2021-08-05 DIAGNOSIS — D53.9 MACROCYTIC ANEMIA: ICD-10-CM

## 2021-08-05 LAB
ALBUMIN SERPL-MCNC: 3.2 G/DL (ref 3.4–5)
ALBUMIN/GLOB SERPL: 1 {RATIO} (ref 1–2)
ALP LIVER SERPL-CCNC: 66 U/L
ALT SERPL-CCNC: 24 U/L
ANION GAP SERPL CALC-SCNC: 5 MMOL/L (ref 0–18)
AST SERPL-CCNC: 17 U/L (ref 15–37)
BASOPHILS # BLD AUTO: 0.05 X10(3) UL (ref 0–0.2)
BASOPHILS NFR BLD AUTO: 1 %
BILIRUB SERPL-MCNC: 0.4 MG/DL (ref 0.1–2)
BUN BLD-MCNC: 18 MG/DL (ref 7–18)
BUN/CREAT SERPL: 26.5 (ref 10–20)
CALCIUM BLD-MCNC: 9.1 MG/DL (ref 8.5–10.1)
CHLORIDE SERPL-SCNC: 112 MMOL/L (ref 98–112)
CO2 SERPL-SCNC: 27 MMOL/L (ref 21–32)
CREAT BLD-MCNC: 0.68 MG/DL
DEPRECATED RDW RBC AUTO: 52.2 FL (ref 35.1–46.3)
EOSINOPHIL # BLD AUTO: 0.18 X10(3) UL (ref 0–0.7)
EOSINOPHIL NFR BLD AUTO: 3.4 %
ERYTHROCYTE [DISTWIDTH] IN BLOOD BY AUTOMATED COUNT: 13.4 % (ref 11–15)
GLOBULIN PLAS-MCNC: 3.3 G/DL (ref 2.8–4.4)
GLUCOSE BLD-MCNC: 63 MG/DL (ref 70–99)
HAPTOGLOB SERPL-MCNC: 84.3 MG/DL (ref 30–200)
HCT VFR BLD AUTO: 36.3 %
HGB BLD-MCNC: 11.9 G/DL
IMM GRANULOCYTES # BLD AUTO: 0.01 X10(3) UL (ref 0–1)
IMM GRANULOCYTES NFR BLD: 0.2 %
LDH SERPL L TO P-CCNC: 169 U/L
LYMPHOCYTES # BLD AUTO: 1.09 X10(3) UL (ref 1–4)
LYMPHOCYTES NFR BLD AUTO: 20.8 %
M PROTEIN MFR SERPL ELPH: 6.5 G/DL (ref 6.4–8.2)
MCH RBC QN AUTO: 34.8 PG (ref 26–34)
MCHC RBC AUTO-ENTMCNC: 32.8 G/DL (ref 31–37)
MCV RBC AUTO: 106.1 FL
MONOCYTES # BLD AUTO: 0.75 X10(3) UL (ref 0.1–1)
MONOCYTES NFR BLD AUTO: 14.3 %
NEUTROPHILS # BLD AUTO: 3.15 X10 (3) UL (ref 1.5–7.7)
NEUTROPHILS # BLD AUTO: 3.15 X10(3) UL (ref 1.5–7.7)
NEUTROPHILS NFR BLD AUTO: 60.3 %
OSMOLALITY SERPL CALC.SUM OF ELEC: 298 MOSM/KG (ref 275–295)
PATIENT FASTING Y/N/NP: NO
PLATELET # BLD AUTO: 220 10(3)UL (ref 150–450)
POTASSIUM SERPL-SCNC: 4.1 MMOL/L (ref 3.5–5.1)
RBC # BLD AUTO: 3.42 X10(6)UL
SODIUM SERPL-SCNC: 144 MMOL/L (ref 136–145)
WBC # BLD AUTO: 5.2 X10(3) UL (ref 4–11)

## 2021-08-05 PROCEDURE — 84165 PROTEIN E-PHORESIS SERUM: CPT

## 2021-08-05 PROCEDURE — 80053 COMPREHEN METABOLIC PANEL: CPT

## 2021-08-05 PROCEDURE — 85025 COMPLETE CBC W/AUTO DIFF WBC: CPT

## 2021-08-05 PROCEDURE — 83883 ASSAY NEPHELOMETRY NOT SPEC: CPT

## 2021-08-05 PROCEDURE — 36415 COLL VENOUS BLD VENIPUNCTURE: CPT

## 2021-08-05 PROCEDURE — 83010 ASSAY OF HAPTOGLOBIN QUANT: CPT

## 2021-08-05 PROCEDURE — 86334 IMMUNOFIX E-PHORESIS SERUM: CPT

## 2021-08-05 PROCEDURE — 83615 LACTATE (LD) (LDH) ENZYME: CPT

## 2021-08-09 ENCOUNTER — OFFICE VISIT (OUTPATIENT)
Dept: HEMATOLOGY/ONCOLOGY | Facility: HOSPITAL | Age: 76
End: 2021-08-09
Attending: INTERNAL MEDICINE
Payer: MEDICARE

## 2021-08-09 VITALS
BODY MASS INDEX: 28.88 KG/M2 | HEIGHT: 63 IN | TEMPERATURE: 98 F | DIASTOLIC BLOOD PRESSURE: 57 MMHG | OXYGEN SATURATION: 99 % | HEART RATE: 55 BPM | RESPIRATION RATE: 18 BRPM | WEIGHT: 163 LBS | SYSTOLIC BLOOD PRESSURE: 107 MMHG

## 2021-08-09 DIAGNOSIS — E03.9 HYPOTHYROIDISM, UNSPECIFIED TYPE: ICD-10-CM

## 2021-08-09 DIAGNOSIS — D53.9 MACROCYTIC ANEMIA: Primary | ICD-10-CM

## 2021-08-09 DIAGNOSIS — C54.1 ENDOMETRIAL CANCER (HCC): ICD-10-CM

## 2021-08-09 PROCEDURE — 99214 OFFICE O/P EST MOD 30 MIN: CPT | Performed by: INTERNAL MEDICINE

## 2021-08-09 NOTE — PROGRESS NOTES
Cancer Center Progress Note    Patient Name: Unique Erazo   YOB: 1945   Medical Record Number: [id number]   Attending Physician: Raffaele Shrestha M.D.      Chief Complaint:  Macrocytic anemia    History of Present Illness:  43-year-old female b thyroid nodules   • OTHER SURGICAL HISTORY  11/29/2019    bowel obstruction   • SPINE SURGERY PROCEDURE UNLISTED  10/2019    kyphoplasty    • THYROIDECTOMY  1986    nodules removed   • TONSILLECTOMY     • TOTAL HIP REPLACEMENT Right        Family History: Disp: , Rfl:   •  Omega-3 Fatty Acids (KP OMEGA-3 FISH OIL) 1200 MG Oral Capsule Delayed Release, Take by mouth daily.   , Disp: , Rfl:     Risedronate Sodium 35 MG Oral Tab, TAKE 1 TABLET BY MOUTH  EVERY 7 DAYS, Disp: 12 tablet, Rfl: 1  diphenhydrAMINE HCl HGB 11.9*   .0   NE 3.15         Lab Results   Component Value Date    GLU 63 (L) 08/05/2021    BUN 18 08/05/2021    BUNCREA 26.5 (H) 08/05/2021    CREATSERUM 0.68 08/05/2021    ANIONGAP 5 08/05/2021    GFR >59 10/27/2008    GFRNAA 86 08/05/2021

## 2021-08-10 LAB
ALBUMIN SERPL ELPH-MCNC: 3.72 G/DL (ref 3.75–5.21)
ALBUMIN/GLOB SERPL: 1.39 {RATIO} (ref 1–2)
ALPHA1 GLOB SERPL ELPH-MCNC: 0.28 G/DL (ref 0.19–0.46)
ALPHA2 GLOB SERPL ELPH-MCNC: 0.59 G/DL (ref 0.48–1.05)
B-GLOBULIN SERPL ELPH-MCNC: 0.64 G/DL (ref 0.68–1.23)
GAMMA GLOB SERPL ELPH-MCNC: 1.17 G/DL (ref 0.62–1.7)
KAPPA LC FREE SER-MCNC: 3.15 MG/DL (ref 0.33–1.94)
KAPPA LC FREE/LAMBDA FREE SER NEPH: 1.33 {RATIO} (ref 0.26–1.65)
LAMBDA LC FREE SERPL-MCNC: 2.36 MG/DL (ref 0.57–2.63)
M PROTEIN MFR SERPL ELPH: 6.4 G/DL (ref 6.4–8.2)

## 2021-08-24 ENCOUNTER — TELEPHONE (OUTPATIENT)
Dept: INTERNAL MEDICINE CLINIC | Facility: CLINIC | Age: 76
End: 2021-08-24

## 2021-08-28 ENCOUNTER — PATIENT MESSAGE (OUTPATIENT)
Dept: INTERNAL MEDICINE CLINIC | Facility: CLINIC | Age: 76
End: 2021-08-28

## 2021-08-30 NOTE — TELEPHONE ENCOUNTER
From: Veena Rubin  To: Silvina Kaiser MD  Sent: 8/28/2021 12:57 PM CDT  Subject: Non-Urgent Medical Question    I've had a pain/ache in my right shoulder and upper arm for about a month. Been trying to take care of at home but is still there.  Woul

## 2021-09-15 ENCOUNTER — OFFICE VISIT (OUTPATIENT)
Dept: ORTHOPEDICS CLINIC | Facility: CLINIC | Age: 76
End: 2021-09-15
Payer: COMMERCIAL

## 2021-09-15 ENCOUNTER — HOSPITAL ENCOUNTER (OUTPATIENT)
Dept: GENERAL RADIOLOGY | Facility: HOSPITAL | Age: 76
Discharge: HOME | End: 2021-09-15
Attending: ORTHOPAEDIC SURGERY
Payer: MEDICARE

## 2021-09-15 DIAGNOSIS — Z96.641 STATUS POST TOTAL REPLACEMENT OF RIGHT HIP: ICD-10-CM

## 2021-09-15 DIAGNOSIS — Z47.89 ORTHOPEDIC AFTERCARE: ICD-10-CM

## 2021-09-15 DIAGNOSIS — Z47.89 ORTHOPEDIC AFTERCARE: Primary | ICD-10-CM

## 2021-09-15 PROCEDURE — 99213 OFFICE O/P EST LOW 20 MIN: CPT | Performed by: ORTHOPAEDIC SURGERY

## 2021-09-15 PROCEDURE — 73502 X-RAY EXAM HIP UNI 2-3 VIEWS: CPT | Performed by: ORTHOPAEDIC SURGERY

## 2021-09-15 NOTE — PROGRESS NOTES
NURSING INTAKE COMMENTS: Patient presents with: Follow - Up: Patient is here for her 1 year POV, surgery was 9/11/2020 - Right total hip replacement. Patient denies any pain.       HPI: This 77year old female presents today 1 year follow-up of her right t D) 600-200 MG-UNIT Oral Cap Take by mouth daily. Takes 2 tabs in am and 1 tab at HS      • Multiple Vitamins Oral Tab Take 1 tablet by mouth daily. • Omega-3 Fatty Acids (KP OMEGA-3 FISH OIL) 1200 MG Oral Capsule Delayed Release Take by mouth daily. total hip arthroplasty.     Lab Results   Component Value Date    WBC 5.2 08/05/2021    HGB 11.9 (L) 08/05/2021    .0 08/05/2021      Lab Results   Component Value Date    GLU 63 (L) 08/05/2021    BUN 18 08/05/2021    CREATSERUM 0.68 08/05/2021    GF

## 2021-10-20 RX ORDER — LEVOTHYROXINE SODIUM 0.05 MG/1
TABLET ORAL
Qty: 90 TABLET | Refills: 0 | Status: SHIPPED | OUTPATIENT
Start: 2021-10-20 | End: 2022-01-13

## 2021-10-20 NOTE — TELEPHONE ENCOUNTER
Dr. Karle Meckel    Would you like to refill , last TSH was one year ago     Med pended for your review / approval      Refill passed per [address] protocol. Requested Prescriptions   Pending Prescriptions Disp Refills    LEVOTHYROXINE 50 MCG Oral Tab [Pharmacy Med Name: Levothyroxine Sodium 50 MCG Oral Tablet] 90 tablet 3     Sig: TAKE 1 TABLET BY MOUTH  BEFORE BREAKFAST        Thyroid Medication Protocol Passed - 10/19/2021 10:17 PM        Passed - TSH in past 12 months        Passed - Last TSH value is normal     Lab Results   Component Value Date    TSH 1.710 10/30/2020    [facility] 2.74 08/03/2016                 Passed - Appointment in past 12 or next 3 months            Recent Outpatient Visits              1 month ago [facility]care    [facility] Jenana luisa Higgins MD    Office Visit    2 months ago Macrocytic anemia    [facility] Hematology Oncology Denver Lopes, MD    Office Visit    4 months ago [facility] Tavon Alcantara MD    Office Visit    5 months ago Ventral hernia without obstruction or gangrene    [facility] Tavon Alcantara MD    Office Visit    6 months ago Umbilical hernia without obstruction and without gangrene    [address], Cecemaricel Lan MD    Office Visit          Future Appointments         Provider Department Appt Notes    In 1 week Sofia Castro MD [address], [facility] Annual wellness exam.    In 2 months Denver Lopes, [address] Hematology Oncology FOLLOW UP VISIT. CL  5M

## 2021-11-02 ENCOUNTER — LAB ENCOUNTER (OUTPATIENT)
Dept: LAB | Age: 76
End: 2021-11-02
Attending: INTERNAL MEDICINE
Payer: MEDICARE

## 2021-11-02 ENCOUNTER — OFFICE VISIT (OUTPATIENT)
Dept: INTERNAL MEDICINE CLINIC | Facility: CLINIC | Age: 76
End: 2021-11-02
Payer: COMMERCIAL

## 2021-11-02 VITALS
SYSTOLIC BLOOD PRESSURE: 109 MMHG | HEART RATE: 66 BPM | OXYGEN SATURATION: 98 % | HEIGHT: 63 IN | DIASTOLIC BLOOD PRESSURE: 69 MMHG | BODY MASS INDEX: 30.18 KG/M2 | WEIGHT: 170.31 LBS | TEMPERATURE: 99 F

## 2021-11-02 DIAGNOSIS — D75.89 MACROCYTOSIS: ICD-10-CM

## 2021-11-02 DIAGNOSIS — C54.1 ADENOCARCINOMA OF ENDOMETRIUM (HCC): ICD-10-CM

## 2021-11-02 DIAGNOSIS — M43.16 SPONDYLOLISTHESIS OF LUMBAR REGION: ICD-10-CM

## 2021-11-02 DIAGNOSIS — E03.9 ACQUIRED HYPOTHYROIDISM: ICD-10-CM

## 2021-11-02 DIAGNOSIS — Z00.00 MEDICARE ANNUAL WELLNESS VISIT, SUBSEQUENT: Primary | ICD-10-CM

## 2021-11-02 DIAGNOSIS — H61.23 BILATERAL IMPACTED CERUMEN: ICD-10-CM

## 2021-11-02 DIAGNOSIS — S32.020D CLOSED WEDGE COMPRESSION FRACTURE OF L2 VERTEBRA WITH ROUTINE HEALING, SUBSEQUENT ENCOUNTER: ICD-10-CM

## 2021-11-02 DIAGNOSIS — Z12.31 ENCOUNTER FOR SCREENING MAMMOGRAM FOR BREAST CANCER: ICD-10-CM

## 2021-11-02 DIAGNOSIS — Z91.81 RISK FOR FALLS: ICD-10-CM

## 2021-11-02 DIAGNOSIS — M81.0 AGE-RELATED OSTEOPOROSIS WITHOUT CURRENT PATHOLOGICAL FRACTURE: ICD-10-CM

## 2021-11-02 DIAGNOSIS — I87.2 PERIPHERAL VENOUS INSUFFICIENCY: ICD-10-CM

## 2021-11-02 DIAGNOSIS — F41.1 GAD (GENERALIZED ANXIETY DISORDER): ICD-10-CM

## 2021-11-02 PROBLEM — B35.1 ONYCHOMYCOSIS OF GREAT TOE: Status: RESOLVED | Noted: 2020-10-30 | Resolved: 2021-11-02

## 2021-11-02 PROBLEM — D16.6: Status: RESOLVED | Noted: 2019-08-15 | Resolved: 2021-11-02

## 2021-11-02 PROBLEM — Z47.89 ORTHOPEDIC AFTERCARE: Status: RESOLVED | Noted: 2020-11-09 | Resolved: 2021-11-02

## 2021-11-02 PROBLEM — M54.50 BACK PAIN, LUMBOSACRAL: Status: RESOLVED | Noted: 2019-11-29 | Resolved: 2021-11-02

## 2021-11-02 PROCEDURE — 99397 PER PM REEVAL EST PAT 65+ YR: CPT | Performed by: INTERNAL MEDICINE

## 2021-11-02 PROCEDURE — 84443 ASSAY THYROID STIM HORMONE: CPT

## 2021-11-02 PROCEDURE — G0439 PPPS, SUBSEQ VISIT: HCPCS | Performed by: INTERNAL MEDICINE

## 2021-11-02 PROCEDURE — 36415 COLL VENOUS BLD VENIPUNCTURE: CPT

## 2021-11-02 PROCEDURE — 3008F BODY MASS INDEX DOCD: CPT | Performed by: INTERNAL MEDICINE

## 2021-11-02 PROCEDURE — 3078F DIAST BP <80 MM HG: CPT | Performed by: INTERNAL MEDICINE

## 2021-11-02 PROCEDURE — 96160 PT-FOCUSED HLTH RISK ASSMT: CPT | Performed by: INTERNAL MEDICINE

## 2021-11-02 PROCEDURE — 3074F SYST BP LT 130 MM HG: CPT | Performed by: INTERNAL MEDICINE

## 2021-11-02 NOTE — PROGRESS NOTES
HPI:   Allene Epley is a 77year old female who presents for a Medicare Subsequent Annual Wellness visit (Pt already had Initial Annual Wellness). No topic due editable text        She has been screened for Falls and is low risk.     Cognitive Assess Adenocarcinoma of endometrium ([facility] [address]     Macrocytosis     Risk for falls     Bilateral impacted cerumen     SOPHIA (generalized anxiety disorder)    Wt Readings from Last 3 Encounters:  11/02/21 : 170 lb 4.8 oz (77.2 kg)  08/09/21 : 163 lb (73.9 kg)  05/24/21 history that includes Breast biopsy (Left, 1990); colonoscopy; other surgical history (1986); tonsillectomy; d & c (2013); spine surgery procedure unlisted (10/2019); thyroidectomy (1986); other surgical history (11/29/2019); hysterectomy; Colectomy; hip r 11/2/2021  9:01 AM  Screening Method: Questionnaire  I have a problem hearing over the telephone: No I have trouble following the conversations when two or more people are talking at the same time: Sometimes   I have trouble understanding things on the TV: rate and rhythm, S1 and S2 normal, no murmur, rub, or gallop   Abdomen:   Soft, non-tender, bowel sounds active all four quadrants,  no masses, no organomegaly   Pelvic: Deferred   Extremities: Extremities normal, atraumatic, no cyanosis or edema   Pulses: Patient following up with her rheumatologist and currently on risedronate. Continue calcium vitamin D. She will be due for DEXA scan next year.    (C54.1) Adenocarcinoma of endometrium ([address]  Plan: No signs of recurrence.   She has had a hysterectomy done Interaction;Puzzles;Games; Visiting Friends; Visiting Family     Supplementary Documentation:   Audrie Crigler Struc's SCREENING SCHEDULE   Tests on this list are recommended by your physician but may not be covered, or covered at this frequency, by your insurer. for long-term glucocorticoid medication use (Steroids) Last Dexa Scan:    XR DEXA BONE DENSITOMETRY (CPT=77080) 11/25/2020      No recommendations at this time   Pap and Pelvic    Pap   Covered every 2 years for women at normal risk;  Annually if at high ri

## 2021-11-15 RX ORDER — RISEDRONATE SODIUM 35 MG/1
TABLET, FILM COATED ORAL
Qty: 12 TABLET | Refills: 3 | Status: SHIPPED | OUTPATIENT
Start: 2021-11-15 | End: 2022-09-27

## 2021-11-15 NOTE — TELEPHONE ENCOUNTER
Refill passed per [facility] protocol. Requested Prescriptions   Pending Prescriptions Disp Refills    RISEDRONATE SODIUM 35 MG Oral Tab [Pharmacy Med Name: RISEDRONATE  35MG  TAB] 12 tablet 3     Sig: TAKE 1 TABLET BY MOUTH  EVERY 7 DAYS        Osteoporosis Medication Protocol Passed - 11/14/2021  9:52 PM        Passed - Appointment within past 12 or next 3 months              Future Appointments         Provider Department Appt Notes    In 3 weeks [facility] Results    In 1 month Corine Brunson MD [facility] Hematology Oncology FOLLOW UP VISIT. CL  5M            Recent Outpatient Visits              1 week ago Medicare annual wellness visit, subsequent    [facility], [address], Tripp Mayo MD    Office Visit    2 months ago Orthopedic aftercare    [place] [facility] [place] [facility] [address], Thalia Sullvian MD    Office Visit    3 months ago Macrocytic anemia    [facility] Hematology Oncology Corine Brunson MD    Office Visit    5 months ago Post-operative [facility] [place] [facility] for Health Surgery Manoj Krishnan MD    Office Visit    6 months ago Ventral hernia without obstruction or gangrene    [place] [facility] [place] [facility] for [facility] Surgery Manoj Krishnan MD    Office Visit

## 2021-12-06 RX ORDER — FLUOXETINE HYDROCHLORIDE 20 MG/1
20 CAPSULE ORAL DAILY
Qty: 90 CAPSULE | Refills: 1 | Status: SHIPPED | OUTPATIENT
Start: 2021-12-06 | End: 2022-05-25

## 2021-12-06 NOTE — TELEPHONE ENCOUNTER
Refill passed per [facility], [facility] protocol. Requested Prescriptions   Pending Prescriptions Disp Refills    FLUOXETINE 20 MG Oral Cap [Pharmacy Med Name: FLUoxetine HCl 20 MG Oral Capsule] 90 capsule 3     Sig: TAKE 1 CAPSULE BY MOUTH  DAILY        Psychiatric Non-Scheduled (Anti-Anxiety) Passed - 12/5/2021 10:08 PM        Passed - Appointment in last 6 or next 3 months               Recent Outpatient Visits              1 month ago Medicare annual wellness visit, subsequent    [place] [facility] [place], [facility], Kym Wesley MD    Office Visit    2 months ago Orthopedic aftercare    [place] [facility] [place] [facility] for [address] [place] [address], Jacobo Daigle MD    Office Visit    3 months ago Macrocytic anemia    [facility] Hematology Oncology Boo [place] MD    Office Visit    6 months ago [facility]operative [facility] Milka Sampson MD    Office Visit    7 months ago Ventral hernia without obstruction or gangrene    [facility] [facility] Milka Sampson MD    Office Visit            Future Appointments         Provider Department Appt Notes    In 3 days [facility] Results    In 1 month Boo [place] MD [facility]ICS Hematology Oncology FOLLOW UP VISIT. CL  5M

## 2021-12-09 ENCOUNTER — HOSPITAL ENCOUNTER (OUTPATIENT)
Dept: MAMMOGRAPHY | Age: 76
Discharge: HOME | End: 2021-12-09
Attending: INTERNAL MEDICINE
Payer: MEDICARE

## 2021-12-09 DIAGNOSIS — Z12.31 ENCOUNTER FOR SCREENING MAMMOGRAM FOR BREAST CANCER: ICD-10-CM

## 2021-12-09 PROCEDURE — 77067 SCR MAMMO BI INCL CAD: CPT | Performed by: INTERNAL MEDICINE

## 2021-12-09 PROCEDURE — 77063 BREAST TOMOSYNTHESIS BI: CPT | Performed by: INTERNAL MEDICINE

## 2022-01-05 ENCOUNTER — LAB ENCOUNTER (OUTPATIENT)
Dept: LAB | Age: 77
End: 2022-01-05
Attending: INTERNAL MEDICINE
Payer: MEDICARE

## 2022-01-05 DIAGNOSIS — E03.9 HYPOTHYROIDISM, UNSPECIFIED TYPE: ICD-10-CM

## 2022-01-05 DIAGNOSIS — C54.1 ENDOMETRIAL CANCER (HCC): ICD-10-CM

## 2022-01-05 DIAGNOSIS — D53.9 MACROCYTIC ANEMIA: ICD-10-CM

## 2022-01-05 LAB
BASOPHILS # BLD AUTO: 0.04 X10(3) UL (ref 0–0.2)
BASOPHILS NFR BLD AUTO: 0.8 %
DEPRECATED RDW RBC AUTO: 54.8 FL (ref 35.1–46.3)
EOSINOPHIL # BLD AUTO: 0.17 X10(3) UL (ref 0–0.7)
EOSINOPHIL NFR BLD AUTO: 3.3 %
ERYTHROCYTE [DISTWIDTH] IN BLOOD BY AUTOMATED COUNT: 13.8 % (ref 11–15)
HCT VFR BLD AUTO: 37.6 %
HGB BLD-MCNC: 12.1 G/DL
IMM GRANULOCYTES # BLD AUTO: 0.02 X10(3) UL (ref 0–1)
IMM GRANULOCYTES NFR BLD: 0.4 %
LYMPHOCYTES # BLD AUTO: 1.34 X10(3) UL (ref 1–4)
LYMPHOCYTES NFR BLD AUTO: 26 %
MCH RBC QN AUTO: 34.3 PG (ref 26–34)
MCHC RBC AUTO-ENTMCNC: 32.2 G/DL (ref 31–37)
MCV RBC AUTO: 106.5 FL
MONOCYTES # BLD AUTO: 0.62 X10(3) UL (ref 0.1–1)
MONOCYTES NFR BLD AUTO: 12 %
NEUTROPHILS # BLD AUTO: 2.96 X10 (3) UL (ref 1.5–7.7)
NEUTROPHILS # BLD AUTO: 2.96 X10(3) UL (ref 1.5–7.7)
NEUTROPHILS NFR BLD AUTO: 57.5 %
PLATELET # BLD AUTO: 213 10(3)UL (ref 150–450)
RBC # BLD AUTO: 3.53 X10(6)UL
WBC # BLD AUTO: 5.2 X10(3) UL (ref 4–11)

## 2022-01-05 PROCEDURE — 85025 COMPLETE CBC W/AUTO DIFF WBC: CPT

## 2022-01-05 PROCEDURE — 36415 COLL VENOUS BLD VENIPUNCTURE: CPT

## 2022-01-10 ENCOUNTER — OFFICE VISIT (OUTPATIENT)
Dept: HEMATOLOGY/ONCOLOGY | Facility: HOSPITAL | Age: 77
End: 2022-01-10
Attending: INTERNAL MEDICINE
Payer: MEDICARE

## 2022-01-10 VITALS
BODY MASS INDEX: 30.48 KG/M2 | OXYGEN SATURATION: 99 % | HEIGHT: 63 IN | WEIGHT: 172 LBS | HEART RATE: 61 BPM | RESPIRATION RATE: 18 BRPM | DIASTOLIC BLOOD PRESSURE: 73 MMHG | SYSTOLIC BLOOD PRESSURE: 129 MMHG | TEMPERATURE: 99 F

## 2022-01-10 DIAGNOSIS — E03.9 HYPOTHYROIDISM, UNSPECIFIED TYPE: ICD-10-CM

## 2022-01-10 DIAGNOSIS — C54.1 ENDOMETRIAL CANCER (HCC): ICD-10-CM

## 2022-01-10 DIAGNOSIS — D53.9 MACROCYTIC ANEMIA: Primary | ICD-10-CM

## 2022-01-10 PROCEDURE — 99214 OFFICE O/P EST MOD 30 MIN: CPT | Performed by: INTERNAL MEDICINE

## 2022-01-10 NOTE — PROGRESS NOTES
Cancer Center Progress Note    Patient Name: Colton Pereira   YOB: 1945   Medical Record Number: [id number]   Attending Physician: Jimmy Calvillo M.D.      Chief Complaint:  Macrocytic anemia    History of Present Illness:  55-year-old female b thyroid nodules   • OTHER SURGICAL HISTORY  11/29/2019    bowel obstruction   • SPINE SURGERY PROCEDURE UNLISTED  10/2019    kyphoplasty    • THYROIDECTOMY  1986    nodules removed   • TONSILLECTOMY     • TOTAL HIP REPLACEMENT Right        Family History: mouth daily. , Disp: , Rfl:     FLUoxetine 20 MG Oral Cap, Take 1 capsule (20 mg total) by mouth daily. , Disp: 90 capsule, Rfl: 1  Risedronate Sodium 35 MG Oral Tab, TAKE 1 TABLET BY MOUTH  EVERY 7 DAYS, Disp: 12 tablet, Rfl: 3  LEVOTHYROXINE 50 MCG Oral (H) 08/05/2021    CREATSERUM 0.68 08/05/2021    ANIONGAP 5 08/05/2021    GFR >59 10/27/2008    GFRNAA 86 08/05/2021    GFRAA 100 08/05/2021    CA 9.1 08/05/2021    OSMOCALC 298 (H) 08/05/2021    ALKPHO 66 08/05/2021    AST 17 08/05/2021    ALT 24 08/05/202

## 2022-01-13 RX ORDER — LEVOTHYROXINE SODIUM 0.05 MG/1
50 TABLET ORAL
Qty: 90 TABLET | Refills: 1 | Status: SHIPPED | OUTPATIENT
Start: 2022-01-13 | End: 2022-07-03

## 2022-01-13 NOTE — TELEPHONE ENCOUNTER
[facility] per [facility] protocol. Requested Prescriptions   Pending Prescriptions Disp Refills    LEVOTHYROXINE 50 MCG Oral Tab [Pharmacy Med Name: Levothyroxine Sodium 50 MCG Oral Tablet] 90 tablet 3     Sig: TAKE 1 TABLET BY MOUTH  BEFORE BREAKFAST        Thyroid Medication Protocol Passed - 1/12/2022 11:01 PM        Passed - TSH in past 12 months        Passed - Last TSH value is normal     Lab Results   Component Value Date    TSH 2.840 11/02/2021    [facility] 2.74 08/03/2016                 Passed - Appointment in past 12 or next 3 months               Recent Outpatient Visits              3 days ago Macrocytic anemia    [facility] Hematology Oncology Maikel Saul MD    Office Visit    2 months ago Medicare annual wellness visit, subsequent    [facility] [place], [facility], [address], Fabio Miranda MD    Office Visit    4 months ago Orthopedic aftercare    [facility] Radha Stephen[facility] MD    Office Visit    5 months ago Macrocytic anemia    [facility] Hematology Oncology Maikel Saul MD    Office Visit    7 months ago Post-operative [facility] Health Surgery Conception MD Alexx    Office Visit            Future Appointments         Provider Department Appt Notes    In 4 months Maikel Saul MD [facility] AND [facility] Hematology Oncology FOLLOW UP VISIT. CL  5M

## 2022-05-04 ENCOUNTER — HOSPITAL ENCOUNTER (OUTPATIENT)
Dept: GENERAL RADIOLOGY | Age: 77
Discharge: HOME | End: 2022-05-04
Attending: INTERNAL MEDICINE
Payer: MEDICARE

## 2022-05-04 ENCOUNTER — OFFICE VISIT (OUTPATIENT)
Dept: INTERNAL MEDICINE CLINIC | Facility: CLINIC | Age: 77
End: 2022-05-04
Payer: COMMERCIAL

## 2022-05-04 VITALS
RESPIRATION RATE: 16 BRPM | OXYGEN SATURATION: 97 % | HEART RATE: 56 BPM | BODY MASS INDEX: 30.91 KG/M2 | TEMPERATURE: 98 F | HEIGHT: 62 IN | WEIGHT: 168 LBS | SYSTOLIC BLOOD PRESSURE: 114 MMHG | DIASTOLIC BLOOD PRESSURE: 73 MMHG

## 2022-05-04 DIAGNOSIS — M54.50 ACUTE BILATERAL LOW BACK PAIN WITHOUT SCIATICA: Primary | ICD-10-CM

## 2022-05-04 DIAGNOSIS — M54.50 ACUTE BILATERAL LOW BACK PAIN WITHOUT SCIATICA: ICD-10-CM

## 2022-05-04 PROCEDURE — 99213 OFFICE O/P EST LOW 20 MIN: CPT | Performed by: INTERNAL MEDICINE

## 2022-05-04 PROCEDURE — 3074F SYST BP LT 130 MM HG: CPT | Performed by: INTERNAL MEDICINE

## 2022-05-04 PROCEDURE — 3008F BODY MASS INDEX DOCD: CPT | Performed by: INTERNAL MEDICINE

## 2022-05-04 PROCEDURE — 72110 X-RAY EXAM L-2 SPINE 4/>VWS: CPT | Performed by: INTERNAL MEDICINE

## 2022-05-04 PROCEDURE — 3078F DIAST BP <80 MM HG: CPT | Performed by: INTERNAL MEDICINE

## 2022-05-05 ENCOUNTER — TELEPHONE (OUTPATIENT)
Dept: INTERNAL MEDICINE CLINIC | Facility: CLINIC | Age: 77
End: 2022-05-05

## 2022-05-25 RX ORDER — FLUOXETINE HYDROCHLORIDE 20 MG/1
CAPSULE ORAL
Qty: 90 CAPSULE | Refills: 1 | Status: SHIPPED | OUTPATIENT
Start: 2022-05-25

## 2022-06-02 ENCOUNTER — LAB ENCOUNTER (OUTPATIENT)
Dept: LAB | Age: 77
End: 2022-06-02
Attending: INTERNAL MEDICINE
Payer: MEDICARE

## 2022-06-02 DIAGNOSIS — E03.9 HYPOTHYROIDISM, UNSPECIFIED TYPE: ICD-10-CM

## 2022-06-02 DIAGNOSIS — C54.1 ENDOMETRIAL CANCER (HCC): ICD-10-CM

## 2022-06-02 DIAGNOSIS — D53.9 MACROCYTIC ANEMIA: ICD-10-CM

## 2022-06-02 LAB
BASOPHILS # BLD AUTO: 0.05 X10(3) UL (ref 0–0.2)
BASOPHILS NFR BLD AUTO: 1.1 %
DEPRECATED RDW RBC AUTO: 54.4 FL (ref 35.1–46.3)
EOSINOPHIL # BLD AUTO: 0.21 X10(3) UL (ref 0–0.7)
EOSINOPHIL NFR BLD AUTO: 4.5 %
ERYTHROCYTE [DISTWIDTH] IN BLOOD BY AUTOMATED COUNT: 13.9 % (ref 11–15)
HCT VFR BLD AUTO: 36.6 %
HGB BLD-MCNC: 12.1 G/DL
IMM GRANULOCYTES # BLD AUTO: 0.01 X10(3) UL (ref 0–1)
IMM GRANULOCYTES NFR BLD: 0.2 %
LYMPHOCYTES # BLD AUTO: 1.29 X10(3) UL (ref 1–4)
LYMPHOCYTES NFR BLD AUTO: 27.8 %
MCH RBC QN AUTO: 35.6 PG (ref 26–34)
MCHC RBC AUTO-ENTMCNC: 33.1 G/DL (ref 31–37)
MCV RBC AUTO: 107.6 FL
MONOCYTES # BLD AUTO: 0.59 X10(3) UL (ref 0.1–1)
MONOCYTES NFR BLD AUTO: 12.7 %
NEUTROPHILS # BLD AUTO: 2.49 X10 (3) UL (ref 1.5–7.7)
NEUTROPHILS # BLD AUTO: 2.49 X10(3) UL (ref 1.5–7.7)
NEUTROPHILS NFR BLD AUTO: 53.7 %
PLATELET # BLD AUTO: 208 10(3)UL (ref 150–450)
RBC # BLD AUTO: 3.4 X10(6)UL
WBC # BLD AUTO: 4.6 X10(3) UL (ref 4–11)

## 2022-06-02 PROCEDURE — 36415 COLL VENOUS BLD VENIPUNCTURE: CPT

## 2022-06-02 PROCEDURE — 85025 COMPLETE CBC W/AUTO DIFF WBC: CPT

## 2022-06-07 ENCOUNTER — OFFICE VISIT (OUTPATIENT)
Dept: HEMATOLOGY/ONCOLOGY | Facility: HOSPITAL | Age: 77
End: 2022-06-07
Attending: INTERNAL MEDICINE
Payer: MEDICARE

## 2022-06-07 VITALS
HEIGHT: 63 IN | RESPIRATION RATE: 18 BRPM | OXYGEN SATURATION: 99 % | HEART RATE: 57 BPM | TEMPERATURE: 98 F | BODY MASS INDEX: 29.59 KG/M2 | WEIGHT: 167 LBS | DIASTOLIC BLOOD PRESSURE: 61 MMHG | SYSTOLIC BLOOD PRESSURE: 121 MMHG

## 2022-06-07 DIAGNOSIS — C54.1 ENDOMETRIAL CANCER (HCC): ICD-10-CM

## 2022-06-07 DIAGNOSIS — D53.9 MACROCYTIC ANEMIA: Primary | ICD-10-CM

## 2022-06-07 DIAGNOSIS — E03.9 HYPOTHYROIDISM, UNSPECIFIED TYPE: ICD-10-CM

## 2022-06-07 PROCEDURE — 99214 OFFICE O/P EST MOD 30 MIN: CPT | Performed by: INTERNAL MEDICINE

## 2022-07-03 RX ORDER — LEVOTHYROXINE SODIUM 0.05 MG/1
50 TABLET ORAL
Qty: 90 TABLET | Refills: 1 | Status: SHIPPED | OUTPATIENT
Start: 2022-07-03

## 2022-08-15 NOTE — PHYSICAL THERAPY NOTE
Noted.    PHYSICAL THERAPY HIP EVALUATION - INPATIENT     Room Number: 432/432-A  Evaluation Date: 9/11/2020  Type of Evaluation: Initial  Physician Order: PT Eval and Treat    Presenting Problem: OA right hip , pt is s/p anterior SHERRI .   No anterior precautions note ambulation. Pt with min assist for transfers with cues provided. Pt did report feeling nauseous and fatigued throughout fxn mobility limiting tolerance for activity .   Pt able to  ambulate with RW approximately 15 ft with step to gait pattern , slow un Patient will benefit from continued IP PT services to address these deficits in preparation for discharge.     DISCHARGE RECOMMENDATIONS  PT Discharge Recommendations: 24 hour care/supervision;Home;Home with home health PT    PLAN  PT Treatment Plan: Be Mike García MD at [facility] OR   • HYSTERECTOMY     • MYOMECTOMY HYSTEROSCOPIC N/A 1/20/2020    Performed by Marizol Lama MD at [address] [place] [address]    s/p thyroidectomy for benign thyroid nodules   • OTHER SURGICAL HISTORY  11 post sx with anterior approach precautions per MD     Pt with full and strong B ankle DF and PF noted with screening    BALANCE  Static Sitting: Fair -  Dynamic Sitting: Not tested  Static Standing: Poor +  Dynamic Standing: Poor +    ADDITIONAL TESTS transfers Sit to/from Stand at assistance level: modified independent     Goal #2  Current Status    Goal #3 Patient is able to ambulate 150-200  feet with assistive device at assistance level: modified independent    Goal #3   Current Status    Goal #4 Pa

## 2022-08-29 NOTE — PLAN OF CARE
BABY and -526 BABY Problem: Patient/Family Goals  Goal: Patient/Family Long Term Goal  Description  Patient's Long Term Goal: Return home    Interventions:  - Tolerating meals  -Pain management  - Ambulation  - See additional Care Plan goals for specific interventions   12 Verbalizes/displays adequate comfort level or patient's stated pain goal  Description  INTERVENTIONS:  - Encourage pt to monitor pain and request assistance  - Assess pain using appropriate pain scale  - Administer analgesics based on type and severity of

## 2022-09-27 NOTE — TELEPHONE ENCOUNTER
Refill passed per [address] [place] protocol. Requested Prescriptions   Pending Prescriptions Disp Refills    RISEDRONATE 35 MG Oral Tab [Pharmacy Med Name: RISEDRONATE  35MG  TAB] 12 tablet 3     Sig: TAKE 1 TABLET BY MOUTH  EVERY 7 DAYS        Osteoporosis Medication Protocol Passed - 9/27/2022 12:19 AM        Passed - In person appointment or virtual visit in the past 12 mos or appointment in next 3 mos       Recent Outpatient Visits              3 months ago Macrocytic anemia    [facility] Hematology Oncology Wai Oliveira MD    Office Visit    4 months ago Acute bilateral low back pain without sciatica    [address] MD    Office Visit    8 months ago Macrocytic anemia    [facility] Hematology Oncology Wai Oliveira MD    Office Visit    10 months ago Medicare annual wellness visit, subsequent    [address] MD    Office Visit    1 year ago Orthopedic aftercare    [facility] for Landa Soulier, [place]ie [place], MD    Office Visit     Future Appointments         Provider Department Appt Notes    In 1 month Pedro Mirza MD [address]     In 4 months [facility] Hematology Oncology FOLLOW UP VISIT. CL  8m                      Future Appointments         Provider Department Appt Notes    In 1 month Pedro Mirza MD [address], [address]     In 4 months [facility] Hematology Oncology FOLLOW UP VISIT. CL  8m             Recent Outpatient Visits              3 months ago Macrocytic anemia    [facility] Hematology Oncology Wai Oliveira MD    Office Visit    4 months ago Acute bilateral low back pain without sciatica    Tejas Toledo MD    Office Visit    8 months ago [facility] Hematology Oncology Imani Renee MD    Office Visit    10 months ago Medicare annual wellness visit, subsequent    Alejandra Saldana MD    Office Visit    1 year ago Orthopedic aftercare    [facility] for Rolin Collet, David Zuñiga MD    Office Visit

## 2022-09-29 RX ORDER — RISEDRONATE SODIUM 35 MG/1
TABLET, FILM COATED ORAL
Qty: 12 TABLET | Refills: 3 | Status: SHIPPED | OUTPATIENT
Start: 2022-09-29

## 2022-10-31 NOTE — TELEPHONE ENCOUNTER
Pt scheduled appt via [employer] citing Think I have a hernia in my abdomen that needs to be looked at.     Please advise re:abdominal pain
Pt stated that on Sunday she noticed a lump above her navel about 2 inches wide. Pt denied redness, pain, fever. Pt is not sure if it is a umbilical hernia. Pt was advised to come in for a evaluation. Pt agreed and she will come in today to see .
normal...

## 2022-11-08 ENCOUNTER — OFFICE VISIT (OUTPATIENT)
Dept: INTERNAL MEDICINE CLINIC | Facility: CLINIC | Age: 77
End: 2022-11-08
Payer: COMMERCIAL

## 2022-11-08 VITALS
BODY MASS INDEX: 30.42 KG/M2 | WEIGHT: 171.69 LBS | OXYGEN SATURATION: 100 % | TEMPERATURE: 97 F | DIASTOLIC BLOOD PRESSURE: 61 MMHG | HEIGHT: 63 IN | HEART RATE: 66 BPM | SYSTOLIC BLOOD PRESSURE: 121 MMHG

## 2022-11-08 DIAGNOSIS — F41.1 GAD (GENERALIZED ANXIETY DISORDER): ICD-10-CM

## 2022-11-08 DIAGNOSIS — E03.9 ACQUIRED HYPOTHYROIDISM: ICD-10-CM

## 2022-11-08 DIAGNOSIS — J30.0 VASOMOTOR RHINITIS: ICD-10-CM

## 2022-11-08 DIAGNOSIS — Z91.81 RISK FOR FALLS: ICD-10-CM

## 2022-11-08 DIAGNOSIS — H61.23 BILATERAL IMPACTED CERUMEN: ICD-10-CM

## 2022-11-08 DIAGNOSIS — S32.020D CLOSED WEDGE COMPRESSION FRACTURE OF L2 VERTEBRA WITH ROUTINE HEALING, SUBSEQUENT ENCOUNTER: ICD-10-CM

## 2022-11-08 DIAGNOSIS — I87.2 PERIPHERAL VENOUS INSUFFICIENCY: ICD-10-CM

## 2022-11-08 DIAGNOSIS — C54.1 ADENOCARCINOMA OF ENDOMETRIUM (HCC): ICD-10-CM

## 2022-11-08 DIAGNOSIS — M81.0 AGE-RELATED OSTEOPOROSIS WITHOUT CURRENT PATHOLOGICAL FRACTURE: ICD-10-CM

## 2022-11-08 DIAGNOSIS — D75.89 MACROCYTOSIS: ICD-10-CM

## 2022-11-08 DIAGNOSIS — Z12.11 COLON CANCER SCREENING: ICD-10-CM

## 2022-11-08 DIAGNOSIS — Z00.00 MEDICARE ANNUAL WELLNESS VISIT, SUBSEQUENT: Primary | ICD-10-CM

## 2022-11-08 DIAGNOSIS — Z12.31 ENCOUNTER FOR SCREENING MAMMOGRAM FOR BREAST CANCER: ICD-10-CM

## 2022-11-08 PROCEDURE — 99397 PER PM REEVAL EST PAT 65+ YR: CPT | Performed by: INTERNAL MEDICINE

## 2022-11-08 PROCEDURE — 3008F BODY MASS INDEX DOCD: CPT | Performed by: INTERNAL MEDICINE

## 2022-11-08 PROCEDURE — G0439 PPPS, SUBSEQ VISIT: HCPCS | Performed by: INTERNAL MEDICINE

## 2022-11-08 PROCEDURE — 3078F DIAST BP <80 MM HG: CPT | Performed by: INTERNAL MEDICINE

## 2022-11-08 PROCEDURE — 96160 PT-FOCUSED HLTH RISK ASSMT: CPT | Performed by: INTERNAL MEDICINE

## 2022-11-08 PROCEDURE — 1126F AMNT PAIN NOTED NONE PRSNT: CPT | Performed by: INTERNAL MEDICINE

## 2022-11-08 PROCEDURE — 3074F SYST BP LT 130 MM HG: CPT | Performed by: INTERNAL MEDICINE

## 2022-11-08 PROCEDURE — 1123F ACP DISCUSS/DSCN MKR DOCD: CPT | Performed by: INTERNAL MEDICINE

## 2022-11-09 ENCOUNTER — TELEPHONE (OUTPATIENT)
Dept: INTERNAL MEDICINE CLINIC | Facility: CLINIC | Age: 77
End: 2022-11-09

## 2022-11-09 ENCOUNTER — LAB ENCOUNTER (OUTPATIENT)
Dept: LAB | Age: 77
End: 2022-11-09
Attending: INTERNAL MEDICINE
Payer: MEDICARE

## 2022-11-09 DIAGNOSIS — Z12.11 COLON CANCER SCREENING: ICD-10-CM

## 2022-11-09 DIAGNOSIS — E03.9 ACQUIRED HYPOTHYROIDISM: ICD-10-CM

## 2022-11-09 DIAGNOSIS — Z00.00 MEDICARE ANNUAL WELLNESS VISIT, SUBSEQUENT: ICD-10-CM

## 2022-11-09 PROBLEM — M43.16 SPONDYLOLISTHESIS OF LUMBAR REGION: Status: RESOLVED | Noted: 2019-08-08 | Resolved: 2022-11-09

## 2022-11-09 PROBLEM — J30.0 VASOMOTOR RHINITIS: Status: ACTIVE | Noted: 2022-11-09

## 2022-11-09 LAB
ALBUMIN SERPL-MCNC: 3.5 G/DL (ref 3.4–5)
ALBUMIN/GLOB SERPL: 1.2 {RATIO} (ref 1–2)
ALP LIVER SERPL-CCNC: 82 U/L
ALT SERPL-CCNC: 25 U/L
ANION GAP SERPL CALC-SCNC: 5 MMOL/L (ref 0–18)
AST SERPL-CCNC: 23 U/L (ref 15–37)
BILIRUB SERPL-MCNC: 0.4 MG/DL (ref 0.1–2)
BUN BLD-MCNC: 17 MG/DL (ref 7–18)
BUN/CREAT SERPL: 20.7 (ref 10–20)
CALCIUM BLD-MCNC: 8.8 MG/DL (ref 8.5–10.1)
CHLORIDE SERPL-SCNC: 110 MMOL/L (ref 98–112)
CO2 SERPL-SCNC: 26 MMOL/L (ref 21–32)
CREAT BLD-MCNC: 0.82 MG/DL
FASTING STATUS PATIENT QL REPORTED: YES
GFR SERPLBLD BASED ON 1.73 SQ M-ARVRAT: 75 ML/MIN/1.73M2 (ref 60–?)
GLOBULIN PLAS-MCNC: 3 G/DL (ref 2.8–4.4)
GLUCOSE BLD-MCNC: 82 MG/DL (ref 70–99)
OSMOLALITY SERPL CALC.SUM OF ELEC: 293 MOSM/KG (ref 275–295)
POTASSIUM SERPL-SCNC: 4.2 MMOL/L (ref 3.5–5.1)
PROT SERPL-MCNC: 6.5 G/DL (ref 6.4–8.2)
SODIUM SERPL-SCNC: 141 MMOL/L (ref 136–145)
TSI SER-ACNC: 3.6 MIU/ML (ref 0.36–3.74)

## 2022-11-09 PROCEDURE — 80053 COMPREHEN METABOLIC PANEL: CPT

## 2022-11-09 PROCEDURE — 84443 ASSAY THYROID STIM HORMONE: CPT

## 2022-11-09 PROCEDURE — 36415 COLL VENOUS BLD VENIPUNCTURE: CPT

## 2022-11-09 RX ORDER — FLUTICASONE PROPIONATE 50 MCG
2 SPRAY, SUSPENSION (ML) NASAL DAILY
Qty: 1 EACH | Refills: 0 | Status: SHIPPED | OUTPATIENT
Start: 2022-11-09 | End: 2022-12-09

## 2022-11-09 NOTE — TELEPHONE ENCOUNTER
Pt on the phone stating she was prescribed medication yesterday for inflammation of the nose but the pharmacy has not received the script. Unable to find medication name.  Please advise

## 2022-11-09 NOTE — TELEPHONE ENCOUNTER
Spoke to patient and informed rx sent to pharmacy. Patient educated on route, dose, and frequency of medication. Patient verbalized understanding and had no further questions at this time.

## 2022-11-11 ENCOUNTER — LAB ENCOUNTER (OUTPATIENT)
Dept: LAB | Age: 77
End: 2022-11-11
Attending: INTERNAL MEDICINE
Payer: MEDICARE

## 2022-11-11 LAB — HEMOCCULT STL QL: NEGATIVE

## 2022-11-11 PROCEDURE — 82274 ASSAY TEST FOR BLOOD FECAL: CPT

## 2022-11-11 RX ORDER — FLUOXETINE HYDROCHLORIDE 20 MG/1
20 CAPSULE ORAL DAILY
Qty: 90 CAPSULE | Refills: 1 | Status: SHIPPED | OUTPATIENT
Start: 2022-11-11

## 2022-11-11 NOTE — TELEPHONE ENCOUNTER
Refill passed per [facility] protocol. Requested Prescriptions   Pending Prescriptions Disp Refills    FLUOXETINE 20 MG Oral Cap [Pharmacy Med Name: FLUoxetine HCl 20 MG Oral Capsule] 90 capsule 3     Sig: TAKE 1 CAPSULE BY MOUTH  DAILY       Psychiatric Non-Scheduled (Anti-Anxiety) Passed - 11/10/2022  9:15 PM        Passed - In person appointment or virtual visit in the past 6 mos or appointment in next 3 mos     Recent Outpatient Visits              3 days ago Medicare annual wellness visit, subsequent    Jerardo Israel MD    Office Visit    5 months ago [address] [place] [address] Hematology Oncology Rosa Rae MD    Office Visit    6 months ago Acute bilateral low back pain without sciatica    Jerardo Israel MD    Office Visit    10 months ago [address] [place] [address] Hematology Oncology Rosa Rae MD    Office Visit    1 year ago Medicare annual wellness visit, subsequent    [facility], [address] MD    Office Visit          Future Appointments         Provider Department Appt Notes    In 1 month ADO DEXA RM1; ADO GABBY [address] [facility] [address]     In 1 month ADO GABBY RM1; ADO DEXA [address] [facility] [facility] [address]     In 2 months Noah Martínez MD [facility] Hematology Oncology FOLLOW UP VISIT. CL  8m                     Recent Outpatient Visits              3 days ago Medicare annual wellness visit, subsequent    Jerardo Israel MD    Office Visit    5 months ago [address] [place] [address] Hematology Oncology Rosa Rae MD    Office Visit    6 months ago Acute bilateral low back pain without sciatica    Negro Neves MD    Office Visit    10 months ago [address] [place] [address] Hematology Oncology Tanya Noah Cool MD    Office Visit    1 year ago Medicare annual wellness visit, subsequent    [address] Jer[address] Connor MD    Office Visit            Future Appointments         Provider Department Appt Notes    In 1 month ADO DEXA RM1; ADO GABBY [address] [facility]     In 1 month ADO GABBY RM1; ADO DEXA [address] [facility] [address]     In 2 months Rosa Rae MD [facility] Hematology Oncology FOLLOW UP VISIT. CL  8m

## 2022-12-14 ENCOUNTER — HOSPITAL ENCOUNTER (OUTPATIENT)
Dept: MAMMOGRAPHY | Age: 77
Discharge: HOME | End: 2022-12-14
Attending: INTERNAL MEDICINE
Payer: MEDICARE

## 2022-12-14 ENCOUNTER — HOSPITAL ENCOUNTER (OUTPATIENT)
Dept: BONE DENSITY | Age: 77
Discharge: HOME | End: 2022-12-14
Attending: INTERNAL MEDICINE
Payer: MEDICARE

## 2022-12-14 DIAGNOSIS — Z12.31 ENCOUNTER FOR SCREENING MAMMOGRAM FOR BREAST CANCER: ICD-10-CM

## 2022-12-14 DIAGNOSIS — M81.0 AGE-RELATED OSTEOPOROSIS WITHOUT CURRENT PATHOLOGICAL FRACTURE: ICD-10-CM

## 2022-12-14 PROCEDURE — 77080 DXA BONE DENSITY AXIAL: CPT | Performed by: INTERNAL MEDICINE

## 2022-12-14 PROCEDURE — 77063 BREAST TOMOSYNTHESIS BI: CPT | Performed by: INTERNAL MEDICINE

## 2022-12-14 PROCEDURE — 77067 SCR MAMMO BI INCL CAD: CPT | Performed by: INTERNAL MEDICINE

## 2022-12-16 ENCOUNTER — TELEPHONE (OUTPATIENT)
Dept: INTERNAL MEDICINE CLINIC | Facility: CLINIC | Age: 77
End: 2022-12-16

## 2022-12-16 DIAGNOSIS — M81.0 AGE-RELATED OSTEOPOROSIS WITHOUT CURRENT PATHOLOGICAL FRACTURE: Primary | ICD-10-CM

## 2022-12-16 DIAGNOSIS — E03.9 ACQUIRED HYPOTHYROIDISM: ICD-10-CM

## 2022-12-21 RX ORDER — LEVOTHYROXINE SODIUM 0.05 MG/1
50 TABLET ORAL
Qty: 90 TABLET | Refills: 1 | Status: SHIPPED | OUTPATIENT
Start: 2022-12-21

## 2022-12-21 NOTE — TELEPHONE ENCOUNTER
Refill passed per [employer] [facility] protocol. Requested Prescriptions   Pending Prescriptions Disp Refills    LEVOTHYROXINE 50 MCG Oral Tab [Pharmacy Med Name: Levothyroxine Sodium 50 MCG Oral Tablet] 90 tablet 3     Sig: TAKE 1 TABLET BY MOUTH  BEFORE BREAKFAST       Thyroid Medication Protocol Passed - 12/20/2022 10:47 PM        Passed - TSH in past 12 months        Passed - Last TSH value is normal     Lab Results   Component Value Date    TSH 3.600 11/09/2022    [facility] 2.74 08/03/2016                 Passed - In person appointment or virtual visit in the past 12 mos or appointment in next 3 mos     Recent Outpatient Visits              1 month ago Medicare annual wellness visit, subsequent    [place] [employer] [place], [address]    Office Visit    6 months ago [address] [facility] Hematology Oncology Geno Lopez MD    Office Visit    7 months ago Acute bilateral low back pain without sciatica    [address] MD    Office Visit    11 months ago [address] [facility] Hematology Oncology Geno Lopez MD    Office Visit    1 year ago Medicare annual wellness visit, subsequent    [address] MD    Office Visit          Future Appointments         Provider Department Appt Notes    In 1 month Jasmyne Martínez, [address] Hematology Oncology FOLLOW UP VISIT. CL  8m                  Recent Outpatient Visits              1 month ago Medicare annual wellness visit, subsequent    [address] MD    Office Visit    6 months ago [address] [facility] Hematology Oncology Geno Lopez MD    Office Visit    7 months ago Acute bilateral low back pain without sciatica    Cameron Fonseca MD    Office Visit    11 months ago Macrocytic anemia    [facility] Hematology Oncology Dev Bates MD    Office Visit    1 year ago Medicare annual wellness visit, subsequent    [facility], [facility], [address], Maegan Marcano MD    Office Visit          Future Appointments         Provider Department Appt Notes    In 1 month Dev Bates MD [facility] AND [facility] Hematology Oncology FOLLOW UP VISIT. CL  8m

## 2023-01-31 ENCOUNTER — OFFICE VISIT (OUTPATIENT)
Dept: DERMATOLOGY CLINIC | Facility: CLINIC | Age: 78
End: 2023-01-31

## 2023-01-31 DIAGNOSIS — D49.2 NEOPLASM OF UNSPECIFIED BEHAVIOR OF BONE, SOFT TISSUE, AND SKIN: Primary | ICD-10-CM

## 2023-01-31 PROCEDURE — 88305 TISSUE EXAM BY PATHOLOGIST: CPT | Performed by: STUDENT IN AN ORGANIZED HEALTH CARE EDUCATION/TRAINING PROGRAM

## 2023-01-31 PROCEDURE — 1126F AMNT PAIN NOTED NONE PRSNT: CPT | Performed by: STUDENT IN AN ORGANIZED HEALTH CARE EDUCATION/TRAINING PROGRAM

## 2023-01-31 PROCEDURE — 11102 TANGNTL BX SKIN SINGLE LES: CPT | Performed by: STUDENT IN AN ORGANIZED HEALTH CARE EDUCATION/TRAINING PROGRAM

## 2023-02-02 ENCOUNTER — LAB ENCOUNTER (OUTPATIENT)
Dept: LAB | Age: 78
End: 2023-02-02
Attending: INTERNAL MEDICINE
Payer: MEDICARE

## 2023-02-02 DIAGNOSIS — D53.9 MACROCYTIC ANEMIA: ICD-10-CM

## 2023-02-02 DIAGNOSIS — C54.1 ENDOMETRIAL CANCER (HCC): ICD-10-CM

## 2023-02-02 DIAGNOSIS — E03.9 HYPOTHYROIDISM, UNSPECIFIED TYPE: ICD-10-CM

## 2023-02-02 LAB
BASOPHILS # BLD AUTO: 0.06 X10(3) UL (ref 0–0.2)
BASOPHILS NFR BLD AUTO: 1.1 %
DEPRECATED RDW RBC AUTO: 50 FL (ref 35.1–46.3)
EOSINOPHIL # BLD AUTO: 0.27 X10(3) UL (ref 0–0.7)
EOSINOPHIL NFR BLD AUTO: 5 %
ERYTHROCYTE [DISTWIDTH] IN BLOOD BY AUTOMATED COUNT: 13 % (ref 11–15)
HCT VFR BLD AUTO: 37.3 %
HGB BLD-MCNC: 12.5 G/DL
IMM GRANULOCYTES # BLD AUTO: 0 X10(3) UL (ref 0–1)
IMM GRANULOCYTES NFR BLD: 0 %
LYMPHOCYTES # BLD AUTO: 1.15 X10(3) UL (ref 1–4)
LYMPHOCYTES NFR BLD AUTO: 21.3 %
MCH RBC QN AUTO: 35 PG (ref 26–34)
MCHC RBC AUTO-ENTMCNC: 33.5 G/DL (ref 31–37)
MCV RBC AUTO: 104.5 FL
MONOCYTES # BLD AUTO: 0.89 X10(3) UL (ref 0.1–1)
MONOCYTES NFR BLD AUTO: 16.5 %
NEUTROPHILS # BLD AUTO: 3.04 X10 (3) UL (ref 1.5–7.7)
NEUTROPHILS # BLD AUTO: 3.04 X10(3) UL (ref 1.5–7.7)
NEUTROPHILS NFR BLD AUTO: 56.1 %
PLATELET # BLD AUTO: 210 10(3)UL (ref 150–450)
RBC # BLD AUTO: 3.57 X10(6)UL
WBC # BLD AUTO: 5.4 X10(3) UL (ref 4–11)

## 2023-02-02 PROCEDURE — 85025 COMPLETE CBC W/AUTO DIFF WBC: CPT

## 2023-02-02 PROCEDURE — 36415 COLL VENOUS BLD VENIPUNCTURE: CPT

## 2023-02-02 NOTE — PROGRESS NOTES
Logged in path book and pmh. Pt informed of pathology results and schedule 3 week follow up for cryotherapy.

## 2023-02-07 ENCOUNTER — OFFICE VISIT (OUTPATIENT)
Dept: HEMATOLOGY/ONCOLOGY | Facility: HOSPITAL | Age: 78
End: 2023-02-07
Attending: INTERNAL MEDICINE
Payer: MEDICARE

## 2023-02-07 VITALS
BODY MASS INDEX: 30.41 KG/M2 | SYSTOLIC BLOOD PRESSURE: 107 MMHG | HEIGHT: 63 IN | WEIGHT: 171.63 LBS | OXYGEN SATURATION: 98 % | DIASTOLIC BLOOD PRESSURE: 66 MMHG | RESPIRATION RATE: 18 BRPM | TEMPERATURE: 99 F | HEART RATE: 62 BPM

## 2023-02-07 DIAGNOSIS — C54.1 ENDOMETRIAL CANCER (HCC): ICD-10-CM

## 2023-02-07 DIAGNOSIS — E03.9 HYPOTHYROIDISM, UNSPECIFIED TYPE: ICD-10-CM

## 2023-02-07 DIAGNOSIS — D53.9 MACROCYTIC ANEMIA: Primary | ICD-10-CM

## 2023-02-07 PROCEDURE — 99214 OFFICE O/P EST MOD 30 MIN: CPT | Performed by: INTERNAL MEDICINE

## 2023-02-21 ENCOUNTER — OFFICE VISIT (OUTPATIENT)
Dept: DERMATOLOGY CLINIC | Facility: CLINIC | Age: 78
End: 2023-02-21

## 2023-02-21 DIAGNOSIS — L57.0 ACTINIC KERATOSIS: Primary | ICD-10-CM

## 2023-02-21 PROCEDURE — 1126F AMNT PAIN NOTED NONE PRSNT: CPT | Performed by: STUDENT IN AN ORGANIZED HEALTH CARE EDUCATION/TRAINING PROGRAM

## 2023-02-21 PROCEDURE — 17000 DESTRUCT PREMALG LESION: CPT | Performed by: STUDENT IN AN ORGANIZED HEALTH CARE EDUCATION/TRAINING PROGRAM

## 2023-03-30 NOTE — PROGRESS NOTES
HPI:    Patient ID: Aide Zelaya is a 69year old female. Hip Pain    The pain is present in the right knee, right hip and right upper leg. This is a chronic problem. The current episode started more than 1 year ago.  There has been no history of extre SUBJECTIVE:  Saravanan Fernandes is a 22 m.o. male here accompanied by mother, who is a historian.    HPI  Patient is here today with concerns about  mucus, congestion and tugging at ears x 2 days. Pt has decreased appetite but ate a little this morning. Pt has taking some fluids. Pt has tubes (PETs placed - May of 2022) and mother concerned they are not draining because of the mucus build up. Pt ran fever of 100.3 this morning via axillary. Pt has fatigued and sleeping more per mother.         Saravanan's allergies, medications, history, and problem list were updated as appropriate.    Review of Systems  A comprehensive review of symptoms was completed and negative except as noted in the HPI.    OBJECTIVE:  Vital signs  Vitals:    03/30/23 0813   Temp: 98.6 °F (37 °C)   TempSrc: Axillary   Weight: 10.7 kg (23 lb 8 oz)        Physical Exam  Constitutional:       General: He is active. He is not in acute distress.     Appearance: Normal appearance. He is well-developed and normal weight. He is not toxic-appearing.   HENT:      Head: Normocephalic.      Right Ear: Tympanic membrane, ear canal and external ear normal. Tympanic membrane is not erythematous (normal PETs bilaterally) or bulging.      Left Ear: Tympanic membrane, ear canal and external ear normal. Tympanic membrane is not erythematous or bulging.      Nose: Nose normal.      Mouth/Throat:      Mouth: Mucous membranes are moist.   Eyes:      Conjunctiva/sclera: Conjunctivae normal.      Pupils: Pupils are equal, round, and reactive to light.   Cardiovascular:      Rate and Rhythm: Normal rate and regular rhythm.      Pulses: Normal pulses.      Heart sounds: Normal heart sounds. No murmur heard.  Pulmonary:      Effort: Pulmonary effort is normal. No respiratory distress.      Breath sounds: Normal breath sounds.   Abdominal:      General: Abdomen is flat. Bowel sounds are normal.      Palpations: Abdomen is soft.   Musculoskeletal:         General: Normal range of  motion.      Cervical back: Normal range of motion.   Skin:     General: Skin is warm.   Neurological:      General: No focal deficit present.      Mental Status: He is alert.         ASSESSMENT/PLAN:  Saravanan was seen today for uri, nasal congestion, cough and fever.    Diagnoses and all orders for this visit:    Allergic rhinitis, unspecified seasonality, unspecified trigger     Alternate Claritin/Zyrtec   Dimetapp Cold and Cough - 5ml TID     No results found for this or any previous visit (from the past 672 hour(s)).      Follow Up:  No follow-ups on file.   tablet by mouth every 6 (six) hours as needed. (Patient not taking: Reported on 2/10/2020 ) 15 tablet 0     Allergies:No Known Allergies   PHYSICAL EXAM:   Physical Exam   Constitutional: She appears well-developed. No distress.    obese   HENT:   Head: Atr Sandra. She may continue with tylenol prn for now. No orders of the defined types were placed in this encounter.       Meds This Visit:  Requested Prescriptions      No prescriptions requested or ordered in this encounter       Imaging & Referrals:

## 2023-04-25 RX ORDER — FLUOXETINE HYDROCHLORIDE 20 MG/1
CAPSULE ORAL
Qty: 90 CAPSULE | Refills: 3 | Status: SHIPPED | OUTPATIENT
Start: 2023-04-25

## 2023-04-25 NOTE — TELEPHONE ENCOUNTER
Refill passed per [employer] protocol    Requested Prescriptions   Pending Prescriptions Disp Refills    FLUOXETINE 20 MG Oral Cap [Pharmacy Med Name: FLUoxetine HCl 20 MG Oral Capsule] 90 capsule 3     Sig: TAKE 1 CAPSULE BY MOUTH DAILY       Psychiatric Non-Scheduled (Anti-Anxiety) Passed - 4/25/2023  5:32 AM        Passed - In person appointment or virtual visit in the past 6 mos or appointment in next 3 mos     Recent Outpatient Visits              2 months ago Actinic keratosis    Haim Horne MD    Office Visit    2 months ago Macrocytic anemia    [facility] Hematology Oncology Reida Bence, MD    Office Visit    2 months ago Neoplasm of unspecified behavior of bone, soft tissue, and skin    Flavio Garcia, [address] [facility] Brittany Kelly MD    Office Visit    5 months ago Jose Fernandez annual wellness visit, subsequent    [facility] Natalia Cantu MD    Office Visit    10 months ago Macrocytic anemia    [facility] Hematology Oncology Reida Bence, MD    Office Visit          Future Appointments         Provider Department Appt Notes    In 4 weeks MD Flavio Tan [place]     In 1 month MD Flavio Crow, [address] [facility], [place] Referral from Dr. Winnie Shore regarding bone density results. In 3 months Crystal Martínez MD [facility] Hematology Oncology FOLLOW UP VISIT. CL  6m

## 2023-05-23 ENCOUNTER — OFFICE VISIT (OUTPATIENT)
Dept: DERMATOLOGY CLINIC | Facility: CLINIC | Age: 78
End: 2023-05-23

## 2023-05-23 DIAGNOSIS — L57.0 ACTINIC KERATOSIS: Primary | ICD-10-CM

## 2023-05-23 PROCEDURE — 1159F MED LIST DOCD IN RCRD: CPT | Performed by: STUDENT IN AN ORGANIZED HEALTH CARE EDUCATION/TRAINING PROGRAM

## 2023-05-23 PROCEDURE — 17000 DESTRUCT PREMALG LESION: CPT | Performed by: STUDENT IN AN ORGANIZED HEALTH CARE EDUCATION/TRAINING PROGRAM

## 2023-05-23 PROCEDURE — 1160F RVW MEDS BY RX/DR IN RCRD: CPT | Performed by: STUDENT IN AN ORGANIZED HEALTH CARE EDUCATION/TRAINING PROGRAM

## 2023-06-05 ENCOUNTER — OFFICE VISIT (OUTPATIENT)
Dept: RHEUMATOLOGY | Facility: CLINIC | Age: 78
End: 2023-06-05

## 2023-06-05 ENCOUNTER — LAB ENCOUNTER (OUTPATIENT)
Dept: LAB | Age: 78
End: 2023-06-05
Attending: INTERNAL MEDICINE
Payer: MEDICARE

## 2023-06-05 ENCOUNTER — TELEPHONE (OUTPATIENT)
Dept: RHEUMATOLOGY | Facility: CLINIC | Age: 78
End: 2023-06-05

## 2023-06-05 VITALS
HEART RATE: 54 BPM | DIASTOLIC BLOOD PRESSURE: 72 MMHG | HEIGHT: 63 IN | SYSTOLIC BLOOD PRESSURE: 112 MMHG | WEIGHT: 171 LBS | BODY MASS INDEX: 30.3 KG/M2

## 2023-06-05 DIAGNOSIS — M81.0 AGE-RELATED OSTEOPOROSIS WITHOUT CURRENT PATHOLOGICAL FRACTURE: ICD-10-CM

## 2023-06-05 DIAGNOSIS — S32.020D CLOSED WEDGE COMPRESSION FRACTURE OF L2 VERTEBRA WITH ROUTINE HEALING, SUBSEQUENT ENCOUNTER: ICD-10-CM

## 2023-06-05 DIAGNOSIS — M81.0 AGE-RELATED OSTEOPOROSIS WITHOUT CURRENT PATHOLOGICAL FRACTURE: Primary | ICD-10-CM

## 2023-06-05 LAB
ALP LIVER SERPL-CCNC: 76 U/L
ANION GAP SERPL CALC-SCNC: 6 MMOL/L (ref 0–18)
BUN BLD-MCNC: 20 MG/DL (ref 7–18)
BUN/CREAT SERPL: 23 (ref 10–20)
CALCIUM BLD-MCNC: 9.4 MG/DL (ref 8.5–10.1)
CHLORIDE SERPL-SCNC: 108 MMOL/L (ref 98–112)
CO2 SERPL-SCNC: 28 MMOL/L (ref 21–32)
CREAT BLD-MCNC: 0.87 MG/DL
FASTING STATUS PATIENT QL REPORTED: NO
GFR SERPLBLD BASED ON 1.73 SQ M-ARVRAT: 69 ML/MIN/1.73M2 (ref 60–?)
GLUCOSE BLD-MCNC: 90 MG/DL (ref 70–99)
OSMOLALITY SERPL CALC.SUM OF ELEC: 296 MOSM/KG (ref 275–295)
POTASSIUM SERPL-SCNC: 3.9 MMOL/L (ref 3.5–5.1)
SODIUM SERPL-SCNC: 142 MMOL/L (ref 136–145)
TSI SER-ACNC: 2.88 MIU/ML (ref 0.36–3.74)

## 2023-06-05 PROCEDURE — 1160F RVW MEDS BY RX/DR IN RCRD: CPT | Performed by: INTERNAL MEDICINE

## 2023-06-05 PROCEDURE — 86334 IMMUNOFIX E-PHORESIS SERUM: CPT

## 2023-06-05 PROCEDURE — 3074F SYST BP LT 130 MM HG: CPT | Performed by: INTERNAL MEDICINE

## 2023-06-05 PROCEDURE — 36415 COLL VENOUS BLD VENIPUNCTURE: CPT

## 2023-06-05 PROCEDURE — 83521 IG LIGHT CHAINS FREE EACH: CPT

## 2023-06-05 PROCEDURE — 99215 OFFICE O/P EST HI 40 MIN: CPT | Performed by: INTERNAL MEDICINE

## 2023-06-05 PROCEDURE — 83970 ASSAY OF PARATHORMONE: CPT

## 2023-06-05 PROCEDURE — 3008F BODY MASS INDEX DOCD: CPT | Performed by: INTERNAL MEDICINE

## 2023-06-05 PROCEDURE — 1159F MED LIST DOCD IN RCRD: CPT | Performed by: INTERNAL MEDICINE

## 2023-06-05 PROCEDURE — 3078F DIAST BP <80 MM HG: CPT | Performed by: INTERNAL MEDICINE

## 2023-06-05 PROCEDURE — 80048 BASIC METABOLIC PNL TOTAL CA: CPT

## 2023-06-05 PROCEDURE — 84165 PROTEIN E-PHORESIS SERUM: CPT

## 2023-06-05 PROCEDURE — 82784 ASSAY IGA/IGD/IGG/IGM EACH: CPT

## 2023-06-05 PROCEDURE — 86364 TISS TRNSGLTMNASE EA IG CLAS: CPT

## 2023-06-05 PROCEDURE — 82306 VITAMIN D 25 HYDROXY: CPT

## 2023-06-05 PROCEDURE — 84075 ASSAY ALKALINE PHOSPHATASE: CPT

## 2023-06-05 PROCEDURE — 84443 ASSAY THYROID STIM HORMONE: CPT

## 2023-06-05 NOTE — PATIENT INSTRUCTIONS
You were seen today for osteoporosis  Bone density compared to 2020 did slightly worsen while you are on risedronate  You have been on resonate for about 4 years now with minimal improvement in your bone density  Plan to switch to Prolia which is an injection every 6 months  Make sure to get blood work 1 to 2 weeks prior to each Prolia injection.   A standing order will be placed  Continue calcium 1800 mg a day  Take vitamin D at least 2000 units daily  Blood work today  See me during your second Prolia injection

## 2023-06-06 LAB
ALBUMIN SERPL ELPH-MCNC: 3.76 G/DL (ref 3.75–5.21)
ALBUMIN/GLOB SERPL: 1.38 {RATIO} (ref 1–2)
ALPHA1 GLOB SERPL ELPH-MCNC: 0.28 G/DL (ref 0.19–0.46)
ALPHA2 GLOB SERPL ELPH-MCNC: 0.55 G/DL (ref 0.48–1.05)
B-GLOBULIN SERPL ELPH-MCNC: 0.64 G/DL (ref 0.68–1.23)
GAMMA GLOB SERPL ELPH-MCNC: 1.26 G/DL (ref 0.62–1.7)
IGA SERPL-MCNC: 162 MG/DL (ref 70–312)
KAPPA LC FREE SER-MCNC: 4.42 MG/DL (ref 0.33–1.94)
KAPPA LC FREE/LAMBDA FREE SER NEPH: 1.41 {RATIO} (ref 0.26–1.65)
LAMBDA LC FREE SERPL-MCNC: 3.13 MG/DL (ref 0.57–2.63)
PROT SERPL-MCNC: 6.5 G/DL (ref 6.4–8.2)
PTH-INTACT SERPL-MCNC: 30.6 PG/ML (ref 18.5–88)
VIT D+METAB SERPL-MCNC: 55.2 NG/ML (ref 30–100)

## 2023-06-07 NOTE — TELEPHONE ENCOUNTER
Benefits investigation form filled. Faxed with insurance, face sheet and office visit notes. Awaiting determination.

## 2023-06-12 LAB — TTG IGA SER-ACNC: 0.5 U/ML (ref ?–7)

## 2023-06-13 NOTE — TELEPHONE ENCOUNTER
PA approved for Prolia. Okay to schedule pt?       PA Approved    Prior authorization for: Prolia    Medication form: 60 mg PFS    Date received: 6/13/2024    Approval #: [id number]    Approved dates: 6/13/2023 to 6/13/2024

## 2023-06-15 ENCOUNTER — NURSE ONLY (OUTPATIENT)
Dept: RHEUMATOLOGY | Facility: CLINIC | Age: 78
End: 2023-06-15

## 2023-06-15 DIAGNOSIS — M81.0 AGE-RELATED OSTEOPOROSIS WITHOUT CURRENT PATHOLOGICAL FRACTURE: Primary | ICD-10-CM

## 2023-06-15 PROCEDURE — 96372 THER/PROPH/DIAG INJ SC/IM: CPT | Performed by: INTERNAL MEDICINE

## 2023-06-15 NOTE — PATIENT INSTRUCTIONS
Denosumab Prefilled Syringe  Brands: Prolia  Uses  For bone strength. Instructions  Drug interactions can change how medicines work or increase risk for side effects. Tell your health care providers about all medicines taken. Include prescription and over-the-counter medicines, vitamins, and herbal medicines. Speak with your doctor or pharmacist before starting or stopping any medicine. You may need vitamin and mineral supplements while on this medicine. Please speak with your doctor or pharmacist.  Keep all appointments for medical exams and tests while on this medicine. Cautions  Tell your doctor and pharmacist if you ever had an allergic reaction to a medicine. This medicine is associated with a rare but very serious medical condition. Please speak with your doctor about symptoms you should look out for while on this medicine. Notify your doctor immediately if you develop those symptoms. Some patients on this medicine have developed severe, life-threatening infections. Please speak with your doctor about the risks and benefits of using this medicine. Please check with your doctor before drinking alcohol while on this medicine. Avoid smoking while on this medicine. Smoking may increase your risk for bone fractures. Call the doctor if there are any signs of confusion or unusual changes in behavior. This medicine may reduce your body's ability to fight infections. Avoid contact with people with colds, flu or other infections. Contact your doctor if you develop fever, cough, sore throat, or chills. Speak with your health care provider before receiving any vaccinations. Tell the doctor or pharmacist if you are pregnant, planning to be pregnant, or breastfeeding. This medicine can hurt a new baby in the womb. If you become pregnant while on this medicine, tell your doctor immediately. Your doctor may switch you to a different medicine.   Women of childbearing age should have a negative pregnancy test before starting this medicine. Women must use reliable forms of birth control while using this medicine and for 5 months after stopping to prevent pregnancy. Some patients have serious side effects from this medicine. Ask your pharmacist to show you the information from the Food and Drug Administration (FDA) and discuss it with you. Side Effects  The following is a list of some common side effects from this medicine. Please speak with your doctor about what you should do if you experience these or other side effects. pain, redness, swelling near injection  Call your doctor or get medical help right away if you notice any of these more serious side effects:  back pain  bone pain  confusion  fainting  fever or chills  numbness or tingling in hands and feet  hearing loss  fast or irregular heart beats  irritability  jaw pain  muscle cramps  joint or muscle pain  red, burning, or itchy skin  seizures  shortness of breath  increased urinary frequency  blood in urine  A few people may have an allergic reaction to this medicine. Symptoms can include difficulty breathing, skin rash, itching, swelling, or severe dizziness. If you notice any of these symptoms, seek medical help quickly. Extra  Please speak with your doctor, nurse, or pharmacist if you have any questions about this medicine. [email or web address]  IMPORTANT NOTE: This document tells you briefly how to take your medicine, but it does not tell you all there is to know about it. Your doctor or pharmacist may give you other documents about your medicine. Please talk to them if you have any questions. Always follow their advice. There is a more complete description of this medicine available in [place]. Scan this code on your smartphone or tablet or use the web address below. You can also ask your pharmacist for a printout.  If you have any questions, please ask your pharmacist. The display and use of this drug information is subject to Terms of Use. Copyright(c) 2022 [employer], [address] [address] [employer] [facility]. All rights reserved. This information is not intended as a substitute for professional medical care. Always follow your healthcare professional's instructions.

## 2023-06-15 NOTE — PROGRESS NOTES
Name and  verified. Pt aware she was to receive injection of Prolia. Injection given in Left Upper Arm, and pt tolerated well. Possible local side effects discussed with pt. Pt aware to follow up in 6 months for next injection and to have labs completed prior to appointment.

## 2023-06-26 ENCOUNTER — OFFICE VISIT (OUTPATIENT)
Dept: INTERNAL MEDICINE CLINIC | Facility: CLINIC | Age: 78
End: 2023-06-26

## 2023-06-26 VITALS
SYSTOLIC BLOOD PRESSURE: 95 MMHG | HEART RATE: 71 BPM | BODY MASS INDEX: 31.25 KG/M2 | WEIGHT: 176.38 LBS | HEIGHT: 63 IN | OXYGEN SATURATION: 99 % | TEMPERATURE: 98 F | DIASTOLIC BLOOD PRESSURE: 64 MMHG

## 2023-06-26 DIAGNOSIS — D75.89 MACROCYTOSIS: ICD-10-CM

## 2023-06-26 DIAGNOSIS — Z91.81 RISK FOR FALLS: ICD-10-CM

## 2023-06-26 DIAGNOSIS — J30.0 VASOMOTOR RHINITIS: ICD-10-CM

## 2023-06-26 DIAGNOSIS — E03.9 ACQUIRED HYPOTHYROIDISM: ICD-10-CM

## 2023-06-26 DIAGNOSIS — Z12.11 COLON CANCER SCREENING: ICD-10-CM

## 2023-06-26 DIAGNOSIS — F41.1 GAD (GENERALIZED ANXIETY DISORDER): ICD-10-CM

## 2023-06-26 DIAGNOSIS — M81.0 AGE-RELATED OSTEOPOROSIS WITHOUT CURRENT PATHOLOGICAL FRACTURE: ICD-10-CM

## 2023-06-26 DIAGNOSIS — Z00.00 MEDICARE ANNUAL WELLNESS VISIT, SUBSEQUENT: Primary | ICD-10-CM

## 2023-06-26 DIAGNOSIS — Z01.00 EYE EXAM, ROUTINE: ICD-10-CM

## 2023-06-26 DIAGNOSIS — Z85.42 HISTORY OF ENDOMETRIAL CANCER: ICD-10-CM

## 2023-06-26 DIAGNOSIS — Z12.39 BREAST SCREENING: ICD-10-CM

## 2023-06-26 DIAGNOSIS — I87.2 PERIPHERAL VENOUS INSUFFICIENCY: ICD-10-CM

## 2023-06-26 PROBLEM — H61.23 BILATERAL IMPACTED CERUMEN: Status: RESOLVED | Noted: 2021-11-02 | Resolved: 2023-06-26

## 2023-06-26 RX ORDER — MULTIVIT-MIN/IRON/FOLIC ACID/K 18-600-40
CAPSULE ORAL
COMMUNITY

## 2023-07-05 ENCOUNTER — OFFICE VISIT (OUTPATIENT)
Dept: DERMATOLOGY CLINIC | Facility: CLINIC | Age: 78
End: 2023-07-05

## 2023-07-05 DIAGNOSIS — D49.2 NEOPLASM OF UNSPECIFIED BEHAVIOR OF BONE, SOFT TISSUE, AND SKIN: Primary | ICD-10-CM

## 2023-07-05 PROCEDURE — 1160F RVW MEDS BY RX/DR IN RCRD: CPT | Performed by: STUDENT IN AN ORGANIZED HEALTH CARE EDUCATION/TRAINING PROGRAM

## 2023-07-05 PROCEDURE — 11102 TANGNTL BX SKIN SINGLE LES: CPT | Performed by: STUDENT IN AN ORGANIZED HEALTH CARE EDUCATION/TRAINING PROGRAM

## 2023-07-05 PROCEDURE — 1126F AMNT PAIN NOTED NONE PRSNT: CPT | Performed by: STUDENT IN AN ORGANIZED HEALTH CARE EDUCATION/TRAINING PROGRAM

## 2023-07-05 PROCEDURE — 1159F MED LIST DOCD IN RCRD: CPT | Performed by: STUDENT IN AN ORGANIZED HEALTH CARE EDUCATION/TRAINING PROGRAM

## 2023-07-05 PROCEDURE — 88305 TISSUE EXAM BY PATHOLOGIST: CPT | Performed by: STUDENT IN AN ORGANIZED HEALTH CARE EDUCATION/TRAINING PROGRAM

## 2023-07-05 NOTE — PROGRESS NOTES
Established Patient    Referred by: No referring provider defined for this encounter. CHIEF COMPLAINT: Lesion of concern     HISTORY OF PRESENT ILLNESS: Michael Cho is a 69year old female here for evaluation of lesion of concern. 1. Growth   Location: left cheek  Duration: 7 months  Signs and symptoms: scabbed  Current treatment: none  Past treatments: cryo      Personal Dermatologic History  History of skin cancer:   AKS  History of  atypical moles: No    FAMILY HISTORY:  History of melanoma: No    Past Medical History  Past Medical History:   Diagnosis Date    Actinic keratosis 01/2023    Left cheek    Anxiety     Back problem     compression fractures    Benign essential tremor     right hand    Cancer ([facility] [address] 02/2020    uterine    Disorder of thyroid     hypothyroid    Exposure to medical diagnostic radiation     Hypothyroidism     Osteoarthritis     Osteoporosis     Post-operative hypothyroidism     Postmenopausal atrophic vaginitis 01/29/2015    Visual impairment     glasses       REVIEW OF SYSTEMS:  Constitutional: Denies fever, chills, unintentional weight loss. Skin as per HPI    Medications  Current Outpatient Medications   Medication Sig Dispense Refill    Cholecalciferol (VITAMIN D) 50 MCG (2000 UT) Oral Cap Take by mouth.      levothyroxine 50 MCG Oral Tab Take 1 tablet (50 mcg total) by mouth before breakfast. 90 tablet 3    FLUOXETINE 20 MG Oral Cap TAKE 1 CAPSULE BY MOUTH DAILY 90 capsule 3    Multiple Vitamins-Minerals (EYE VITAMINS & MINERALS OR) Take 1 tablet by mouth daily. diphenhydrAMINE HCl, Sleep, (ZZZQUIL OR) Take by mouth. VITAMIN B COMPLEX-C OR       Calcium Carbonate-Vitamin D (CALCIUM-VITAMIN D) 600-200 MG-UNIT Oral Cap Take by mouth daily. Takes 2 tabs in am and 1 tab at HS       Multiple Vitamins Oral Tab Take 1 tablet by mouth daily. Omega-3 Fatty Acids ( OMEGA-3 FISH OIL) 1200 MG Oral Capsule Delayed Release Take by mouth daily.         risedronate 35 MG Oral Tab TAKE 1 TABLET BY MOUTH  EVERY 7 DAYS 12 tablet 3    Melatonin 5 MG Oral Tab Take 1 tablet (5 mg total) by mouth nightly. (Patient not taking: Reported on 6/26/2023)         PHYSICAL EXAM:  General: awake, alert, no acute distress  Neuropsych: appropriate mood and affect  Eyes: Sclerae anicteric, without conjunctival injection, eyelids unremarkable  Skin: Skin exam was performed today including the following: L cheek with a pink sclerotic plaque. Pertinent findings include:   - L cheek with a pink sclerotic plaque    ASSESSMENT & PLAN:  Pathophysiology of diagnoses discussed with patient. Therapeutic options reviewed. Risks, benefits, and alternatives discussed with patient. Instructions reviewed at length. #Neoplasm(s) of uncertain behavior of skin  - Shave biopsy performed today   - Will notify patient with results and arrange for appropriate definitive treatment, if indicated. Shave of lesion to establish and confirm diagnosis:  Photo taken: Yes    Risks, benefits, alternatives and personnel required for shave biopsy reviewed with patient. Risks discussed include, but not limited to: pain, bleeding, infection, scar, reaction to anesthetic, and recurrence/need for further treatment. Patient and physician agree as to site(s) to be biopsied. Patient verbalizes understanding and wishes to proceed. Site(s) prepped with alcohol and anesthetized with 1% lidocaine with epinephrine. Shave of lesion(s) performed to the level of the dermis. Specimen(s) from A. L cheek  sent for pathology to r/o SCC 50% ALCL and bandaging applied. Written and verbal wound care instructions provided to patient, understanding verbalized.       Return to clinic: pending biopsy and every 6 months or sooner if something concerning arises     Wilfredo Nazario MD

## 2023-07-10 RX ORDER — FLUOROURACIL 50 MG/G
CREAM TOPICAL
Qty: 40 G | Refills: 0 | Status: SHIPPED | OUTPATIENT
Start: 2023-07-10

## 2023-07-10 NOTE — PROGRESS NOTES
Dr. Ismael Tan - pt's spouse informed of pathology. He states she would like to proceed with Efudex. Their preferred pharmacy is [place].  Thank you.

## 2023-08-02 ENCOUNTER — LAB ENCOUNTER (OUTPATIENT)
Dept: LAB | Age: 78
End: 2023-08-02
Attending: INTERNAL MEDICINE
Payer: MEDICARE

## 2023-08-02 DIAGNOSIS — D53.9 MACROCYTIC ANEMIA: ICD-10-CM

## 2023-08-02 DIAGNOSIS — C54.1 ENDOMETRIAL CANCER (HCC): ICD-10-CM

## 2023-08-02 DIAGNOSIS — E03.9 HYPOTHYROIDISM, UNSPECIFIED TYPE: ICD-10-CM

## 2023-08-02 LAB
BASOPHILS # BLD AUTO: 0.07 X10(3) UL (ref 0–0.2)
BASOPHILS NFR BLD AUTO: 1.2 %
EOSINOPHIL # BLD AUTO: 0.21 X10(3) UL (ref 0–0.7)
EOSINOPHIL NFR BLD AUTO: 3.5 %
ERYTHROCYTE [DISTWIDTH] IN BLOOD BY AUTOMATED COUNT: 13.6 %
HCT VFR BLD AUTO: 37.9 %
HGB BLD-MCNC: 12.6 G/DL
IMM GRANULOCYTES # BLD AUTO: 0.01 X10(3) UL (ref 0–1)
IMM GRANULOCYTES NFR BLD: 0.2 %
LYMPHOCYTES # BLD AUTO: 1.7 X10(3) UL (ref 1–4)
LYMPHOCYTES NFR BLD AUTO: 28.4 %
MCH RBC QN AUTO: 33.6 PG (ref 26–34)
MCHC RBC AUTO-ENTMCNC: 33.2 G/DL (ref 31–37)
MCV RBC AUTO: 101.1 FL
MONOCYTES # BLD AUTO: 0.76 X10(3) UL (ref 0.1–1)
MONOCYTES NFR BLD AUTO: 12.7 %
NEUTROPHILS # BLD AUTO: 3.23 X10 (3) UL (ref 1.5–7.7)
NEUTROPHILS # BLD AUTO: 3.23 X10(3) UL (ref 1.5–7.7)
NEUTROPHILS NFR BLD AUTO: 54 %
PLATELET # BLD AUTO: 231 10(3)UL (ref 150–450)
RBC # BLD AUTO: 3.75 X10(6)UL
WBC # BLD AUTO: 6 X10(3) UL (ref 4–11)

## 2023-08-02 PROCEDURE — 85025 COMPLETE CBC W/AUTO DIFF WBC: CPT

## 2023-08-02 PROCEDURE — 36415 COLL VENOUS BLD VENIPUNCTURE: CPT

## 2023-08-07 ENCOUNTER — OFFICE VISIT (OUTPATIENT)
Dept: HEMATOLOGY/ONCOLOGY | Facility: HOSPITAL | Age: 78
End: 2023-08-07
Attending: INTERNAL MEDICINE
Payer: MEDICARE

## 2023-08-07 VITALS
HEART RATE: 59 BPM | RESPIRATION RATE: 18 BRPM | SYSTOLIC BLOOD PRESSURE: 96 MMHG | BODY MASS INDEX: 31.04 KG/M2 | DIASTOLIC BLOOD PRESSURE: 66 MMHG | OXYGEN SATURATION: 100 % | WEIGHT: 175.19 LBS | TEMPERATURE: 98 F | HEIGHT: 63 IN

## 2023-08-07 DIAGNOSIS — D53.9 MACROCYTIC ANEMIA: Primary | ICD-10-CM

## 2023-08-07 DIAGNOSIS — Z85.42 HISTORY OF ENDOMETRIAL CANCER: ICD-10-CM

## 2023-08-07 PROCEDURE — 99214 OFFICE O/P EST MOD 30 MIN: CPT | Performed by: INTERNAL MEDICINE

## 2023-08-16 NOTE — TELEPHONE ENCOUNTER
Please review and sign off if appropriate. Requested Prescriptions   Pending Prescriptions Disp Refills    risedronate 35 MG Oral Tab [Pharmacy Med Name: RISEDRONATE  35MG  TAB] 12 tablet 3     Sig: TAKE 1 TABLET BY MOUTH  WEEKLY WITH 8 OZ OF PLAIN  WATER 30 MINUTES BEFORE  FIRST FOOD, DRINK OR MEDS.   STAY UPRIGHT FOR 30 MINS       Osteoporosis Medication Protocol Passed - 8/15/2023  1:58 AM        Passed - In person appointment or virtual visit in the past 12 mos or appointment in next 3 mos     Recent Outpatient Visits              1 week ago Macrocytic anemia    [facility] Hematology Oncology Mirela Ford MD    Office Visit    1 month ago Neoplasm of unspecified behavior of bone, soft tissue, and skin    Venancio Shay MD    Office Visit    1 month ago Jose Fernandez annual wellness visit, subsequent    [address], [facility], Jimmy Ortega MD    Office Visit    2 months ago Age-related osteoporosis without current pathological fracture    [facility], [place] Nurse Only    2 months ago Age-related osteoporosis without current pathological fracture    [address], [address] [facility],[address], Diogo Wetzel MD    Office Visit          Future Appointments         Provider Department Appt Notes    In 1 month Colin Montesinos MD [address], [facility], [address] 3 month follow up                    Recent Outpatient Visits              1 week ago Macrocytic anemia    [facility] Hematology Oncology Mirela Ford MD    Office Visit    1 month ago Neoplasm of unspecified behavior of bone, soft tissue, and skin    Venancio Shay MD    Office Visit    1 month ago Jose Fernandez annual wellness visit, subsequent    [facility], Jimmy Ortega MD Office Visit    2 months ago Age-related osteoporosis without current pathological fracture    [facility], [place]    Nurse Only    2 months ago Age-related osteoporosis without current pathological fracture    [facility]risti Daigle, [address] [place] [address],[address], Yari Hidalgo MD    Office Visit            Future Appointments         Provider Department Appt Notes    In 1 month Gerhardt Bad, MD [facility], [address], [place] 3 month follow up

## 2023-08-18 RX ORDER — RISEDRONATE SODIUM 35 MG/1
TABLET, FILM COATED ORAL
Qty: 12 TABLET | Refills: 3 | OUTPATIENT
Start: 2023-08-18

## 2023-08-18 NOTE — TELEPHONE ENCOUNTER
I called the patient who confirmed she is not taking any more. I called [facility] and RISEDRONATE 35MG was taken off her profile.

## 2023-10-02 ENCOUNTER — OFFICE VISIT (OUTPATIENT)
Dept: DERMATOLOGY CLINIC | Facility: CLINIC | Age: 78
End: 2023-10-02

## 2023-10-02 DIAGNOSIS — L57.0 ACTINIC KERATOSIS: Primary | ICD-10-CM

## 2023-10-02 PROCEDURE — 1160F RVW MEDS BY RX/DR IN RCRD: CPT | Performed by: STUDENT IN AN ORGANIZED HEALTH CARE EDUCATION/TRAINING PROGRAM

## 2023-10-02 PROCEDURE — 99213 OFFICE O/P EST LOW 20 MIN: CPT | Performed by: STUDENT IN AN ORGANIZED HEALTH CARE EDUCATION/TRAINING PROGRAM

## 2023-10-02 PROCEDURE — 1159F MED LIST DOCD IN RCRD: CPT | Performed by: STUDENT IN AN ORGANIZED HEALTH CARE EDUCATION/TRAINING PROGRAM

## 2023-10-02 NOTE — PROGRESS NOTES
October 2, 2023    Established patient     CHIEF COMPLAINT: AK left cheek    HISTORY OF PRESENT ILLNESS: .    1. AK  Location: left cheek   Duration: chronic  Signs and symptoms: improved with efudex, (4 weeks course) feels there's still a small bump left  Current treatment: none  Past treatments: efudex    DERM HISTORY:  History of skin cancer: No  History of chronic skin disease/condition: No    FAMILY HISTORY:  History of melanoma: No  History of chronic skin disease/condition: No    History/Other:    REVIEW OF SYSTEMS:  Constitutional: Denies fever, chills, unintentional weight loss. 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      levothyroxine 50 MCG Oral Tab Take 1 tablet (50 mcg total) by mouth before breakfast. 90 tablet 3    FLUOXETINE 20 MG Oral Cap TAKE 1 CAPSULE BY MOUTH DAILY 90 capsule 3    Multiple Vitamins-Minerals (EYE VITAMINS & MINERALS OR) Take 1 tablet by mouth daily. diphenhydrAMINE HCl, Sleep, (ZZZQUIL OR) Take by mouth. VITAMIN B COMPLEX-C OR       Melatonin 5 MG Oral Tab Take 1 tablet (5 mg total) by mouth nightly. Calcium Carbonate-Vitamin D (CALCIUM-VITAMIN D) 600-200 MG-UNIT Oral Cap Take by mouth daily. Takes 2 tabs in am and 1 tab at HS       Multiple Vitamins Oral Tab Take 1 tablet by mouth daily. Omega-3 Fatty Acids ( OMEGA-3 FISH OIL) 1200 MG Oral Capsule Delayed Release Take by mouth daily. fluorouracil 5 % External Cream Start using 3-4 weeks after biopsy. Use twice daily to spot on left cheek for 4 weeks. Please notify docot if spot opens up or begins to bleed. (Patient not taking: Reported on 10/2/2023) 40 g 0       Objective:    PHYSICAL EXAM:  General: awake, alert, no acute distress  Skin: Skin exam was performed today including the following: L cheek. Pertinent findings include:   - with a pink patch at site of biopsy     ASSESSMENT & PLAN:  Pathophysiology of diagnoses discussed with patient. Therapeutic options reviewed. Risks, benefits, and alternatives discussed with patient. Instructions reviewed at length. #Actinic keratosis - s/p biopsy and 4 week course of Efudex  - No scaling present on examination today. Will continue to monitor the site and re-biopsy itf changing. Photo taken today  - Recommend sun protection with SPF 30 or higher, sun protective clothing such as wide brimmed hats and long sleeves. Recommend avoiding midday sun (10 am- 3 pm).       Return to clinic: 3 months or sooner if something concerning arises     Kimberlee Ghosh MD

## 2023-11-10 ENCOUNTER — TELEPHONE (OUTPATIENT)
Dept: INTERNAL MEDICINE CLINIC | Facility: CLINIC | Age: 78
End: 2023-11-10

## 2023-11-10 NOTE — TELEPHONE ENCOUNTER
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

## 2023-12-12 ENCOUNTER — TELEPHONE (OUTPATIENT)
Dept: RHEUMATOLOGY | Facility: CLINIC | Age: 78
End: 2023-12-12

## 2023-12-12 DIAGNOSIS — M81.0 AGE-RELATED OSTEOPOROSIS WITHOUT CURRENT PATHOLOGICAL FRACTURE: Primary | ICD-10-CM

## 2023-12-12 NOTE — TELEPHONE ENCOUNTER
Patient due for Prolia 12/15/23 and labs. LOV 6/5/23 instructed to see you at time of Prolia but did not schedule follow up at that time. Can we add her to your schedule? Please advise.

## 2023-12-13 NOTE — TELEPHONE ENCOUNTER
Patient can have a nurse visit on 12/15 for her Prolia shot. She will need to get blood work prior to the [employer] shot, I will place that order.   She will need to see me when she gets her shot in June, she should make an appointment

## 2023-12-13 NOTE — TELEPHONE ENCOUNTER
Appt scheduled for Prolia 12/18 at 10AM patient aware to have labs completed a few days prior to appt.

## 2023-12-14 ENCOUNTER — PATIENT MESSAGE (OUTPATIENT)
Dept: RHEUMATOLOGY | Facility: CLINIC | Age: 78
End: 2023-12-14

## 2023-12-14 ENCOUNTER — TELEPHONE (OUTPATIENT)
Dept: RHEUMATOLOGY | Facility: CLINIC | Age: 78
End: 2023-12-14

## 2023-12-14 ENCOUNTER — LAB ENCOUNTER (OUTPATIENT)
Dept: LAB | Age: 78
End: 2023-12-14
Attending: INTERNAL MEDICINE
Payer: MEDICARE

## 2023-12-14 DIAGNOSIS — M81.0 AGE-RELATED OSTEOPOROSIS WITHOUT CURRENT PATHOLOGICAL FRACTURE: ICD-10-CM

## 2023-12-14 LAB
ANION GAP SERPL CALC-SCNC: 4 MMOL/L (ref 0–18)
BUN BLD-MCNC: 12 MG/DL (ref 9–23)
BUN/CREAT SERPL: 13.6 (ref 10–20)
CALCIUM BLD-MCNC: 9.4 MG/DL (ref 8.7–10.4)
CHLORIDE SERPL-SCNC: 109 MMOL/L (ref 98–112)
CO2 SERPL-SCNC: 28 MMOL/L (ref 21–32)
CREAT BLD-MCNC: 0.88 MG/DL
EGFRCR SERPLBLD CKD-EPI 2021: 68 ML/MIN/1.73M2 (ref 60–?)
FASTING STATUS PATIENT QL REPORTED: NO
GLUCOSE BLD-MCNC: 43 MG/DL (ref 70–99)
OSMOLALITY SERPL CALC.SUM OF ELEC: 289 MOSM/KG (ref 275–295)
POTASSIUM SERPL-SCNC: 3.9 MMOL/L (ref 3.5–5.1)
SODIUM SERPL-SCNC: 141 MMOL/L (ref 136–145)

## 2023-12-14 PROCEDURE — 80048 BASIC METABOLIC PNL TOTAL CA: CPT

## 2023-12-14 PROCEDURE — 36415 COLL VENOUS BLD VENIPUNCTURE: CPT

## 2023-12-14 NOTE — TELEPHONE ENCOUNTER
Thank you for letting me know-I tried to call the patient but it went to voicemail. I also MyChart the patient.

## 2023-12-14 NOTE — TELEPHONE ENCOUNTER
Called patient 12/14 regarding recent lab results notable for hypoglycemia of glucose 43. Our office had contacted the patient earlier today with patient asymptomatic including no hypoglycemic symptoms and eating/drinking normally. On calling the patient, patient unavailable so voicemail left. I will also MyChart message to patient: \"Hello-I was just hoping to contact you regarding a recent lab result with low blood sugar. I tried calling you, however, it went to voicemail. I know that one of our nurses called you earlier today and that you are feeling well. Please watch out for symptoms of: Fast heart rate, shaking, sweatiness, feeling anxious or nervous, confusion, dizziness. If you experience any of the symptoms, please present to the emergency room immediately for further evaluation. Low blood sugar can be dangerous and should be monitored. Please make sure that you are eating adequately including enough carbohydrates as well as hydrating well. \"

## 2023-12-14 NOTE — TELEPHONE ENCOUNTER
Received call from [facility] in lab stating they have a critical lab value of Glucose of 43. Called pt. Pt reports she is feeling fine - her baseline. Denies any hypoglycemic symptoms. Has been eating and drinking per her norm. States she did not fast prior to today's blood work.

## 2023-12-18 ENCOUNTER — NURSE ONLY (OUTPATIENT)
Dept: RHEUMATOLOGY | Facility: CLINIC | Age: 78
End: 2023-12-18
Payer: COMMERCIAL

## 2023-12-18 DIAGNOSIS — M81.0 AGE-RELATED OSTEOPOROSIS WITHOUT CURRENT PATHOLOGICAL FRACTURE: Primary | ICD-10-CM

## 2023-12-18 PROCEDURE — 96372 THER/PROPH/DIAG INJ SC/IM: CPT | Performed by: INTERNAL MEDICINE

## 2023-12-18 NOTE — PROGRESS NOTES
Patient came to office for 6 month follow up Prolia injection. Labs reviewed and WNL. Name and  verified. Patient aware she/he is to receive Prolia injection. Injection given on left upper arm SQ, patient tolerated injection well. Patient given reminder note for 6 month follow up and lab order to complete. Patient agreeable with plan and will call office with any questions or concerns. Chart review indicated Dr Safia Bender wanted to see patient at the time of this injection. Consulted with Dr Safia Bender; ok to proceed with injection and have pt make 6 mo follow up appt with Dr Safia Bender. Informed pt of need to schedule 6 mo follow up with Dr Safia Bender.

## 2024-01-02 NOTE — PROGRESS NOTES
HPI:   Magda Mckenzie is a 68year old female who presents for a Medicare Subsequent Annual Wellness visit (Pt already had Initial Annual Wellness).       Her last annual assessment has been over 1 year: Annual Physical due on 08/24/2019         Fall/Risk A Outpatient Wound Clinician Visit Note    Chief Complaint:   Chief Complaint   Patient presents with    Wound     Left BKA       Plan:  The below orders were released and performed during the visit:   Complete Wound Care  Every visit  Diagnosis: Surgical wound  Dressing change(s) to be done by: Wound Care Team  Dressing change(s) to be done by: Other (specify)  Other (specify): Home Health/Caregiver  Dressing frequency: Daily  Wound location: L anterior/proximal wound incision  Dressing change(s) to be done using: Clean Technique  Soak wound for (minutes): 5  Soak solution: Vashe (Hydrochlorous Acid) if available from wound care  Clean wound with: Normal saline  Specify order of application: Apply nickel thick santyl to wound bed, cover with xeroform and gauze 4x4 folded in half. Secure with medipore tape. Apply ABD pad and secure with kerlix.  Wound compression: Other (specify)  Other (specify): Residual limb   Complete Wound Care  Every visit  Diagnosis: Surgical wound  Dressing change(s) to be done by: Wound Care Team  Dressing change(s) to be done by: Other (specify)  Other (specify): Home Health/Caregiver  Dressing frequency: Every other day  Wound location: L medial BKA  Dressing change(s) to be done using: Clean Technique  Soak solution: Vashe (Hydrochlorous Acid) if available from wound care  Clean wound with: Normal saline  Clean wound with: Vashe  Clean wound with: Other (specify)  Other (specify): flush the wound with 3ml Vashe  Specify order of application: Loosely fill wound depth with silver hydrofiber, cover with gauze 4x4 folded in half. Secure with medipore tape. Apply ABD pad and secure with kerlix.  Wound compression: Other (specify)  Other (specify): Residual limb       Assessment:  Wound Leg Left Anterior;Proximal Incision (Active)   Date First Assessed/Time First Assessed: 06/13/23 1017   Location: Leg  Laterality: (c) Left  Modifier: Anterior;Proximal  Level of Skin Injury: Full  Thickness  Primary Wound Type: Incision      Assessments 1/2/2024  3:45 PM   Wound Image     Dressing Assessment Drainage present;Intact   Dressing Activity Changed   Dressing Changed On   01/02/24   Wound Exudate No odor;Minimal;Serous   Cleansing Agent Normal saline;Hypochlorous acid (e.g. Vashe)   Wound Bed/Tissue Type Yellow;Necrotic tissue, slough;Bone;Pink   Periwound Condition Dry;Hyperpigmented   Wound Edge Well defined;Attached to wound bed   Topical Agent Collagenase   Wound Dressing Other (comment) (xeroform, gauze, abdominal pad, kerlix)   Wound Length (cm) 1.1 cm   Wound Width (cm) 2 cm   Wound Depth (cm) 1 cm   Wound Surface Area (cm^2) 2.2 cm^2   Wound Volume (cm^3) 2.2 cm^3   PhotoTaken? Yes   Debridement Selective - Conservative Sharp Selective - Ultrasound (enzymatic)   Wound Volume Change (Initial) -4.55 cm3   Wound Volume % Change (Initial) -67.41 %   Wound Volume Change (30 days) 0.4 cm3   Wound Volume % Change (30 days) 22.22 %               Wound Knee Left BKA;Medial (Active)   Date First Assessed/Time First Assessed: 09/26/23 1426   Location: Knee  Laterality: Left  Modifier: BKA;Medial      Assessments 1/2/2024  3:45 PM   Wound Image     Dressing Assessment Drainage present;Intact   Dressing Activity Changed   Dressing Changed On   01/02/24   Wound Exudate No odor;Sanguineous;Small   Cleansing Agent Normal saline;Hypochlorous acid (e.g. Vashe)   Wound Bed/Tissue Type Red   Periwound Condition Dry;Hyperpigmented   Wound Edge Well defined;Attached to wound bed   Wound Status Unchanged   Wound Length (cm) 0.3 cm   Wound Width (cm) 0.3 cm   Wound Depth (cm) 2 cm   Wound Surface Area (cm^2) 0.09 cm^2   Wound Volume (cm^3) 0.18 cm^3   PhotoTaken? Yes              Home Care Service:  Yes,   Home care agency name: [facility]   Phone: [phone]  Fax: [phone]      Type of Supervision: Direct Supervision: `    Was care transitioned to this department today? Yes `    Was care transitioned from this  instructed to get our office a copy of it for scanning into Epic. She smoked tobacco in the past but quit greater than 12 months ago.   Social History    Tobacco Use      Smoking status: Former Smoker        Packs/day: 1.00      Smokeless tobacco: N capsule (20 mg total) by mouth once daily. Melatonin 5 MG Oral Tab Take 1 tablet by mouth daily. Calcium Carbonate-Vitamin D (CALCIUM-VITAMIN D) 600-200 MG-UNIT Oral Cap Take  by mouth. Multiple Vitamins Oral Tab Take  by mouth.    Omega-3 Fatty Acids department today?  No   Hospitalization within the last 30 days (if yes, date of discharge)? No   Special Needs: Special Needs List: none  Comments:  Patient arrival information, vital signs and dressing removed by staff member noted.  Patient and Caregiver education was given regarding procedures/therapy planned.  A review of the H&P and other pertinent information was done.    Arrival Disposition: Wheelchair  Transfer Assist: 1 person  Departure Instruction: Simple D/C (Rx, simple instructions) and Coordination of Care  Departure Disposition: Depart with assistance    Patient seen in OP Clinic by wound care RN. Patient came back after the ID appointment today. Son verbalized that the patient will have an additional IV antibiotic for an acute infection for a 2 week course. Patient is scheduled on the 3rd and 10th of January for IV antibiotic infusion.  ID doctor will wait for Dr. Srinivasan recommendations and or further intervention, then patient will follow up with ID after seeing Dr. Srinivasan on 01/22/2023. Caregiver-Madhavi was present, instructions and demonstrations on dressing changes was done. She verbalized understanding. Necessary supplies sent home with the patient. Faxed orders and notes to [facility].   Patient denies new questions or concerns. Wound care complete as ordered and patient tolerated. Will monitor wound progress and adjust as appropriate.      Follow up 2x week, next office visit is 01/05/2023.      (Oral)   Resp 12   Ht 5' 3\" (1.6 m)   Wt 210 lb (95.3 kg)   BMI 37.20 kg/m²  Estimated body mass index is 37.2 kg/m² as calculated from the following:    Height as of this encounter: 5' 3\" (1.6 m). Weight as of this encounter: 210 lb (95.3 kg).     Med CHRONIC CONDITIONS:   Sierra Cason is a 68year old female who presents for a Medicare Assessment.      PLAN SUMMARY:   (Z00.00) Medicare annual wellness visit, subsequent  (primary encounter diagnosis)  Plan: CBC WITH DIFFERENTIAL WITH PLATELET, COMP well-being?: Social Interaction;Puzzles; Visiting Family; Visiting Friends      This section provided for quick review of chart, separate sheet to patient  [address] Internal Lab or Procedure External Lab or Procedure Annually 10/24/2018 Please get every year    Pneumococcal 13 (Prevnar)  Covered Once after 65 10/20/2016 Please get once after your 65th birthday    Pneumococcal 23 (Pneumovax)  Covered Once after 65 No vaccine history found Please get once after your 65th

## 2024-01-08 ENCOUNTER — OFFICE VISIT (OUTPATIENT)
Dept: DERMATOLOGY CLINIC | Facility: CLINIC | Age: 79
End: 2024-01-08
Payer: COMMERCIAL

## 2024-01-08 DIAGNOSIS — D49.2 NEOPLASM OF UNSPECIFIED BEHAVIOR OF BONE, SOFT TISSUE, AND SKIN: Primary | ICD-10-CM

## 2024-01-08 PROCEDURE — 88305 TISSUE EXAM BY PATHOLOGIST: CPT | Performed by: STUDENT IN AN ORGANIZED HEALTH CARE EDUCATION/TRAINING PROGRAM

## 2024-01-08 PROCEDURE — 1159F MED LIST DOCD IN RCRD: CPT | Performed by: STUDENT IN AN ORGANIZED HEALTH CARE EDUCATION/TRAINING PROGRAM

## 2024-01-08 PROCEDURE — 11103 TANGNTL BX SKIN EA SEP/ADDL: CPT | Performed by: STUDENT IN AN ORGANIZED HEALTH CARE EDUCATION/TRAINING PROGRAM

## 2024-01-08 PROCEDURE — 1160F RVW MEDS BY RX/DR IN RCRD: CPT | Performed by: STUDENT IN AN ORGANIZED HEALTH CARE EDUCATION/TRAINING PROGRAM

## 2024-01-08 PROCEDURE — 11102 TANGNTL BX SKIN SINGLE LES: CPT | Performed by: STUDENT IN AN ORGANIZED HEALTH CARE EDUCATION/TRAINING PROGRAM

## 2024-01-08 NOTE — PROGRESS NOTES
Established Patient    Referred by: No referring provider defined for this encounter.    CHIEF COMPLAINT: Lesion of concern     HISTORY OF PRESENT ILLNESS: Coni Gandhi is a 78 year old female here for evaluation of lesion of concern.    1. Growth   Location: Left cheek  Duration: Chronic  Signs and symptoms: None  Current treatment: Efubex  Past treatments: Efudex      Personal Dermatologic History  History of skin cancer: No  History of  atypical moles: No    FAMILY HISTORY:  History of melanoma: No    Past Medical History  Past Medical History:   Diagnosis Date    Actinic keratosis 01/2023    Left cheek    Anxiety     Back problem     compression fractures    Benign essential tremor     right hand    Cancer (HCC) 02/2020    uterine    Disorder of thyroid     hypothyroid    Exposure to medical diagnostic radiation     Hypothyroidism     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   Skin as per HPI    Medications  Current Outpatient Medications   Medication Sig Dispense Refill    fluorouracil 5 % External Cream Start using 3-4 weeks after biopsy. Use twice daily to spot on left cheek for 4 weeks. Please notify docot if spot opens up or begins to bleed. (Patient not taking: Reported on 10/2/2023) 40 g 0    Cholecalciferol (VITAMIN D) 50 MCG (2000 UT) Oral Cap Take by mouth.      levothyroxine 50 MCG Oral Tab Take 1 tablet (50 mcg total) by mouth before breakfast. 90 tablet 3    FLUOXETINE 20 MG Oral Cap TAKE 1 CAPSULE BY MOUTH DAILY 90 capsule 3    Multiple Vitamins-Minerals (EYE VITAMINS & MINERALS OR) Take 1 tablet by mouth daily.      diphenhydrAMINE HCl, Sleep, (ZZZQUIL OR) Take by mouth.      VITAMIN B COMPLEX-C OR       Melatonin 5 MG Oral Tab Take 1 tablet (5 mg  total) by mouth nightly.      Calcium Carbonate-Vitamin D (CALCIUM-VITAMIN D) 600-200 MG-UNIT Oral Cap Take by mouth daily. Takes 2 tabs in am and 1 tab at HS       Multiple Vitamins Oral Tab Take 1 tablet by mouth daily.      Omega-3 Fatty Acids ( OMEGA-3 FISH OIL) 1200 MG Oral Capsule Delayed Release Take by mouth daily.           PHYSICAL EXAM:  General: awake, alert, no acute distress  Neuropsych: appropriate mood and affect  Eyes: Sclerae anicteric, without conjunctival injection, eyelids unremarkable  Skin: Skin exam was performed today including the following: L cheek. Pertinent findings include:   - with pink scaly papules    ASSESSMENT & PLAN:  Pathophysiology of diagnoses discussed with patient.  Therapeutic options reviewed. Risks, benefits, and alternatives discussed with patient. Instructions reviewed at length.    #Neoplasm(s) of uncertain behavior of skin - prior biopsied showing AK, but lesion continues to grow. Will plan repeat biopsy today.   - Shave biopsy performed today   - Will notify patient with results and arrange for appropriate definitive treatment, if indicated.      Shave of lesion to establish and confirm diagnosis:  Photo taken: Yes    Risks, benefits, alternatives and personnel required for shave biopsy reviewed with patient. Risks discussed include, but not limited to: pain, bleeding, infection, scar, reaction to anesthetic, and recurrence/need for further treatment.  Patient and physician agree as to site(s) to be biopsied. Patient verbalizes understanding and wishes to proceed.     Site(s) prepped with alcohol and anesthetized with 1% lidocaine with epinephrine.   Shave of lesion(s) performed to the level of the dermis. Specimen(s) from A.L cheek inferior, B. L cheek superior  sent for pathology to r/o SCC 50% ALCL and bandaging applied.   Written and verbal wound care instructions provided to patient, understanding verbalized.      Return to clinic: pending biopsy or sooner if  something concerning arises     Albaro Wright MD

## 2024-01-11 RX ORDER — TRIAMCINOLONE ACETONIDE 1 MG/G
1 OINTMENT TOPICAL 2 TIMES DAILY
Qty: 30 G | Refills: 0 | Status: SHIPPED | OUTPATIENT
Start: 2024-01-11 | End: 2024-02-10

## 2024-03-11 ENCOUNTER — OFFICE VISIT (OUTPATIENT)
Dept: DERMATOLOGY CLINIC | Facility: CLINIC | Age: 79
End: 2024-03-11
Payer: COMMERCIAL

## 2024-03-11 DIAGNOSIS — L28.0 LICHENOID DERMATITIS: Primary | ICD-10-CM

## 2024-03-11 PROCEDURE — 1159F MED LIST DOCD IN RCRD: CPT | Performed by: STUDENT IN AN ORGANIZED HEALTH CARE EDUCATION/TRAINING PROGRAM

## 2024-03-11 PROCEDURE — 1160F RVW MEDS BY RX/DR IN RCRD: CPT | Performed by: STUDENT IN AN ORGANIZED HEALTH CARE EDUCATION/TRAINING PROGRAM

## 2024-03-11 PROCEDURE — 99213 OFFICE O/P EST LOW 20 MIN: CPT | Performed by: STUDENT IN AN ORGANIZED HEALTH CARE EDUCATION/TRAINING PROGRAM

## 2024-03-11 NOTE — PROGRESS NOTES
Established Patient    Referred by: No referring provider defined for this encounter.    CHIEF COMPLAINT: Lesion of concern     HISTORY OF PRESENT ILLNESS: Coni Gandhi is a 78 year old female here for evaluation of lesion of concern.    1. Growth   Location: Left cheek  Duration: Chronic  Signs and symptoms: None  Current treatment: Triamcinolone  Past treatments: Efubex      Personal Dermatologic History  History of skin cancer: No  History of  atypical moles: No    FAMILY HISTORY:  History of melanoma: No    Past Medical History  Past Medical History:   Diagnosis Date    Actinic keratosis 01/2023    Left cheek    Anxiety     Back problem     compression fractures    Benign essential tremor     right hand    Cancer (HCC) 02/2020    uterine    Disorder of thyroid     hypothyroid    Exposure to medical diagnostic radiation     Hypothyroidism     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   Skin as per HPI    Medications  Current Outpatient Medications   Medication Sig Dispense Refill    fluorouracil 5 % External Cream Start using 3-4 weeks after biopsy. Use twice daily to spot on left cheek for 4 weeks. Please notify docot if spot opens up or begins to bleed. 40 g 0    Cholecalciferol (VITAMIN D) 50 MCG (2000 UT) Oral Cap Take by mouth.      levothyroxine 50 MCG Oral Tab Take 1 tablet (50 mcg total) by mouth before breakfast. 90 tablet 3    FLUOXETINE 20 MG Oral Cap TAKE 1 CAPSULE BY MOUTH DAILY 90 capsule 3    Multiple Vitamins-Minerals (EYE VITAMINS & MINERALS OR) Take 1 tablet by mouth daily.      diphenhydrAMINE HCl, Sleep, (ZZZQUIL OR) Take by mouth.      VITAMIN B COMPLEX-C OR       Melatonin 5 MG Oral Tab Take 1 tablet (5 mg total) by mouth nightly.      Calcium  Carbonate-Vitamin D (CALCIUM-VITAMIN D) 600-200 MG-UNIT Oral Cap Take by mouth daily. Takes 2 tabs in am and 1 tab at HS       Multiple Vitamins Oral Tab Take 1 tablet by mouth daily.      Omega-3 Fatty Acids ( OMEGA-3 FISH OIL) 1200 MG Oral Capsule Delayed Release Take by mouth daily.           PHYSICAL EXAM:  General: awake, alert, no acute distress  Neuropsych: appropriate mood and affect  Eyes: Sclerae anicteric, without conjunctival injection, eyelids unremarkable  Skin: Skin exam was performed today including the following: L cheek. Pertinent findings include:   - with pink sclerotic papules at sites of biopsies    ASSESSMENT & PLAN:  Pathophysiology of diagnoses discussed with patient.  Therapeutic options reviewed. Risks, benefits, and alternatives discussed with patient. Instructions reviewed at length.    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 Take weekends off. Avoid use on face, breasts, groin, or axiillae. Ok to hold for time being given improvement in violaceous hue. Discussed that she should start back up if rash returning.        Return to clinic: 6 months or sooner if something concerning arises     Albaro Wright MD

## 2024-03-29 RX ORDER — FLUOXETINE HYDROCHLORIDE 20 MG/1
20 CAPSULE ORAL DAILY
Qty: 90 CAPSULE | Refills: 0 | Status: SHIPPED | OUTPATIENT
Start: 2024-03-29

## 2024-03-29 NOTE — TELEPHONE ENCOUNTER
Please review; protocol failed/No Protocol appointment only    LOV: 06/2023  Future Appointments   Date Time Provider Department Center   6/27/2024  9:00 AM Freedom Hickey MD [facility] ADO       Requested Prescriptions   Pending Prescriptions Disp Refills    FLUOXETINE 20 MG Oral Cap [Pharmacy Med Name: FLUoxetine HCl 20 MG Oral Capsule] 90 capsule 3     Sig: TAKE 1 CAPSULE BY MOUTH DAILY       Psychiatric Non-Scheduled (Anti-Anxiety) Failed - 3/27/2024  9:18 PM        Failed - In person appointment or virtual visit in the past 6 mos or appointment in next 3 mos     Recent Outpatient Visits              2 weeks ago [facility]henoid dermat[facility] [place] [facility]    Office Visit    2 months ago Neoplasm of unspecified behavior of bone, soft tissue, and skin    [facility] [place] Michalik, Daniel, MD    Office Visit    3 months ago Age-related osteoporosis without current pathological fracture    [facility]    Nurse Only    5 months ago Actinic keratosis    [facility] [place] Michalik, Daniel, MD    Office Visit    7 months ago Macrocytic anemia    [facility] Hematology Oncology Robin Martínez MD    Office Visit          Future Appointments         Provider Department Appt Notes    In 2 months [facility] ANITA RHEUMATOLOGY [facility] Prolia    In 2 months Nadiya Hylton MD [facility] 6 months follow up    In 3 months Freedom Hickey MD [facility] Annual check-up 6/26/2023               Passed - Depression Screening completed within the past 12 months           Future Appointments         Provider Department Appt Notes    In 2 months [facility] HAJA HOWARD RHEUMATOLOGY [facility] Prolia    In 2 months  [facility] up    In 3 months Freedom Hickey MD [facility] Annual check-up 6/26/2023          Recent Outpatient Visits              2 weeks ago Lichenoid dermatitis    [facility] [address], [place] Albaro Wright MD    Office Visit    2 months ago Neoplasm of unspecified behavior of bone, soft tissue, and skin    [facility]    [facility]    3 months ago Age-related osteoporosis without current pathological fracture    [facility]    [facility]    5 months ago Actinic keratosis    [facility] [address], [place] Albaro Wright MD    Office Visit    7 months ago Macrocytic anemia    [facility] Hematology Oncology Robin Martínez MD    Office Visit

## 2024-05-02 RX ORDER — LEVOTHYROXINE SODIUM 0.05 MG/1
50 TABLET ORAL
Qty: 90 TABLET | Refills: 3 | Status: SHIPPED | OUTPATIENT
Start: 2024-05-02

## 2024-05-02 NOTE — TELEPHONE ENCOUNTER
Future Appointments   Date Time Provider Department Center   6/18/2024  9:00 AM [facility]   6/18/2024 10:00 AM Nadiya Hylton MD [facility]   6/27/2024  9:00 AM Freedom Hickey MD [facility] AD[facility]         Refill passed per [facility] protocol.     Requested Prescriptions   Pending Prescriptions Disp Refills    LEVOTHYROXINE 50 MCG Oral Tab [Pharmacy Med Name: Levothyroxine Sodium 50 MCG Oral Tablet] 90 tablet 3     Sig: TAKE 1 TABLET BY MOUTH BEFORE  BREAKFAST       Thyroid Medication Protocol Passed - 5/1/2024  9:56 PM        Passed - TSH in past 12 months        Passed - Last TSH value is normal     Lab Results   Component Value Date    TSH 2.880 06/05/2023    THYROIDFUNC 2.74 08/03/2016                 Passed - In person appointment or virtual visit in the past 12 mos or appointment in next 3 mos     Recent Outpatient Visits              1 month ago Lichenoid dermatitis    [facility]    Office Visit    3 months ago Neoplasm of unspecified behavior of bone, soft tissue, and skin    [facility] [address], [place] Albaro Wright MD    Office Visit    4 months ago Age-related osteoporosis without current pathological fracture    [facility]    Nurse Only    7 months ago Actinic keratosis    [facility] [address], [place] Albaro Wright MD    Office Visit    8 months ago Macrocytic anemia    [facility] Hematology Oncology Robin Martínez MD    Office Visit          Future Appointments         Provider Department Appt Notes    In 1 month [facility] RN RHEUMATOLOGY [facility] Prolia    In 1 month Nadiya Hylton MD [facility] 6 months follow up    In 1 month Freedom Hickey MD [facility]  check-up 6/26/2023                          Future Appointments         Provider Department Appt Notes    In 1 month [facility] RN RHEUMATOLOGY [facility] Prolia    In 1 month Nadiya Hylton MD [facility] 6 months follow up    In 1 month Freedom Hickey MD [facility] Annual check-up 6/26/2023             Recent Outpatient Visits              1 month ago [facility]    3 months ago Neoplasm of unspecified behavior of bone, soft tissue, and skin    [facility]    Office Visit    4 months ago Age-related osteoporosis without current pathological fracture    [facility]    Nurse Only    7 months ago [facility]inic keratosis    [facility]    Office Visit    8 months ago Macrocytic anemia    [facility] Hematology Oncology Robin Martínez MD    Office Visit

## 2024-06-01 RX ORDER — FLUOXETINE HYDROCHLORIDE 20 MG/1
20 CAPSULE ORAL DAILY
Qty: 30 CAPSULE | Refills: 0 | Status: SHIPPED | OUTPATIENT
Start: 2024-06-01

## 2024-06-01 NOTE — TELEPHONE ENCOUNTER
Refill Per Protocol   Last written for quantity: 90 wit Refills: 0 for no appointment.   Appointment scheduled. Sent Quantity: 30 Refills : 0 to get patient to next appointment.- Address all refills at upcoming appointment.  Future Appointments   Date Time Provider Department Center   6/27/2024  9:00 AM Freedom Hickey MD [facility] ADO     Requested Prescriptions   Pending Prescriptions Disp Refills    FLUOXETINE 20 MG Oral Cap [Pharmacy Med Name: FLUoxetine HCl 20 MG Oral Capsule] 90 capsule 3     Sig: TAKE 1 CAPSULE BY MOUTH DAILY       Psychiatric Non-Scheduled (Anti-Anxiety) Passed - 5/29/2024  9:56 PM        Passed - In person appointment or virtual visit in the past 6 mos or appointment in next 3 mos     Recent Outpatient Visits              2 months ago [facility]    [facility]    4 months ago Neoplasm of unspecified behavior of bone, soft tissue, and skin    [facility] [address], [place] Albaro Wright MD    Office Visit    5 months ago Age-related osteoporosis without current pathological fracture    [facility]    Nurse Only    8 months ago Actinic keratosis    [facility] [address], [place] Albaro Wright MD    Office Visit    9 months ago Macrocytic anemia    [facility] Hematology Oncology Robin Martínez MD    Office Visit          Future Appointments         Provider Department Appt Notes    In 2 weeks [facility] RN RHEUMATOLOGY [facility] Prolia    In 2 weeks Nadiya Hylton MD [facility] 6 months follow up    In 3 weeks Freedom Hickey MD [facility] Annual check-up 6/26/2023                    Passed - Depression Screening completed within the past 12 months               Future  Appointments         Provider Department Appt Notes    In 2 weeks [facility] RN RHEUMATOLOGY [facility] Prolia    In 2 weeks Nadiya Hylton MD [facility] 6 months follow up    In 3 weeks Freedom Hickey MD [facility] Annual check-up 6/26/2023          Recent Outpatient Visits              2 months ago Lichenoid dermatitis    [facility]    Office Visit    4 months ago Neoplasm of unspecified behavior of bone, soft tissue, and skin    [facility]    Office Visit    5 months ago Age-related osteoporosis without current pathological fracture    [facility]    Nurse Only    8 months ago Actinic keratosis    [facility]    Office Visit    9 months ago Macrocytic anemia    [facility] Hematology Oncology Robin Martínez MD    Office Visit

## 2024-06-13 ENCOUNTER — TELEPHONE (OUTPATIENT)
Dept: RHEUMATOLOGY | Facility: CLINIC | Age: 79
End: 2024-06-13

## 2024-06-13 DIAGNOSIS — M81.0 AGE-RELATED OSTEOPOROSIS WITHOUT CURRENT PATHOLOGICAL FRACTURE: Primary | ICD-10-CM

## 2024-06-13 NOTE — TELEPHONE ENCOUNTER
PA Approved    Prior authorization for: Prolia    Medication form: 60 mg PFS    Date received: 6/13/2024    Approval #: [id number]    Approved dates: 6/13/2024 to 6/13/2025

## 2024-06-13 NOTE — TELEPHONE ENCOUNTER
Name and  verified. Lab ordered for 2024 appointment. Office will need to submit PA once internet is up.

## 2024-06-13 NOTE — TELEPHONE ENCOUNTER
Patient is requesting the follow up lab orders placed.  Patient mentions she went to the lab this morning, 6/13/24 and was advised the labs were not placed.   Patient is scheduled for follow up appointment with Dr. Hylton on 6/18/24.    Please call patient when lab orders are confirmed as patient mentions she would like to complete prior to appointment.    Patient mentions it is okay to leave a message if she is unable to answer the call.   Patient would like to know if she needs to fast for the labs as well.     Please advise.

## 2024-06-14 ENCOUNTER — LAB ENCOUNTER (OUTPATIENT)
Dept: LAB | Age: 79
End: 2024-06-14
Attending: INTERNAL MEDICINE

## 2024-06-14 DIAGNOSIS — M81.0 AGE-RELATED OSTEOPOROSIS WITHOUT CURRENT PATHOLOGICAL FRACTURE: ICD-10-CM

## 2024-06-14 LAB
ANION GAP SERPL CALC-SCNC: 2 MMOL/L (ref 0–18)
BUN BLD-MCNC: 22 MG/DL (ref 9–23)
BUN/CREAT SERPL: 21 (ref 10–20)
CALCIUM BLD-MCNC: 9.7 MG/DL (ref 8.7–10.4)
CHLORIDE SERPL-SCNC: 110 MMOL/L (ref 98–112)
CO2 SERPL-SCNC: 28 MMOL/L (ref 21–32)
CREAT BLD-MCNC: 1.05 MG/DL
EGFRCR SERPLBLD CKD-EPI 2021: 55 ML/MIN/1.73M2 (ref 60–?)
FASTING STATUS PATIENT QL REPORTED: NO
GLUCOSE BLD-MCNC: 79 MG/DL (ref 70–99)
OSMOLALITY SERPL CALC.SUM OF ELEC: 292 MOSM/KG (ref 275–295)
POTASSIUM SERPL-SCNC: 4.4 MMOL/L (ref 3.5–5.1)
SODIUM SERPL-SCNC: 140 MMOL/L (ref 136–145)

## 2024-06-14 PROCEDURE — 80048 BASIC METABOLIC PNL TOTAL CA: CPT

## 2024-06-14 PROCEDURE — 36415 COLL VENOUS BLD VENIPUNCTURE: CPT

## 2024-06-18 ENCOUNTER — OFFICE VISIT (OUTPATIENT)
Dept: RHEUMATOLOGY | Facility: CLINIC | Age: 79
End: 2024-06-18

## 2024-06-18 VITALS
SYSTOLIC BLOOD PRESSURE: 90 MMHG | DIASTOLIC BLOOD PRESSURE: 55 MMHG | HEART RATE: 58 BPM | HEIGHT: 63 IN | BODY MASS INDEX: 31.54 KG/M2 | WEIGHT: 178 LBS

## 2024-06-18 DIAGNOSIS — M81.0 AGE-RELATED OSTEOPOROSIS WITHOUT CURRENT PATHOLOGICAL FRACTURE: Primary | ICD-10-CM

## 2024-06-18 DIAGNOSIS — S32.020D CLOSED WEDGE COMPRESSION FRACTURE OF L2 VERTEBRA WITH ROUTINE HEALING, SUBSEQUENT ENCOUNTER: ICD-10-CM

## 2024-06-18 PROCEDURE — 3078F DIAST BP <80 MM HG: CPT | Performed by: INTERNAL MEDICINE

## 2024-06-18 PROCEDURE — 96372 THER/PROPH/DIAG INJ SC/IM: CPT | Performed by: INTERNAL MEDICINE

## 2024-06-18 PROCEDURE — 1160F RVW MEDS BY RX/DR IN RCRD: CPT | Performed by: INTERNAL MEDICINE

## 2024-06-18 PROCEDURE — 99214 OFFICE O/P EST MOD 30 MIN: CPT | Performed by: INTERNAL MEDICINE

## 2024-06-18 PROCEDURE — 3074F SYST BP LT 130 MM HG: CPT | Performed by: INTERNAL MEDICINE

## 2024-06-18 PROCEDURE — 3008F BODY MASS INDEX DOCD: CPT | Performed by: INTERNAL MEDICINE

## 2024-06-18 PROCEDURE — 1126F AMNT PAIN NOTED NONE PRSNT: CPT | Performed by: INTERNAL MEDICINE

## 2024-06-18 PROCEDURE — 1159F MED LIST DOCD IN RCRD: CPT | Performed by: INTERNAL MEDICINE

## 2024-06-18 NOTE — PATIENT INSTRUCTIONS
You were seen today for osteoporosis  You are here for your third Prolia injection  Schedule your bone density around December 14 or after  See me in 6 months you will get your fourth Prolia injection at that time.  Please see me after you get the bone density so around December 18 or 19  Make sure to get blood work 1 week before your Prolia injection in December

## 2024-06-18 NOTE — PROGRESS NOTES
Coni Gandhi is a 78 year old female.    HPI:     Chief Complaint   Patient presents with    Follow - Up     Due for her Prolia Injection     Osteoporosis       I had the pleasure of seeing Coni Gandhi on 6/18/2024 for follow up osteoporosis.     Current Medications:  Prolia every 6 mos- 1st dose 6/15/2023  Calcium 1800 mg daily  Vitamin D 600 mg daily   Previous medications:  Actonel 35 mg daily- on it since 9629-4911  Bone density 2017-->11/2020-->12/2022  LFN  T-score   -2.3        -2.7         -2.8   LTH                -2.0        -2.3         -2.6  LS                  -2.0        -1.6         -1.6  Left forearm                   -2.4         -2.1    Interval History:  76 yo F with hx of Thyroidectomy now Hypothyroid, Uterine cancer s/p hysterectomy, radiation (2022), osteoporosis presents to [facility] care.  She was following with Dr. Lima, last seen in 2020.  She underwent menopause around her mid to late 50s.  She was on hormone replacement therapy for about 1 to 2 years.  Currently takes calcium and vitamin D.  She fractured her right wrist in 1986 while slipping on ice.  In October 2019 she tripped on a rug in her house and fractured her L2 vertebral body.  She had kyphoplasty.  She also has had x-rays done showing multiple mild thoracic compression fractures.  She does not drink alcohol.  Stop smoking in 1975.  Recently diagnosed with uterine cancer and had a hysterectomy and radiation.  Now stable.  She has been on alendronate since 2019.  Np recent falls or fractures.    6/5/2023:   Presents for follow-up of osteoporosis  Last seen in 2020 by Dr. Lima  Has been on risedronate weekly since 2019.  Recent bone density shows worsening left total hip and left femoral neck.  Also takes calcium and vitamin D    6/18/2024:   Presents for follow-up of osteoporosis  She is on Prolia every 6 months.  Here for her third injection  No s/e with prolia  Also takes calcium and vitamin D  No falls or  fractures  Continue to walk and using weights  No joint pain or swelling       HISTORY:  Past Medical History:    Actinic keratosis    Left cheek    Anxiety    Back problem    compression fractures    Benign essential tremor    right hand    Cancer (HCC)    uterine    Disorder of thyroid    hypothyroid    Exposure to medical diagnostic radiation    Hypothyroidism    Lichenoid actinic keratosis    Left cheek    Osteoarthritis    Osteoporosis    Post-operative hypothyroidism    Postmenopausal atrophic vaginitis    Visual impairment    glasses      Social Hx Reviewed   Family Hx Reviewed     Medications (Active prior to today's visit):  Current Outpatient Medications   Medication Sig Dispense Refill    FLUoxetine 20 MG Oral Cap Take 1 capsule (20 mg total) by mouth daily. ADDRESS ALL REFILLS AT UPCOMING APPOINTMENT 30 capsule 0    levothyroxine 50 MCG Oral Tab Take 1 tablet (50 mcg total) by mouth before breakfast. 90 tablet 3    fluorouracil 5 % External Cream Start using 3-4 weeks after biopsy. Use twice daily to spot on left cheek for 4 weeks. Please notify docot if spot opens up or begins to bleed. 40 g 0    Cholecalciferol (VITAMIN D) 50 MCG (2000 UT) Oral Cap Take by mouth.      Multiple Vitamins-Minerals (EYE VITAMINS & MINERALS OR) Take 1 tablet by mouth daily.      diphenhydrAMINE HCl, Sleep, (ZZZQUIL OR) Take by mouth.      VITAMIN B COMPLEX-C OR       Melatonin 5 MG Oral Tab Take 1 tablet (5 mg total) by mouth nightly.      Multiple Vitamins Oral Tab Take 1 tablet by mouth daily.      Omega-3 Fatty Acids ( OMEGA-3 FISH OIL) 1200 MG Oral Capsule Delayed Release Take by mouth daily.        Calcium Carbonate-Vitamin D (CALCIUM-VITAMIN D) 600-200 MG-UNIT Oral Cap Take by mouth daily. Takes 2 tabs in am and 1 tab at HS        .cmed  Allergies:  No Known Allergies      ROS:   All other ROS are negative.     PHYSICAL EXAM:   GEN: AAOx3, NAD  HEENT: EOMI, PERRLA, no injection or icterus, oral mucosa moist, no oral  lesions. No lymphadenopathy. No facial rash  CVS: RRR, no murmurs rubs or gallops. Equal 2+ distal pulses.   LUNGS: CTAB, no increased work of breathing  ABDOMEN:  soft NT/ND, +BS, no HSM  SKIN: No rashes or skin lesions. No nail findings  MSK:  Cervical spine: FROM  Hands: no synovitis in DIP, PIP and MCP, strong full fists  Wrist: FROM, no pain or swelling or warmth on palpation  Elbow: FROM, no pain or swelling or warmth on palpation  Shoulders: FROM, no pain or swelling or warmth on palpation  Hip: normal log roll, no lateral hip pain, OBINNA test negative b/l  Knees: FROM, no warmth or effusion present. No pain with ROM.   Ankles: FROM, no pain or swelling or warmth on palpation  Feet: no pain with MTP squeeze, no toe swelling or pain or warmth on palpation with FROM  Spine: no lumbar or sacral pain on palpation.  NEURO: Cranial nerves II-XII intact grossly. 5/5 strength throughout in both upper and lower extremities, sensation intact.  PSYCH: normal mood       LABS:     Reviewed     Imaging:     Bone density 12/2022:  LEFT FEMORAL NECK   BMD: 0.536 gm/sq. cm. T SCORE: -2.8 Z SCORE: -0.7       LEFT TOTAL HIP   BMD: 0.626 gm/sq. cm. T SCORE: -2.6 Z SCORE: -0.8       PA LUMBAR SPINE (L3 - L4)   BMD: 0.922 gm/sq. cm. T SCORE: -1.6 Z SCORE: 1.0       DISTAL 1/3 OF THE LEFT FOREARM: BMD IS 0.568 GRAMS/CENTIMETER SQUARED, T-SCORE IS -2.1 AND Z-SCORE IS 0.6.     ASSESSMENT/PLAN:     Osteoporosis  - History of L2 vertebral fracture status post kyphoplasty  - She has been on resented 8 weekly since 2019, recent bone density shows no improvement, slightly worsened since 2020  - She is now on Prolia every 6 months.  Here for her third injection  - She remains on calcium and vitamin D.  She also continues to walk and do weightbearing exercises  - Plan to repeat bone density in December 2024    Pt will f/u in 6 mos, when she gets her 4th prolia injection     There is a longitudinal care relationship with me, the care plan  reflects the ongoing nature of the continuous relationship of care, and the medical record indicates that there is ongoing treatment of a serious/complex medical condition which I am currently managing.  is Applicable.     Nadiya Hylton MD  6/18/2024  10:00 AM

## 2024-06-18 NOTE — PROGRESS NOTES
Patient came to office for 6 month follow up Prolia injection. Labs reviewed and WNL. Name and  verified. Patient aware she is to receive Prolia injection. Injection given on Left arm SQ, patient tolerated injection well. Patient given reminder note for 6 month follow up and lab order to complete. Patient agreeable with plan and will call office with any questions or concerns.

## 2024-06-23 NOTE — TELEPHONE ENCOUNTER
Per previous refill request patient needs office visit and was given small supply to last until upcoming office visit    Requested Prescriptions   Pending Prescriptions Disp Refills    FLUOXETINE 20 MG Oral Cap [Pharmacy Med Name: FLUoxetine HCl 20 MG Oral Capsule] 30 capsule 11     Sig: TAKE 1 CAPSULE BY MOUTH DAILY       Psychiatric Non-Scheduled (Anti-Anxiety) Passed - 6/20/2024  9:37 PM        Passed - In person appointment or virtual visit in the past 6 mos or appointment in next 3 mos     Recent Outpatient Visits              5 days ago Age-related osteoporosis without current pathological fracture    [facility] [place] [place] [facility]    Office Visit    3 months ago Lichenoid dermatitis    [facility] [place] Michalik, Daniel, MD    Office Visit    5 months ago Neoplasm of unspecified behavior of bone, soft tissue, and skin    [facility] [place] Michalik, Daniel, MD    Office Visit    6 months ago Age-related osteoporosis without current pathological fracture    [facility]    Nurse Only    8 months ago Actinic keratosis    [facility] [place] Michalik, Daniel, MD    Office Visit          Future Appointments         Provider Department Appt Notes    In 4 days Freedom Hickey MD [facility] Annual check-up 6/26/2023    In 5 months ADO GABBY RM1; ADO DEXA RM1 [facility] DEXA - [place]     In 6 months Nadiya Hylton MD [facility] 6 months follow up- Prolia                    Passed - Depression Screening completed within the past 12 months              Recent Outpatient Visits              5 days ago Age-related osteoporosis without current pathological fracture    [facility]    Office Visit     3 months ago Lichenoid dermatitis    [facility] [place] Murry [facility]    Office Visit    5 months ago Neoplasm of unspecified behavior of bone, soft tissue, and skin    [facility] [place] Michalik, Daniel, MD    Office Visit    6 months ago Age-related osteoporosis without current pathological fracture    [facility]    Nurse Only    8 months ago Actinic keratosis    [facility] [address], [place] Albaro Wright MD    Office Visit           Future Appointments         Provider Department Appt Notes    In 4 days Freedom Hickey MD [facility] Annual check-up 6/26/2023    In 5 months ADO GABBY RM1; ADO DEXA RM1 [facility] DEXA [facility]     In 6 months [facility] 6 [facility] follow up- Prolia

## 2024-06-24 ENCOUNTER — TELEPHONE (OUTPATIENT)
Dept: OBGYN CLINIC | Facility: CLINIC | Age: 79
End: 2024-06-24

## 2024-06-24 NOTE — TELEPHONE ENCOUNTER
Patient would like to know if Zak can continue her cancer care (uterine cancer, total hysterectomy in 2020 and Dr. Crespo had diagnosed initially).  Previously seen at [facility] who is out of network with insurance.    Pls advise

## 2024-06-24 NOTE — TELEPHONE ENCOUNTER
Patient was diagnosed with uterine cancer in 2020.  Dr. Crespo referred her to Dr. West and Dr. Ventura for a hysterectomy.  Patient has been doing al follow up appointments with Dr. Zepeda.  Patient is seen every 6 months.  Her next visit was scheduled in 9/2024 with Dr. Zepeda.  Patient states she found out that Dr. Zepeda is no longer covered under her insurance.  Patient asking if she can do her follow up appointments with Dr. Crespo.  If not, is there a doctor Dr. Crespo recommendations through [facility] that she can see?

## 2024-06-25 NOTE — TELEPHONE ENCOUNTER
She needs to check list of gyne onc covered by her insurance & happy to give recommendations.   normal...

## 2024-06-27 ENCOUNTER — OFFICE VISIT (OUTPATIENT)
Dept: INTERNAL MEDICINE CLINIC | Facility: CLINIC | Age: 79
End: 2024-06-27

## 2024-06-27 VITALS
SYSTOLIC BLOOD PRESSURE: 110 MMHG | HEART RATE: 65 BPM | BODY MASS INDEX: 31.54 KG/M2 | HEIGHT: 63 IN | WEIGHT: 178 LBS | DIASTOLIC BLOOD PRESSURE: 71 MMHG

## 2024-06-27 DIAGNOSIS — Z12.31 ENCOUNTER FOR SCREENING MAMMOGRAM FOR BREAST CANCER: ICD-10-CM

## 2024-06-27 DIAGNOSIS — Z00.00 MEDICARE ANNUAL WELLNESS VISIT, SUBSEQUENT: Primary | ICD-10-CM

## 2024-06-27 DIAGNOSIS — Z87.81 HISTORY OF VERTEBRAL COMPRESSION FRACTURE: ICD-10-CM

## 2024-06-27 DIAGNOSIS — F41.1 GAD (GENERALIZED ANXIETY DISORDER): ICD-10-CM

## 2024-06-27 DIAGNOSIS — M81.0 AGE-RELATED OSTEOPOROSIS WITHOUT CURRENT PATHOLOGICAL FRACTURE: ICD-10-CM

## 2024-06-27 DIAGNOSIS — H61.23 IMPACTED CERUMEN, BILATERAL: ICD-10-CM

## 2024-06-27 DIAGNOSIS — E03.9 ACQUIRED HYPOTHYROIDISM: ICD-10-CM

## 2024-06-27 DIAGNOSIS — Z85.42 HISTORY OF ENDOMETRIAL CANCER: ICD-10-CM

## 2024-06-27 DIAGNOSIS — Z82.49 FAMILY HISTORY OF BRAIN ANEURYSM: ICD-10-CM

## 2024-06-27 DIAGNOSIS — I87.2 PERIPHERAL VENOUS INSUFFICIENCY: ICD-10-CM

## 2024-06-27 DIAGNOSIS — D75.89 MACROCYTOSIS: ICD-10-CM

## 2024-06-27 DIAGNOSIS — Z91.81 RISK FOR FALLS: ICD-10-CM

## 2024-06-27 PROCEDURE — 3074F SYST BP LT 130 MM HG: CPT | Performed by: INTERNAL MEDICINE

## 2024-06-27 PROCEDURE — 69209 REMOVE IMPACTED EAR WAX UNI: CPT | Performed by: INTERNAL MEDICINE

## 2024-06-27 PROCEDURE — 1126F AMNT PAIN NOTED NONE PRSNT: CPT | Performed by: INTERNAL MEDICINE

## 2024-06-27 PROCEDURE — 1170F FXNL STATUS ASSESSED: CPT | Performed by: INTERNAL MEDICINE

## 2024-06-27 PROCEDURE — 1160F RVW MEDS BY RX/DR IN RCRD: CPT | Performed by: INTERNAL MEDICINE

## 2024-06-27 PROCEDURE — G0439 PPPS, SUBSEQ VISIT: HCPCS | Performed by: INTERNAL MEDICINE

## 2024-06-27 PROCEDURE — 96160 PT-FOCUSED HLTH RISK ASSMT: CPT | Performed by: INTERNAL MEDICINE

## 2024-06-27 PROCEDURE — 3008F BODY MASS INDEX DOCD: CPT | Performed by: INTERNAL MEDICINE

## 2024-06-27 PROCEDURE — 1159F MED LIST DOCD IN RCRD: CPT | Performed by: INTERNAL MEDICINE

## 2024-06-27 PROCEDURE — 3078F DIAST BP <80 MM HG: CPT | Performed by: INTERNAL MEDICINE

## 2024-06-27 NOTE — PROGRESS NOTES
Subjective:   Coni Gandhi is a 78 year old female who presents for a Medicare Subsequent Annual Wellness visit (Pt already had Initial Annual Wellness) and scheduled follow up of multiple significant but stable problems.     History/Other:   Fall Risk Assessment:   She has been screened for Falls and is low risk.      Cognitive Assessment:   She had a completely normal cognitive assessment - see flowsheet entries     Functional Ability/Status:   Coni Gandhi has a completely normal functional assessment. See flowsheet for details.      Depression Screening (PHQ-2/PHQ-9): PHQ-2 SCORE: 0  , done 6/27/2024   Last [place] Suicide Screening on 6/27/2024 was No Risk.         Advanced Directives:   She does NOT have a Living Will. [Do you have a living will?: No]  She does NOT have a Power of  for Health Care. [Do you have a healthcare power of ?: No]        Patient Active Problem List   Diagnosis    Hypothyroidism    Peripheral venous insufficiency    Colon cancer screening    Osteoporosis    Wedge compression fracture of second lumbar vertebra with routine healing    Adenocarcinoma of endometrium (HCC)    Macrocytosis    Risk for falls    SOPHIA (generalized anxiety disorder)    Vasomotor rhinitis    Medicare annual wellness visit, subsequent    Breast screening    Eye exam, routine    History of endometrial cancer     Allergies:  She has No Known Allergies.    Current Medications:  Outpatient Medications Marked as Taking for the 6/27/24 encounter (Office Visit) with Freedom Hickey MD   Medication Sig    FLUoxetine 20 MG Oral Cap Take 1 capsule (20 mg total) by mouth daily. ADDRESS ALL REFILLS AT UPCOMING APPOINTMENT    levothyroxine 50 MCG Oral Tab Take 1 tablet (50 mcg total) by mouth before breakfast.    fluorouracil 5 % External Cream Start using 3-4 weeks after biopsy. Use twice daily to spot on left cheek for 4 weeks. Please notify docot if spot opens up or begins to bleed.     Cholecalciferol (VITAMIN D) 50 MCG (2000 UT) Oral Cap Take by mouth.    Multiple Vitamins-Minerals (EYE VITAMINS & MINERALS OR) Take 1 tablet by mouth daily.    diphenhydrAMINE HCl, Sleep, (ZZZQUIL OR) Take by mouth.    VITAMIN B COMPLEX-C OR     Melatonin 5 MG Oral Tab Take 1 tablet (5 mg total) by mouth nightly.    Calcium Carbonate-Vitamin D (CALCIUM-VITAMIN D) 600-200 MG-UNIT Oral Cap Take by mouth daily. Takes 2 tabs in am and 1 tab at HS     Multiple Vitamins Oral Tab Take 1 tablet by mouth daily.    Omega-3 Fatty Acids ( OMEGA-3 FISH OIL) 1200 MG Oral Capsule Delayed Release Take by mouth daily.       Current Facility-Administered Medications for the 6/27/24 encounter (Office Visit) with Freedom Hickey MD   Medication    denosumab (Prolia) 60 MG/ML SUBQ injection 60 mg       Medical History:  She  has a past medical history of Actinic keratosis (01/2023), Anxiety, Back problem, Benign essential tremor, Cancer (HCC) (02/2020), Disorder of thyroid, Exposure to medical diagnostic radiation, Hypothyroidism, Lichenoid actinic keratosis (07/2023), Osteoarthritis, Osteoporosis, Post-operative hypothyroidism, Postmenopausal atrophic vaginitis (01/29/2015), and Visual impairment.  Surgical History:  She  has a past surgical history that includes Breast biopsy (Left, 1990); colonoscopy; other surgical history (1986); tonsillectomy; d & c (2013); spine surgery procedure unlisted (10/2019); thyroidectomy (1986); other surgical history (11/29/2019); hysterectomy; Colectomy; hip replacement surgery; back surgery; total hip replacement (Right); and hernia surgery (05/13/2021).   Family History:  Her family history includes No Known Problems in her sister and another family member; Other in her mother; Stroke in her brother and father.  Social History:  She  reports that she has quit smoking. Her smoking use included cigarettes. She has been exposed to tobacco smoke. She has never used smokeless tobacco. She reports  current alcohol use. She reports that she does not use drugs.    Tobacco:  She smoked tobacco in the past but quit greater than 12 months ago.  Social History     Tobacco Use   Smoking Status Former    Current packs/day: 1.00    Types: Cigarettes    Passive exposure: Past   Smokeless Tobacco Never   Tobacco Comments    quit 40 yrs ago        CAGE Alcohol Screen:   CAGE screening score of 0 on 6/20/2024, showing low risk of alcohol abuse.      Patient Care Team:  Freedom Hickey MD as PCP - General (Internal Medicine)  Tasia Mosqueda (Physical Therapy)  Bebo Melendez PTA as Physical Therapist (Physical Therapy)    Review of Systems  GENERAL: feels well otherwise  SKIN: denies any unusual skin lesions  EYES: denies blurred vision or double vision  HEENT: denies nasal congestion, sinus pain or ST  LUNGS: denies shortness of breath with exertion; no hemopytys ys  CARDIOVASCULAR: denies chest pain on exertion; no syncope; no pnd  GI: denies abdominal pain, denies heartburn; no hematochezia/hematemesis   : denies dysuria, vaginal discharge or itching, no complaint of urinary incontinence; no hematuria   MUSCULOSKELETAL: denies back pain  NEURO: denies headaches  PSYCHE: denies depression or anxiety  HEMATOLOGIC: denies hx of anemia  ENDOCRINE: thyroid history  ALL/ASTHMA: denies hx of allergy or asthma    Objective:   Physical Exam  General Appearance:  Alert, cooperative, no distress, appears stated age   Head:  Normocephalic, without obvious abnormality, atraumatic   Eyes:  PERRL, conjunctiva/corneas clear, EOM's intact both eyes   Ears:  Impacted cerumen bilaterally; after flushing, TM seen and normal    Nose: Nares normal, septum midline,mucosa normal, no drainage or sinus tenderness   Throat: Lips, mucosa, and tongue normal; teeth and gums normal   Neck: Supple, symmetrical, trachea midline, no adenopathy;  thyroid: not enlarged, symmetric, no tenderness/mass/nodules; no carotid bruit or JVD   Back:    Symmetric, no curvature, ROM normal, no CVA tenderness   Lungs:   Clear to auscultation bilaterally, respirations unlabored   Heart:  Regular rate and rhythm, S1 and S2 normal, no murmur, rub, or gallop   Abdomen:   Soft, non-tender, bowel sounds active all four quadrants,  no masses, no organomegaly   Pelvic: Deferred   Extremities: Extremities normal, atraumatic, no cyanosis or edema   Pulses: 2+ and symmetric   Skin: Skin color, texture, turgor normal, no rashes or lesions   Lymph nodes: Cervical, supraclavicular,  nodes normal   Neurologic: Normal       /71 (BP Location: Right arm, Patient Position: Sitting, Cuff Size: adult)   Pulse 65   Ht 5' 3\" (1.6 m)   Wt 178 lb (80.7 kg)   BMI 31.53 kg/m²  Estimated body mass index is 31.53 kg/m² as calculated from the following:    Height as of this encounter: 5' 3\" (1.6 m).    Weight as of this encounter: 178 lb (80.7 kg).    Medicare Hearing Assessment:   Hearing Screening    Time taken: 6/27/2024  8:56 AM  Screening Method: Questionnaire  I have a problem hearing over the telephone: No I have trouble following the conversations when two or more people are talking at the same time: No   I have trouble understanding things on the TV: No I have to strain to understand conversations: No   I get confused about where sounds come from: Sometimes I misunderstand some words in a sentence and need to ask people to repeat themselves: No   I especially have trouble understanding the speech of women and children: No I have trouble understanding the speaker in a large room such as at a meeting or place of [employer]: Sometimes   Many people I talk to seem to mumble (or don't speak clearly): No People get annoyed because I misunderstand what they say: No   I misunderstand what others are saying and make inappropriate responses: No I avoid social activities because I cannot hear well and fear I will reply improperly: No   Family members and friends have told me they think I may  have hearing loss: No                 Assessment & Plan:   Coni Gandhi is a 78 year old female who presents for a Medicare Assessment.     (Z00.00) Medicare annual wellness visit, subsequent  (primary encounter diagnosis)  Plan: CBC With Differential With Platelet, Hepatic         Function Panel (7), Lipid Panel        Routine labs been ordered.  Patient declined to get COVID boosters.  Advised to get her RSV vaccine from pharmacy.    (E03.9) Acquired hypothyroidism  Plan: Assay, Thyroid Stim Hormone        Will check her TSH.  Continue with her levothyroxine.    (M81.0) Age-related osteoporosis without current pathological fracture  Plan: She is currently being treated by a rheumatologist and getting Prolia shots every 6 months.  Continue calcium and vitamin D supplement.    (F41.1) SOPHIA (generalized anxiety disorder)  Plan: Stable on fluoxetine.  CPM.    (D75.89) Macrocytosis  Plan: Patient to be followed by our hematologist before.  Will recheck her CBC.    (Z12.31) Encounter for screening mammogram for breast cancer  Plan: [facility] [place] 2D+3D SCREENING BILAT         (CPT=77067/35581)        Mammogram ordered.    (Z85.42) History of endometrial cancer  Plan: Patient has a history of a hysterectomy and has been followed by gynecologic oncologist at [facility] as well as her local oncologist.  There is been no signs of recurrence.    (Z91.81) Risk for falls  Plan: Fall precautions.    (I87.2) Peripheral venous insufficiency  Plan: Continue leg elevation and use of compression stockings.    (Z82.49) Family history of brain aneurysm  Plan: MRA BRAIN (CPT=70544)        Pt had 2 family members father and brother who passed away from brain aneurysm; pt told she should get screening and will order MRA.     (Z87.81) History of vertebral compression fracture  Plan: had hx of kyphplasty before. Pt also being treated with prolia shots  for her osteoporosis     (H61.23) Impacted cerumen, bilateral  Plan: earflushing done.      The  patient indicates understanding of these issues and agrees to the plan.  Reinforced healthy diet, lifestyle, and exercise.      No follow-ups on file.     Freedom Hickey MD, 6/27/2024     Supplementary Documentation:   General Health:  In the past six months, have you lost more than 10 pounds without trying?: 2 - No  Has your appetite been poor?: No  Type of Diet: Balanced  How does the patient maintain a good energy level?: Appropriate Exercise;Daily Walks;Stretching  How would you describe your daily physical activity?: Moderate  How would you describe your current health state?: Good  How do you maintain positive mental well-being?: Social Interaction;Puzzles;Games;Visiting Friends;Visiting Family  On a scale of 0 to 10, with 0 being no pain and 10 being severe pain, what is your pain level?: 0 - (None)  In the past six months, have you experienced urine leakage?: 0-No  At any time do you feel concerned for the safety/well-being of yourself and/or your children, in your home or elsewhere?: No  Have you had any immunizations at another office such as Influenza, Hepatitis B, Tetanus, or Pneumococcal?: No       Coni A Hiram's SCREENING SCHEDULE   Tests on this list are recommended by your physician but may not be covered, or covered at this frequency, by your insurer.   Please check with your insurance carrier before scheduling to verify coverage.   PREVENTATIVE SERVICES FREQUENCY &  COVERAGE DETAILS LAST COMPLETION DATE   Diabetes Screening    Fasting Blood Sugar /  Glucose    One screening every 12 months if never tested or if previously tested but not diagnosed with pre-diabetes   One screening every 6 months if diagnosed with pre-diabetes Lab Results   Component Value Date    GLUCOSE 93 10/27/2008    GLU 79 06/14/2024        Cardiovascular Disease Screening    Lipid Panel  Cholesterol  Lipoprotein (HDL)  Triglycerides Covered every 5 years for all Medicare beneficiaries without apparent signs or symptoms  of cardiovascular disease Lab Results   Component Value Date    CHOLEST 125 08/30/2019    HDL 45 08/30/2019    LDL 61 08/30/2019    TRIG 96 08/30/2019         Electrocardiogram (EKG)   Covered if needed at Welcome to Medicare, and non-screening if indicated for medical reasons 04/26/2021      Ultrasound Screening for Abdominal Aortic Aneurysm (AAA) Covered once in a lifetime for one of the following risk factors    Men who are 65-75 years old and have ever smoked    Anyone with a family history -     Colorectal Cancer Screening  Covered for ages 50-85; only need ONE of the following:    Colonoscopy   Covered every 10 years    Covered every 2 years if patient is at high risk or previous colonoscopy was abnormal -    Health Maintenance   Topic Date Due    Colorectal Cancer Screening  Discontinued       Flexible Sigmoidoscopy   Covered every 4 years -    Fecal Occult Blood Test Covered annually -   Bone Density Screening    Bone density screening    Covered every 2 years after age 65 if diagnosed with risk of osteoporosis or estrogen deficiency.    Covered yearly for long-term glucocorticoid medication use (Steroids) Last Dexa Scan:    XR DEXA BONE DENSITOMETRY (CPT=77080) 12/14/2022      No recommendations at this time   Pap and Pelvic    Pap   Covered every 2 years for women at normal risk; Annually if at high risk -  No recommendations at this time    Chlamydia Annually if high risk -  No recommendations at this time   Screening Mammogram    Mammogram     Recommend annually for all female patients aged 40 and older    One baseline mammogram covered for patients aged 35-39 12/14/2022    Health Maintenance   Topic Date Due    Mammogram  Discontinued       Immunizations    Influenza Covered once per flu season  Please get every year 09/12/2023  No recommendations at this time    Pneumococcal Each vaccine ([id number] & [id number]) covered once after 65 Prevnar 13: 10/20/2016    Camrmrcfr51: 10/20/2016     No  recommendations at this time    Hepatitis B One screening covered for patients with certain risk factors   -  No recommendations at this time    Tetanus Toxoid Not covered by Medicare Part B unless medically necessary (cut with metal); may be covered with your pharmacy prescription benefits -    Tetanus, Diptheria and Pertusis TD and TDaP Not covered by Medicare Part B -  No recommendations at this time    Zoster Not covered by Medicare Part B; may be covered with your pharmacy  prescription benefits 09/10/2014  No recommendations at this time

## 2024-07-01 ENCOUNTER — LAB ENCOUNTER (OUTPATIENT)
Dept: LAB | Age: 79
End: 2024-07-01
Attending: INTERNAL MEDICINE
Payer: MEDICARE

## 2024-07-01 DIAGNOSIS — Z00.00 MEDICARE ANNUAL WELLNESS VISIT, SUBSEQUENT: ICD-10-CM

## 2024-07-01 DIAGNOSIS — E03.9 ACQUIRED HYPOTHYROIDISM: ICD-10-CM

## 2024-07-01 LAB
ALBUMIN SERPL-MCNC: 4 G/DL (ref 3.2–4.8)
ALP LIVER SERPL-CCNC: 74 U/L
ALT SERPL-CCNC: 16 U/L
AST SERPL-CCNC: 25 U/L (ref ?–34)
BASOPHILS # BLD AUTO: 0.05 X10(3) UL (ref 0–0.2)
BASOPHILS NFR BLD AUTO: 0.8 %
BILIRUB DIRECT SERPL-MCNC: 0.1 MG/DL (ref ?–0.3)
BILIRUB SERPL-MCNC: 0.4 MG/DL (ref 0.2–1.1)
CHOLEST SERPL-MCNC: 131 MG/DL (ref ?–200)
DEPRECATED RDW RBC AUTO: 52 FL (ref 35.1–46.3)
EOSINOPHIL # BLD AUTO: 0.21 X10(3) UL (ref 0–0.7)
EOSINOPHIL NFR BLD AUTO: 3.5 %
ERYTHROCYTE [DISTWIDTH] IN BLOOD BY AUTOMATED COUNT: 13.6 % (ref 11–15)
FASTING PATIENT LIPID ANSWER: YES
HCT VFR BLD AUTO: 39.5 %
HDLC SERPL-MCNC: 53 MG/DL (ref 40–59)
HGB BLD-MCNC: 13 G/DL
IMM GRANULOCYTES # BLD AUTO: 0.01 X10(3) UL (ref 0–1)
IMM GRANULOCYTES NFR BLD: 0.2 %
LDLC SERPL CALC-MCNC: 64 MG/DL (ref ?–100)
LYMPHOCYTES # BLD AUTO: 1.65 X10(3) UL (ref 1–4)
LYMPHOCYTES NFR BLD AUTO: 27.5 %
MCH RBC QN AUTO: 34.2 PG (ref 26–34)
MCHC RBC AUTO-ENTMCNC: 32.9 G/DL (ref 31–37)
MCV RBC AUTO: 103.9 FL
MONOCYTES # BLD AUTO: 0.74 X10(3) UL (ref 0.1–1)
MONOCYTES NFR BLD AUTO: 12.3 %
NEUTROPHILS # BLD AUTO: 3.34 X10 (3) UL (ref 1.5–7.7)
NEUTROPHILS # BLD AUTO: 3.34 X10(3) UL (ref 1.5–7.7)
NEUTROPHILS NFR BLD AUTO: 55.7 %
NONHDLC SERPL-MCNC: 78 MG/DL (ref ?–130)
PLATELET # BLD AUTO: 209 10(3)UL (ref 150–450)
PROT SERPL-MCNC: 6.9 G/DL (ref 5.7–8.2)
RBC # BLD AUTO: 3.8 X10(6)UL
TRIGL SERPL-MCNC: 65 MG/DL (ref 30–149)
TSI SER-ACNC: 3.16 MIU/ML (ref 0.55–4.78)
VLDLC SERPL CALC-MCNC: 10 MG/DL (ref 0–30)
WBC # BLD AUTO: 6 X10(3) UL (ref 4–11)

## 2024-07-01 PROCEDURE — 84443 ASSAY THYROID STIM HORMONE: CPT

## 2024-07-01 PROCEDURE — 80076 HEPATIC FUNCTION PANEL: CPT

## 2024-07-01 PROCEDURE — 36415 COLL VENOUS BLD VENIPUNCTURE: CPT

## 2024-07-01 PROCEDURE — 85025 COMPLETE CBC W/AUTO DIFF WBC: CPT

## 2024-07-01 PROCEDURE — 80061 LIPID PANEL: CPT

## 2024-07-03 ENCOUNTER — TELEPHONE (OUTPATIENT)
Dept: INTERNAL MEDICINE CLINIC | Facility: CLINIC | Age: 79
End: 2024-07-03

## 2024-07-03 DIAGNOSIS — D75.89 MACROCYTOSIS WITHOUT ANEMIA: Primary | ICD-10-CM

## 2024-08-05 ENCOUNTER — HOSPITAL ENCOUNTER (OUTPATIENT)
Dept: MAMMOGRAPHY | Age: 79
Discharge: HOME | End: 2024-08-05
Attending: INTERNAL MEDICINE
Payer: MEDICARE

## 2024-08-05 DIAGNOSIS — Z12.31 ENCOUNTER FOR SCREENING MAMMOGRAM FOR BREAST CANCER: ICD-10-CM

## 2024-08-05 PROCEDURE — 77067 SCR MAMMO BI INCL CAD: CPT | Performed by: INTERNAL MEDICINE

## 2024-08-05 PROCEDURE — 77063 BREAST TOMOSYNTHESIS BI: CPT | Performed by: INTERNAL MEDICINE

## 2024-08-29 ENCOUNTER — HOSPITAL ENCOUNTER (OUTPATIENT)
Dept: MRI IMAGING | Age: 79
Discharge: HOME | End: 2024-08-29
Attending: INTERNAL MEDICINE
Payer: MEDICARE

## 2024-08-29 DIAGNOSIS — Z82.49 FAMILY HISTORY OF BRAIN ANEURYSM: ICD-10-CM

## 2024-08-29 PROCEDURE — 70544 MR ANGIOGRAPHY HEAD W/O DYE: CPT | Performed by: INTERNAL MEDICINE

## 2024-09-14 ENCOUNTER — HOSPITAL ENCOUNTER (INPATIENT)
Facility: HOSPITAL | Age: 79
LOS: 8 days | Discharge: HOME | DRG: 329 | End: 2024-09-23
Attending: EMERGENCY MEDICINE | Admitting: INTERNAL MEDICINE
Payer: MEDICARE

## 2024-09-14 ENCOUNTER — HOSPITAL ENCOUNTER (INPATIENT)
Facility: HOSPITAL | Age: 79
LOS: 8 days | Discharge: HOME | End: 2024-09-23
Attending: EMERGENCY MEDICINE | Admitting: INTERNAL MEDICINE
Payer: MEDICARE

## 2024-09-14 DIAGNOSIS — R11.2 NAUSEA AND VOMITING IN ADULT: ICD-10-CM

## 2024-09-14 DIAGNOSIS — K56.609 BOWEL OBSTRUCTION (HCC): ICD-10-CM

## 2024-09-14 DIAGNOSIS — K56.601 COMPLETE INTESTINAL OBSTRUCTION, UNSPECIFIED CAUSE (HCC): ICD-10-CM

## 2024-09-14 DIAGNOSIS — R10.30 LOWER ABDOMINAL PAIN: Primary | ICD-10-CM

## 2024-09-14 LAB
ANION GAP SERPL CALC-SCNC: 15 MMOL/L (ref 0–18)
BASOPHILS # BLD AUTO: 0.06 X10(3) UL (ref 0–0.2)
BASOPHILS NFR BLD AUTO: 0.8 %
BUN BLD-MCNC: 25 MG/DL (ref 9–23)
BUN/CREAT SERPL: 28.1 (ref 10–20)
CALCIUM BLD-MCNC: 9.4 MG/DL (ref 8.7–10.4)
CHLORIDE SERPL-SCNC: 105 MMOL/L (ref 98–112)
CO2 SERPL-SCNC: 17 MMOL/L (ref 21–32)
CREAT BLD-MCNC: 0.89 MG/DL
DEPRECATED RDW RBC AUTO: 51.6 FL (ref 35.1–46.3)
EGFRCR SERPLBLD CKD-EPI 2021: 67 ML/MIN/1.73M2 (ref 60–?)
EOSINOPHIL # BLD AUTO: 0.13 X10(3) UL (ref 0–0.7)
EOSINOPHIL NFR BLD AUTO: 1.7 %
ERYTHROCYTE [DISTWIDTH] IN BLOOD BY AUTOMATED COUNT: 13.3 % (ref 11–15)
GLUCOSE BLD-MCNC: 129 MG/DL (ref 70–99)
HCT VFR BLD AUTO: 39.7 %
HGB BLD-MCNC: 12.9 G/DL
IMM GRANULOCYTES # BLD AUTO: 0.01 X10(3) UL (ref 0–1)
IMM GRANULOCYTES NFR BLD: 0.1 %
LIPASE SERPL-CCNC: 42 U/L (ref 12–53)
LYMPHOCYTES # BLD AUTO: 3.32 X10(3) UL (ref 1–4)
LYMPHOCYTES NFR BLD AUTO: 42.5 %
MAGNESIUM SERPL-MCNC: 2.1 MG/DL (ref 1.6–2.6)
MCH RBC QN AUTO: 33.9 PG (ref 26–34)
MCHC RBC AUTO-ENTMCNC: 32.5 G/DL (ref 31–37)
MCV RBC AUTO: 104.2 FL
MONOCYTES # BLD AUTO: 0.96 X10(3) UL (ref 0.1–1)
MONOCYTES NFR BLD AUTO: 12.3 %
NEUTROPHILS # BLD AUTO: 3.33 X10 (3) UL (ref 1.5–7.7)
NEUTROPHILS # BLD AUTO: 3.33 X10(3) UL (ref 1.5–7.7)
NEUTROPHILS NFR BLD AUTO: 42.6 %
OSMOLALITY SERPL CALC.SUM OF ELEC: 290 MOSM/KG (ref 275–295)
PLATELET # BLD AUTO: 204 10(3)UL (ref 150–450)
POTASSIUM SERPL-SCNC: 3.8 MMOL/L (ref 3.5–5.1)
RBC # BLD AUTO: 3.81 X10(6)UL
SODIUM SERPL-SCNC: 137 MMOL/L (ref 136–145)
TROPONIN I SERPL HS-MCNC: 4 NG/L
WBC # BLD AUTO: 7.8 X10(3) UL (ref 4–11)

## 2024-09-14 RX ORDER — MORPHINE SULFATE 4 MG/ML
4 INJECTION, SOLUTION INTRAMUSCULAR; INTRAVENOUS ONCE
Status: COMPLETED | OUTPATIENT
Start: 2024-09-14 | End: 2024-09-14

## 2024-09-14 RX ORDER — ONDANSETRON 2 MG/ML
4 INJECTION INTRAMUSCULAR; INTRAVENOUS ONCE
Status: COMPLETED | OUTPATIENT
Start: 2024-09-14 | End: 2024-09-14

## 2024-09-15 ENCOUNTER — APPOINTMENT (OUTPATIENT)
Dept: CT IMAGING | Facility: HOSPITAL | Age: 79
End: 2024-09-15
Attending: EMERGENCY MEDICINE
Payer: MEDICARE

## 2024-09-15 ENCOUNTER — ANESTHESIA EVENT (OUTPATIENT)
Dept: SURGERY | Facility: HOSPITAL | Age: 79
End: 2024-09-15
Payer: MEDICARE

## 2024-09-15 ENCOUNTER — ANESTHESIA (OUTPATIENT)
Dept: SURGERY | Facility: HOSPITAL | Age: 79
End: 2024-09-15
Payer: MEDICARE

## 2024-09-15 ENCOUNTER — APPOINTMENT (OUTPATIENT)
Dept: CT IMAGING | Facility: HOSPITAL | Age: 79
DRG: 329 | End: 2024-09-15
Attending: EMERGENCY MEDICINE
Payer: MEDICARE

## 2024-09-15 PROBLEM — R11.2 NAUSEA AND VOMITING IN ADULT: Status: ACTIVE | Noted: 2024-09-15

## 2024-09-15 PROBLEM — K56.601 COMPLETE INTESTINAL OBSTRUCTION, UNSPECIFIED CAUSE (HCC): Status: ACTIVE | Noted: 2024-09-15

## 2024-09-15 PROBLEM — R10.30 LOWER ABDOMINAL PAIN: Status: ACTIVE | Noted: 2024-09-15

## 2024-09-15 LAB
ANTIBODY SCREEN: NEGATIVE
LACTATE SERPL-SCNC: 2.2 MMOL/L (ref 0.5–2)
LACTATE SERPL-SCNC: 3 MMOL/L (ref 0.5–2)
LACTATE SERPL-SCNC: 5 MMOL/L (ref 0.5–2)
Q-T INTERVAL: 420 MS
QRS DURATION: 86 MS
QTC CALCULATION (BEZET): 513 MS
R AXIS: -27 DEGREES
RH BLOOD TYPE: POSITIVE
T AXIS: 23 DEGREES
VENTRICULAR RATE: 90 BPM

## 2024-09-15 PROCEDURE — 74177 CT ABD & PELVIS W/CONTRAST: CPT | Performed by: EMERGENCY MEDICINE

## 2024-09-15 PROCEDURE — 99223 1ST HOSP IP/OBS HIGH 75: CPT | Performed by: INTERNAL MEDICINE

## 2024-09-15 PROCEDURE — 0DTB0ZZ RESECTION OF ILEUM, OPEN APPROACH: ICD-10-PCS | Performed by: SPECIALIST

## 2024-09-15 RX ORDER — MORPHINE SULFATE 4 MG/ML
4 INJECTION, SOLUTION INTRAMUSCULAR; INTRAVENOUS EVERY 10 MIN PRN
Status: DISCONTINUED | OUTPATIENT
Start: 2024-09-15 | End: 2024-09-15 | Stop reason: HOSPADM

## 2024-09-15 RX ORDER — MORPHINE SULFATE 4 MG/ML
4 INJECTION, SOLUTION INTRAMUSCULAR; INTRAVENOUS ONCE
Status: COMPLETED | OUTPATIENT
Start: 2024-09-15 | End: 2024-09-15

## 2024-09-15 RX ORDER — NALOXONE HYDROCHLORIDE 0.4 MG/ML
80 INJECTION, SOLUTION INTRAMUSCULAR; INTRAVENOUS; SUBCUTANEOUS AS NEEDED
Status: DISCONTINUED | OUTPATIENT
Start: 2024-09-15 | End: 2024-09-15 | Stop reason: HOSPADM

## 2024-09-15 RX ORDER — HYDROCODONE BITARTRATE AND ACETAMINOPHEN 5; 325 MG/1; MG/1
1 TABLET ORAL ONCE AS NEEDED
Status: DISCONTINUED | OUTPATIENT
Start: 2024-09-15 | End: 2024-09-15 | Stop reason: HOSPADM

## 2024-09-15 RX ORDER — OXYCODONE HYDROCHLORIDE 5 MG/1
5 TABLET ORAL EVERY 4 HOURS PRN
Status: DISCONTINUED | OUTPATIENT
Start: 2024-09-15 | End: 2024-09-18

## 2024-09-15 RX ORDER — MORPHINE SULFATE 4 MG/ML
4 INJECTION, SOLUTION INTRAMUSCULAR; INTRAVENOUS EVERY 2 HOUR PRN
Status: DISCONTINUED | OUTPATIENT
Start: 2024-09-15 | End: 2024-09-15

## 2024-09-15 RX ORDER — MORPHINE SULFATE 2 MG/ML
2 INJECTION, SOLUTION INTRAMUSCULAR; INTRAVENOUS EVERY 2 HOUR PRN
Status: DISCONTINUED | OUTPATIENT
Start: 2024-09-15 | End: 2024-09-15

## 2024-09-15 RX ORDER — DEXTROSE MONOHYDRATE AND SODIUM CHLORIDE 5; .9 G/100ML; G/100ML
INJECTION, SOLUTION INTRAVENOUS CONTINUOUS
Status: DISCONTINUED | OUTPATIENT
Start: 2024-09-15 | End: 2024-09-21

## 2024-09-15 RX ORDER — SODIUM CHLORIDE 9 MG/ML
INJECTION, SOLUTION INTRAVENOUS CONTINUOUS PRN
Status: DISCONTINUED | OUTPATIENT
Start: 2024-09-15 | End: 2024-09-15 | Stop reason: SURG

## 2024-09-15 RX ORDER — HYDROMORPHONE HYDROCHLORIDE 1 MG/ML
0.2 INJECTION, SOLUTION INTRAMUSCULAR; INTRAVENOUS; SUBCUTANEOUS EVERY 5 MIN PRN
Status: DISCONTINUED | OUTPATIENT
Start: 2024-09-15 | End: 2024-09-15 | Stop reason: HOSPADM

## 2024-09-15 RX ORDER — METOCLOPRAMIDE HYDROCHLORIDE 5 MG/ML
5 INJECTION INTRAMUSCULAR; INTRAVENOUS EVERY 8 HOURS PRN
Status: DISCONTINUED | OUTPATIENT
Start: 2024-09-15 | End: 2024-09-15

## 2024-09-15 RX ORDER — SODIUM CHLORIDE, SODIUM LACTATE, POTASSIUM CHLORIDE, CALCIUM CHLORIDE 600; 310; 30; 20 MG/100ML; MG/100ML; MG/100ML; MG/100ML
INJECTION, SOLUTION INTRAVENOUS CONTINUOUS
Status: DISCONTINUED | OUTPATIENT
Start: 2024-09-15 | End: 2024-09-15

## 2024-09-15 RX ORDER — ONDANSETRON 2 MG/ML
4 INJECTION INTRAMUSCULAR; INTRAVENOUS EVERY 6 HOURS PRN
Status: DISCONTINUED | OUTPATIENT
Start: 2024-09-15 | End: 2024-09-15 | Stop reason: HOSPADM

## 2024-09-15 RX ORDER — HYDROMORPHONE HYDROCHLORIDE 1 MG/ML
0.4 INJECTION, SOLUTION INTRAMUSCULAR; INTRAVENOUS; SUBCUTANEOUS EVERY 5 MIN PRN
Status: DISCONTINUED | OUTPATIENT
Start: 2024-09-15 | End: 2024-09-15 | Stop reason: HOSPADM

## 2024-09-15 RX ORDER — PHENYLEPHRINE HCL 10 MG/ML
VIAL (ML) INJECTION AS NEEDED
Status: DISCONTINUED | OUTPATIENT
Start: 2024-09-15 | End: 2024-09-15 | Stop reason: SURG

## 2024-09-15 RX ORDER — SODIUM CHLORIDE, SODIUM LACTATE, POTASSIUM CHLORIDE, CALCIUM CHLORIDE 600; 310; 30; 20 MG/100ML; MG/100ML; MG/100ML; MG/100ML
INJECTION, SOLUTION INTRAVENOUS CONTINUOUS PRN
Status: DISCONTINUED | OUTPATIENT
Start: 2024-09-15 | End: 2024-09-15 | Stop reason: SURG

## 2024-09-15 RX ORDER — DEXAMETHASONE SODIUM PHOSPHATE 4 MG/ML
VIAL (ML) INJECTION AS NEEDED
Status: DISCONTINUED | OUTPATIENT
Start: 2024-09-15 | End: 2024-09-15 | Stop reason: SURG

## 2024-09-15 RX ORDER — ONDANSETRON 2 MG/ML
INJECTION INTRAMUSCULAR; INTRAVENOUS
Status: COMPLETED
Start: 2024-09-15 | End: 2024-09-15

## 2024-09-15 RX ORDER — ROCURONIUM BROMIDE 10 MG/ML
INJECTION, SOLUTION INTRAVENOUS AS NEEDED
Status: DISCONTINUED | OUTPATIENT
Start: 2024-09-15 | End: 2024-09-15 | Stop reason: SURG

## 2024-09-15 RX ORDER — HYDROMORPHONE HYDROCHLORIDE 1 MG/ML
0.2 INJECTION, SOLUTION INTRAMUSCULAR; INTRAVENOUS; SUBCUTANEOUS EVERY 2 HOUR PRN
Status: DISCONTINUED | OUTPATIENT
Start: 2024-09-15 | End: 2024-09-18

## 2024-09-15 RX ORDER — ACETAMINOPHEN 500 MG
1000 TABLET ORAL ONCE AS NEEDED
Status: DISCONTINUED | OUTPATIENT
Start: 2024-09-15 | End: 2024-09-15 | Stop reason: HOSPADM

## 2024-09-15 RX ORDER — MORPHINE SULFATE 10 MG/ML
6 INJECTION, SOLUTION INTRAMUSCULAR; INTRAVENOUS EVERY 10 MIN PRN
Status: DISCONTINUED | OUTPATIENT
Start: 2024-09-15 | End: 2024-09-15

## 2024-09-15 RX ORDER — ENOXAPARIN SODIUM 100 MG/ML
40 INJECTION SUBCUTANEOUS DAILY
Status: DISCONTINUED | OUTPATIENT
Start: 2024-09-16 | End: 2024-09-18

## 2024-09-15 RX ORDER — ONDANSETRON 2 MG/ML
INJECTION INTRAMUSCULAR; INTRAVENOUS AS NEEDED
Status: DISCONTINUED | OUTPATIENT
Start: 2024-09-15 | End: 2024-09-15 | Stop reason: SURG

## 2024-09-15 RX ORDER — OXYCODONE HYDROCHLORIDE 5 MG/1
2.5 TABLET ORAL EVERY 4 HOURS PRN
Status: DISCONTINUED | OUTPATIENT
Start: 2024-09-15 | End: 2024-09-18

## 2024-09-15 RX ORDER — HYDROMORPHONE HYDROCHLORIDE 1 MG/ML
0.6 INJECTION, SOLUTION INTRAMUSCULAR; INTRAVENOUS; SUBCUTANEOUS EVERY 5 MIN PRN
Status: DISCONTINUED | OUTPATIENT
Start: 2024-09-15 | End: 2024-09-15 | Stop reason: HOSPADM

## 2024-09-15 RX ORDER — HYDROCODONE BITARTRATE AND ACETAMINOPHEN 5; 325 MG/1; MG/1
2 TABLET ORAL ONCE AS NEEDED
Status: DISCONTINUED | OUTPATIENT
Start: 2024-09-15 | End: 2024-09-15 | Stop reason: HOSPADM

## 2024-09-15 RX ORDER — LEVOTHYROXINE SODIUM 20 UG/ML
37.5 INJECTION, SOLUTION INTRAVENOUS DAILY
Status: DISCONTINUED | OUTPATIENT
Start: 2024-09-15 | End: 2024-09-15

## 2024-09-15 RX ORDER — DEXTROSE MONOHYDRATE, SODIUM CHLORIDE, AND POTASSIUM CHLORIDE 50; 1.49; 4.5 G/1000ML; G/1000ML; G/1000ML
INJECTION, SOLUTION INTRAVENOUS CONTINUOUS
Status: DISCONTINUED | OUTPATIENT
Start: 2024-09-15 | End: 2024-09-15

## 2024-09-15 RX ORDER — MORPHINE SULFATE 2 MG/ML
1 INJECTION, SOLUTION INTRAMUSCULAR; INTRAVENOUS EVERY 2 HOUR PRN
Status: DISCONTINUED | OUTPATIENT
Start: 2024-09-15 | End: 2024-09-15

## 2024-09-15 RX ORDER — MORPHINE SULFATE 4 MG/ML
2 INJECTION, SOLUTION INTRAMUSCULAR; INTRAVENOUS EVERY 10 MIN PRN
Status: DISCONTINUED | OUTPATIENT
Start: 2024-09-15 | End: 2024-09-15 | Stop reason: HOSPADM

## 2024-09-15 RX ORDER — ONDANSETRON 2 MG/ML
4 INJECTION INTRAMUSCULAR; INTRAVENOUS EVERY 6 HOURS PRN
Status: DISCONTINUED | OUTPATIENT
Start: 2024-09-15 | End: 2024-09-15

## 2024-09-15 RX ORDER — LIDOCAINE HYDROCHLORIDE 10 MG/ML
INJECTION, SOLUTION EPIDURAL; INFILTRATION; INTRACAUDAL; PERINEURAL AS NEEDED
Status: DISCONTINUED | OUTPATIENT
Start: 2024-09-15 | End: 2024-09-15 | Stop reason: SURG

## 2024-09-15 RX ORDER — ONDANSETRON 2 MG/ML
4 INJECTION INTRAMUSCULAR; INTRAVENOUS EVERY 4 HOURS PRN
Status: DISCONTINUED | OUTPATIENT
Start: 2024-09-15 | End: 2024-09-18

## 2024-09-15 RX ORDER — HYDROMORPHONE HYDROCHLORIDE 1 MG/ML
0.4 INJECTION, SOLUTION INTRAMUSCULAR; INTRAVENOUS; SUBCUTANEOUS EVERY 2 HOUR PRN
Status: DISCONTINUED | OUTPATIENT
Start: 2024-09-15 | End: 2024-09-18

## 2024-09-15 RX ADMIN — LIDOCAINE HYDROCHLORIDE 50 MG: 10 INJECTION, SOLUTION EPIDURAL; INFILTRATION; INTRACAUDAL; PERINEURAL at 09:52:00

## 2024-09-15 RX ADMIN — DEXAMETHASONE SODIUM PHOSPHATE 8 MG: 4 MG/ML VIAL (ML) INJECTION at 10:04:00

## 2024-09-15 RX ADMIN — SODIUM CHLORIDE, SODIUM LACTATE, POTASSIUM CHLORIDE, CALCIUM CHLORIDE: 600; 310; 30; 20 INJECTION, SOLUTION INTRAVENOUS at 09:38:00

## 2024-09-15 RX ADMIN — SODIUM CHLORIDE: 9 INJECTION, SOLUTION INTRAVENOUS at 10:31:00

## 2024-09-15 RX ADMIN — ROCURONIUM BROMIDE 40 MG: 10 INJECTION, SOLUTION INTRAVENOUS at 10:04:00

## 2024-09-15 RX ADMIN — ONDANSETRON 4 MG: 2 INJECTION INTRAMUSCULAR; INTRAVENOUS at 11:23:00

## 2024-09-15 RX ADMIN — SODIUM CHLORIDE: 9 INJECTION, SOLUTION INTRAVENOUS at 10:04:00

## 2024-09-15 RX ADMIN — PHENYLEPHRINE HCL 100 MCG: 10 MG/ML VIAL (ML) INJECTION at 10:09:00

## 2024-09-15 RX ADMIN — ROCURONIUM BROMIDE 10 MG: 10 INJECTION, SOLUTION INTRAVENOUS at 09:52:00

## 2024-09-15 NOTE — ED INITIAL ASSESSMENT (HPI)
Patient presents via ems from home with complaint of mid lower abdominal pain that started around 9:30pm tonight     +vomiting   Hx SBO 2019  +Bms tonight

## 2024-09-15 NOTE — BRIEF OP NOTE
Patients Name: Coni Gandhi  Attending Physician: Rex Us MD  Operating Physician: CORNELIO SANDERS MD  CSN: [id number]     Location:  OR  MRN: [id number]    YOB: 1945  Admission Date: 9/14/2024  Operation Date: 9/15/2024    Brief Operative Report    Pre-Operative Diagnosis: Bowel obstruction (HCC) [K56.609]    Post-Operative Diagnosis: Same as above. And necrotic portion of ileum    Procedure Performed: EXPLORATORY LAPAROTOMY, BOWEL RESECTION     Surgeon: CORNELIO SANDERS MD    Assistants: T[place]cioco    Anesthesia: General    Attending:  Abeba    EBL: 30cc    Findings: ischemic portion small bowel    Specimens:    ID Type Source Tests Collected by Time Destination   1 : 1. segment of small bowel Tissue Colon SURGICAL PATHOLOGY TISSUE Cornelio Sanders MD 9/15/2024 1052        Drains: none    Complications: None    Disposition: stable     CORNELIO SANDERS MD  9/15/2024  11:57 AM

## 2024-09-15 NOTE — H&P
[facility]  part of [facility]                                                                                                          History and Physical     Coni Gandhi Patient Status:  Emergency    1945 MRN [id number]   Location [facility] EMERGENCY DEPARTMENT Attending Albaro Cameron MD   Hosp Day # 0 PCP Freedom Hickey MD       Chief Complaint: Abdominal pain    Subjective:    Coni Gandhi is a 78 year old female with history of SBO due to internal hernia s/p addition of adhesions and resection of necrotic bowel with primary anastomosis as well as other abdominal surgery, who presents to the ED today with complaints of abdominal pain.  Sudden onset 9:30 PM last night.  Located in the mid to lower abdomen.  Associated with nausea and dry heaves.  No fever, chills, chest pain or shortness of breath.  No diarrhea or bloody stools.    Vital stable  Labs stable except CO2 17  CT abdomen showed findings concerning for pericecal hernia and possible bowel necrosis.    General surgery consulted, NG tube being place.  IV antibiotics started.    History/Other:      Past Medical History:  Past Medical History:    Actinic keratosis    Left cheek    Anxiety    Back problem    compression fractures    Benign essential tremor    right hand    Cancer (HCC)    uterine    Disorder of thyroid    hypothyroid    Exposure to medical diagnostic radiation    Hypothyroidism    Lichenoid actinic keratosis    Left cheek    Osteoarthritis    Osteoporosis    Post-operative hypothyroidism    Postmenopausal atrophic vaginitis    Visual impairment    glasses        Past Surgical History:   Past Surgical History:   Procedure Laterality Date    Back surgery      Breast biopsy Left     Colectomy      Colonoscopy           D & c      \"mass\" inside uterus -- Dr. Napier    Hernia surgery  2021    Ventral Hernia Repair     Hip replacement surgery      Hysterectomy      Other  surgical history  1986    s/p thyroidectomy for benign thyroid nodules    Other surgical history  11/29/2019    bowel obstruction    Spine surgery procedure unlisted  10/2019    kyphoplasty     Thyroidectomy  1986    nodules removed    Tonsillectomy      Total hip replacement Right        Social History:  reports that she has quit smoking. Her smoking use included cigarettes. She has been exposed to tobacco smoke. She has never used smokeless tobacco. She reports current alcohol use. She reports that she does not use drugs.    Family History:   Family History   Problem Relation Age of Onset    Cancer Self     Other (Other) Mother         Heart attack    Stroke Father         brain aneurysm    No Known Problems Sister     Stroke Brother         brain aneurysm    No Known Problems Other     Breast Cancer Neg        Allergies: No Known Allergies    Medications:    Current Facility-Administered Medications on File Prior to Encounter   Medication Dose Route Frequency Provider Last Rate Last Admin    denosumab (Prolia) 60 MG/ML SUBQ injection 60 mg  60 mg Subcutaneous Q6 Months Nadiya Hylton MD   60 mg at 06/18/24 1018     Current Outpatient Medications on File Prior to Encounter   Medication Sig Dispense Refill    FLUoxetine 20 MG Oral Cap Take 1 capsule (20 mg total) by mouth daily. ADDRESS ALL REFILLS AT UPCOMING APPOINTMENT 30 capsule 0    levothyroxine 50 MCG Oral Tab Take 1 tablet (50 mcg total) by mouth before breakfast. 90 tablet 3    fluorouracil 5 % External Cream Start using 3-4 weeks after biopsy. Use twice daily to spot on left cheek for 4 weeks. Please notify docot if spot opens up or begins to bleed. 40 g 0    Cholecalciferol (VITAMIN D) 50 MCG (2000 UT) Oral Cap Take by mouth.      Multiple Vitamins-Minerals (EYE VITAMINS & MINERALS OR) Take 1 tablet by mouth daily.      diphenhydrAMINE HCl, Sleep, (ZZZQUIL OR) Take by mouth.      VITAMIN B COMPLEX-C OR       Melatonin 5 MG Oral Tab Take 1 tablet (5 mg  total) by mouth nightly.      Calcium Carbonate-Vitamin D (CALCIUM-VITAMIN D) 600-200 MG-UNIT Oral Cap Take by mouth daily. Takes 2 tabs in am and 1 tab at HS       Multiple Vitamins Oral Tab Take 1 tablet by mouth daily.      Omega-3 Fatty Acids ( OMEGA-3 FISH OIL) 1200 MG Oral Capsule Delayed Release Take by mouth daily.           Review of Systems:   A comprehensive 14 point review of systems was completed.    Pertinent positives and negatives noted in the HPI.    Objective:     /49   Pulse 59   Temp 97.3 °F (36.3 °C) (Oral)   Resp 25   SpO2 100%   General: No acute distress.    HEENT: Normocephalic, atraumatic.  Neck: Supple, nontender.  Respiratory: Normal effort.  CTAB  Cardiovascular: S1, S2 regular.  Normal rate.  No murmur.   Abdomen: Soft, nondistended.  Diffusely tender, worse in the periumbilical to right lower quadrant regions.  Some guarding.  Neurologic: Alert.  Oriented x 3.  Nonfocal  Musculoskeletal: No tenderness or deformity.  Extremities: No edema  Psychiatric: Cooperative and appropriate    Results:      Labs:  Labs Last 24 Hours:   BMP     CBC    Other     Na 137 Cl 105 BUN 25 Glu 129   Hb 12.9   PTT - Procal -   K 3.8 CO2 17.0 Cr 0.89   WBC 7.8 >< .0  INR - CRP -   Renal Lytes Endo    Hct 39.7   Trop - D dim -   eGFR - Ca 9.4 POC Gluc  -    LFT   pBNP - Lactic -   eGFR AA - PO4 - A1c -   AST - APk - Prot -  LDL -     Mg 2.1 TSH -   ALT - T milagros - Alb -          COVID-19 Lab Results    COVID-19  Lab Results   Component Value Date    COVID19 Not Detected 05/11/2021    COVID19 Not Detected 09/08/2020       Pro-Calcitonin  No results for input(s): \"PCT\" in the last 168 hours.    Cardiac  No results for input(s): \"TROP\", \"PBNP\" in the last 168 hours.    Creatinine Kinase  No results for input(s): \"CK\" in the last 168 hours.    Inflammatory Markers  No results for input(s): \"CRP\", \"POONAM\", \"LDH\", \"DDIMER\" in the last 168 hours.    Imaging: Imaging data reviewed in  Epic.    Assessment & Plan:    Pericecal hernia  Possible bowel necrosis  Abdominal pain due to above  -Admit  -IV fluids  -IV antibiotics  -Strict n.p.o.  -NG tube  -General Surgery consult  -Follow-up lactic acid  -Pain control    Metabolic acidosis  -Follow-up lactic acid  -IV fluids  -Monitor    Hypothyroidism  -IV levothyroxine          Quality:  DVT Prophylaxis: SCD pending surgery  CODE status: Full code  AQUILES: TBD      Plan of care discussed with patient. Acknowledged understanding and agrees to plan. Also discussed with the ED physician.    High MDM  Time spent on this admission - examining patient, obtaining history, reviewing previous medical records, going over test results/imaging and discussing plan of care. More than 50% of the time was spent in counseling and/or coordination of care related to intra-abdominal hernia.   All questions answered.     Catalina Brunner MD  9/15/2024

## 2024-09-15 NOTE — ANESTHESIA PROCEDURE NOTES
Airway  Date/Time: 9/15/2024 9:54 AM  Urgency: Elective    Airway not difficult    General Information and Staff    Patient location during procedure: OR  Anesthesiologist: Isidra Franco MD  Performed: anesthesiologist   Performed by: Isidra Franco MD  Authorized by: Isidra Franco MD      Indications and Patient Condition  Indications for airway management: anesthesia  Sedation level: deep  Preoxygenated: yes  Patient position: sniffing  Mask difficulty assessment: 0 - not attempted    Final Airway Details  Final airway type: endotracheal airway      Successful airway: ETT  Cuffed: yes   Successful intubation technique: Video laryngoscopy  Facilitating devices/methods: intubating stylet and rapid sequence intubation  Endotracheal tube insertion site: oral  Blade: GlideScope  Blade size: #3  ETT size (mm): 7.0    Cormack-Lehane Classification: grade I - full view of glottis  Placement verified by: capnometry   Measured from: teeth  ETT to teeth (cm): 21  Number of attempts at approach: 1    Additional Comments  NG tube placed to suction prior to induction. Smooth RSI with glidescope.

## 2024-09-15 NOTE — PLAN OF CARE
Problem: Patient Centered Care  Goal: Patient preferences are identified and integrated in the patient's plan of care  Description: Interventions:  - What would you like us to know as we care for you?   - Provide timely, complete, and accurate information to patient/family  - Incorporate patient and family knowledge, values, beliefs, and cultural backgrounds into the planning and delivery of care  - Encourage patient/family to participate in care and decision-making at the level they choose  - Honor patient and family perspectives and choices  Outcome: Progressing     Problem: Patient/Family Goals  Goal: Patient/Family Long Term Goal  Description: Patient's Long Term Goal:     Interventions:  -   - See additional Care Plan goals for specific interventions  Outcome: Progressing  Goal: Patient/Family Short Term Goal  Description: Patient's Short Term Goal:     Interventions:   -   - See additional Care Plan goals for specific interventions  Outcome: Progressing     Problem: PAIN - ADULT  Goal: Verbalizes/displays adequate comfort level or patient's stated pain goal  Description: INTERVENTIONS:  - Encourage pt to monitor pain and request assistance  - Assess pain using appropriate pain scale  - Administer analgesics based on type and severity of pain and evaluate response  - Implement non-pharmacological measures as appropriate and evaluate response  - Consider cultural and social influences on pain and pain management  - Manage/alleviate anxiety  - Utilize distraction and/or relaxation techniques  - Monitor for opioid side effects  - Notify MD/LIP if interventions unsuccessful or patient reports new pain  - Anticipate increased pain with activity and pre-medicate as appropriate  Outcome: Progressing     Problem: GASTROINTESTINAL - ADULT  Goal: Minimal or absence of nausea and vomiting  Description: INTERVENTIONS:  - Maintain adequate hydration with IV or PO as ordered and tolerated  - Nasogastric tube to low  intermittent suction as ordered  - Evaluate effectiveness of ordered antiemetic medications  - Provide nonpharmacologic comfort measures as appropriate  - Advance diet as tolerated, if ordered  - Obtain nutritional consult as needed  - Evaluate fluid balance  Outcome: Progressing  Goal: Maintains or returns to baseline bowel function  Description: INTERVENTIONS:  - Assess bowel function  - Maintain adequate hydration with IV or PO as ordered and tolerated  - Evaluate effectiveness of GI medications  - Encourage mobilization and activity  - Obtain nutritional consult as needed  - Establish a toileting routine/schedule  - Consider collaborating with pharmacy to review patient's medication profile  Outcome: Progressing

## 2024-09-15 NOTE — CONSULTS
ab  [facility]  part of [facility]    Report of Consultation    Coni Gandhi Patient Status:  Inpatient    1945 MRN [id number]   Location [facility] 2W/SW Attending Rex Us MD   Hosp Day # 0 PCP Freedom Hickey MD     Date of Admission:  2024  Date of Consult:  9/15/2024    Reason for Consultation:  Abd pain    History of Present Illness:  Coni Gandhi is a a(n) 78 year old female. States pain upper abd  some n and v   no change bowels or chills   States bm ok   Pmh includes DIDIER-BSO radiation   repair of incisional hernia with mesh   lysis   See rest below    History:  Past Medical History:    Actinic keratosis    Left cheek    Anxiety    Back problem    compression fractures    Benign essential tremor    right hand    Cancer (HCC)    uterine    Disorder of thyroid    hypothyroid    Exposure to medical diagnostic radiation    Hypothyroidism    Lichenoid actinic keratosis    Left cheek    Nausea and vomiting in adult    Osteoarthritis    Osteoporosis    Post-operative hypothyroidism    Postmenopausal atrophic vaginitis    Visual impairment    glasses     Past Surgical History:   Procedure Laterality Date    Back surgery      Breast biopsy Left     Colectomy      Colonoscopy      2012     D & c      \"mass\" inside uterus -- Dr. Napier    Hernia surgery  2021    Ventral Hernia Repair     Hip replacement surgery      Hysterectomy      Other surgical history      s/p thyroidectomy for benign thyroid nodules    Other surgical history  2019    bowel obstruction    Spine surgery procedure unlisted  10/2019    kyphoplasty     Thyroidectomy  1986    nodules removed    Tonsillectomy      Total hip replacement Right      Family History   Problem Relation Age of Onset    Cancer Self     Other (Other) Mother         Heart attack    Stroke Father         brain aneurysm    No Known Problems Sister     Stroke Brother         brain aneurysm    No  Known Problems Other     Breast Cancer Neg       reports that she has quit smoking. Her smoking use included cigarettes. She has been exposed to tobacco smoke. She has never used smokeless tobacco. She reports current alcohol use. She reports that she does not use drugs.    Allergies:  No Known Allergies    Medications:    Current Facility-Administered Medications:     dextrose 5%-sodium chloride 0.9% infusion, , Intravenous, Continuous    morphINE PF 2 MG/ML injection 1 mg, 1 mg, Intravenous, Q2H PRN **OR** morphINE PF 2 MG/ML injection 2 mg, 2 mg, Intravenous, Q2H PRN **OR** morphINE PF 4 MG/ML injection 4 mg, 4 mg, Intravenous, Q2H PRN    ondansetron (Zofran) 4 MG/2ML injection 4 mg, 4 mg, Intravenous, Q6H PRN    metoclopramide (Reglan) 5 mg/mL injection 5 mg, 5 mg, Intravenous, Q8H PRN    piperacillin-tazobactam (Zosyn) 3.375 g in dextrose 5% 100 mL IVPB-ADDV, 3.375 g, Intravenous, Q8H    [Held by provider] levothyroxine (Synthroid) 100 MCG/5ML injection 37.5 mcg, 37.5 mcg, Intravenous, Daily  Facility-Administered Medications Prior to Admission   Medication Dose Route Frequency Provider Last Rate Last Admin    denosumab (Prolia) 60 MG/ML SUBQ injection 60 mg  60 mg Subcutaneous Q6 Months Nadiya Hylton MD   60 mg at 06/18/24 1018     Medications Prior to Admission   Medication Sig Dispense Refill Last Dose    levothyroxine 50 MCG Oral Tab Take 1 tablet (50 mcg total) by mouth before breakfast. 90 tablet 3 9/14/2024 at AM    VITAMIN B COMPLEX-C OR    9/14/2024 at AM    Calcium Carbonate-Vitamin D (CALCIUM-VITAMIN D) 600-200 MG-UNIT Oral Cap Take by mouth daily. Takes 2 tabs in am and 1 tab at HS    9/15/2024 at AM    Multiple Vitamins Oral Tab Take 1 tablet by mouth daily.   9/14/2024 at AM    Omega-3 Fatty Acids ( OMEGA-3 FISH OIL) 1200 MG Oral Capsule Delayed Release Take by mouth daily.     9/14/2024 at AM    FLUoxetine 20 MG Oral Cap Take 1 capsule (20 mg total) by mouth daily. ADDRESS ALL REFILLS AT  UPCOMING APPOINTMENT (Patient taking differently: Take 1 capsule (20 mg total) by mouth daily. ADDRESS ALL REFILLS AT UPCOMING APPOINTMENT- states she takes at hs) 30 capsule 0 9/13/2024 at HS    fluorouracil 5 % External Cream Start using 3-4 weeks after biopsy. Use twice daily to spot on left cheek for 4 weeks. Please notify docot if spot opens up or begins to bleed. 40 g 0 Unknown    Cholecalciferol (VITAMIN D) 50 MCG (2000 UT) Oral Cap Take by mouth.       Multiple Vitamins-Minerals (EYE VITAMINS & MINERALS OR) Take 1 tablet by mouth daily.       diphenhydrAMINE HCl, Sleep, (ZZZQUIL OR) Take by mouth.   Unknown    Melatonin 5 MG Oral Tab Take 1 tablet (5 mg total) by mouth nightly.   Unknown       Review of Systems:  A ten point review of systems was negative except as noted.    Physical Exam:  Blood pressure 114/64, pulse 55, temperature 97.9 °F (36.6 °C), temperature source Temporal, resp. rate 17, height 5' 2\" (1.575 m), weight 178 lb (80.7 kg), SpO2 96%, not currently breastfeeding.    General: Alert, orientated x3.  Cooperative.  Mod discomfort  HEENT: Exam is unremarkable.    Lungs: per attending  Cardiac: per attending  Abdomen:  Soft, distended with tenderness everton-umbilical area      Skin: Normal texture and turgor.  Neurologic: per attending  Laboratory Data:  Lab Results   Component Value Date    WBC 7.8 09/14/2024    HGB 12.9 09/14/2024    HCT 39.7 09/14/2024    .0 09/14/2024    CREATSERUM 0.89 09/14/2024    BUN 25 (H) 09/14/2024     09/14/2024    K 3.8 09/14/2024     09/14/2024    CO2 17.0 (L) 09/14/2024     (H) 09/14/2024    CA 9.4 09/14/2024    ALB 4.0 07/01/2024    ALKPHO 74 07/01/2024    BILT 0.4 07/01/2024    TP 6.9 07/01/2024    AST 25 07/01/2024    ALT 16 07/01/2024    PTT 33.1 08/28/2020    INR 1.13 08/28/2020    TSH 3.161 07/01/2024    LIP 42 09/14/2024    MG 2.1 09/14/2024    TROP <0.045 11/29/2019    B12 543 12/04/2019       Imaging:  MRA BRAIN  (CPT=70544)    Result Date: 8/29/2024  PROCEDURE: MRA BRAIN (CPT=70544)  COMPARISON: None.  INDICATIONS: Z82.49 Family history of brain aneurysm  TECHNIQUE: MR angiography was without contrast material.  Multiplanar reconstructed 2D and 3D images of the cerebral arteries were created and interpreted.   FINDINGS:  INTERNAL CAROTIDS: Flow identified.  ANTERIOR CEREBRALS: Flow identified.  MIDDLE CEREBRALS: Flow identified.  POSTERIOR CEREBRALS: Flow identified.  ANTERIOR COMMUNICATOR: Not identified. POSTERIOR COMMUNICATORS: Flow identified.  . BASILAR: Flow identified.  VERTEBRALS: Left V4 is not visualized-probably terminated in PICA .  Dominant patent right vertebral artery continues as basilar artery. SUPERIOR CEREBELLAR: Flow identified .  Mild narrowing of right superior cerebellar artery. ANTERIOR INFERIOR CEREBALLAR: Flow identified on left.  Right not visualized PICA: Flow identified ANEURYSMS: A 2 mm outpouching from the right cavernous carotid artery probably represents a small infundibulum at origin of a hypophyseal branch of the right cavernous carotid artery.  Unlikely to represent a tiny aneurysm.  No other evidence of aneurysm. SIGNIFICANT STENOSIS: None. VASCULAR MALFORMATIONS: None. OTHER:   Negative.          CONCLUSION:  1. 2 mm outpouching from right cavernous carotid artery probably represents a small infundibulum at origin of a small hypophyseal branch.  No unequivocal aneurysm.  Consider a follow-up MRA in 1 year.    Dictated by (CST): Davonte Fall MD on 8/29/2024 at 4:24 PM     Finalized by (CST): Davonte Fall MD on 8/29/2024 at 4:43 PM            Impression and Plan:  Patient Active Problem List   Diagnosis    Hypothyroidism    Peripheral venous insufficiency    Colon cancer screening    Osteoporosis    Wedge compression fracture of second lumbar vertebra with routine healing    Adenocarcinoma of endometrium (HCC)    Macrocytosis    Risk for falls    SOPHIA (generalized anxiety disorder)     Vasomotor rhinitis    Medicare annual wellness visit, subsequent    Breast screening    Eye exam, routine    History of endometrial cancer    Lower abdominal pain    Nausea and vomiting in adult    Complete intestinal obstruction, unspecified cause (HCC)   7.8  wbc  lactic 5  CT   possible ischemic area in cecum   For exp    CORNELIO SANDERS MD  9/15/2024  9:04 AM

## 2024-09-15 NOTE — ANESTHESIA POSTPROCEDURE EVALUATION
Patient: Coni Gandhi    Procedure Summary       Date: 09/15/24 Room / Location: [facility] MAIN OR  / [facility] MAIN OR    Anesthesia Start: 0938 Anesthesia Stop:     Procedure: EXPLORATORY LAPAROTOMY, BOWEL RESECTION (Abdomen) Diagnosis:       Bowel obstruction (HCC)      (Bowel obstruction (HCC) [K56.609])    Surgeons: Bebo Rodrigez MD Anesthesiologist: Isidra Franco MD    Anesthesia Type: general ASA Status: 3 - Emergent            Anesthesia Type: general    Vitals Value Taken Time   /74 09/15/24 1137   Temp 98.1 °F (36.7 °C) 09/15/24 1137   Pulse 69 09/15/24 1137   Resp 19 09/15/24 1137   SpO2 93 % 09/15/24 1137       EM AN Post Evaluation:   Patient Evaluated in PACU  Patient Participation: complete - patient participated  Level of Consciousness: sleepy but conscious  Pain Score: 0  Pain Management: adequate  Airway Patency:patent  Dental exam unchanged from preop  Yes    Nausea/Vomiting: none  Cardiovascular Status: acceptable  Respiratory Status: acceptable  Postoperative Hydration acceptable      Isidra Franco MD  9/15/2024 11:43 AM

## 2024-09-15 NOTE — ANESTHESIA PROCEDURE NOTES
Arterial Line    Date/Time: 9/15/2024 9:45 AM    Performed by: Isidra Franco MD  Authorized by: Isidra Franco MD    General Information and Staff    Procedure Start:  9/15/2024 9:45 AM  Procedure End:  9/15/2024 9:49 AM  Anesthesiologist:  Isidra Franco MD  Performed By:  Anesthesiologist  Patient Location:  OR  Indication: continuous blood pressure monitoring and blood sampling needed    Site Identification: real time ultrasound guided and surface landmarks    Preanesthetic Checklist: 2 patient identifiers, IV checked, risks and benefits discussed, monitors and equipment checked, pre-op evaluation, timeout performed, anesthesia consent and sterile technique used    Procedure Details    Catheter Size:  20 G  Catheter Length:  1 and 3/4 inch  Catheter Type:  Arrow  Seldinger Technique?: Yes    Laterality:  Right  Site:  Radial artery  Site Prep: chlorhexidine    Line Secured:  Wrist Brace, tape and Tegaderm    Assessment    Events: patient tolerated procedure well with no complications      Medications  9/15/2024 9:45 AM      Additional Comments

## 2024-09-15 NOTE — PROGRESS NOTES
Notified about lactic acid 5.0.  This is likely due to bowel ischemia  Called and updated Dr Rodrigez.  Will give additional fluid bolus and transfer patient to PCU as a precaution.  Trend lactic acid levels

## 2024-09-15 NOTE — PLAN OF CARE
Problem: Patient Centered Care  Goal: Patient preferences are identified and integrated in the patient's plan of care  Description: Interventions:  - What would you like us to know as we care for you?   - Provide timely, complete, and accurate information to patient/family  - Incorporate patient and family knowledge, values, beliefs, and cultural backgrounds into the planning and delivery of care  - Encourage patient/family to participate in care and decision-making at the level they choose  - Honor patient and family perspectives and choices  Outcome: Progressing     Problem: Patient/Family Goals  Goal: Patient/Family Long Term Goal  Description: Patient's Long Term Goal:     Interventions:  - See additional Care Plan goals for specific interventions  Outcome: Progressing  Goal: Patient/Family Short Term Goal  Description: Patient's Short Term Goal:     Interventions:   - See additional Care Plan goals for specific interventions  Outcome: Progressing     Problem: PAIN - ADULT  Goal: Verbalizes/displays adequate comfort level or patient's stated pain goal  Description: INTERVENTIONS:  - Encourage pt to monitor pain and request assistance  - Assess pain using appropriate pain scale  - Administer analgesics based on type and severity of pain and evaluate response  - Implement non-pharmacological measures as appropriate and evaluate response  - Consider cultural and social influences on pain and pain management  - Manage/alleviate anxiety  - Utilize distraction and/or relaxation techniques  - Monitor for opioid side effects  - Notify MD/LIP if interventions unsuccessful or patient reports new pain  - Anticipate increased pain with activity and pre-medicate as appropriate  Outcome: Progressing     Problem: GASTROINTESTINAL - ADULT  Goal: Maintains or returns to baseline bowel function  Description: INTERVENTIONS:  - Assess bowel function  - Maintain adequate hydration with IV or PO as ordered and tolerated  - Evaluate  effectiveness of GI medications  - Encourage mobilization and activity  - Obtain nutritional consult as needed  - Establish a toileting routine/schedule  - Consider collaborating with pharmacy to review patient's medication profile  Outcome: Progressing     Problem: GASTROINTESTINAL - ADULT  Goal: Minimal or absence of nausea and vomiting  Description: INTERVENTIONS:  - Maintain adequate hydration with IV or PO as ordered and tolerated  - Nasogastric tube to low intermittent suction as ordered  - Evaluate effectiveness of ordered antiemetic medications  - Provide nonpharmacologic comfort measures as appropriate  - Advance diet as tolerated, if ordered  - Obtain nutritional consult as needed  - Evaluate fluid balance  Outcome: Not Progressing

## 2024-09-15 NOTE — ED PROVIDER NOTES
Patient Seen in: [facility] Emergency Department      History     Chief Complaint   Patient presents with    Abdomen/Flank Pain     Stated Complaint: abdominal pain    Subjective:   HPI        Objective:   Past Medical History:    Actinic keratosis    Left cheek    Anxiety    Back problem    compression fractures    Benign essential tremor    right hand    Cancer (HCC)    uterine    Disorder of thyroid    hypothyroid    Exposure to medical diagnostic radiation    Hypothyroidism    Lichenoid actinic keratosis    Left cheek    Osteoarthritis    Osteoporosis    Post-operative hypothyroidism    Postmenopausal atrophic vaginitis    Visual impairment    glasses              Past Surgical History:   Procedure Laterality Date    Back surgery      Breast biopsy Left 1990    Colectomy      Colonoscopy      2012     D & c  2013    \"mass\" inside uterus -- Dr. Napier    Hernia surgery  05/13/2021    Ventral Hernia Repair     Hip replacement surgery      Hysterectomy      Other surgical history  1986    s/p thyroidectomy for benign thyroid nodules    Other surgical history  11/29/2019    bowel obstruction    Spine surgery procedure unlisted  10/2019    kyphoplasty     Thyroidectomy  1986    nodules removed    Tonsillectomy      Total hip replacement Right                 Social History     Socioeconomic History    Marital status:     Number of children: 3   Occupational History    Occupation: Retired   Tobacco Use    Smoking status: Former     Current packs/day: 1.00     Types: Cigarettes     Passive exposure: Past    Smokeless tobacco: Never    Tobacco comments:     quit 40 yrs ago   Vaping Use    Vaping status: Never Used   Substance and Sexual Activity    Alcohol use: Yes     Alcohol/week: 0.0 standard drinks of alcohol     Comment: rarely    Drug use: No   Other Topics Concern    Caffeine Concern Yes     Comment: Coffee 1 cup daily; Soda    Grew up on a farm No    History of tanning No    Outdoor occupation No     Breast feeding No    Reaction to local anesthetic No    Pt has a pacemaker No    Pt has a defibrillator No              Review of Systems    Positive for stated Chief Complaint: Abdomen/Flank Pain    Other systems are as noted in HPI.  Constitutional and vital signs reviewed.      All other systems reviewed and negative except as noted above.    Physical Exam     ED Triage Vitals   BP 09/14/24 2316 142/83   Pulse 09/14/24 2316 85   Resp 09/14/24 2316 26   Temp 09/14/24 2320 97.3 °F (36.3 °C)   Temp src 09/14/24 2320 Oral   SpO2 09/14/24 2316 97 %   O2 Device 09/14/24 2316 None (Room air)       Current Vitals:   Vital Signs  BP: 110/49  Pulse: 59  Resp: 25  Temp: 97.3 °F (36.3 °C)  Temp src: Oral  MAP (mmHg): 67    Oxygen Therapy  SpO2: 100 %  O2 Device: None (Room air)            Physical Exam          ED Course     Labs Reviewed   CBC WITH DIFFERENTIAL WITH PLATELET - Abnormal; Notable for the following components:       Result Value    .2 (*)     RDW-SD 51.6 (*)     All other components within normal limits   BASIC METABOLIC PANEL (8) - Abnormal; Notable for the following components:    Glucose 129 (*)     CO2 17.0 (*)     BUN 25 (*)     BUN/CREA Ratio 28.1 (*)     All other components within normal limits   LIPASE - Normal   TROPONIN I HIGH SENSITIVITY - Normal   MAGNESIUM - Normal   URINALYSIS WITH CULTURE REFLEX   LACTIC ACID, PLASMA   RAINBOW DRAW LAVENDER   RAINBOW DRAW LIGHT GREEN   RAINBOW DRAW GOLD   RAINBOW DRAW BLUE   BLOOD CULTURE   BLOOD CULTURE     EKG    Rate, intervals and axes as noted on EKG Report.  Rate: 90  Rhythm: Atrial fibrillation  Reading: Atrial fibrillation with a normal ventricular response                          MDM      78-year-old female with a history of uterine cancer status post hysterectomy, 1 previous episode of SBO presents today with abdominal pain.  Patient states this evening, around 9 PM, she started having low/middle abdominal pain associated with some nausea and  vomiting.  The pain is now severe.  Denies fever/chills, dysuria, bowel movement changes, or other associated symptoms.    On exam, vitals normal, painful appearing with some active retching, tenderness to palpation throughout the abdomen but worst in the lower quadrants.    Differential: Small bowel obstruction, diverticulitis, cystitis,    Plan to eval with labs and CT abdomen pelvis while giving symptomatic treatment.  Admission disposition: 9/15/2024  1:40 AM         I received a call from radiology regarding the CT showing pericecal hernia with concern for possible obstruction and possible bowel necrosis.    I consulted general surgery and reviewed the case.  They recommended NG tube placement, IV fluids, and will come and see the patient.      I spent a total of 45 minutes of critical care time in obtaining history, performing a physical exam, bedside monitoring of interventions, collecting and interpreting tests and discussion with consultants but not including time spent performing procedures.                             Medical Decision Making      Disposition and Plan     Clinical Impression:  1. Lower abdominal pain    2. Nausea and vomiting in adult    3. Complete intestinal obstruction, unspecified cause (HCC)         Disposition:  Admit  9/15/2024  1:40 am    Follow-up:  No follow-up provider specified.  We recommend that you schedule follow up care with a primary care provider within the next three months to obtain basic health screening including reassessment of your blood pressure.      Medications Prescribed:  Current Discharge Medication List                            Hospital Problems       Present on Admission  Date Reviewed: 6/29/2024            ICD-10-CM Noted POA    * (Principal) Lower abdominal pain R10.30 9/15/2024 Unknown

## 2024-09-15 NOTE — ANESTHESIA PROCEDURE NOTES
Peripheral IV  Date/Time: 9/15/2024 9:55 AM  Inserted by: Isidra Franco MD    Placement  Needle size: 16 G  Laterality: left  Location: forearm  Site prep: alcohol  Technique: ultrasound guided  Attempts: 1

## 2024-09-15 NOTE — ANESTHESIA PREPROCEDURE EVALUATION
Anesthesia PreOp Note    HPI:     Coni Gandhi is a 78 year old female who presents for preoperative consultation requested by: Bebo Rodrigez MD    Date of Surgery: 9/15/2024    Procedure(s):  EXPLORATORY LAPAROTOMY POSSIBLE BOWEL RESECTION  Indication: Bowel obstruction (HCC) [K56.609]    Relevant Problems   No relevant active problems       NPO:                         History Review:  Patient Active Problem List    Diagnosis Date Noted    Lower abdominal pain 09/15/2024    Nausea and vomiting in adult 09/15/2024    Complete intestinal obstruction, unspecified cause (HCC) 09/15/2024    Medicare annual wellness visit, subsequent 06/26/2023    Breast screening 06/26/2023    Eye exam, routine 06/26/2023    History of endometrial cancer 06/26/2023    Vasomotor rhinitis 11/09/2022    SOPHIA (generalized anxiety disorder) 11/02/2021    Macrocytosis 10/30/2020    Risk for falls 10/30/2020    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/23/2020    Wedge compression fracture of second lumbar vertebra with routine healing 10/14/2019    Peripheral venous insufficiency 06/24/2019    Osteoporosis 01/29/2015    Colon cancer screening 12/01/2009    Hypothyroidism 10/24/2008       Past Medical History:    Actinic keratosis    Left cheek    Anxiety    Back problem    compression fractures    Benign essential tremor    right hand    Cancer (HCC)    uterine    Disorder of thyroid    hypothyroid    Exposure to medical diagnostic radiation    Hypothyroidism    Lichenoid actinic keratosis    Left cheek    Nausea and vomiting in adult    Osteoarthritis    Osteoporosis    Post-operative hypothyroidism    Postmenopausal atrophic vaginitis    Visual impairment    glasses       Past Surgical History:   Procedure Laterality Date    Back surgery      Breast biopsy Left 1990    Colectomy      Colonoscopy      2012     D & c  2013    \"mass\" inside uterus -- Dr. Napier    Hernia surgery  05/13/2021    Ventral Hernia Repair     Hip replacement surgery       Hysterectomy      Other surgical history  1986    s/p thyroidectomy for benign thyroid nodules    Other surgical history  11/29/2019    bowel obstruction    Spine surgery procedure unlisted  10/2019    kyphoplasty     Thyroidectomy  1986    nodules removed    Tonsillectomy      Total hip replacement Right        Facility-Administered Medications Prior to Admission   Medication Dose Route Frequency Provider Last Rate Last Admin    denosumab (Prolia) 60 MG/ML SUBQ injection 60 mg  60 mg Subcutaneous Q6 Months Nadiya Hylton MD   60 mg at 06/18/24 1018     Medications Prior to Admission   Medication Sig Dispense Refill Last Dose    levothyroxine 50 MCG Oral Tab Take 1 tablet (50 mcg total) by mouth before breakfast. 90 tablet 3 9/14/2024 at AM    VITAMIN B COMPLEX-C OR    9/14/2024 at AM    Calcium Carbonate-Vitamin D (CALCIUM-VITAMIN D) 600-200 MG-UNIT Oral Cap Take by mouth daily. Takes 2 tabs in am and 1 tab at HS    9/15/2024 at AM    Multiple Vitamins Oral Tab Take 1 tablet by mouth daily.   9/14/2024 at AM    Omega-3 Fatty Acids ( OMEGA-3 FISH OIL) 1200 MG Oral Capsule Delayed Release Take by mouth daily.     9/14/2024 at AM    FLUoxetine 20 MG Oral Cap Take 1 capsule (20 mg total) by mouth daily. ADDRESS ALL REFILLS AT UPCOMING APPOINTMENT (Patient taking differently: Take 1 capsule (20 mg total) by mouth daily. ADDRESS ALL REFILLS AT UPCOMING APPOINTMENT- states she takes at hs) 30 capsule 0 9/13/2024 at HS    fluorouracil 5 % External Cream Start using 3-4 weeks after biopsy. Use twice daily to spot on left cheek for 4 weeks. Please notify docot if spot opens up or begins to bleed. 40 g 0 Unknown    Cholecalciferol (VITAMIN D) 50 MCG (2000 UT) Oral Cap Take by mouth.       Multiple Vitamins-Minerals (EYE VITAMINS & MINERALS OR) Take 1 tablet by mouth daily.       diphenhydrAMINE HCl, Sleep, (ZZZQUIL OR) Take by mouth.   Unknown    Melatonin 5 MG Oral Tab Take 1 tablet (5 mg total) by mouth nightly.    Unknown     Current Facility-Administered Medications Ordered in Epic   Medication Dose Route Frequency Provider Last Rate Last Admin    dextrose 5%-sodium chloride 0.9% infusion   Intravenous Continuous Catalina Brunner  mL/hr at 09/15/24 0600 New Bag at 09/15/24 0600    morphINE PF 2 MG/ML injection 1 mg  1 mg Intravenous Q2H PRN Catalina Brunner MD        Or    morphINE PF 2 MG/ML injection 2 mg  2 mg Intravenous Q2H PRN Catalina Brunner MD   2 mg at 09/15/24 0351    Or    morphINE PF 4 MG/ML injection 4 mg  4 mg Intravenous Q2H PRN Catalina Brunner MD        ondansetron (Zofran) 4 MG/2ML injection 4 mg  4 mg Intravenous Q6H PRN Catalina Brunner MD   4 mg at 09/15/24 0411    metoclopramide (Reglan) 5 mg/mL injection 5 mg  5 mg Intravenous Q8H PRN Catalina Brunner MD        piperacillin-tazobactam (Zosyn) 3.375 g in dextrose 5% 100 mL IVPB-ADDV  3.375 g Intravenous Q8H Catalina Brunner MD        [Held by provider] levothyroxine (Synthroid) 100 MCG/5ML injection 37.5 mcg  37.5 mcg Intravenous Daily Catalina Brunner MD         No current [facility]-ordered outpatient medications on file.       No Known Allergies    Family History   Problem Relation Age of Onset    Cancer Self     Other (Other) Mother         Heart attack    Stroke Father         brain aneurysm    No Known Problems Sister     Stroke Brother         brain aneurysm    No Known Problems Other     Breast Cancer Neg      Social History     Socioeconomic History    Marital status:     Number of children: 3   Occupational History    Occupation: Retired   Tobacco Use    Smoking status: Former     Current packs/day: 1.00     Types: Cigarettes     Passive exposure: Past    Smokeless tobacco: Never    Tobacco comments:     quit 40 yrs ago   Vaping Use    Vaping status: Never Used   Substance and Sexual Activity    Alcohol use: Yes     Alcohol/week: 0.0 standard drinks of alcohol     Comment: rarely    Drug use: No   Other Topics Concern     Caffeine Concern Yes     Comment: Coffee 1 cup daily; Soda    Grew up on a farm No    History of tanning No    Outdoor occupation No    Breast feeding No    Reaction to local anesthetic No    Pt has a pacemaker No    Pt has a defibrillator No       Available pre-op labs reviewed.  Lab Results   Component Value Date    WBC 7.8 09/14/2024    RBC 3.81 09/14/2024    HGB 12.9 09/14/2024    HCT 39.7 09/14/2024    .2 (H) 09/14/2024    MCH 33.9 09/14/2024    MCHC 32.5 09/14/2024    RDW 13.3 09/14/2024    .0 09/14/2024     Lab Results   Component Value Date     09/14/2024    K 3.8 09/14/2024     09/14/2024    CO2 17.0 (L) 09/14/2024    BUN 25 (H) 09/14/2024    CREATSERUM 0.89 09/14/2024     (H) 09/14/2024    CA 9.4 09/14/2024          Vital Signs:  Body mass index is 32.56 kg/m².   height is 1.575 m (5' 2\") and weight is 80.7 kg (178 lb). Her temporal temperature is 97.9 °F (36.6 °C). Her blood pressure is 114/64 and her pulse is 55. Her respiration is 17 and oxygen saturation is 96%.   Vitals:    09/15/24 0323 09/15/24 0400 09/15/24 0500 09/15/24 0600   BP:   122/62 114/64   Pulse:   58 55   Resp:   12 17   Temp:  97.9 °F (36.6 °C)     TempSrc:  Temporal     SpO2:   96% 96%   Weight: 80.7 kg (178 lb)      Height:            Anesthesia Evaluation     Patient summary reviewed and Nursing notes reviewed    No history of anesthetic complications   Airway   Mallampati: II  TM distance: >3 FB  Neck ROM: full  Dental      Comment: Patient denies any additional loose, missing, and chipped teeth.    Pulmonary - negative ROS and normal exam    breath sounds clear to auscultation  (-) asthma, shortness of breath, recent URI, sleep apnea  Cardiovascular - normal exam  Exercise tolerance: good  (+) hypertension  (-) pacemaker, valvular problems/murmurs, past MI, CAD, CABG/stent, dysrhythmias, angina, CHF    Rhythm: regular  Rate: normal    Neuro/Psych    (+)  anxiety/panic attacks,      (-) seizures,  TIA, CVA    GI/Hepatic/Renal    (-) GERD, liver disease, renal disease    Comments: Ischemic bowel    Endo/Other    (-) diabetes mellitus, blood dyscrasia    Comments: obesity  Abdominal                  Anesthesia Plan:   ASA:  3  Emergent    Plan:   General  Monitors and Lines:   A-line and Additonal IV  Airway:  ETT  Informed Consent Plan and Risks Discussed With:  Patient      I have informed Coni A Struc and/or legal guardian or family member of the nature of the anesthetic plan, benefits, risks including possible dental damage if relevant, major complications, and any alternative forms of anesthetic management.   All of the patient's questions were answered to the best of my ability. The patient desires the anesthetic management as planned.  Isidra Franco MD  9/15/2024 7:30 AM  Present on Admission:   Lower abdominal pain

## 2024-09-15 NOTE — ED QUICK NOTES
Orders for admission, patient is aware of plan and ready to go upstairs. Any questions, please call ED RN Vanda at extension [phone].     Patient Covid vaccination status: Fully vaccinated     COVID Test Ordered in ED: None    COVID Suspicion at Admission: N/A    Running Infusions:      Mental Status/LOC at time of transport: a.ox4    Other pertinent information: NG tube placed at 55cm  CIWA score: N/A   NIH score:  N/A

## 2024-09-16 LAB
ALBUMIN SERPL-MCNC: 3.1 G/DL (ref 3.2–4.8)
ALBUMIN/GLOB SERPL: 1.5 {RATIO} (ref 1–2)
ALP LIVER SERPL-CCNC: 42 U/L
ALT SERPL-CCNC: 12 U/L
ANION GAP SERPL CALC-SCNC: 4 MMOL/L (ref 0–18)
AST SERPL-CCNC: 19 U/L (ref ?–34)
BASOPHILS # BLD AUTO: 0.01 X10(3) UL (ref 0–0.2)
BASOPHILS NFR BLD AUTO: 0.1 %
BILIRUB SERPL-MCNC: 0.5 MG/DL (ref 0.2–1.1)
BUN BLD-MCNC: 16 MG/DL (ref 9–23)
BUN/CREAT SERPL: 21.6 (ref 10–20)
CALCIUM BLD-MCNC: 7.6 MG/DL (ref 8.7–10.4)
CHLORIDE SERPL-SCNC: 114 MMOL/L (ref 98–112)
CO2 SERPL-SCNC: 24 MMOL/L (ref 21–32)
CREAT BLD-MCNC: 0.74 MG/DL
DEPRECATED RDW RBC AUTO: 50.5 FL (ref 35.1–46.3)
EGFRCR SERPLBLD CKD-EPI 2021: 83 ML/MIN/1.73M2 (ref 60–?)
EOSINOPHIL # BLD AUTO: 0 X10(3) UL (ref 0–0.7)
EOSINOPHIL NFR BLD AUTO: 0 %
ERYTHROCYTE [DISTWIDTH] IN BLOOD BY AUTOMATED COUNT: 13.7 % (ref 11–15)
GLOBULIN PLAS-MCNC: 2.1 G/DL (ref 2–3.5)
GLUCOSE BLD-MCNC: 160 MG/DL (ref 70–99)
HCT VFR BLD AUTO: 23.2 %
HCT VFR BLD AUTO: 24.6 %
HCT VFR BLD AUTO: 26.2 %
HCT VFR BLD AUTO: 26.5 %
HGB BLD-MCNC: 8 G/DL
HGB BLD-MCNC: 8.5 G/DL
HGB BLD-MCNC: 8.7 G/DL
HGB BLD-MCNC: 8.9 G/DL
IMM GRANULOCYTES # BLD AUTO: 0.06 X10(3) UL (ref 0–1)
IMM GRANULOCYTES NFR BLD: 0.5 %
LYMPHOCYTES # BLD AUTO: 0.55 X10(3) UL (ref 1–4)
LYMPHOCYTES NFR BLD AUTO: 4.7 %
MAGNESIUM SERPL-MCNC: 1.8 MG/DL (ref 1.6–2.6)
MCH RBC QN AUTO: 34 PG (ref 26–34)
MCHC RBC AUTO-ENTMCNC: 33.6 G/DL (ref 31–37)
MCV RBC AUTO: 101.1 FL
MONOCYTES # BLD AUTO: 1.21 X10(3) UL (ref 0.1–1)
MONOCYTES NFR BLD AUTO: 10.4 %
NEUTROPHILS # BLD AUTO: 9.75 X10 (3) UL (ref 1.5–7.7)
NEUTROPHILS # BLD AUTO: 9.75 X10(3) UL (ref 1.5–7.7)
NEUTROPHILS NFR BLD AUTO: 84.3 %
OSMOLALITY SERPL CALC.SUM OF ELEC: 299 MOSM/KG (ref 275–295)
PHOSPHATE SERPL-MCNC: 2.1 MG/DL (ref 2.4–5.1)
PLATELET # BLD AUTO: 182 10(3)UL (ref 150–450)
POTASSIUM SERPL-SCNC: 3.8 MMOL/L (ref 3.5–5.1)
PROT SERPL-MCNC: 5.2 G/DL (ref 5.7–8.2)
RBC # BLD AUTO: 2.62 X10(6)UL
SODIUM SERPL-SCNC: 142 MMOL/L (ref 136–145)
WBC # BLD AUTO: 11.6 X10(3) UL (ref 4–11)

## 2024-09-16 PROCEDURE — 99291 CRITICAL CARE FIRST HOUR: CPT | Performed by: INTERNAL MEDICINE

## 2024-09-16 RX ORDER — ACETAMINOPHEN 10 MG/ML
1000 INJECTION, SOLUTION INTRAVENOUS EVERY 6 HOURS PRN
Status: DISCONTINUED | OUTPATIENT
Start: 2024-09-16 | End: 2024-09-18

## 2024-09-16 RX ORDER — MAGNESIUM SULFATE HEPTAHYDRATE 40 MG/ML
2 INJECTION, SOLUTION INTRAVENOUS ONCE
Status: COMPLETED | OUTPATIENT
Start: 2024-09-16 | End: 2024-09-16

## 2024-09-16 NOTE — PROGRESS NOTES
Progress Note     Coni Gandhi Patient Status:  Inpatient    1945 MRN [id number]   Location [facility] 2W/SW Attending Rex Us MD   Hosp Day # 1 PCP Freedom Hickey MD     Chief Complaint:   Chief Complaint   Patient presents with    Abdomen/Flank Pain         Subjective:   S: Patient seen and examined, chart reviewed.   Patient has ongoing bleeding from the surgical site.  Hemoglobin dropped from 12-8.9 but has stayed consistent throughout today.  Await surgical input.      Review of Systems:   10 point ROS completed and was negative, except for pertinent positive and negatives stated in subjective.                Objective:   Vital signs:  Temp:  [97.6 °F (36.4 °C)-97.9 °F (36.6 °C)] 97.6 °F (36.4 °C)  Pulse:  [67-83] 74  Resp:  [15-26] 22  BP: (100-116)/(48-79) 102/67  SpO2:  [88 %-100 %] 95 %  AO: ()/() 111/106    Wt Readings from Last 6 Encounters:   09/15/24 178 lb (80.7 kg)   24 178 lb (80.7 kg)   24 178 lb (80.7 kg)   23 175 lb 3.2 oz (79.5 kg)   23 176 lb 6.4 oz (80 kg)   23 171 lb (77.6 kg)       Physical Exam:    General: No acute distress.   Respiratory: Clear to auscultation bilaterally. No wheezes. No rhonchi.  Cardiovascular: S1, S2. Regular rate and rhythm. No murmurs, rubs or gallops.   Abdomen: Staples are intact.  Fresh blood oozing from 2 different places along the midline incision.  Soft, nontender, nondistended.  Positive bowel sounds. No rebound or guarding.  Neurologic: No focal neurological deficits.   Musculoskeletal: Moves all extremities.  Extremities: No edema.    Results:   Diagnostic Data:      Labs:    Labs Last 24 Hours:   BMP     CBC    Other     Na 142 Cl 114 BUN 16 Glu 160   Hb 8.5   PTT - Procal -   K 3.8 CO2 24.0 Cr 0.74   WBC 11.6 >< .0  INR - CRP -   Renal Lytes Endo    Hct 24.6   Trop - D dim -   eGFR - Ca 7.6 POC Gluc  -    LFT   pBNP - Lactic -   eGFR AA - PO4 2.1 A1c -   AST 19 APk 42  Prot 5.2  LDL -      Recent Labs   Lab 09/14/24 2319 09/16/24  0334 09/16/24  1202 09/16/24  1345   RBC 3.81 2.62*  --   --    HGB 12.9 8.9* 8.7* 8.5*   HCT 39.7 26.5* 26.2* 24.6*   .2* 101.1*  --   --    MCH 33.9 34.0  --   --    MCHC 32.5 33.6  --   --    RDW 13.3 13.7  --   --    NEPRELIM 3.33 9.75*  --   --    WBC 7.8 11.6*  --   --    .0 182.0  --   --        Lab Results   Component Value Date    INR 1.13 08/28/2020    INR 1.1 (L) 10/23/2007       Recent Labs   Lab 09/14/24 2319 09/16/24  0334   * 160*   BUN 25* 16   CREATSERUM 0.89 0.74   CA 9.4 7.6*   ALB  --  3.1*    142   K 3.8 3.8    114*   CO2 17.0* 24.0   ALKPHO  --  42*   AST  --  19   ALT  --  12   BILT  --  0.5   TP  --  5.2*       Recent Labs   Lab 09/14/24 2319   LIP 42       Recent Labs   Lab 09/14/24 2319 09/16/24  0334   MG 2.1 1.8   PHOS  --  2.1*       No results for input(s): \"URINE\", \"CULTI\", \"BLDSMR\" in the last 168 hours.      CT ABDOMEN+PELVIS(CONTRAST ONLY)(CPT=74177)    Result Date: 9/15/2024  CONCLUSION:  1. Fluid-filled thickened loops of small bowel are evident in the right hemiabdomen, which could reflect underlying infectious/inflammatory and/or ischemic enteritis or malabsorption in the appropriate clinical setting. Given the clustered appearance of the bowel protruding laterally, these findings may reflect pericecal hernia, potentially with some degree of underlying strangulation.  2. Postoperative changes of small bowel without evidence of obstruction.  3. Right hip arthroplasty with resultant artifactual degradation of the pelvis.  4. Gallbladder distension; if there is clinical concern for acute cholecystitis, follow-up right upper quadrant sonography should be considered.  5. Uncomplicated distal colonic diverticulosis.   6. Nonobstructing left renal calculus.  7. Kyphoplasty of the L2 vertebral segment.   8. Lesser incidental findings as above.    A preliminary report was issued by the  [facility] teleradiology service. There are no major discrepancies.  elm-remote.   Dictated by (CST): Nikko Braxton MD on 9/15/2024 at 9:59 AM     Finalized by (CST): Nikko Braxton MD on 9/15/2024 at 10:08 AM               Pro-Calcitonin  No results for input(s): \"PCT\" in the last 168 hours.    Cardiac  No results for input(s): \"TROP\", \"PBNP\" in the last 168 hours.    Imaging: Imaging data reviewed in [facility].    CT ABDOMEN+PELVIS(CONTRAST ONLY)(CPT=74177)    Result Date: 9/15/2024  CONCLUSION:  1. Fluid-filled thickened loops of small bowel are evident in the right hemiabdomen, which could reflect underlying infectious/inflammatory and/or ischemic enteritis or malabsorption in the appropriate clinical setting. Given the clustered appearance of the bowel protruding laterally, these findings may reflect pericecal hernia, potentially with some degree of underlying strangulation.  2. Postoperative changes of small bowel without evidence of obstruction.  3. Right hip arthroplasty with resultant artifactual degradation of the pelvis.  4. Gallbladder distension; if there is clinical concern for acute cholecystitis, follow-up right upper quadrant sonography should be considered.  5. Uncomplicated distal colonic diverticulosis.   6. Nonobstructing left renal calculus.  7. Kyphoplasty of the L2 vertebral segment.   8. Lesser incidental findings as above.    A preliminary report was issued by the [facility] teleradiology service. There are no major discrepancies.  elm-remote.   Dictated by (CST): Nikko Braxton MD on 9/15/2024 at 9:59 AM     Finalized by (CST): Nikko Braxton MD on 9/15/2024 at 10:08 AM              Medications:    piperacillin-tazobactam  3.375 g Intravenous Q8H    enoxaparin  40 mg Subcutaneous Daily       Assessment & Plan:   ASSESSMENT / PLAN:     Small bowel obstruction status post exploratory laparotomy with bowel resection secondary to necrotic ileum.  Postoperative acute blood loss  anemia  Metabolic alkalosis    Plan  -IV fluids D5 normal saline at 100 cc an hour  -IV antibiotics with Zosyn  -Strict n.p.o.  -NG tube to continuous low wall suction  -General Surgery consult  -Pain control manage at this time.     Metabolic acidosis resolved     Hypothyroidism  -IV levothyroxine         dvt prophylaxis: sc heparin  code status: full code  dispo: home upon medical clearance    I personally reviewed the available laboratories, imaging including operative report. I discussed/will discuss the case with patient and her nurse. I ordered laboratories, studies including serial H&H. I adjusted medications including not applicable today. Medical decision making high, risk is high.    >55min spent, >50% spent counseling and coordinating care in the form of educating pt/family and d/w consultants and staff. Most of the time spent discussing the above plan.     Estimated date of discharge: To be determined  Discharge is dependent on: Improved clinical status  At this point Ms. Gandhi is expected to be discharge to: Home versus rehab    Plan of care discussed with patient and nurse    Rex Us MD, FAAP, FACP  [facility] Hospitalist  I respond to Epic Chat and AMS Connect

## 2024-09-16 NOTE — PLAN OF CARE
Problem: PAIN - ADULT  Goal: Verbalizes/displays adequate comfort level or patient's stated pain goal  Description: INTERVENTIONS:  - Encourage pt to monitor pain and request assistance  - Assess pain using appropriate pain scale  - Administer analgesics based on type and severity of pain and evaluate response  - Implement non-pharmacological measures as appropriate and evaluate response  - Consider cultural and social influences on pain and pain management  - Manage/alleviate anxiety  - Utilize distraction and/or relaxation techniques  - Monitor for opioid side effects  - Notify MD/LIP if interventions unsuccessful or patient reports new pain  - Anticipate increased pain with activity and pre-medicate as appropriate  Outcome: Progressing     Problem: GASTROINTESTINAL - ADULT  Goal: Minimal or absence of nausea and vomiting  Description: INTERVENTIONS:  - Maintain adequate hydration with IV or PO as ordered and tolerated  - Nasogastric tube to low intermittent suction as ordered  - Evaluate effectiveness of ordered antiemetic medications  - Provide nonpharmacologic comfort measures as appropriate  - Advance diet as tolerated, if ordered  - Obtain nutritional consult as needed  - Evaluate fluid balance  Outcome: Progressing     Problem: GASTROINTESTINAL - ADULT  Goal: Maintains or returns to baseline bowel function  Description: INTERVENTIONS:  - Assess bowel function  - Maintain adequate hydration with IV or PO as ordered and tolerated  - Evaluate effectiveness of GI medications  - Encourage mobilization and activity  - Obtain nutritional consult as needed  - Establish a toileting routine/schedule  - Consider collaborating with pharmacy to review patient's medication profile  Outcome: Not Progressing

## 2024-09-16 NOTE — PROGRESS NOTES
[facility]  part of [facility]    Progress Note    Coni Gandhi Patient Status:  Inpatient    1945 MRN [id number]   Location [facility] 2W/SW Attending Rex Us MD   Hosp Day # 1 PCP Freedom Hickey MD     Assessment and Plan:     Coni is a 78 year old female s/p ex lap, small bowel resection 9/15    - Hgb stable  - continue pressure dressing to midline  - continue NGT to LIWS  - Encourage oob, ambulation      Subjective:   Complains of headache. No flatus. NGT with minimal output. Reports incisional pain with turning in bed.     Objective:   Patient Vitals for the past 24 hrs:   BP Temp Temp src Pulse Resp SpO2   24 1400 114/60 -- -- 73 16 97 %   24 1300 108/56 -- -- 79 23 96 %   24 1100 102/67 -- -- 74 22 95 %   24 1000 102/56 -- -- 77 20 96 %   24 0900 112/56 -- -- 71 18 94 %   24 0800 105/57 -- -- 72 20 96 %   24 0700 109/66 -- -- 78 20 95 %   24 0600 112/55 -- -- 67 15 95 %   24 0500 104/57 -- -- 73 15 96 %   24 0400 104/54 97.6 °F (36.4 °C) Temporal 67 16 94 %   24 0300 101/50 -- -- 72 19 95 %   24 0200 100/48 -- -- 68 17 94 %   24 0100 107/55 -- -- 73 18 96 %   24 0000 103/56 97.6 °F (36.4 °C) Temporal 68 16 96 %   09/15/24 2300 100/62 -- -- 73 24 96 %   09/15/24 2200 103/57 -- -- 75 23 96 %   09/15/24 2100 116/79 -- -- 83 23 95 %   09/15/24 2000 104/61 97.8 °F (36.6 °C) Temporal 75 19 95 %       Intake/Output                   24 0700 - 09/15/24 0659 09/15/24 07 - 24 0659 24 07 - 24 0659       Intake    I.V.  --  1000  653    Volume (mL) (dextrose 5%-sodium chloride 0.9% infusion) -- -- 653    Volume (mL) (sodium chloride 0.9% infusion) -- 1000 --    IV PIGGYBACK  100  100  100    Volume (mL) (piperacillin-tazobactam (Zosyn) 3.375 g in dextrose 5% 100 mL IVPB-ADDV) -- 100 100    Volume (mL) (piperacillin-tazobactam (Zosyn) 4.5 g in dextrose  5% 100 mL IVPB-ADDV) 100 -- --    Total Intake 100 1100 753       Output    Urine  --  1400  --    Urine -- 800 --    Output (mL) (Urethral Catheter Double-lumen;Latex) -- 600 --    Emesis/NG output  --  300  20    Output (mL) (NG/OG Tube Nasogastric Right nostril) -- 300 20    Total Output -- 1700 20       Net I/O     100 -600 733            Exam: AVSS  General: No acute distress. Alert and oriented x 3.  HEENT: Moist mucous membranes. Anicteric.   Neck: Supple, No JVD.   Respiratory: Nonlabored resp.  Cardiovascular: Regular rate.   Abdomen: Soft, appropriately TTP. Mild oozing from midline. Pressure dressing applied.   Neurologic: No focal neurological deficits.  Musculoskeletal: Full range of motion of all extremities. No calf tenderness. No swelling noted.  Integument: No lesions. No erythema.  Psychiatric: Appropriate mood and affect.    Results:     Lab Results   Component Value Date    WBC 11.6 (H) 09/16/2024    HGB 8.5 (L) 09/16/2024    HCT 24.6 (L) 09/16/2024    .0 09/16/2024    CREATSERUM 0.74 09/16/2024    BUN 16 09/16/2024     09/16/2024    K 3.8 09/16/2024     (H) 09/16/2024    CO2 24.0 09/16/2024     (H) 09/16/2024    CA 7.6 (L) 09/16/2024    ALB 3.1 (L) 09/16/2024    ALKPHO 42 (L) 09/16/2024    BILT 0.5 09/16/2024    TP 5.2 (L) 09/16/2024    AST 19 09/16/2024    ALT 12 09/16/2024    PTT 33.1 08/28/2020    INR 1.13 08/28/2020    TSH 3.161 07/01/2024    LIP 42 09/14/2024    MG 1.8 09/16/2024    PHOS 2.1 (L) 09/16/2024    TROP <0.045 11/29/2019    B12 543 12/04/2019       CT ABDOMEN+PELVIS(CONTRAST ONLY)(CPT=74177)    Result Date: 9/15/2024  CONCLUSION:  1. Fluid-filled thickened loops of small bowel are evident in the right hemiabdomen, which could reflect underlying infectious/inflammatory and/or ischemic enteritis or malabsorption in the appropriate clinical setting. Given the clustered appearance of the bowel protruding laterally, these findings may reflect pericecal  hernia, potentially with some degree of underlying strangulation.  2. Postoperative changes of small bowel without evidence of obstruction.  3. Right hip arthroplasty with resultant artifactual degradation of the pelvis.  4. Gallbladder distension; if there is clinical concern for acute cholecystitis, follow-up right upper quadrant sonography should be considered.  5. Uncomplicated distal colonic diverticulosis.   6. Nonobstructing left renal calculus.  7. Kyphoplasty of the L2 vertebral segment.   8. Lesser incidental findings as above.    A preliminary report was issued by the [facility] teleradiology service. There are no major discrepancies.  elm-remote.   Dictated by (CST): Nkiko Braxton MD on 9/15/2024 at 9:59 AM     Finalized by (CST): Nikko Braxton MD on 9/15/2024 at 10:08 AM         EKG 12 Lead    Result Date: 9/15/2024  Atrial fibrillation Nonspecific ST abnormality Prolonged QT interval or tu fusion, consider myocardial disease, electrolyte imbalance, or drug effects Abnormal ECG No previous ECGs found in [facility] Confirmed by DO Sherwood Asif ([id number]) on 9/15/2024 7:58:44 AM         Jennifer Conti MD  9/16/2024

## 2024-09-17 LAB
BASOPHILS # BLD AUTO: 0.02 X10(3) UL (ref 0–0.2)
BASOPHILS NFR BLD AUTO: 0.2 %
BNP SERPL-MCNC: 160 PG/ML
DEPRECATED RDW RBC AUTO: 53.2 FL (ref 35.1–46.3)
EOSINOPHIL # BLD AUTO: 0.01 X10(3) UL (ref 0–0.7)
EOSINOPHIL NFR BLD AUTO: 0.1 %
ERYTHROCYTE [DISTWIDTH] IN BLOOD BY AUTOMATED COUNT: 14 % (ref 11–15)
HCT VFR BLD AUTO: 22.8 %
HGB BLD-MCNC: 7.5 G/DL
IMM GRANULOCYTES # BLD AUTO: 0.06 X10(3) UL (ref 0–1)
IMM GRANULOCYTES NFR BLD: 0.7 %
LYMPHOCYTES # BLD AUTO: 0.63 X10(3) UL (ref 1–4)
LYMPHOCYTES NFR BLD AUTO: 6.8 %
MAGNESIUM SERPL-MCNC: 2.2 MG/DL (ref 1.6–2.6)
MCH RBC QN AUTO: 34.1 PG (ref 26–34)
MCHC RBC AUTO-ENTMCNC: 32.9 G/DL (ref 31–37)
MCV RBC AUTO: 103.6 FL
MONOCYTES # BLD AUTO: 1.08 X10(3) UL (ref 0.1–1)
MONOCYTES NFR BLD AUTO: 11.7 %
NEUTROPHILS # BLD AUTO: 7.4 X10 (3) UL (ref 1.5–7.7)
NEUTROPHILS # BLD AUTO: 7.4 X10(3) UL (ref 1.5–7.7)
NEUTROPHILS NFR BLD AUTO: 80.5 %
PHOSPHATE SERPL-MCNC: 1.3 MG/DL (ref 2.4–5.1)
PLATELET # BLD AUTO: 145 10(3)UL (ref 150–450)
RBC # BLD AUTO: 2.2 X10(6)UL
WBC # BLD AUTO: 9.2 X10(3) UL (ref 4–11)

## 2024-09-17 PROCEDURE — 99233 SBSQ HOSP IP/OBS HIGH 50: CPT | Performed by: INTERNAL MEDICINE

## 2024-09-17 RX ORDER — ZOLPIDEM TARTRATE 5 MG/1
5 TABLET ORAL ONCE
Status: COMPLETED | OUTPATIENT
Start: 2024-09-17 | End: 2024-09-17

## 2024-09-17 RX ORDER — ONDANSETRON 2 MG/ML
4 INJECTION INTRAMUSCULAR; INTRAVENOUS EVERY 6 HOURS PRN
Status: DISCONTINUED | OUTPATIENT
Start: 2024-09-17 | End: 2024-09-17

## 2024-09-17 RX ORDER — METOCLOPRAMIDE HYDROCHLORIDE 5 MG/ML
5 INJECTION INTRAMUSCULAR; INTRAVENOUS EVERY 8 HOURS PRN
Status: DISCONTINUED | OUTPATIENT
Start: 2024-09-17 | End: 2024-09-18

## 2024-09-17 NOTE — CM/SW NOTE
09/17/24 1800   CM/SW Referral Data   Referral Source    Reason for Referral Discharge planning   Informant Patient   Medical Hx   Does patient have an established PCP? Yes  (Freedom Hickey)   Patient Info   Patient's Current Mental Status at Time of Assessment Alert;Oriented   Patient's Home Environment House   Number of Levels in Home 3   Number of Stair in Home 7 stairs up, 7 stairs down   Patient lives with Spouse/Significant other   Patient Status Prior to Admission   Independent with ADLs and Mobility Yes   Services in place prior to admission DME/Supplies at home   Type of DME/Supplies Straight Cane;Standard Walker;Shower Chair   Discharge Needs   Anticipated D/C needs To be determined     Pt discussed during nursing rounds. Dx SBO, NG removed this afternoon, now on clear liquid diet. Home w/spouse, independent w/cane and walker prior to admission. PT/OT evals scheduled for tomorrow to confirm dc recommendation.    Plan: TBD    / to remain available for support and/or discharge planning.     RIKA Woody    [phone]

## 2024-09-17 NOTE — PLAN OF CARE
Problem: Patient Centered Care  Goal: Patient preferences are identified and integrated in the patient's plan of care  Description: Interventions:  - What would you like us to know as we care for you? Pain,comfort levels, and nausea  - Provide timely, complete, and accurate information to patient/family  - Incorporate patient and family knowledge, values, beliefs, and cultural backgrounds into the planning and delivery of care  - Encourage patient/family to participate in care and decision-making at the level they choose  - Honor patient and family perspectives and choices  Outcome: Progressing     Problem: PAIN - ADULT  Goal: Verbalizes/displays adequate comfort level or patient's stated pain goal  Description: INTERVENTIONS:  - Encourage pt to monitor pain and request assistance  - Assess pain using appropriate pain scale  - Administer analgesics based on type and severity of pain and evaluate response  - Implement non-pharmacological measures as appropriate and evaluate response  - Consider cultural and social influences on pain and pain management  - Manage/alleviate anxiety  - Utilize distraction and/or relaxation techniques  - Monitor for opioid side effects  - Notify MD/LIP if interventions unsuccessful or patient reports new pain  - Anticipate increased pain with activity and pre-medicate as appropriate  Outcome: Progressing     Problem: GASTROINTESTINAL - ADULT  Goal: Maintains or returns to baseline bowel function  Description: INTERVENTIONS:  - Assess bowel function  - Maintain adequate hydration with IV or PO as ordered and tolerated  - Evaluate effectiveness of GI medications  - Encourage mobilization and activity  - Obtain nutritional consult as needed  - Establish a toileting routine/schedule  - Consider collaborating with pharmacy to review patient's medication profile  9/17/2024 1623 by Jamie Sylvester, RN  Outcome: Progressing  9/17/2024 1622 by Jamie Sylvester, RN  Outcome: Not Progressing

## 2024-09-17 NOTE — PROGRESS NOTES
[facility]  Progress Note    Coni Gandhi Patient Status:  Inpatient    1945 MRN [id number]   Location [facility] 2W/SW Attending Rex Us MD   Hosp Day # 2 PCP Freedom Hickey MD     Subjective:  Patient resting in bed.  She reports she feels well today.  She has incisional abdominal pain.  She has some nausea but has not vomited.  She is not passing any gas or having bowel movements.  She has been unable to ambulate and has been n.p.o. since surgery.  She denies chest pain, shortness of breath, dizziness.  NGT to LIS with minimal bilious output.     Objective/Physical Exam:  General: Alert, orientated x3.  Cooperative.  No apparent distress.  Vital Signs:  Blood pressure 102/53, pulse 60, temperature 98 °F (36.7 °C), temperature source Temporal, resp. rate 13, height 5' 2\" (1.575 m), weight 178 lb (80.7 kg), SpO2 95%, not currently breastfeeding.  Wt Readings from Last 3 Encounters:   09/15/24 178 lb (80.7 kg)   24 178 lb (80.7 kg)   24 178 lb (80.7 kg)     Lungs: No respiratory distress.  Cardiac: Regular rate and rhythm.   Abdomen:  Soft, not distended, mildly tender, with no rebound or guarding.  No peritoneal signs.   Extremities:  No lower extremity edema noted.    Incision: Clean, dry, intact, no erythema      Intake/Output:    Intake/Output Summary (Last 24 hours) at 2024 1522  Last data filed at 2024 0654  Gross per 24 hour   Intake --   Output 1830 ml   Net -1830 ml     I/O last 3 completed shifts:  In: 853 [I.V.:653; IV PIGGYBACK:200]  Out: 2400 [Urine:0; Emesis/NG output:350]  No intake/output data recorded.    Medications:    sodium phosphate  15 mmol Intravenous Once    piperacillin-tazobactam  3.375 g Intravenous Q8H    enoxaparin  40 mg Subcutaneous Daily       Labs:  Lab Results   Component Value Date    WBC 9.2 2024    HGB 7.5 2024    HCT 22.8 2024    .0 2024        Lab Results   Component  Value Date    INR 1.13 08/28/2020    INR 1.1 (L) 10/23/2007         Assessment  Patient Active Problem List   Diagnosis    Hypothyroidism    Peripheral venous insufficiency    Colon cancer screening    Osteoporosis    Wedge compression fracture of second lumbar vertebra with routine healing    Adenocarcinoma of endometrium (HCC)    Macrocytosis    Risk for falls    SOPHIA (generalized anxiety disorder)    Vasomotor rhinitis    Medicare annual wellness visit, subsequent    Breast screening    Eye exam, routine    History of endometrial cancer    Lower abdominal pain    Nausea and vomiting in adult    Complete intestinal obstruction, unspecified cause (HCC)     POD 2: Exploratory laparotomy, bowel resection  Anemia    Plan:  Advance to clear liquid diet as tolerated  NGT clamp trial today  Okay to remove Larson  Continue to trend Hgb, transfuse if Hgb < 7.0  Continue IV antibiotics as per hospitalist, day 2 of therapy  Ambulate up to chair as able  Encourage incentive spirometer  DVT prophylaxis with Lovenox      Quality:  DVT Mechanical Prophylaxis:   SCDs, Early ambuation  DVT Pharmacologic Prophylaxis   Medication    enoxaparin (Lovenox) 40 MG/0.4ML SUBQ injection 40 mg                Code Status: Full Code  Larson: Larosn catheter in place  Larson Duration (in days): 3  Central line:    AQUILES: 9/17/2024        JOSH Alas  9/17/2024  3:22 PM

## 2024-09-17 NOTE — PROGRESS NOTES
Progress Note     Coni Gandhi Patient Status:  Inpatient    1945 MRN [id number]   Location [facility] 2W/SW Attending Rex Us MD   Hosp Day # 2 PCP Freedom Hickey MD     Chief Complaint:   Chief Complaint   Patient presents with    Abdomen/Flank Pain         Subjective:   S: Patient seen and examined, chart reviewed.   No new complaints.  She is hungry and wants to eat and get NGT out.  , Madi, is at the bedside.  I answered questions from both about care plan.  Reached out to Nurse.       Review of Systems:   10 point ROS completed and was negative, except for pertinent positive and negatives stated in subjective.                Objective:   Vital signs:  Temp:  [97.4 °F (36.3 °C)-98.6 °F (37 °C)] 98 °F (36.7 °C)  Pulse:  [66-85] 66  Resp:  [16-25] 16  BP: (102-174)/() 115/61  SpO2:  [94 %-99 %] 95 %    Wt Readings from Last 6 Encounters:   09/15/24 178 lb (80.7 kg)   24 178 lb (80.7 kg)   24 178 lb (80.7 kg)   23 175 lb 3.2 oz (79.5 kg)   23 176 lb 6.4 oz (80 kg)   23 171 lb (77.6 kg)       Physical Exam:    General: No acute distress.   Respiratory: Clear to auscultation bilaterally. No wheezes. No rhonchi.  Cardiovascular: S1, S2. Regular rate and rhythm. No murmurs, rubs or gallops.   Abdomen: Staples intact, some fresh bleeding from lower 1/3 of wound when pressure applied to skin.  Bandage is C/D/I. Soft, nontender, nondistended.  Positive bowel reduced. No rebound or guarding.  Neurologic: No focal neurological deficits.   Musculoskeletal: Moves all extremities.  Extremities: No edema.    Results:   Diagnostic Data:      Labs:    Labs Last 24 Hours:   BMP     CBC    Other     Na - Cl - BUN - Glu -   Hb 7.5   PTT - Procal -   K - CO2 - Cr -   WBC 9.2 >< .0  INR - CRP -   Renal Lytes Endo    Hct 22.8   Trop - D dim -   eGFR - Ca - POC Gluc  -    LFT   pBNP - Lactic -   eGFR AA - PO4 1.3 A1c -   AST - APk - Prot -  LDL -       Recent Labs   Lab 09/14/24 2319 09/16/24 0334 09/16/24  1202 09/16/24  1345 09/16/24 2051 09/17/24 0334   RBC 3.81 2.62*  --   --   --  2.20*   HGB 12.9 8.9*   < > 8.5* 8.0* 7.5*   HCT 39.7 26.5*   < > 24.6* 23.2* 22.8*   .2* 101.1*  --   --   --  103.6*   MCH 33.9 34.0  --   --   --  34.1*   MCHC 32.5 33.6  --   --   --  32.9   RDW 13.3 13.7  --   --   --  14.0   NEPRELIM 3.33 9.75*  --   --   --  7.40   WBC 7.8 11.6*  --   --   --  9.2   .0 182.0  --   --   --  145.0*    < > = values in this interval not displayed.       Lab Results   Component Value Date    INR 1.13 08/28/2020    INR 1.1 (L) 10/23/2007       Recent Labs   Lab 09/14/24 2319 09/16/24 0334   * 160*   BUN 25* 16   CREATSERUM 0.89 0.74   CA 9.4 7.6*   ALB  --  3.1*    142   K 3.8 3.8    114*   CO2 17.0* 24.0   ALKPHO  --  42*   AST  --  19   ALT  --  12   BILT  --  0.5   TP  --  5.2*       Recent Labs   Lab 09/14/24 2319   LIP 42       Recent Labs   Lab 09/14/24 2319 09/16/24 0334 09/17/24 0334   MG 2.1 1.8 2.2   PHOS  --  2.1* 1.3*       No results for input(s): \"URINE\", \"CULTI\", \"BLDSMR\" in the last 168 hours.      No results found.        Pro-Calcitonin  No results for input(s): \"PCT\" in the last 168 hours.    Cardiac  No results for input(s): \"TROP\", \"PBNP\" in the last 168 hours.    Imaging: Imaging data reviewed in Epic.    No results found.       Medications:    piperacillin-tazobactam  3.375 g Intravenous Q8H    enoxaparin  40 mg Subcutaneous Daily       Assessment & Plan:   ASSESSMENT / PLAN:     Small bowel obstruction status post exploratory laparotomy with bowel resection secondary to necrotic ileum.  Postoperative acute blood loss anemia   Metabolic alkalosis     Plan  -IV fluids D5 normal saline at 100 cc an hour  -IV antibiotics with Zosyn  -OOB to chair, ambulate, PT/OT  - would start CLD if ok with surgery  - follow H/H and transfuse if Hb<7gm/dl  -NG tube to continuous low wall suction,  can clamp and follow residuals.   -General Surgery consult  -Pain control manage at this time. Well controlled at this time. She has not had morphine in over 24 hrs.       Hypothyroidism  -IV levothyroxine         dvt prophylaxis: lovenox  code status: full code  dispo: home upon medical clearance     I personally reviewed the available laboratories, imaging including operative report. I discussed/will discuss the case with patient and her nurse. I ordered laboratories, studies including serial H&H. I adjusted medications including not applicable today. Medical decision making high, risk is high.     >55min spent, >50% spent counseling and coordinating care in the form of educating pt/family and d/w consultants and staff. Most of the time spent discussing the above plan.      Estimated date of discharge: To be determined  Discharge is dependent on: Improved clinical status  At this point Ms. Gandhi is expected to be discharge to: Home versus rehab     Plan of care discussed with patient and nurse      Rex Us MD, FAAP, FACP  [facility] Hospitalist  I respond to Epic Chat and AMS Connect

## 2024-09-17 NOTE — PAYOR COMM NOTE
--------------  ADMISSION REVIEW     Payor: [employer]  Subscriber #:  [id number]  Authorization Number: [id number]    Admit date: 9/15/24  Admit time:  2:48 AM       REVIEW DOCUMENTATION:    Patient Seen in: [facility] Emergency Department      History     Chief Complaint   Patient presents with    Abdomen/Flank Pain     Stated Complaint: abdominal pain  Objective:   Past Medical History:    Actinic keratosis    Left cheek    Anxiety    Back problem    compression fractures    Benign essential tremor    right hand    Cancer (HCC)    uterine    Disorder of thyroid    hypothyroid    Exposure to medical diagnostic radiation    Hypothyroidism    Lichenoid actinic keratosis    Left cheek    Osteoarthritis    Osteoporosis    Post-operative hypothyroidism    Postmenopausal atrophic vaginitis    Visual impairment    glasses     Past Surgical History:   Procedure Laterality Date    Back surgery      Breast biopsy Left 1990    Colectomy      Colonoscopy      2012     D & c  2013    \"mass\" inside uterus -- Dr. Napier    Hernia surgery  05/13/2021    Ventral Hernia Repair     Hip replacement surgery      Hysterectomy      Other surgical history  1986    s/p thyroidectomy for benign thyroid nodules    Other surgical history  11/29/2019    bowel obstruction    Spine surgery procedure unlisted  10/2019    kyphoplasty     Thyroidectomy  1986    nodules removed    Tonsillectomy      Total hip replacement Right        Physical Exam     ED Triage Vitals   BP 09/14/24 2316 142/83   Pulse 09/14/24 2316 85   Resp 09/14/24 2316 26   Temp 09/14/24 2320 97.3 °F (36.3 °C)   Temp src 09/14/24 2320 Oral   SpO2 09/14/24 2316 97 %   O2 Device 09/14/24 2316 None (Room air)       Current Vitals:   Vital Signs  BP: 110/49  Pulse: 59  Resp: 25  Temp: 97.3 °F (36.3 °C)  Temp src: Oral  MAP (mmHg): 67    Oxygen Therapy  SpO2: 100 %  O2 Device: None (Room air)      ED Course     Labs Reviewed   CBC WITH DIFFERENTIAL WITH PLATELET -  Abnormal; Notable for the following components:       Result Value    .2 (*)     RDW-SD 51.6 (*)     All other components within normal limits   BASIC METABOLIC PANEL (8) - Abnormal; Notable for the following components:    Glucose 129 (*)     CO2 17.0 (*)     BUN 25 (*)     BUN/CREA Ratio 28.1 (*)     All other components within normal limits   LIPASE - Normal   TROPONIN I HIGH SENSITIVITY - Normal   MAGNESIUM - Normal   BLOOD CULTURE   BLOOD CULTURE     EKG    Rate, intervals and axes as noted on EKG Report.  Rate: 90  Rhythm: Atrial fibrillation  Reading: Atrial fibrillation with a normal ventricular response    Disposition and Plan     Clinical Impression:  1. Lower abdominal pain    2. Nausea and vomiting in adult    3. Complete intestinal obstruction, unspecified cause (HCC)         Disposition:  Admit  9/15/2024  1:40 am    Signed by Albaro Cameron MD on 9/15/2024  1:40 AM       morphINE PF 4 MG/ML injection 4 mg  Dose: 4 mg  Freq: Once Route: IV  Start: 09/15/24 0129 End: 09/15/24 0220           0220 BB-Given          morphINE PF 4 MG/ML injection 4 mg  Dose: 4 mg  Freq: Once Route: IV  Start: 09/14/24 2334 End: 09/14/24 2337 2337 BB-Given           ondansetron (Zofran) 4 MG/2ML injection 4 mg  Dose: 4 mg  Freq: Once Route: IV  Start: 09/14/24 2334 End: 09/14/24 2337 2337 BB-Given             sodium chloride 0.9 % IV bolus 1,000 mL  Dose: 1,000 mL  Freq: Once Route: IV  Last Dose: Stopped (09/15/24 0056)  Start: 09/14/24 2329 End: 09/15/24 0056     morphINE PF 2 MG/ML injection 2 mg  Dose: 2 mg  Freq: Every 2 hour PRN Route: IV  PRN Reason: moderate pain  Start: 09/15/24 0321 End: 09/15/24 1204 X 1   ondansetron (Zofran) 4 MG/2ML injection 4 mg  Dose: 4 mg  Freq: Every 6 hours PRN Route: IV  PRN Reasons: Nausea,vomiting  Start: 09/15/24 0321 End: 09/15/24 1204    X 2       HISTORY AND PHYSICAL      sabalphonso Gandhi is a 78 year old female with history of SBO due to internal hernia s/p  addition of adhesions and resection of necrotic bowel with primary anastomosis as well as other abdominal surgery, who presents to the ED today with complaints of abdominal pain.  Sudden onset 9:30 PM last night.  Located in the mid to lower abdomen.  Associated with nausea and dry heaves.  No fever, chills, chest pain or shortness of breath.  No diarrhea or bloody stools.     Vital stable  Labs stable except CO2 17  CT abdomen showed findings concerning for pericecal hernia and possible bowel necrosis.    Assessment & Plan:  Pericecal hernia  Possible bowel necrosis  Abdominal pain due to above  -Admit  -IV fluids  -IV antibiotics  -Strict n.p.o.  -NG tube  -General Surgery consult  -Follow-up lactic acid  -Pain control     Metabolic acidosis  -Follow-up lactic acid  -IV fluids  -Monitor     Hypothyroidism  -IV levothyroxine     General surgery consulted, NG tube being place.  IV antibiotics started.      CT ABDOMEN + PELVIS   CONCLUSION:   1. Fluid-filled thickened loops of small bowel are evident in the right hemiabdomen, which could reflect underlying infectious/inflammatory and/or ischemic enteritis or malabsorption in the appropriate clinical setting. Given the clustered appearance of  the bowel protruding laterally, these findings may reflect pericecal hernia, potentially with some degree of underlying strangulation.      2. Postoperative changes of small bowel without evidence of obstruction.      3. Right hip arthroplasty with resultant artifactual degradation of the pelvis.      4. Gallbladder distension; if there is clinical concern for acute cholecystitis, follow-up right upper quadrant sonography should be considered.      5. Uncomplicated distal colonic diverticulosis.       6. Nonobstructing left renal calculus.      7. Kyphoplasty of the L2 vertebral segment.         SURGICAL CONSULT      Impression and Plan:      Patient Active Problem List   Diagnosis    Hypothyroidism    Peripheral venous  insufficiency    Colon cancer screening    Osteoporosis    Wedge compression fracture of second lumbar vertebra with routine healing    Adenocarcinoma of endometrium (HCC)    Macrocytosis    Risk for falls    SOPHIA (generalized anxiety disorder)    Vasomotor rhinitis    Medicare annual wellness visit, subsequent    Breast screening    Eye exam, routine    History of endometrial cancer    Lower abdominal pain    Nausea and vomiting in adult    Complete intestinal obstruction, unspecified cause (HCC)   7.8  wbc  lactic 5  CT   possible ischemic area in cecum   For exp    Brief Operative Report     Pre-Operative Diagnosis: Bowel obstruction (HCC) [K56.609]     Post-Operative Diagnosis: Same as above. And necrotic portion of ileum     Procedure Performed: EXPLORATORY LAPAROTOMY, BOWEL RESECTION       REVIEW DOCUMENTATION  9-16-24     Coni is a 78 year old female s/p ex lap, small bowel resection 9/15     - Hgb stable  - continue pressure dressing to midline  - continue NGT to LIWS  - Encourage oob, ambulation    Exam: AVSS  General: No acute distress. Alert and oriented x 3.  HEENT: Moist mucous membranes. Anicteric.   Neck: Supple, No JVD.   Respiratory: Nonlabored resp.  Cardiovascular: Regular rate.   Abdomen: Soft, appropriately TTP. Mild oozing from midline. Pressure dressing applied.   Neurologic: No focal neurological deficits.  Musculoskeletal: Full range of motion of all extremities. No calf tenderness. No swelling noted.  Integument: No lesions. No erythema.  Psychiatric: Appropriate mood and affect.     Results:            Lab Results   Component Value Date     WBC 11.6 (H) 09/16/2024     HGB 8.5 (L) 09/16/2024     HCT 24.6 (L) 09/16/2024     .0 09/16/2024     CREATSERUM 0.74 09/16/2024     BUN 16 09/16/2024      09/16/2024     K 3.8 09/16/2024      (H) 09/16/2024     CO2 24.0 09/16/2024      (H) 09/16/2024     CA 7.6 (L) 09/16/2024     ALB 3.1 (L) 09/16/2024     ALKPHO 42 (L)  09/16/2024     BILT 0.5 09/16/2024     TP 5.2 (L) 09/16/2024     AST 19 09/16/2024     ALT 12 09/16/2024       MEDICATIONS ADMINISTERED IN LAST 1 DAY:  acetaminophen (Ofirmev) 10 mg/mL infusion premix 1,000 mg       Date Action Dose Route User    9/17/2024 0839 New Bag 1,000 mg Intravenous Jamie Sylvester RN    9/16/2024 1821 New Bag 1,000 mg Intravenous Emily Guerra RN          dextrose 5%-sodium chloride 0.9% infusion  100 ML HR        Date Action Dose Route User    9/17/2024 0935 New Bag (none) Intravenous Jamie Sylvester RN    9/16/2024 1507 New Bag (none) Intravenous Oksana Garcia RN          enoxaparin (Lovenox) 40 MG/0.4ML SUBQ injection 40 mg       Date Action Dose Route User    9/17/2024 0839 Given 40 mg Subcutaneous (Left Lower Abdomen) Jamie Sylvester RN          HYDROmorphone (Dilaudid) 1 MG/ML injection 0.2 mg       Date Action Dose Route User    9/17/2024 0006 Given 0.2 mg Intravenous Aquiles Mccauley RN          ondansetron (Zofran) 4 MG/2ML injection 4 mg       Date Action Dose Route User    9/17/2024 0621 Given 4 mg Intravenous Aquiles Mccauley RN          piperacillin-tazobactam (Zosyn) 3.375 g in dextrose 5% 100 mL IVPB-ADDV       Date Action Dose Route User    9/17/2024 0839 New Bag 3.375 g Intravenous Jamie Sylvester RN    9/17/2024 0006 New Bag 3.375 g Intravenous Aquiles Mccauley RN    9/16/2024 1835 New Bag 3.375 g Intravenous Oksana Garcia RN            9/16/24 0800 105/57 -- -- 72 20 96 %   09/16/24 0700 109/66 -- -- 78 20 95 %   09/16/24 0600 112/55 -- -- 67 15 95 %   09/16/24 0500 104/57 -- -- 73 15 96 %   09/16/24 0400 104/54 97.6 °F (36.4 °C) Temporal 67 16 94 %   09/16/24 0300 101/50 -- -- 72 19 95 %   09/16/24 0200 100/48 -- -- 68 17 94 %   09/16/24 0100 107/55 -- -- 73 18 96 %   09/16/24 0000 103/56 97.6 °F (36.4 °C) Temporal 68 16 96 %   09/15/24 2300 100/62 -- -- 73 24 96 %   09/15/24 2200 103/57 -- -- 75 23 96 %   09/15/24 2100 116/79 -- -- 83 23 95 %   09/15/24 2000  104/61 97.8 °F (36.6 °C) Temporal 75 19 95 %

## 2024-09-17 NOTE — PLAN OF CARE
Pt A/Ox4, Max assist. NG intact. Dressing changed.   Problem: Patient Centered Care  Goal: Patient preferences are identified and integrated in the patient's plan of care  Description: Interventions:  - What would you like us to know as we care for you? I would like to return home.   - Provide timely, complete, and accurate information to patient/family  - Incorporate patient and family knowledge, values, beliefs, and cultural backgrounds into the planning and delivery of care  - Encourage patient/family to participate in care and decision-making at the level they choose  - Honor patient and family perspectives and choices  Outcome: Progressing     Problem: Patient/Family Goals  Goal: Patient/Family Long Term Goal  Description: Patient's Long Term Goal: Get stronger.     Interventions:  - Ambulate up to the chair for meals.   - See additional Care Plan goals for specific interventions  Outcome: Progressing  Goal: Patient/Family Short Term Goal  Description: Patient's Short Term Goal: Ask frequent questions.     Interventions:   - Work with staff on ways to improve overall health status.   - See additional Care Plan goals for specific interventions  Outcome: Progressing     Problem: PAIN - ADULT  Goal: Verbalizes/displays adequate comfort level or patient's stated pain goal  Description: INTERVENTIONS:  - Encourage pt to monitor pain and request assistance  - Assess pain using appropriate pain scale  - Administer analgesics based on type and severity of pain and evaluate response  - Implement non-pharmacological measures as appropriate and evaluate response  - Consider cultural and social influences on pain and pain management  - Manage/alleviate anxiety  - Utilize distraction and/or relaxation techniques  - Monitor for opioid side effects  - Notify MD/LIP if interventions unsuccessful or patient reports new pain  - Anticipate increased pain with activity and pre-medicate as appropriate  Outcome: Progressing      Problem: GASTROINTESTINAL - ADULT  Goal: Minimal or absence of nausea and vomiting  Description: INTERVENTIONS:  - Maintain adequate hydration with IV or PO as ordered and tolerated  - Nasogastric tube to low intermittent suction as ordered  - Evaluate effectiveness of ordered antiemetic medications  - Provide nonpharmacologic comfort measures as appropriate  - Advance diet as tolerated, if ordered  - Obtain nutritional consult as needed  - Evaluate fluid balance  Outcome: Progressing  Goal: Maintains or returns to baseline bowel function  Description: INTERVENTIONS:  - Assess bowel function  - Maintain adequate hydration with IV or PO as ordered and tolerated  - Evaluate effectiveness of GI medications  - Encourage mobilization and activity  - Obtain nutritional consult as needed  - Establish a toileting routine/schedule  - Consider collaborating with pharmacy to review patient's medication profile  Outcome: Progressing

## 2024-09-18 ENCOUNTER — ANESTHESIA (OUTPATIENT)
Dept: SURGERY | Facility: HOSPITAL | Age: 79
End: 2024-09-18
Payer: MEDICARE

## 2024-09-18 ENCOUNTER — ANESTHESIA EVENT (OUTPATIENT)
Dept: SURGERY | Facility: HOSPITAL | Age: 79
End: 2024-09-18
Payer: MEDICARE

## 2024-09-18 LAB
ANION GAP SERPL CALC-SCNC: 6 MMOL/L (ref 0–18)
BASOPHILS # BLD AUTO: 0.04 X10(3) UL (ref 0–0.2)
BASOPHILS NFR BLD AUTO: 0.5 %
BUN BLD-MCNC: <5 MG/DL (ref 9–23)
CALCIUM BLD-MCNC: 6.9 MG/DL (ref 8.7–10.4)
CHLORIDE SERPL-SCNC: 117 MMOL/L (ref 98–112)
CO2 SERPL-SCNC: 23 MMOL/L (ref 21–32)
CREAT BLD-MCNC: 0.5 MG/DL
DEPRECATED RDW RBC AUTO: 52.6 FL (ref 35.1–46.3)
EGFRCR SERPLBLD CKD-EPI 2021: 97 ML/MIN/1.73M2 (ref 60–?)
EOSINOPHIL # BLD AUTO: 0.07 X10(3) UL (ref 0–0.7)
EOSINOPHIL NFR BLD AUTO: 0.8 %
ERYTHROCYTE [DISTWIDTH] IN BLOOD BY AUTOMATED COUNT: 13.7 % (ref 11–15)
GLUCOSE BLD-MCNC: 116 MG/DL (ref 70–99)
HCT VFR BLD AUTO: 22.6 %
HGB BLD-MCNC: 7.6 G/DL
IMM GRANULOCYTES # BLD AUTO: 0.02 X10(3) UL (ref 0–1)
IMM GRANULOCYTES NFR BLD: 0.2 %
LYMPHOCYTES # BLD AUTO: 0.96 X10(3) UL (ref 1–4)
LYMPHOCYTES NFR BLD AUTO: 11.1 %
MAGNESIUM SERPL-MCNC: 2.3 MG/DL (ref 1.6–2.6)
MCH RBC QN AUTO: 34.7 PG (ref 26–34)
MCHC RBC AUTO-ENTMCNC: 33.6 G/DL (ref 31–37)
MCV RBC AUTO: 103.2 FL
MONOCYTES # BLD AUTO: 0.9 X10(3) UL (ref 0.1–1)
MONOCYTES NFR BLD AUTO: 10.4 %
NEUTROPHILS # BLD AUTO: 6.65 X10 (3) UL (ref 1.5–7.7)
NEUTROPHILS # BLD AUTO: 6.65 X10(3) UL (ref 1.5–7.7)
NEUTROPHILS NFR BLD AUTO: 77 %
PLATELET # BLD AUTO: 158 10(3)UL (ref 150–450)
POTASSIUM SERPL-SCNC: 2.9 MMOL/L (ref 3.5–5.1)
POTASSIUM SERPL-SCNC: 3.2 MMOL/L (ref 3.5–5.1)
RBC # BLD AUTO: 2.19 X10(6)UL
SODIUM SERPL-SCNC: 146 MMOL/L (ref 136–145)
WBC # BLD AUTO: 8.6 X10(3) UL (ref 4–11)

## 2024-09-18 PROCEDURE — 49002 REOPENING OF ABDOMEN: CPT | Performed by: SURGERY

## 2024-09-18 PROCEDURE — 99233 SBSQ HOSP IP/OBS HIGH 50: CPT | Performed by: HOSPITALIST

## 2024-09-18 PROCEDURE — 99232 SBSQ HOSP IP/OBS MODERATE 35: CPT | Performed by: SURGERY

## 2024-09-18 PROCEDURE — 0DCV0ZZ EXTIRPATION OF MATTER FROM MESENTERY, OPEN APPROACH: ICD-10-PCS | Performed by: SURGERY

## 2024-09-18 RX ORDER — MORPHINE SULFATE 4 MG/ML
4 INJECTION, SOLUTION INTRAMUSCULAR; INTRAVENOUS EVERY 10 MIN PRN
Status: DISCONTINUED | OUTPATIENT
Start: 2024-09-18 | End: 2024-09-18 | Stop reason: HOSPADM

## 2024-09-18 RX ORDER — SENNOSIDES 8.6 MG
17.2 TABLET ORAL NIGHTLY PRN
Status: DISCONTINUED | OUTPATIENT
Start: 2024-09-18 | End: 2024-09-23

## 2024-09-18 RX ORDER — ZOLPIDEM TARTRATE 5 MG/1
5 TABLET ORAL NIGHTLY PRN
Status: DISCONTINUED | OUTPATIENT
Start: 2024-09-18 | End: 2024-09-22

## 2024-09-18 RX ORDER — NALOXONE HYDROCHLORIDE 0.4 MG/ML
0.08 INJECTION, SOLUTION INTRAMUSCULAR; INTRAVENOUS; SUBCUTANEOUS AS NEEDED
Status: DISCONTINUED | OUTPATIENT
Start: 2024-09-18 | End: 2024-09-18 | Stop reason: HOSPADM

## 2024-09-18 RX ORDER — ONDANSETRON 2 MG/ML
INJECTION INTRAMUSCULAR; INTRAVENOUS AS NEEDED
Status: DISCONTINUED | OUTPATIENT
Start: 2024-09-18 | End: 2024-09-18 | Stop reason: SURG

## 2024-09-18 RX ORDER — BISACODYL 10 MG
10 SUPPOSITORY, RECTAL RECTAL
Status: DISCONTINUED | OUTPATIENT
Start: 2024-09-18 | End: 2024-09-23

## 2024-09-18 RX ORDER — HYDROMORPHONE HYDROCHLORIDE 1 MG/ML
0.2 INJECTION, SOLUTION INTRAMUSCULAR; INTRAVENOUS; SUBCUTANEOUS EVERY 2 HOUR PRN
Status: DISCONTINUED | OUTPATIENT
Start: 2024-09-18 | End: 2024-09-23

## 2024-09-18 RX ORDER — HYDROMORPHONE HYDROCHLORIDE 1 MG/ML
0.4 INJECTION, SOLUTION INTRAMUSCULAR; INTRAVENOUS; SUBCUTANEOUS EVERY 5 MIN PRN
Status: DISCONTINUED | OUTPATIENT
Start: 2024-09-18 | End: 2024-09-18 | Stop reason: HOSPADM

## 2024-09-18 RX ORDER — SODIUM CHLORIDE, SODIUM LACTATE, POTASSIUM CHLORIDE, CALCIUM CHLORIDE 600; 310; 30; 20 MG/100ML; MG/100ML; MG/100ML; MG/100ML
INJECTION, SOLUTION INTRAVENOUS CONTINUOUS
Status: DISCONTINUED | OUTPATIENT
Start: 2024-09-18 | End: 2024-09-19

## 2024-09-18 RX ORDER — ONDANSETRON 2 MG/ML
4 INJECTION INTRAMUSCULAR; INTRAVENOUS ONCE AS NEEDED
Status: COMPLETED | OUTPATIENT
Start: 2024-09-18 | End: 2024-09-18

## 2024-09-18 RX ORDER — OXYCODONE HYDROCHLORIDE 5 MG/1
5 TABLET ORAL EVERY 4 HOURS PRN
Status: DISCONTINUED | OUTPATIENT
Start: 2024-09-18 | End: 2024-09-23

## 2024-09-18 RX ORDER — MORPHINE SULFATE 4 MG/ML
2 INJECTION, SOLUTION INTRAMUSCULAR; INTRAVENOUS EVERY 10 MIN PRN
Status: DISCONTINUED | OUTPATIENT
Start: 2024-09-18 | End: 2024-09-18 | Stop reason: HOSPADM

## 2024-09-18 RX ORDER — HEPARIN SODIUM 5000 [USP'U]/ML
5000 INJECTION, SOLUTION INTRAVENOUS; SUBCUTANEOUS EVERY 12 HOURS SCHEDULED
Status: DISCONTINUED | OUTPATIENT
Start: 2024-09-19 | End: 2024-09-23

## 2024-09-18 RX ORDER — SODIUM CHLORIDE, SODIUM LACTATE, POTASSIUM CHLORIDE, CALCIUM CHLORIDE 600; 310; 30; 20 MG/100ML; MG/100ML; MG/100ML; MG/100ML
INJECTION, SOLUTION INTRAVENOUS CONTINUOUS PRN
Status: DISCONTINUED | OUTPATIENT
Start: 2024-09-18 | End: 2024-09-18 | Stop reason: SURG

## 2024-09-18 RX ORDER — LIDOCAINE HYDROCHLORIDE 10 MG/ML
INJECTION, SOLUTION EPIDURAL; INFILTRATION; INTRACAUDAL; PERINEURAL AS NEEDED
Status: DISCONTINUED | OUTPATIENT
Start: 2024-09-18 | End: 2024-09-18 | Stop reason: SURG

## 2024-09-18 RX ORDER — OXYCODONE HYDROCHLORIDE 5 MG/1
2.5 TABLET ORAL EVERY 4 HOURS PRN
Status: DISCONTINUED | OUTPATIENT
Start: 2024-09-18 | End: 2024-09-23

## 2024-09-18 RX ORDER — SODIUM PHOSPHATE, DIBASIC AND SODIUM PHOSPHATE, MONOBASIC 7; 19 G/230ML; G/230ML
1 ENEMA RECTAL ONCE AS NEEDED
Status: DISCONTINUED | OUTPATIENT
Start: 2024-09-18 | End: 2024-09-23

## 2024-09-18 RX ORDER — HYDROMORPHONE HYDROCHLORIDE 1 MG/ML
0.4 INJECTION, SOLUTION INTRAMUSCULAR; INTRAVENOUS; SUBCUTANEOUS EVERY 2 HOUR PRN
Status: DISCONTINUED | OUTPATIENT
Start: 2024-09-18 | End: 2024-09-23

## 2024-09-18 RX ORDER — HYDROMORPHONE HYDROCHLORIDE 1 MG/ML
0.2 INJECTION, SOLUTION INTRAMUSCULAR; INTRAVENOUS; SUBCUTANEOUS EVERY 5 MIN PRN
Status: DISCONTINUED | OUTPATIENT
Start: 2024-09-18 | End: 2024-09-18 | Stop reason: HOSPADM

## 2024-09-18 RX ORDER — HYDROMORPHONE HYDROCHLORIDE 1 MG/ML
0.6 INJECTION, SOLUTION INTRAMUSCULAR; INTRAVENOUS; SUBCUTANEOUS EVERY 5 MIN PRN
Status: DISCONTINUED | OUTPATIENT
Start: 2024-09-18 | End: 2024-09-18 | Stop reason: HOSPADM

## 2024-09-18 RX ORDER — METOCLOPRAMIDE HYDROCHLORIDE 5 MG/ML
10 INJECTION INTRAMUSCULAR; INTRAVENOUS EVERY 8 HOURS PRN
Status: DISCONTINUED | OUTPATIENT
Start: 2024-09-18 | End: 2024-09-23

## 2024-09-18 RX ORDER — POLYETHYLENE GLYCOL 3350 17 G/17G
17 POWDER, FOR SOLUTION ORAL DAILY PRN
Status: DISCONTINUED | OUTPATIENT
Start: 2024-09-18 | End: 2024-09-23

## 2024-09-18 RX ORDER — DEXAMETHASONE SODIUM PHOSPHATE 4 MG/ML
VIAL (ML) INJECTION AS NEEDED
Status: DISCONTINUED | OUTPATIENT
Start: 2024-09-18 | End: 2024-09-18 | Stop reason: SURG

## 2024-09-18 RX ORDER — ACETAMINOPHEN 325 MG/1
650 TABLET ORAL EVERY 6 HOURS PRN
Status: DISCONTINUED | OUTPATIENT
Start: 2024-09-18 | End: 2024-09-19

## 2024-09-18 RX ORDER — ROCURONIUM BROMIDE 10 MG/ML
INJECTION, SOLUTION INTRAVENOUS AS NEEDED
Status: DISCONTINUED | OUTPATIENT
Start: 2024-09-18 | End: 2024-09-18 | Stop reason: SURG

## 2024-09-18 RX ORDER — ZOLPIDEM TARTRATE 5 MG/1
5 TABLET ORAL NIGHTLY PRN
Status: DISCONTINUED | OUTPATIENT
Start: 2024-09-18 | End: 2024-09-18

## 2024-09-18 RX ORDER — LEVOTHYROXINE SODIUM 50 UG/1
50 TABLET ORAL
Status: DISCONTINUED | OUTPATIENT
Start: 2024-09-18 | End: 2024-09-19

## 2024-09-18 RX ORDER — POTASSIUM CHLORIDE 1500 MG/1
40 TABLET, EXTENDED RELEASE ORAL ONCE
Status: COMPLETED | OUTPATIENT
Start: 2024-09-18 | End: 2024-09-18

## 2024-09-18 RX ORDER — POTASSIUM CHLORIDE 1500 MG/1
20 TABLET, EXTENDED RELEASE ORAL ONCE
Status: COMPLETED | OUTPATIENT
Start: 2024-09-18 | End: 2024-09-18

## 2024-09-18 RX ORDER — SODIUM CHLORIDE, SODIUM LACTATE, POTASSIUM CHLORIDE, CALCIUM CHLORIDE 600; 310; 30; 20 MG/100ML; MG/100ML; MG/100ML; MG/100ML
INJECTION, SOLUTION INTRAVENOUS CONTINUOUS
Status: DISCONTINUED | OUTPATIENT
Start: 2024-09-18 | End: 2024-09-18 | Stop reason: HOSPADM

## 2024-09-18 RX ORDER — ONDANSETRON 2 MG/ML
4 INJECTION INTRAMUSCULAR; INTRAVENOUS EVERY 6 HOURS PRN
Status: DISCONTINUED | OUTPATIENT
Start: 2024-09-18 | End: 2024-09-23

## 2024-09-18 RX ORDER — MORPHINE SULFATE 10 MG/ML
6 INJECTION, SOLUTION INTRAMUSCULAR; INTRAVENOUS EVERY 10 MIN PRN
Status: DISCONTINUED | OUTPATIENT
Start: 2024-09-18 | End: 2024-09-18 | Stop reason: HOSPADM

## 2024-09-18 RX ORDER — POTASSIUM CHLORIDE 14.9 MG/ML
20 INJECTION INTRAVENOUS ONCE
Status: DISCONTINUED | OUTPATIENT
Start: 2024-09-18 | End: 2024-09-23

## 2024-09-18 RX ADMIN — ROCURONIUM BROMIDE 10 MG: 10 INJECTION, SOLUTION INTRAVENOUS at 16:23:00

## 2024-09-18 RX ADMIN — ROCURONIUM BROMIDE 30 MG: 10 INJECTION, SOLUTION INTRAVENOUS at 16:30:00

## 2024-09-18 RX ADMIN — DEXAMETHASONE SODIUM PHOSPHATE 8 MG: 4 MG/ML VIAL (ML) INJECTION at 16:27:00

## 2024-09-18 RX ADMIN — SODIUM CHLORIDE, SODIUM LACTATE, POTASSIUM CHLORIDE, CALCIUM CHLORIDE: 600; 310; 30; 20 INJECTION, SOLUTION INTRAVENOUS at 16:16:00

## 2024-09-18 RX ADMIN — LIDOCAINE HYDROCHLORIDE 50 MG: 10 INJECTION, SOLUTION EPIDURAL; INFILTRATION; INTRACAUDAL; PERINEURAL at 16:23:00

## 2024-09-18 RX ADMIN — ONDANSETRON 4 MG: 2 INJECTION INTRAMUSCULAR; INTRAVENOUS at 16:47:00

## 2024-09-18 NOTE — PHYSICAL THERAPY NOTE
Chart reviewed, patient with wound dehiscence and not appropriate for skilled physical therapy. Will attempt to see patient tomorrow for physical therapy evaluation if medically stable. RN aware.

## 2024-09-18 NOTE — OPERATIVE REPORT
[facility]  part of [facility]     Operative Report    Patient Name:  Coni Gandhi  MR:  [id number]  :  1945  DOS:  24    Preop Dx:  Fascia dehiscence  Postop Dx:  Fascia dehiscence, intra-abdominal hematoma    Procedure:    Exploratory laparotomy via WOUND EXPLORATION, CLOSURE FASCIA   EVACUATION OF HEMATOMA  Surgeon:  Osbaldo Phillips MD  Surgical Assistant.: Dion Alvarez CSA, Sherri Carranza MD  EBL: 20 ml  Complication:  None    INDICATION:  Pt is a 78 year old female POD 3 sp ex lap and SB resection with Dr. Rodrigez.  She was diagnosed with a fascia dehiscence this am and is scheduled for a WOUND EXPLORATION, CLOSURE FASCIA AND EVACUATION OF HEMATOMA.    CONSENT:  An informed consent discussion was held with the patient regarding the nature of fascia dehiscence, the treatment options and the details of the procedure.  The risks including but not limited to bleeding, wound infection, incomplete resection, bowel injury, recurrent dehiscence and subsequent incisional hernia were discussed.  The patient expressed understanding and want to proceed with the planned procedure.    TECHNIQUE:  The patient was taken to the OR and placed in supine position.  GET was established and the skin was prepped in standard fashion.  Staples were removed and multiple loops of small bowel was seen above the fascia consistent with our preoperative diagnosis.  I saw two short segments of O looped PDS sutures and it appeared that the double suture had broken at the same location.  The suture pieces were removed.  The previous upper 0 looped PDS suture was intact and left in place.  I examined the eviscerated small bowel and it appeared to be viable.  The previous small bowel anastomosis was identified.  A moderate sized hematoma was seen underneath the anastomosis along the mesentery.  I evacuated the hematoma and noted no active bleeding.  The rest of the small intestine was examined and no obvious  abnormality was seen.  I copiously irrigated with warm saline.  The lower midline fascia was closed using 0 looped PDS.  The subcutaneous tissue was irrigated with saline.  The skin incision was closed using staples and the subcutaneous tissue was packed with iodoform soaked telfa strips.  Sterile dressings were applied.  All instrument and sponge counts were correct.  I was present during the critical portions of the procedure.    Osbaldo Phillips MD

## 2024-09-18 NOTE — PLAN OF CARE
Problem: Patient Centered Care  Goal: Patient preferences are identified and integrated in the patient's plan of care  Description: Interventions:  - What would you like us to know as we care for you?   - Provide timely, complete, and accurate information to patient/family  - Incorporate patient and family knowledge, values, beliefs, and cultural backgrounds into the planning and delivery of care  - Encourage patient/family to participate in care and decision-making at the level they choose  - Honor patient and family perspectives and choices  Outcome: Progressing     Problem: Patient/Family Goals  Goal: Patient/Family Long Term Goal  Description: Patient's Long Term Goal:     Interventions:  - See additional Care Plan goals for specific interventions  Outcome: Progressing  Goal: Patient/Family Short Term Goal  Description: Patient's Short Term Goal:    Interventions:   - See additional Care Plan goals for specific interventions  Outcome: Progressing     Problem: PAIN - ADULT  Goal: Verbalizes/displays adequate comfort level or patient's stated pain goal  Description: INTERVENTIONS:  - Encourage pt to monitor pain and request assistance  - Assess pain using appropriate pain scale  - Administer analgesics based on type and severity of pain and evaluate response  - Implement non-pharmacological measures as appropriate and evaluate response  - Consider cultural and social influences on pain and pain management  - Manage/alleviate anxiety  - Utilize distraction and/or relaxation techniques  - Monitor for opioid side effects  - Notify MD/LIP if interventions unsuccessful or patient reports new pain  - Anticipate increased pain with activity and pre-medicate as appropriate  Outcome: Progressing     Problem: GASTROINTESTINAL - ADULT  Goal: Minimal or absence of nausea and vomiting  Description: INTERVENTIONS:  - Maintain adequate hydration with IV or PO as ordered and tolerated  - Nasogastric tube to low intermittent  suction as ordered  - Evaluate effectiveness of ordered antiemetic medications  - Provide nonpharmacologic comfort measures as appropriate  - Advance diet as tolerated, if ordered  - Obtain nutritional consult as needed  - Evaluate fluid balance  Outcome: Progressing  Goal: Maintains or returns to baseline bowel function  Description: INTERVENTIONS:  - Assess bowel function  - Maintain adequate hydration with IV or PO as ordered and tolerated  - Evaluate effectiveness of GI medications  - Encourage mobilization and activity  - Obtain nutritional consult as needed  - Establish a toileting routine/schedule  - Consider collaborating with pharmacy to review patient's medication profile  Outcome: Progressing

## 2024-09-18 NOTE — ANESTHESIA POSTPROCEDURE EVALUATION
Patient: Coni Gandhi    Procedure Summary       Date: 09/18/24 Room / Location: [facility] MAIN OR  / [facility] MAIN OR    Anesthesia Start: 1616 Anesthesia Stop:     Procedure: WOUND EXPLORATION, CLOSURE FASCIA AND EVACUATION OF HEMATOMA Diagnosis: (fascia dehiscence)    Surgeons: Osbaldo Phillips MD Anesthesiologist: Isidra Franco MD    Anesthesia Type: general ASA Status: 3 - Emergent            Anesthesia Type: No value filed.    Vitals Value Taken Time   /60 09/18/24 1656   Temp 99 09/18/24 1656   Pulse 76 09/18/24 1656   Resp 10 09/18/24 1656   SpO2 96 % 09/18/24 1656   Vitals shown include unfiled device data.    [facility] AN Post Evaluation:   Patient Evaluated in PACU  Patient Participation: complete - patient participated  Level of Consciousness: sleepy but conscious  Pain Score: 0  Pain Management: adequate  Airway Patency:patent  Dental exam unchanged from preop  Yes    Nausea/Vomiting: none  Cardiovascular Status: acceptable  Respiratory Status: acceptable  Postoperative Hydration acceptable      Isidra Franco MD  9/18/2024 4:56 PM

## 2024-09-18 NOTE — PROGRESS NOTES
Progress Note     Coni Gandhi Patient Status:  Inpatient    1945 MRN [id number]   Location [facility] 2W/SW Attending Rex Us MD   Hosp Day # 3 PCP Freedom Hickey MD     Chief Complaint:   Chief Complaint   Patient presents with    Abdomen/Flank Pain         Subjective:   S: Patient seen and examined, chart reviewed.   No new complaints.    , Madi, is at the bedside.    Denies cp, sob, dizziness  Ab. Sore  Wound dehiscence noted  Ab. sore    Review of Systems:   10 point ROS completed and was negative, except for pertinent positive and negatives stated in subjective.                Objective:   Vital signs:  Temp:  [97.2 °F (36.2 °C)-99 °F (37.2 °C)] 97.7 °F (36.5 °C)  Pulse:  [60-86] 73  Resp:  [12-25] 20  BP: ()/(53-69) 126/63  SpO2:  [95 %-100 %] 99 %    Wt Readings from Last 6 Encounters:   09/15/24 178 lb (80.7 kg)   24 178 lb (80.7 kg)   24 178 lb (80.7 kg)   23 175 lb 3.2 oz (79.5 kg)   23 176 lb 6.4 oz (80 kg)   23 171 lb (77.6 kg)       Physical Exam:    General: No acute distress.   Respiratory: Clear to auscultation bilaterally. No wheezes. No rhonchi.  Cardiovascular: S1, S2. Regular rate and rhythm. No murmurs, rubs or gallops.   Abdomen: Staples intact, some fresh bleeding from lower 1/3 of wound when pressure applied to skin.  Bandage is C/D/I. Soft, nontender, nondistended.  Positive bowel reduced. No rebound or guarding. + wound dehiscence  Neurologic: No focal neurological deficits.   Musculoskeletal: Moves all extremities.  Extremities: No edema.    Results:   Diagnostic Data:      Labs:    Labs Last 24 Hours:   BMP     CBC    Other     Na 146 Cl 117 BUN <5 Glu 116   Hb 7.6   PTT - Procal -   K 2.9 CO2 23.0 Cr 0.50   WBC 8.6 >< .0  INR - CRP -   Renal Lytes Endo    Hct 22.6   Trop - D dim -   eGFR - Ca 6.9 POC Gluc  -    LFT   pBNP - Lactic -   eGFR AA - PO4 - A1c -   AST - APk - Prot -  LDL -      Recent  Labs   Lab 09/16/24 0334 09/16/24  1202 09/16/24 2051 09/17/24 0334 09/18/24  0231   RBC 2.62*  --   --  2.20* 2.19*   HGB 8.9*   < > 8.0* 7.5* 7.6*   HCT 26.5*   < > 23.2* 22.8* 22.6*   .1*  --   --  103.6* 103.2*   MCH 34.0  --   --  34.1* 34.7*   MCHC 33.6  --   --  32.9 33.6   RDW 13.7  --   --  14.0 13.7   NEPRELIM 9.75*  --   --  7.40 6.65   WBC 11.6*  --   --  9.2 8.6   .0  --   --  145.0* 158.0    < > = values in this interval not displayed.       Lab Results   Component Value Date    INR 1.13 08/28/2020    INR 1.1 (L) 10/23/2007       Recent Labs   Lab 09/14/24 2319 09/16/24 0334 09/18/24 0231   * 160* 116*   BUN 25* 16 <5*   CREATSERUM 0.89 0.74 0.50*   CA 9.4 7.6* 6.9*   ALB  --  3.1*  --     142 146*   K 3.8 3.8 2.9*    114* 117*   CO2 17.0* 24.0 23.0   ALKPHO  --  42*  --    AST  --  19  --    ALT  --  12  --    BILT  --  0.5  --    TP  --  5.2*  --        Recent Labs   Lab 09/14/24 2319   LIP 42       Recent Labs   Lab 09/16/24 0334 09/17/24 0334 09/18/24  0231   MG 1.8 2.2 2.3   PHOS 2.1* 1.3*  --        No results for input(s): \"URINE\", \"CULTI\", \"BLDSMR\" in the last 168 hours.      No results found.        Pro-Calcitonin  No results for input(s): \"PCT\" in the last 168 hours.    Cardiac  No results for input(s): \"TROP\", \"PBNP\" in the last 168 hours.    Imaging: Imaging data reviewed in Epic.    No results found.       Medications:    potassium chloride  20 mEq Intravenous Once    levothyroxine  50 mcg Oral Daily @ 0700    FLUoxetine  20 mg Oral Daily    piperacillin-tazobactam  3.375 g Intravenous Q8H    enoxaparin  40 mg Subcutaneous Daily       Assessment & Plan:   ASSESSMENT / PLAN:     Small bowel obstruction status post exploratory laparotomy with bowel resection secondary to necrotic ileum.  Postoperative acute blood loss anemia   Metabolic alkalosis  Post operative wound dehiscence     Plan  -IV fluids D5 normal saline at 100 cc an hour  -IV antibiotics  with Zosyn  -OOB to chair, ambulate, PT/OT  - would start CLD if ok with surgery  - follow H/H and transfuse if Hb<7gm/dl  -NG tube to continuous low wall suction, can clamp and follow residuals.   -General Surgery consult  -Pain control manage at this time. Well controlled at this time. She has not had morphine in over 24 hrs.   -planning or for wound exploration today      Hypothyroidism  -IV levothyroxine--> po levothyroxine         dvt prophylaxis: lovenox  code status: full code  dispo: home upon medical clearance     I personally reviewed the available laboratories, imaging including operative report. I discussed/will discuss the case with patient and her nurse. I ordered laboratories, studies including serial H&H. I adjusted medications including not applicable today. Medical decision making high, risk is high.     >55min spent, >50% spent counseling and coordinating care in the form of educating pt/family and d/w consultants and staff. Most of the time spent discussing the above plan.      Estimated date of discharge: To be determined  Discharge is dependent on: Improved clinical status  At this point Ms. Gandhi is expected to be discharge to: Home versus rehab     Plan of care discussed with patient and nurse    ANNEL WARREN MD

## 2024-09-18 NOTE — PROGRESS NOTES
[facility]  part of [facility]    Progress Note    Coni Gandhi Patient Status:  Inpatient    1945 MRN [id number]   Location [facility] 2W/SW Attending Chad Ruiz MD   Hosp Day # 3 PCP Freedom Hickey MD     Assessment and Plan:     Fascia dehiscence  -discussed w pt and her  the nature of postop fascia dehiscence and rationale for surgical intervention.  The options of skin closure with delayed incisional hernia repair also discussed.  The expected outcomes, risks and benefits of each approach discussed in detail.      Recommend:  NPO, IV fluids, replace K and to OR today for wound exploration and fascia closure.    Subjective:   I was asked by Caroline Guevara PA-C to evaluate open wound.    Objective:   Patient Vitals for the past 24 hrs:   BP Temp Temp src Pulse Resp SpO2   24 0700 126/63 97.7 °F (36.5 °C) Temporal 73 20 99 %   24 0400 115/58 97.2 °F (36.2 °C) Temporal 67 20 96 %   24 0000 -- 97.4 °F (36.3 °C) Temporal -- -- --   24 2000 125/69 99 °F (37.2 °C) Temporal 62 12 97 %   24 1900 123/69 -- -- 70 22 98 %   24 1800 105/67 -- -- 75 25 98 %   24 1700 124/62 -- -- 71 22 100 %   24 1600 98/67 98.5 °F (36.9 °C) Temporal 70 19 98 %   24 1500 116/54 -- -- 63 13 97 %   24 1400 102/53 -- -- 60 13 95 %   24 1300 115/60 -- -- 86 22 96 %   24 1200 112/60 98.4 °F (36.9 °C) Temporal 71 20 97 %   24 1100 114/61 -- -- 65 15 95 %   24 1000 101/59 -- -- 71 22 98 %       Intake/Output                   24 0700 - 24 0659 24 0700 - 24 0659 24 07 - 24 0659       Intake    I.V.  653  3660.5  --    I.V. -- 9.3 --    Volume (mL) (dextrose 5%-sodium chloride 0.9% infusion) 653 3651.2 --    IV PIGGYBACK  100  560.8  --    Volume (mL) (piperacillin-tazobactam (Zosyn) 3.375 g in dextrose 5% 100 mL IVPB-ADDV) 100 472 --    Volume (mL) (potassium phosphate  dibasic 30 mmol in sodium chloride 0.9% 250 mL IVPB) -- 88.8 --    Total Intake 753 4221.3 --       Output    Urine  1800  1050  --    Urine Occurrence -- 7 x --    Output (mL) ([REMOVED] Urethral Catheter Double-lumen;Latex) 1800 1050 --    Emesis/NG output  50  --  --    Output (mL) ([REMOVED] NG/OG Tube Nasogastric Right nostril) 50 -- --    Stool  --  --  --    Stool Count Calculated for I/O -- 2 x 1 x    Total Output 1850 1050 --       Net I/O     -1097 3171.3 --            Exam:   /63 (BP Location: Right arm)   Pulse 73   Temp 97.7 °F (36.5 °C) (Temporal)   Resp 20   Ht 5' 2\" (1.575 m)   Wt 178 lb (80.7 kg)   SpO2 99%   BMI 32.56 kg/m²   Gen:  NAD  Abd:  Soft, NT, ND, incision with 3 cm skin opening on the inferior aspect.  Viable and visible loop of bowel just under the skin opening.    Results:     Lab Results   Component Value Date    WBC 8.6 09/18/2024    HGB 7.6 (L) 09/18/2024    HCT 22.6 (L) 09/18/2024    .0 09/18/2024    CREATSERUM 0.50 (L) 09/18/2024    BUN <5 (L) 09/18/2024     (H) 09/18/2024    K 2.9 (LL) 09/18/2024     (H) 09/18/2024    CO2 23.0 09/18/2024     (H) 09/18/2024    CA 6.9 (L) 09/18/2024    ALB 3.1 (L) 09/16/2024    ALKPHO 42 (L) 09/16/2024    BILT 0.5 09/16/2024    TP 5.2 (L) 09/16/2024    AST 19 09/16/2024    ALT 12 09/16/2024    PTT 33.1 08/28/2020    INR 1.13 08/28/2020    TSH 3.161 07/01/2024    LIP 42 09/14/2024    MG 2.3 09/18/2024    PHOS 1.3 (L) 09/17/2024    TROP <0.045 11/29/2019    B12 543 12/04/2019       No results found.        Osbaldo Phillips MD  9/18/2024

## 2024-09-18 NOTE — ANESTHESIA PROCEDURE NOTES
Airway  Date/Time: 9/18/2024 4:25 PM  Urgency: Elective    Airway not difficult    General Information and Staff    Patient location during procedure: OR  Anesthesiologist: Isidra Franco MD  Performed: anesthesiologist   Performed by: Isidra Franco MD  Authorized by: Isidra Franco MD      Indications and Patient Condition  Indications for airway management: anesthesia  Sedation level: deep  Preoxygenated: yes  Patient position: sniffing  Mask difficulty assessment: 0 - not attempted    Final Airway Details  Final airway type: endotracheal airway      Successful airway: ETT  Cuffed: yes   Successful intubation technique: direct laryngoscopy  Facilitating devices/methods: rapid sequence intubation, cricoid pressure and intubating stylet  Endotracheal tube insertion site: oral  Blade: GlideScope  Blade size: #3  ETT size (mm): 7.0    Cormack-Lehane Classification: grade I - full view of glottis  Placement verified by: capnometry   Measured from: teeth  ETT to teeth (cm): 21  Number of attempts at approach: 1

## 2024-09-18 NOTE — ANESTHESIA PREPROCEDURE EVALUATION
Anesthesia PreOp Note    HPI:     Coni Gandhi is a 78 year old female who presents for preoperative consultation requested by: Osbaldo Phillips MD    Date of Surgery: 9/14/2024 - 9/18/2024    Procedure(s):  WOUND EXPLORATION, CLOSURE FASCIA  Indication: fascia dehiscence    Relevant Problems   No relevant active problems       NPO:  Last Liquid Consumption Date: 09/18/24  Last Liquid Consumption Time: 0800 (small sip with meds)  Last Solid Consumption Date: 09/14/24  Last Solid Consumption Time: 1000  Last Liquid Consumption Date: 09/18/24          History Review:  Patient Active Problem List    Diagnosis Date Noted    Lower abdominal pain 09/15/2024    Nausea and vomiting in adult 09/15/2024    Complete intestinal obstruction, unspecified cause (HCC) 09/15/2024    Medicare annual wellness visit, subsequent 06/26/2023    Breast screening 06/26/2023    Eye exam, routine 06/26/2023    History of endometrial cancer 06/26/2023    Vasomotor rhinitis 11/09/2022    SOPHIA (generalized anxiety disorder) 11/02/2021    Macrocytosis 10/30/2020    Risk for falls 10/30/2020    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/23/2020    Wedge compression fracture of second lumbar vertebra with routine healing 10/14/2019    Peripheral venous insufficiency 06/24/2019    Osteoporosis 01/29/2015    Colon cancer screening 12/01/2009    Hypothyroidism 10/24/2008       Past Medical History:    Actinic keratosis    Left cheek    Anxiety    Back problem    compression fractures    Benign essential tremor    right hand    Cancer (HCC)    uterine    Disorder of thyroid    hypothyroid    Exposure to medical diagnostic radiation    Hypothyroidism    Lichenoid actinic keratosis    Left cheek    Nausea and vomiting in adult    Osteoarthritis    Osteoporosis    Post-operative hypothyroidism    Postmenopausal atrophic vaginitis    Visual impairment    glasses       Past Surgical History:   Procedure Laterality Date    Back surgery      Breast biopsy Left 1990     Colectomy      Colonoscopy      2012     D & c  2013    \"mass\" inside uterus -- Dr. Napier    Hernia surgery  05/13/2021    Ventral Hernia Repair     Hip replacement surgery      Hysterectomy      Other surgical history  1986    s/p thyroidectomy for benign thyroid nodules    Other surgical history  11/29/2019    bowel obstruction    Spine surgery procedure unlisted  10/2019    kyphoplasty     Thyroidectomy  1986    nodules removed    Tonsillectomy      Total hip replacement Right        Facility-Administered Medications Prior to Admission   Medication Dose Route Frequency Provider Last Rate Last Admin    denosumab (Prolia) 60 MG/ML SUBQ injection 60 mg  60 mg Subcutaneous Q6 Months Nadiya Hylton MD   60 mg at 06/18/24 1018     Medications Prior to Admission   Medication Sig Dispense Refill Last Dose    levothyroxine 50 MCG Oral Tab Take 1 tablet (50 mcg total) by mouth before breakfast. 90 tablet 3 9/14/2024 at AM    VITAMIN B COMPLEX-C OR    9/14/2024 at AM    Calcium Carbonate-Vitamin D (CALCIUM-VITAMIN D) 600-200 MG-UNIT Oral Cap Take by mouth daily. Takes 2 tabs in am and 1 tab at HS    9/15/2024 at AM    Multiple Vitamins Oral Tab Take 1 tablet by mouth daily.   9/14/2024 at AM    Omega-3 Fatty Acids ( OMEGA-3 FISH OIL) 1200 MG Oral Capsule Delayed Release Take by mouth daily.     9/14/2024 at AM    FLUoxetine 20 MG Oral Cap Take 1 capsule (20 mg total) by mouth daily. ADDRESS ALL REFILLS AT UPCOMING APPOINTMENT (Patient taking differently: Take 1 capsule (20 mg total) by mouth daily. ADDRESS ALL REFILLS AT UPCOMING APPOINTMENT- states she takes at hs) 30 capsule 0 9/13/2024 at HS    fluorouracil 5 % External Cream Start using 3-4 weeks after biopsy. Use twice daily to spot on left cheek for 4 weeks. Please notify docot if spot opens up or begins to bleed. 40 g 0 Unknown    Cholecalciferol (VITAMIN D) 50 MCG (2000 UT) Oral Cap Take by mouth.       Multiple Vitamins-Minerals (EYE VITAMINS & MINERALS OR) Take 1  tablet by mouth daily.       diphenhydrAMINE HCl, Sleep, (ZZZQUIL OR) Take by mouth.   Unknown    Melatonin 5 MG Oral Tab Take 1 tablet (5 mg total) by mouth nightly.   Unknown     Current Facility-Administered Medications Ordered in Epic   Medication Dose Route Frequency Provider Last Rate Last Admin    potassium chloride 20 mEq/100mL IVPB premix 20 mEq  20 mEq Intravenous Once Rex Us MD        acetaminophen (Tylenol) tab 650 mg  650 mg Oral Q6H PRN Chad Ruiz MD   650 mg at 09/18/24 0855    levothyroxine (Synthroid) tab 50 mcg  50 mcg Oral Daily @ 0700 Chad Ruiz MD   50 mcg at 09/18/24 1056    FLUoxetine (PROzac) cap 20 mg  20 mg Oral Nightly Chad Ruiz MD        metoclopramide (Reglan) 5 mg/mL injection 5 mg  5 mg Intravenous Q8H PRN Clair Clemente APRN   5 mg at 09/17/24 1129    dextrose 5%-sodium chloride 0.9% infusion   Intravenous Continuous Catalina Brunner  mL/hr at 09/18/24 0725 New Bag at 09/18/24 0725    piperacillin-tazobactam (Zosyn) 3.375 g in dextrose 5% 100 mL IVPB-ADDV  3.375 g Intravenous Q8H Catalina Brunner MD 25 mL/hr at 09/18/24 0855 3.375 g at 09/18/24 0855    enoxaparin (Lovenox) 40 MG/0.4ML SUBQ injection 40 mg  40 mg Subcutaneous Daily Bebo Rodrigez MD   40 mg at 09/17/24 0839    oxyCODONE immediate release tab 2.5 mg  2.5 mg Oral Q4H PRN Bebo Rodrigez MD   2.5 mg at 09/18/24 0510    Or    oxyCODONE immediate release tab 5 mg  5 mg Oral Q4H PRN Bebo Rodrigez MD        HYDROmorphone (Dilaudid) 1 MG/ML injection 0.2 mg  0.2 mg Intravenous Q2H PRN Bebo Rodrigez MD   0.2 mg at 09/17/24 1130    Or    HYDROmorphone (Dilaudid) 1 MG/ML injection 0.4 mg  0.4 mg Intravenous Q2H PRN Bebo Rodrigez MD        ondansetron (Zofran) 4 MG/2ML injection 4 mg  4 mg Intravenous Q4H PRN Rex Us MD   4 mg at 09/17/24 1750     No current [facility]-ordered outpatient medications on file.       No Known Allergies    Family History    Problem Relation Age of Onset    Cancer Self     Other (Other) Mother         Heart attack    Stroke Father         brain aneurysm    No Known Problems Sister     Stroke Brother         brain aneurysm    No Known Problems Other     Breast Cancer Neg      Social History     Socioeconomic History    Marital status:     Number of children: 3   Occupational History    Occupation: Retired   Tobacco Use    Smoking status: Former     Current packs/day: 1.00     Types: Cigarettes     Passive exposure: Past    Smokeless tobacco: Never    Tobacco comments:     quit 40 yrs ago   Vaping Use    Vaping status: Never Used   Substance and Sexual Activity    Alcohol use: Yes     Alcohol/week: 0.0 standard drinks of alcohol     Comment: rarely    Drug use: No   Other Topics Concern    Caffeine Concern Yes     Comment: Coffee 1 cup daily; Soda    Grew up on a farm No    History of tanning No    Outdoor occupation No    Breast feeding No    Reaction to local anesthetic No    Pt has a pacemaker No    Pt has a defibrillator No       Available pre-op labs reviewed.  Lab Results   Component Value Date    WBC 8.6 09/18/2024    RBC 2.19 (L) 09/18/2024    HGB 7.6 (L) 09/18/2024    HCT 22.6 (L) 09/18/2024    .2 (H) 09/18/2024    MCH 34.7 (H) 09/18/2024    MCHC 33.6 09/18/2024    RDW 13.7 09/18/2024    .0 09/18/2024     Lab Results   Component Value Date     (H) 09/18/2024    K 3.2 (L) 09/18/2024     (H) 09/18/2024    CO2 23.0 09/18/2024    BUN <5 (L) 09/18/2024    CREATSERUM 0.50 (L) 09/18/2024     (H) 09/18/2024    CA 6.9 (L) 09/18/2024          Vital Signs:  Body mass index is 32.56 kg/m².   height is 1.575 m (5' 2\") and weight is 80.7 kg (178 lb). Her temporal temperature is 98.1 °F (36.7 °C). Her blood pressure is 115/59 and her pulse is 64. Her respiration is 15 and oxygen saturation is 96%.   Vitals:    09/18/24 0400 09/18/24 0700 09/18/24 0830 09/18/24 1200   BP: 115/58 126/63 131/71 115/59    Pulse: 67 73 76 64   Resp: 20 20 15 15   Temp: 97.2 °F (36.2 °C) 97.7 °F (36.5 °C)  98.1 °F (36.7 °C)   TempSrc: Temporal Temporal  Temporal   SpO2: 96% 99% 98% 96%   Weight:       Height:            Anesthesia Evaluation     Patient summary reviewed and Nursing notes reviewed    Airway   Mallampati: II  TM distance: >3 FB  Neck ROM: full  Dental      Pulmonary - normal exam   Cardiovascular - normal exam    Neuro/Psych    (+)  anxiety/panic attacks,        GI/Hepatic/Renal      Comments: S/p ex lap and removal of necrotic bowel causing bowel obstruction on 9/15 and since then has had acute post op anemia- coming back to OR for wound exploration    Transufion for Hgb <7    Endo/Other    (+) blood dyscrasia (anemia)    Comments: Hypokalemia- repleted to 3.1  Abdominal                  Anesthesia Plan:   ASA:  3  Emergent    Plan:   General  Airway:  ETT and Video laryngoscope  Post-op Pain Management: IV analgesics  Informed Consent Plan and Risks Discussed With:  Patient  Discussed plan with:  Surgeon      I have informed Coni Kaiseruc and/or legal guardian or family member of the nature of the anesthetic plan, benefits, risks including possible dental damage if relevant, major complications, and any alternative forms of anesthetic management.   All of the patient's questions were answered to the best of my ability. The patient desires the anesthetic management as planned.  Johana Lopez MD  9/18/2024 3:20 PM  Present on Admission:   Lower abdominal pain

## 2024-09-19 LAB
ANION GAP SERPL CALC-SCNC: 5 MMOL/L (ref 0–18)
BASOPHILS # BLD AUTO: 0.01 X10(3) UL (ref 0–0.2)
BASOPHILS NFR BLD AUTO: 0.1 %
BUN BLD-MCNC: 6 MG/DL (ref 9–23)
BUN/CREAT SERPL: 12 (ref 10–20)
CALCIUM BLD-MCNC: 7 MG/DL (ref 8.7–10.4)
CHLORIDE SERPL-SCNC: 118 MMOL/L (ref 98–112)
CO2 SERPL-SCNC: 22 MMOL/L (ref 21–32)
CREAT BLD-MCNC: 0.5 MG/DL
DEPRECATED RDW RBC AUTO: 53 FL (ref 35.1–46.3)
EGFRCR SERPLBLD CKD-EPI 2021: 97 ML/MIN/1.73M2 (ref 60–?)
EOSINOPHIL # BLD AUTO: 0 X10(3) UL (ref 0–0.7)
EOSINOPHIL NFR BLD AUTO: 0 %
ERYTHROCYTE [DISTWIDTH] IN BLOOD BY AUTOMATED COUNT: 14 % (ref 11–15)
GLUCOSE BLD-MCNC: 147 MG/DL (ref 70–99)
HCT VFR BLD AUTO: 22.9 %
HGB BLD-MCNC: 7.6 G/DL
IMM GRANULOCYTES # BLD AUTO: 0.04 X10(3) UL (ref 0–1)
IMM GRANULOCYTES NFR BLD: 0.5 %
LYMPHOCYTES # BLD AUTO: 0.48 X10(3) UL (ref 1–4)
LYMPHOCYTES NFR BLD AUTO: 5.9 %
MAGNESIUM SERPL-MCNC: 2.1 MG/DL (ref 1.6–2.6)
MCH RBC QN AUTO: 34.2 PG (ref 26–34)
MCHC RBC AUTO-ENTMCNC: 33.2 G/DL (ref 31–37)
MCV RBC AUTO: 103.2 FL
MONOCYTES # BLD AUTO: 0.45 X10(3) UL (ref 0.1–1)
MONOCYTES NFR BLD AUTO: 5.5 %
NEUTROPHILS # BLD AUTO: 7.14 X10 (3) UL (ref 1.5–7.7)
NEUTROPHILS # BLD AUTO: 7.14 X10(3) UL (ref 1.5–7.7)
NEUTROPHILS NFR BLD AUTO: 88 %
OSMOLALITY SERPL CALC.SUM OF ELEC: 300 MOSM/KG (ref 275–295)
PHOSPHATE SERPL-MCNC: 1.7 MG/DL (ref 2.4–5.1)
PLATELET # BLD AUTO: 189 10(3)UL (ref 150–450)
POTASSIUM SERPL-SCNC: 3.6 MMOL/L (ref 3.5–5.1)
POTASSIUM SERPL-SCNC: 3.6 MMOL/L (ref 3.5–5.1)
RBC # BLD AUTO: 2.22 X10(6)UL
SODIUM SERPL-SCNC: 145 MMOL/L (ref 136–145)
WBC # BLD AUTO: 8.1 X10(3) UL (ref 4–11)

## 2024-09-19 PROCEDURE — 99233 SBSQ HOSP IP/OBS HIGH 50: CPT | Performed by: HOSPITALIST

## 2024-09-19 RX ORDER — ACETAMINOPHEN 325 MG/1
650 TABLET ORAL EVERY 6 HOURS PRN
Status: DISCONTINUED | OUTPATIENT
Start: 2024-09-19 | End: 2024-09-23

## 2024-09-19 RX ORDER — LEVOTHYROXINE SODIUM 50 UG/1
50 TABLET ORAL
Status: DISCONTINUED | OUTPATIENT
Start: 2024-09-19 | End: 2024-09-23

## 2024-09-19 NOTE — CM/SW NOTE
Anticipated therapy need: Home with Home Healthcare.    [facility] and HH referrals sent in A[place].    If patient is agreeable, will need choice for HH.    Gina Owen MBA BSN RN CRRN   RN Case Manager  [phone]

## 2024-09-19 NOTE — PLAN OF CARE
Pt alert and oriented. Complains of mild soreness in abdomen. Abdominal binder in place. Pt went to OR for hematoma evacuation and fascia dehiscence repair with Dr Anne today.    Problem: Patient Centered Care  Goal: Patient preferences are identified and integrated in the patient's plan of care  Description: Interventions:  - What would you like us to know as we care for you?   - Provide timely, complete, and accurate information to patient/family  - Incorporate patient and family knowledge, values, beliefs, and cultural backgrounds into the planning and delivery of care  - Encourage patient/family to participate in care and decision-making at the level they choose  - Honor patient and family perspectives and choices  Outcome: Progressing     Problem: Patient/Family Goals  Goal: Patient/Family Long Term Goal  Description: Patient's Long Term Goal:     Interventions:  -   - See additional Care Plan goals for specific interventions  Outcome: Progressing  Goal: Patient/Family Short Term Goal  Description: Patient's Short Term Goal:     Interventions:   -   - See additional Care Plan goals for specific interventions  Outcome: Progressing     Problem: PAIN - ADULT  Goal: Verbalizes/displays adequate comfort level or patient's stated pain goal  Description: INTERVENTIONS:  - Encourage pt to monitor pain and request assistance  - Assess pain using appropriate pain scale  - Administer analgesics based on type and severity of pain and evaluate response  - Implement non-pharmacological measures as appropriate and evaluate response  - Consider cultural and social influences on pain and pain management  - Manage/alleviate anxiety  - Utilize distraction and/or relaxation techniques  - Monitor for opioid side effects  - Notify MD/LIP if interventions unsuccessful or patient reports new pain  - Anticipate increased pain with activity and pre-medicate as appropriate  Outcome: Progressing     Problem: GASTROINTESTINAL - ADULT  Goal:  Minimal or absence of nausea and vomiting  Description: INTERVENTIONS:  - Maintain adequate hydration with IV or PO as ordered and tolerated  - Nasogastric tube to low intermittent suction as ordered  - Evaluate effectiveness of ordered antiemetic medications  - Provide nonpharmacologic comfort measures as appropriate  - Advance diet as tolerated, if ordered  - Obtain nutritional consult as needed  - Evaluate fluid balance  Outcome: Progressing  Goal: Maintains or returns to baseline bowel function  Description: INTERVENTIONS:  - Assess bowel function  - Maintain adequate hydration with IV or PO as ordered and tolerated  - Evaluate effectiveness of GI medications  - Encourage mobilization and activity  - Obtain nutritional consult as needed  - Establish a toileting routine/schedule  - Consider collaborating with pharmacy to review patient's medication profile  Outcome: Progressing

## 2024-09-19 NOTE — PHYSICAL THERAPY NOTE
PHYSICAL THERAPY EVALUATION - INPATIENT     Room Number: 224/224-A  Evaluation Date: 9/19/2024  Type of Evaluation: Initial   Physician Order: PT Eval and Treat    Presenting Problem: lower abdominal pain s/p ex lap and then s/p wound closure     Reason for Therapy: Mobility Dysfunction and Discharge Planning    PHYSICAL THERAPY ASSESSMENT   Patient is a 78 year old female admitted 9/14/2024 for lower abdominal pain s/p ex lap and then s/p wound closure.  Prior to admission, patient's baseline is independent in ADL's and ambulation with cane as needed.  Patient is currently functioning below baseline with bed mobility, transfers, and gait.  Patient is requiring contact guard assist as a result of the following impairments: pain and medical status.  Physical Therapy will continue to follow for duration of hospitalization.    Patient will benefit from continued skilled PT Services at discharge to promote prior level of function and safety with additional support and return home with home health PT.    PLAN  PT Treatment Plan: Bed mobility;Body mechanics;Patient education;Gait training;Stair training;Transfer training;Balance training  Rehab Potential : Good  Frequency (Obs): 5x/week    PHYSICAL THERAPY MEDICAL/SOCIAL HISTORY   Problem List  Principal Problem:    Lower abdominal pain  Active Problems:    Nausea and vomiting in adult    Complete intestinal obstruction, unspecified cause (HCC)    HOME SITUATION  Home Situation  Type of Home: House  Home Layout: Two level  Stairs to Enter : 2  Railing: Yes  Stairs to Bedroom: 7  Railing: Yes  Lives With: Spouse  Drives: Yes  Patient Owned Equipment: Rolling walker;Cane (uses cane as needed)  Patient Regularly Uses: None     SUBJECTIVE  \"You guys are fun\"    PHYSICAL THERAPY EXAMINATION   OBJECTIVE  Precautions: Abdominal protective strategies  Fall Risk: Standard fall risk    WEIGHT BEARING RESTRICTION  Weight Bearing Restriction: None                PAIN  ASSESSMENT  Rating: Unable to rate  Location: generalized discomfort  Management Techniques: Activity promotion;Body mechanics;Repositioning    COGNITION  Overall Cognitive Status:  WFL - within functional limits    RANGE OF MOTION AND STRENGTH ASSESSMENT  Lower extremity ROM is within functional limits  BLE WNL  Lower extremity strength is within functional limits  BLE WNL    BALANCE  Static Sitting: Good  Dynamic Sitting: Fair +  Static Standing: Fair  Dynamic Standing: Fair -    AM-PAC '6-Clicks' INPATIENT SHORT FORM - BASIC MOBILITY  How much difficulty does the patient currently have...  Patient Difficulty: Turning over in bed (including adjusting bedclothes, sheets and blankets)?: A Little   Patient Difficulty: Sitting down on and standing up from a chair with arms (e.g., wheelchair, bedside commode, etc.): A Little   Patient Difficulty: Moving from lying on back to sitting on the side of the bed?: A Little   How much help from another person does the patient currently need...   Help from Another: Moving to and from a bed to a chair (including a wheelchair)?: A Little   Help from Another: Need to walk in hospital room?: A Little   Help from Another: Climbing 3-5 steps with a railing?: A Little     AM-PAC Score:  Raw Score: 18   Approx Degree of Impairment: 46.58%   Standardized Score (AM-PAC Scale): 43.63   CMS Modifier (G-Code): CK    FUNCTIONAL ABILITY STATUS  Functional Mobility/Gait Assessment  Gait Assistance: Contact guard assist  Distance (ft): 80ft  Assistive Device:  (hand held assist right side, will need to use rolling walker)  Rolling:  not tested   Supine to Sit: minimal assist  Sit to Supine:  not tested   Sit to Stand: contact guard assist    Exercise/Education Provided:  Bed mobility  Body mechanics  Gait training  Transfer training    Skilled Therapy Provided: Patient on room air. Patient oxygen sat 100% and heart rate 70, blood pressure 106/93 and then 122/61, and oxygen 100% and heart rate  76 at end of session, patient complaining of shortness of breath. Patient currently with min A x 1 to CGA for mobility with no assistive device, using hand held assist during the session. Patient with abdomen binder in place. Patient will need to use rolling walker at this time. The patient's Approx Degree of Impairment: 46.58% has been calculated based on documentation in the [facility] '6 clicks' Inpatient Basic Mobility Short Form.  Research supports that patients with this level of impairment may benefit from home with home health. Patient received semi-fowlers in bed, agreeable to physical therapy evaluation. Education with patient provided verbally on physical therapy plan of care, physiological benefits of out of bed mobility, and abdominal protective strategies. Next session anticipate to progress bed mobility, transfers, gait, and stair negotiation.    Patient history and/or personal factors that may impact the plan of care include home accessibility concerns. Based on the physical therapy examination of the noted systems and functional activity/participation limitations, the patient presentation is evolving given the patient presents with surgical precautions/limitations. Final disposition will be made by interdisciplinary medical team.    Patient End of Session: Up in chair;Needs met;Call light within reach;RN aware of session/findings;All patient questions and concerns addressed;Family present    CURRENT GOALS  Goals to be met by: 9/25/24  Patient Goal Patient's self-stated goal is: to go home   Goal #1 Patient is able to demonstrate supine - sit EOB @ level: supervision     Goal #1   Current Status    Goal #2 Patient is able to demonstrate transfers Sit to/from Stand at assistance level: supervision with walker - rolling     Goal #2  Current Status    Goal #3 Patient is able to ambulate 100 feet with assist device: walker - rolling at assistance level: supervision   Goal #3   Current Status    Goal #4  Patient will negotiate 7 stairs/one curb w/ assistive device and supervision   Goal #4   Current Status    Goal #5 Patient to demonstrate independence with home activity/exercise instructions provided to patient in preparation for discharge.   Goal #5   Current Status    Goal #6    Goal #6  Current Status      Patient Evaluation Complexity Level:  History Moderate - 1 or 2 personal factors and/or co-morbidities   Examination of body systems Low -  addressing 1-2 elements   Clinical Presentation  Moderate - Evolving   Clinical Decision Making  Low Complexity     Gait Training: 10 minutes  Therapeutic Activity:  13 minutes

## 2024-09-19 NOTE — PROGRESS NOTES
[facility]  Progress Note    Coni Gandhi Patient Status:  Inpatient    1945 MRN [id number]   Location [facility] 2W/SW Attending Chad Ruiz MD   Hosp Day # 4 PCP Freedom Hickey MD     Subjective:  Patient resting in bed. She has mild abdominal pain today. She denies nausea or vomiting. She is passing gas and having bowel movements. She has been ambulating and NPO since surgery.     Objective/Physical Exam:  General: Alert, orientated x3.  Cooperative.  No apparent distress.  Vital Signs:  Blood pressure 102/77, pulse 63, temperature 97.3 °F (36.3 °C), temperature source Temporal, resp. rate 20, height 5' 2\" (1.575 m), weight 178 lb (80.7 kg), SpO2 100%, not currently breastfeeding.  Wt Readings from Last 3 Encounters:   09/15/24 178 lb (80.7 kg)   24 178 lb (80.7 kg)   24 178 lb (80.7 kg)     Lungs: No respiratory distress.  Cardiac: Regular rate and rhythm.   Abdomen:  Soft, not distended, mildly tender, with no rebound or guarding.  No peritoneal signs.   Extremities:  No lower extremity edema noted.    Incision: Clean, dry, intact, no erythema. Telfa strips in midline incision.       Intake/Output:    Intake/Output Summary (Last 24 hours) at 2024 1307  Last data filed at 2024 0826  Gross per 24 hour   Intake 846.4 ml   Output 1150 ml   Net -303.6 ml     I/O last 3 completed shifts:  In: 5057.7 [P.O.:50; I.V.:4346.9; IV PIGGYBACK:660.8]  Out: 1150 [Urine:1150]  I/O this shift:  In: 60 [P.O.:60]  Out: -     Medications:    FLUoxetine  20 mg Oral Nightly    levothyroxine  50 mcg Oral Daily @ 0700    [Transfer Hold] potassium chloride  20 mEq Intravenous Once    heparin  5,000 Units Subcutaneous 2 times per day    pantoprazole  40 mg Intravenous Daily    piperacillin-tazobactam  3.375 g Intravenous Q8H       Labs:  Lab Results   Component Value Date    WBC 8.1 2024    HGB 7.6 2024    HCT 22.9 2024    .0 2024     Lab  Results   Component Value Date     09/19/2024    K 3.6 09/19/2024    K 3.6 09/19/2024     09/19/2024    CO2 22.0 09/19/2024    BUN 6 09/19/2024    CREATSERUM 0.50 09/19/2024     09/19/2024    CA 7.0 09/19/2024     Lab Results   Component Value Date    INR 1.13 08/28/2020    INR 1.1 (L) 10/23/2007         Assessment  Patient Active Problem List   Diagnosis    Hypothyroidism    Peripheral venous insufficiency    Colon cancer screening    Osteoporosis    Wedge compression fracture of second lumbar vertebra with routine healing    Adenocarcinoma of endometrium (HCC)    Macrocytosis    Risk for falls    SOPHIA (generalized anxiety disorder)    Vasomotor rhinitis    Medicare annual wellness visit, subsequent    Breast screening    Eye exam, routine    History of endometrial cancer    Lower abdominal pain    Nausea and vomiting in adult    Complete intestinal obstruction, unspecified cause (HCC)     POD 1: Exploratory laparotomy via wound exploration, closure of fascia.  Evacuation of hematoma  S/p exploratory laparotomy, bowel resection (9/15/24)      Plan:  Advance to clear liquid diet as tolerated.  Correct electrolyte abnormalities.   Ambulate as able   DVT prophylaxis with Lovenox  Encourage incentive spirometer    Quality:  DVT Mechanical Prophylaxis:   SCDs, Early ambuation  DVT Pharmacologic Prophylaxis   Medication    heparin (Porcine) 5000 UNIT/ML injection 5,000 Units                Code Status: Full Code  Larson: External urinary catheter in place  Larson Duration (in days):   Central line:    AQUILES: 9/17/2024        JOSH Alas  9/19/2024  1:07 PM

## 2024-09-19 NOTE — OCCUPATIONAL THERAPY NOTE
OCCUPATIONAL THERAPY EVALUATION - INPATIENT     Room Number: 224/224-A  Evaluation Date: 9/19/2024  Type of Evaluation: Initial  Presenting Problem: Ex lap 9/15; WOUND EXPLORATION, CLOSURE FASCIA AND EVACUATION OF HEMATOMA 9/19    Physician Order: IP Consult to Occupational Therapy  Reason for Therapy: ADL/IADL Dysfunction and Discharge Planning    OCCUPATIONAL THERAPY ASSESSMENT   Patient is a 78 year old female admitted 9/14/2024 for above.  Prior to admission, patient's baseline is home with supportive spouse and independenet .  Patient is currently functioning near baseline with self care and basic mobility tasks .  Patient is requiring up to Mod A with ADLs; CGA/SBA with functional t/f and mobility as a result of the following impairments: decreased functional strength, decreased functional reach, decreased endurance, pain, decreased muscular endurance, and medical status. Occupational Therapy will continue to follow for duration of hospitalization.    Patient will benefit from continued skilled OT Services at discharge to promote prior level of function and safety with additional support and return home with home health OT    PLAN  OT Treatment Plan: Balance activities;Energy conservation/work simplification techniques;ADL training;Functional transfer training;Endurance training;Patient/Family education;Patient/Family training;Compensatory technique education  OT Device Recommendations: Shower chair    OCCUPATIONAL THERAPY MEDICAL/SOCIAL HISTORY   Problem List  Principal Problem:    Lower abdominal pain  Active Problems:    Nausea and vomiting in adult    Complete intestinal obstruction, unspecified cause (HCC)    HOME SITUATION  Type of Home: House  Home Layout: Two level  Lives With: Spouse  Toilet and Equipment: Standard height toilet  Shower/Tub and Equipment: Tub-shower combo  Drives: Yes  Patient Regularly Uses: None    SUBJECTIVE  I just want to go home    OCCUPATIONAL THERAPY EXAMINATION     OBJECTIVE  Precautions: -- (abdominal)  Fall Risk: Standard fall risk    PAIN ASSESSMENT  Rating: Unable to rate  Location: abdominal  Management Techniques: Activity promotion; Repositioning    ACTIVITY TOLERANCE  Pulse: 70        BP: (!) 106/93  BP Location: Right arm  BP Method: Automatic  Patient Position: Sitting    O2 SATURATIONS  Oxygen Therapy  SPO2% on Room Air at Rest: 100    COGNITION  Overall Cognitive Status:  WFL - within functional limits      RANGE OF MOTION   Upper extremity ROM is within functional limits     STRENGTH ASSESSMENT  Upper extremity strength is within functional limits     COORDINATION  Gross Motor: WFL   Fine Motor: WFL     ACTIVITIES OF DAILY LIVING ASSESSMENT  AM-PAC ‘6-Clicks’ Inpatient Daily Activity Short Form  How much help from another person does the patient currently need…  -   Putting on and taking off regular lower body clothing?: A Lot  -   Bathing (including washing, rinsing, drying)?: A Lot  -   Toileting, which includes using toilet, bedpan or urinal? : A Lot  -   Putting on and taking off regular upper body clothing?: A Little  -   Taking care of personal grooming such as brushing teeth?: A Little  -   Eating meals?: A Little    AM-PAC Score:  Score: 15  Approx Degree of Impairment: 56.46%  Standardized Score (AM-PAC Scale): 34.69  CMS Modifier (G-Code): CK    FUNCTIONAL TRANSFER ASSESSMENT  Sit to Stand: Edge of Bed  Edge of Bed: Contact Guard Assist  Toilet Transfer: Contact Guard Assist    BED MOBILITY     BALANCE ASSESSMENT  Static Sitting: Stand-by Assist  Static Standing: Contact Guard Assist    FUNCTIONAL ADL ASSESSMENT  Eating: Independent  Grooming Seated: Stand-by Assist  Bathing Seated: Minimal Assist  UB Dressing Seated: Minimal Assist  LB Dressing Seated: Moderate Assist  Toileting Seated: Moderate Assist       Skilled Therapy Provided: Patient care coordinated with PT; RN provided consent to proceed with treatment. Patient rcvd in bed ; patient is  pleasant and cooperative; motivated to participate. Educated patient on abdominal protection strategies including log roll and bracing abdomen for coughing, laughing and deep breathing. Patient was able to complete transition from supine to EOB with Min A. Patient tolerated seated EOB  with SBA. Patient was able to complete STS to hand held assist with CGA x2; completed the following dynamic activities: ambulatory bathroom distances in room x2; educated on posture, activity and energy conservation;  Patient educated on importance of mobility and encouraged to be up in chair 2-3x day with RN staff and continue to ambulate short distances fequently. Patient is on track to d/c to home; will continue skilled therapy while IP to maximize patient function in prep to d/c home.         EDUCATION PROVIDED  Patient: Role of Occupational Therapy; Plan of Care; Discharge Recommendations; Posture/Positioning; Surgical Precautions; Abdominal Protective Strategies  Patient's Response to Education: Verbalized Understanding; Requires Further Education    The patient's Approx Degree of Impairment: 56.46% has been calculated based on documentation in the [facility] '6 clicks' Inpatient Daily Activity Short Form.  Research supports that patients with this level of impairment may benefit from GR, however, pt has potential and support to progress to level where she is able to return home with HH; Continue skilled occupational therapy while IP to maximize patient function and increase patient participation, safety, and independence with basic ADL and everyday activities.   .  Final disposition will be made by interdisciplinary medical team.     Patient End of Session: Up in chair;Needs met;Call light within reach;RN aware of session/findings;All patient questions and concerns addressed    OT Goals  Patients self stated goal is: to return home     Patient will complete functional transfer with SBA   Comment:     Patient will complete toileting  with distant supervision  Comment:     Patient will tolerate standing for 3- minutes in prep for adls with SBA    Comment:    Patient will complete item retrieval with SBA   Comment:          Goals  on:   Frequency: 3-5x week     Patient Evaluation Complexity Level:   Occupational Profile/Medical History MODERATE - Expanded review of history including review of medical or therapy record   Specific performance deficits impacting engagement in ADL/IADL MODERATE  3 - 5 performance deficits   Client Assessment/Performance Deficits MODERATE - Comorbidities and min to mod modifications of tasks    Clinical Decision Making MODERATE - Analysis of occupational profile, detailed assessments, several treatment options    Overall Complexity MODERATE     OT Session Time: 23 minutes  Self-Care Home Management: 0 minutes  Therapeutic Activity: 23 minutes    Jesse Martinez, Occupational Therapist, OTR/L ext [phone]

## 2024-09-19 NOTE — PLAN OF CARE
Pt alert and oriented. Vitals stable. Walking in room and in hallway today with walker and 1 assist. Prn tylenol given for headache. Tolerating clear liquid diet. Waiting for transfer to floor.   Problem: Patient Centered Care  Goal: Patient preferences are identified and integrated in the patient's plan of care  Description: Interventions:  - What would you like us to know as we care for you?   - Provide timely, complete, and accurate information to patient/family  - Incorporate patient and family knowledge, values, beliefs, and cultural backgrounds into the planning and delivery of care  - Encourage patient/family to participate in care and decision-making at the level they choose  - Honor patient and family perspectives and choices  Outcome: Progressing     Problem: Patient/Family Goals  Goal: Patient/Family Long Term Goal  Description: Patient's Long Term Goal:     Interventions:  -   - See additional Care Plan goals for specific interventions  Outcome: Progressing  Goal: Patient/Family Short Term Goal  Description: Patient's Short Term Goal:     Interventions:   -   - See additional Care Plan goals for specific interventions  Outcome: Progressing     Problem: PAIN - ADULT  Goal: Verbalizes/displays adequate comfort level or patient's stated pain goal  Description: INTERVENTIONS:  - Encourage pt to monitor pain and request assistance  - Assess pain using appropriate pain scale  - Administer analgesics based on type and severity of pain and evaluate response  - Implement non-pharmacological measures as appropriate and evaluate response  - Consider cultural and social influences on pain and pain management  - Manage/alleviate anxiety  - Utilize distraction and/or relaxation techniques  - Monitor for opioid side effects  - Notify MD/LIP if interventions unsuccessful or patient reports new pain  - Anticipate increased pain with activity and pre-medicate as appropriate  Outcome: Progressing     Problem:  GASTROINTESTINAL - ADULT  Goal: Minimal or absence of nausea and vomiting  Description: INTERVENTIONS:  - Maintain adequate hydration with IV or PO as ordered and tolerated  - Nasogastric tube to low intermittent suction as ordered  - Evaluate effectiveness of ordered antiemetic medications  - Provide nonpharmacologic comfort measures as appropriate  - Advance diet as tolerated, if ordered  - Obtain nutritional consult as needed  - Evaluate fluid balance  Outcome: Progressing  Goal: Maintains or returns to baseline bowel function  Description: INTERVENTIONS:  - Assess bowel function  - Maintain adequate hydration with IV or PO as ordered and tolerated  - Evaluate effectiveness of GI medications  - Encourage mobilization and activity  - Obtain nutritional consult as needed  - Establish a toileting routine/schedule  - Consider collaborating with pharmacy to review patient's medication profile  Outcome: Progressing

## 2024-09-19 NOTE — PROGRESS NOTES
Progress Note     Coni Gandhi Patient Status:  Inpatient    1945 MRN [id number]   Location [facility] 2W/SW Attending Rex Us MD   Hosp Day # 4 PCP Freedom Hickey MD     Chief Complaint:   Chief Complaint   Patient presents with    Abdomen/Flank Pain         Subjective:   S: Patient seen and examined, chart reviewed.   No new complaints.    , Madi, is at the bedside.    Denies cp, sob, dizziness  Overall feels ok, tolerating clears    Review of Systems:   10 point ROS completed and was negative, except for pertinent positive and negatives stated in subjective.                Objective:   Vital signs:  Temp:  [97.3 °F (36.3 °C)-98.1 °F (36.7 °C)] 97.3 °F (36.3 °C)  Pulse:  [63-79] 63  Resp:  [10-23] 20  BP: (102-131)/(55-93) 102/77  SpO2:  [95 %-100 %] 100 %    Wt Readings from Last 6 Encounters:   09/15/24 178 lb (80.7 kg)   24 178 lb (80.7 kg)   24 178 lb (80.7 kg)   23 175 lb 3.2 oz (79.5 kg)   23 176 lb 6.4 oz (80 kg)   23 171 lb (77.6 kg)       Physical Exam:    General: No acute distress.   Respiratory: Clear to auscultation bilaterally. No wheezes. No rhonchi.  Cardiovascular: S1, S2. Regular rate and rhythm. No murmurs, rubs or gallops.   Abdomen: Staples intact, some fresh bleeding from lower 1/3 of wound when pressure applied to skin.  Bandage is C/D/I. Soft, nontender, nondistended.  Positive bowel reduced. No rebound or guarding. + wound dehiscence  Neurologic: No focal neurological deficits.   Musculoskeletal: Moves all extremities.  Extremities: No edema.    Results:   Diagnostic Data:      Labs:    Labs Last 24 Hours:   BMP     CBC    Other     Na 145 Cl 118 BUN 6 Glu 147   Hb 7.6   PTT - Procal -   K 3.6; 3.6 CO2 22.0 Cr 0.50   WBC 8.1 >< .0  INR - CRP -   Renal Lytes Endo    Hct 22.9   Trop - D dim -   eGFR - Ca 7.0 POC Gluc  -    LFT   pBNP - Lactic -   eGFR AA - PO4 1.7 A1c -   AST - APk - Prot -  LDL -       Recent Labs   Lab 09/17/24 0334 09/18/24  0231 09/19/24  0256   RBC 2.20* 2.19* 2.22*   HGB 7.5* 7.6* 7.6*   HCT 22.8* 22.6* 22.9*   .6* 103.2* 103.2*   MCH 34.1* 34.7* 34.2*   MCHC 32.9 33.6 33.2   RDW 14.0 13.7 14.0   NEPRELIM 7.40 6.65 7.14   WBC 9.2 8.6 8.1   .0* 158.0 189.0       Lab Results   Component Value Date    INR 1.13 08/28/2020    INR 1.1 (L) 10/23/2007       Recent Labs   Lab 09/16/24 0334 09/18/24 0231 09/18/24  1256 09/19/24  0255   * 116*  --  147*   BUN 16 <5*  --  6*   CREATSERUM 0.74 0.50*  --  0.50*   CA 7.6* 6.9*  --  7.0*   ALB 3.1*  --   --   --     146*  --  145   K 3.8 2.9* 3.2* 3.6  3.6   * 117*  --  118*   CO2 24.0 23.0  --  22.0   ALKPHO 42*  --   --   --    AST 19  --   --   --    ALT 12  --   --   --    BILT 0.5  --   --   --    TP 5.2*  --   --   --        Recent Labs   Lab 09/14/24  2319   LIP 42       Recent Labs   Lab 09/16/24 0334 09/17/24 0334 09/18/24  0231 09/19/24  0255   MG 1.8 2.2 2.3 2.1   PHOS 2.1* 1.3*  --  1.7*       No results for input(s): \"URINE\", \"CULTI\", \"BLDSMR\" in the last 168 hours.      No results found.        Pro-Calcitonin  No results for input(s): \"PCT\" in the last 168 hours.    Cardiac  No results for input(s): \"TROP\", \"PBNP\" in the last 168 hours.    Imaging: Imaging data reviewed in Epic.    No results found.       Medications:    FLUoxetine  20 mg Oral Nightly    levothyroxine  50 mcg Oral Daily @ 0700    [Transfer Hold] potassium chloride  20 mEq Intravenous Once    heparin  5,000 Units Subcutaneous 2 times per day    pantoprazole  40 mg Intravenous Daily    piperacillin-tazobactam  3.375 g Intravenous Q8H       Assessment & Plan:   ASSESSMENT / PLAN:     Small bowel obstruction status post exploratory laparotomy with bowel resection secondary to necrotic ileum.  Postoperative acute blood loss anemia   Metabolic alkalosis  Post operative wound dehiscence     Plan  -IV fluids D5 normal saline at 100 cc an  hour  -IV antibiotics with Zosyn  -OOB to chair, ambulate, PT/OT  - would start CLD if ok with surgery  - follow H/H and transfuse if Hb<7gm/dl  -NG tube to continuous low wall suction, can clamp and follow residuals.   -General Surgery consult  -Pain control manage at this time. Well controlled at this time. She has not had morphine in over 24 hrs.   -Status post ex lap via wound exploration, closure of fascia and evacuation of hematoma on 9/18/2024  -Tolerating clear liquid diet advance per surgery          Hypothyroidism  -IV levothyroxine--> po levothyroxine         dvt prophylaxis: lovenox  code status: full code  dispo: home upon surgical clearance     I personally reviewed the available laboratories, imaging including operative report. I discussed/will discuss the case with patient and her nurse. I ordered laboratories, studies including serial H&H. I adjusted medications including not applicable today. Medical decision making high, risk is high.     >55min spent, >50% spent counseling and coordinating care in the form of educating pt/family and d/w consultants and staff. Most of the time spent discussing the above plan.      Estimated date of discharge: To be determined  Discharge is dependent on: Improved clinical status  At this point Ms. Gandhi is expected to be discharge to: Home versus rehab     Plan of care discussed with patient and nurse    ANNEL WARREN MD

## 2024-09-20 LAB — GLUCOSE BLDC GLUCOMTR-MCNC: 104 MG/DL (ref 70–99)

## 2024-09-20 PROCEDURE — 99233 SBSQ HOSP IP/OBS HIGH 50: CPT | Performed by: FAMILY MEDICINE

## 2024-09-20 NOTE — PROGRESS NOTES
[facility]  Progress Note    Coni Gandhi Patient Status:  Inpatient    1945 MRN [id number]   Location [facility] 2W/SW Attending Karli Andujar MD   Hosp Day # 5 PCP Freedom Hickey MD     Subjective:  Patient is sitting up in the chair. She feels well, no abdominal tenderness. Tolerating clear liquids, some mild nausea, no vomiting. She is eager to advance her diet. Passing flatus and bms.    Objective/Physical Exam:  General: Alert, orientated x3.  Cooperative.  No apparent distress.  Vital Signs:  Blood pressure 134/62, pulse 63, temperature 97.7 °F (36.5 °C), temperature source Temporal, resp. rate 18, height 5' 2\" (1.575 m), weight 178 lb (80.7 kg), SpO2 96%, not currently breastfeeding.  Wt Readings from Last 3 Encounters:   09/15/24 178 lb (80.7 kg)   24 178 lb (80.7 kg)   24 178 lb (80.7 kg)     Lungs: No respiratory distress.  Cardiac: Regular rate and rhythm.   Abdomen:  Soft,  distended, expected incisional tenderness, with no rebound or guarding.  No peritoneal signs.   Extremities:  No lower extremity edema noted.    Incision: Clean, dry, intact, no erythema    Intake/Output:    Intake/Output Summary (Last 24 hours) at 2024 1013  Last data filed at 2024 0400  Gross per 24 hour   Intake 2615.5 ml   Output 975 ml   Net 1640.5 ml     I/O last 3 completed shifts:  In: 3461.9 [P.O.:510; I.V.:2651.9; IV PIGGYBACK:300]  Out: 2125 [Urine:2125]  No intake/output data recorded.    Medications:    FLUoxetine  20 mg Oral Nightly    levothyroxine  50 mcg Oral Daily @ 0700    [Transfer Hold] potassium chloride  20 mEq Intravenous Once    heparin  5,000 Units Subcutaneous 2 times per day    pantoprazole  40 mg Intravenous Daily    piperacillin-tazobactam  3.375 g Intravenous Q8H       Labs:        Lab Results   Component Value Date    INR 1.13 2020    INR 1.1 (L) 10/23/2007         Assessment  Patient Active Problem List   Diagnosis     Hypothyroidism    Peripheral venous insufficiency    Colon cancer screening    Osteoporosis    Wedge compression fracture of second lumbar vertebra with routine healing    Adenocarcinoma of endometrium (HCC)    Macrocytosis    Risk for falls    SOPHIA (generalized anxiety disorder)    Vasomotor rhinitis    Medicare annual wellness visit, subsequent    Breast screening    Eye exam, routine    History of endometrial cancer    Lower abdominal pain    Nausea and vomiting in adult    Complete intestinal obstruction, unspecified cause (HCC)     POD2 s/p ex lap with closure of fascia and evacuation of hematoma  S/p ex lap with bowel resection 9/15/24.      Plan:  Tolerating clears, okay to advance to full liquids and further to softs as tolerated  Ambulate and up to chair as able  DVT prophylaxis with Lovenox  Encouraged incentive spirometry    Quality:  DVT Mechanical Prophylaxis:   SCDs, Early ambuation  DVT Pharmacologic Prophylaxis   Medication    heparin (Porcine) 5000 UNIT/ML injection 5,000 Units                Code Status: Full Code  Larson: External urinary catheter in place  Larson Duration (in days):   Central line:    AQUILES: 9/17/2024        Ramiro Dunaway PA-C  9/20/2024  10:13 AM

## 2024-09-20 NOTE — PROGRESS NOTES
Progress Note     Coniryan Gandhi Patient Status:  Inpatient    1945 MRN [id number]   Location [facility] 2W/SW Attending Karli Andujar MD   Hosp Day # 5 PCP Freedom Hickey MD     Chief Complaint: patient presented with abdominal pain     Subjective:   S: Patient seen, chart reviewed , tolerating diet     Review of Systems:   10 point ROS completed and was negative, except for pertinent positive and negatives stated in subjective.    Objective:   Vital signs:  Temp:  [97.7 °F (36.5 °C)-98.4 °F (36.9 °C)] 97.7 °F (36.5 °C)  Pulse:  [63-73] 63  Resp:  [14-20] 18  BP: (102-134)/(62-93) 134/62  SpO2:  [96 %-100 %] 96 %    Wt Readings from Last 6 Encounters:   09/15/24 178 lb (80.7 kg)   24 178 lb (80.7 kg)   24 178 lb (80.7 kg)   23 175 lb 3.2 oz (79.5 kg)   23 176 lb 6.4 oz (80 kg)   23 171 lb (77.6 kg)         Physical Exam:    General: No acute distress. Alert ,         Respiratory: Clear to auscultation bilaterally. No wheezes. No rhonchi.  Cardiovascular: S1, S2. Regular rate and rhythm. No murmurs, rubs or gallops.   Abdomen: Soft, nontender, nondistended.  Positive bowel sounds. No rebound or guarding.  Neurologic: No focal neurological deficits.   Musculoskeletal: Moves all extremities.  Extremities: No edema.    Results:   Diagnostic Data:      Labs:    Labs Last 24 Hours:   BMP     CBC    Other     Na - Cl - BUN - Glu -   Hb -   PTT - Procal -   K - CO2 - Cr -   WBC - >< PLT -  INR - CRP -   Renal Lytes Endo    Hct -   Trop - D dim -   eGFR - Ca - POC Gluc  -    LFT   pBNP - Lactic -   eGFR AA - PO4 - A1c -   AST - APk - Prot -  LDL -     Mg - TSH -   ALT - T milagros - Alb -        COVID-19 Lab Results    COVID-19  Lab Results   Component Value Date    COVID19 Not Detected 2021    COVID19 Not Detected 2020       Pro-Calcitonin  No results for input(s): \"PCT\" in the last 168 hours.    Cardiac  No results for input(s): \"TROP\", \"PBNP\" in the last 168  hours.    Creatinine Kinase  No results for input(s): \"CK\" in the last 168 hours.    Inflammatory Markers  No results for input(s): \"CRP\", \"POONAM\", \"LDH\", \"DDIMER\" in the last 168 hours.    Imaging: Imaging data reviewed in Epic.    Medications:    FLUoxetine  20 mg Oral Nightly    levothyroxine  50 mcg Oral Daily @ 0700    [Transfer Hold] potassium chloride  20 mEq Intravenous Once    heparin  5,000 Units Subcutaneous 2 times per day    pantoprazole  40 mg Intravenous Daily    piperacillin-tazobactam  3.375 g Intravenous Q8H       Assessment & Plan:   ASSESSMENT / PLAN:       Small bowel obstruction status post exploratory laparotomy with bowel resection secondary to necrotic ileum.  Postoperative acute blood loss anemia   Metabolic alkalosis  Post operative wound dehiscence    Plan  -IV fluids D5 normal saline at 100 cc an hour  -IV antibiotics with Zosyn  -OOB to chair, ambulate, PT/OT  - would start CLD if ok with surgery  - follow H/H and transfuse if Hb<7gm/dl  -NG tube discontinued  -General Surgery consulted and following   -Pain control manage at this time.   -Status post ex lap via wound exploration, closure of fascia and evacuation of hematoma on 9/18/2024  -Tolerating clear liquid diet advance per surgery              Hypothyroidism  -IV levothyroxine--> po levothyroxine          Quality:  DVT Prophylaxis: heparin  CODE status: full   Larson:  present   DISPO: Upon surgical clearance       Plan of care discussed with patient and nurse     Coordinated care with providers and counseling re: treatment plan and workup     Karli Andujar MD      Chart reviewed, including current vitals, notes, labs and imaging  Labs ordered and medications adjusted as outlined above  Coordinate care with care team/consultants  Discussed with patient results of tests, management plan as outlined above, and the need for ongoing hospitalization  D/w RN     MDM high

## 2024-09-20 NOTE — PHYSICAL THERAPY NOTE
PHYSICAL THERAPY TREATMENT NOTE - INPATIENT     Room Number: 224/224-A       Presenting Problem: lower abdominal pain s/p ex lap and then s/p wound closure       Problem List  Principal Problem:    Lower abdominal pain  Active Problems:    Nausea and vomiting in adult    Complete intestinal obstruction, unspecified cause (HCC)      PHYSICAL THERAPY ASSESSMENT   Patient demonstrates good  progress this session, goals  remain in progress.      Patient is requiring supervision as a result of the following impairments: pain and medical status.     Patient continues to function near baseline with bed mobility, transfers, and gait.  Next session anticipate patient to progress bed mobility, gait, and stair negotiation.  Physical Therapy will continue to follow patient for duration of hospitalization.    Patient continues to benefit from continued skilled PT services: at discharge to promote prior level of function and safety with additional support and return home with home health PT.    PLAN  PT Treatment Plan: Bed mobility;Body mechanics;Patient education;Gait training;Stair training;Transfer training;Balance training  Frequency (Obs): 5x/week    SUBJECTIVE  \"Thank you for coming by today\"    OBJECTIVE  Precautions:  (abdominal)    PAIN ASSESSMENT   Rating: Unable to rate  Location: generalized discomfort  Management Techniques: Activity promotion;Body mechanics;Repositioning    BALANCE  Static Sitting: Good  Dynamic Sitting: Fair +  Static Standing: Fair  Dynamic Standing: Fair -    AM-PAC '6-Clicks' INPATIENT SHORT FORM - BASIC MOBILITY  How much difficulty does the patient currently have...  Patient Difficulty: Turning over in bed (including adjusting bedclothes, sheets and blankets)?: A Little   Patient Difficulty: Sitting down on and standing up from a chair with arms (e.g., wheelchair, bedside commode, etc.): None   Patient Difficulty: Moving from lying on back to sitting on the side of the bed?: A Little   How  much help from another person does the patient currently need...   Help from Another: Moving to and from a bed to a chair (including a wheelchair)?: A Little   Help from Another: Need to walk in hospital room?: A Little   Help from Another: Climbing 3-5 steps with a railing?: A Little     AM-PAC Score:  Raw Score: 19   Approx Degree of Impairment: 41.77%   Standardized Score (AM-PAC Scale): 45.44   CMS Modifier (G-Code): CK    FUNCTIONAL ABILITY STATUS  Functional Mobility/Gait Assessment  Gait Assistance: Supervision  Distance (ft): 100ft  Assistive Device: Rolling walker  Rolling:  not tested   Supine to Sit:  not tested   Sit to Supine:  not tested   Sit to Stand: modified independent    Skilled Therapy Provided: Patient on room air. Oxygen sat 96% and heart rate 70, blood pressure 122/68, after the session oxygen sat 100% and heart rate 68. Patient educated in pulse ox function and importance of oxygen saturation. Patient verbalized understanding. Patient reports some shortness of breath with ambulating, pulse ox in th high 80's, however poor wavelength. Patient using  walker during the session, offered patient rolling walker to which she answered she is okay with  walker. Patient able to sit to stand with mod I. Patient reports she has been doing her homework and ambulating with RN staff throughout the day. The patient's Approx Degree of Impairment: 41.77% has been calculated based on documentation in the [facility] '6 clicks' Inpatient Daily Activity Short Form.  Research supports that patients with this level of impairment may benefit from home with home health.  Patient received seated in bedside chair, agreeable to physical therapy. Education with patient provided verbally on physical therapy plan of care and physiological benefits of out of bed mobility. Patient with good carryover during session. Anticipated therapy needs remain appropriate based on the patient's performance, personal factors,  and remaining functional impairments.   Final disposition will be made by interdisciplinary medical team.    Patient End of Session: Up in chair;Needs met;Call light within reach;RN aware of session/findings;All patient questions and concerns addressed    CURRENT GOALS   Goals to be met by: 9/25/24  Patient Goal Patient's self-stated goal is: to go home   Goal #1 Patient is able to demonstrate supine - sit EOB @ level: supervision      Goal #1   Current Status Not met   Goal #2 Patient is able to demonstrate transfers Sit to/from Stand at assistance level: supervision with walker - rolling      Goal #2  Current Status Met    Goal #3 Patient is able to ambulate 100 feet with assist device: walker - rolling at assistance level: supervision   Goal #3   Current Status Met    Goal #4 Patient will negotiate 7 stairs/one curb w/ assistive device and supervision   Goal #4   Current Status Not met   Goal #5 Patient to demonstrate independence with home activity/exercise instructions provided to patient in preparation for discharge.   Goal #5   Current Status In progress   Goal #6     Goal #6  Current Status       Gait Training: 10 minutes  Therapeutic Activity: 13 minutes

## 2024-09-20 NOTE — PLAN OF CARE
Problem: Patient Centered Care  Goal: Patient preferences are identified and integrated in the patient's plan of care  Description: Interventions:  - What would you like us to know as we care for you? I live with my   - Provide timely, complete, and accurate information to patient/family  - Incorporate patient and family knowledge, values, beliefs, and cultural backgrounds into the planning and delivery of care  - Encourage patient/family to participate in care and decision-making at the level they choose  - Honor patient and family perspectives and choices  Outcome: Progressing     Problem: Patient/Family Goals  Goal: Patient/Family Long Term Goal  Description: Patient's Long Term Goal: maintain better general health    Interventions:  - medication, diet and lifestyle modification  - See additional Care Plan goals for specific interventions  Outcome: Progressing  Goal: Patient/Family Short Term Goal  Description: Patient's Short Term Goal: go home    Interventions:   - medication, pt/ot, nutritional management  - See additional Care Plan goals for specific interventions  Outcome: Progressing     Problem: PAIN - ADULT  Goal: Verbalizes/displays adequate comfort level or patient's stated pain goal  Description: INTERVENTIONS:  - Encourage pt to monitor pain and request assistance  - Assess pain using appropriate pain scale  - Administer analgesics based on type and severity of pain and evaluate response  - Implement non-pharmacological measures as appropriate and evaluate response  - Consider cultural and social influences on pain and pain management  - Manage/alleviate anxiety  - Utilize distraction and/or relaxation techniques  - Monitor for opioid side effects  - Notify MD/LIP if interventions unsuccessful or patient reports new pain  - Anticipate increased pain with activity and pre-medicate as appropriate  Outcome: Progressing     Problem: GASTROINTESTINAL - ADULT  Goal: Minimal or absence of nausea and  vomiting  Description: INTERVENTIONS:  - Maintain adequate hydration with IV or PO as ordered and tolerated  - Nasogastric tube to low intermittent suction as ordered  - Evaluate effectiveness of ordered antiemetic medications  - Provide nonpharmacologic comfort measures as appropriate  - Advance diet as tolerated, if ordered  - Obtain nutritional consult as needed  - Evaluate fluid balance  Outcome: Progressing  Goal: Maintains or returns to baseline bowel function  Description: INTERVENTIONS:  - Assess bowel function  - Maintain adequate hydration with IV or PO as ordered and tolerated  - Evaluate effectiveness of GI medications  - Encourage mobilization and activity  - Obtain nutritional consult as needed  - Establish a toileting routine/schedule  - Consider collaborating with pharmacy to review patient's medication profile  Outcome: Progressing

## 2024-09-21 LAB
ANION GAP SERPL CALC-SCNC: 6 MMOL/L (ref 0–18)
BASOPHILS # BLD AUTO: 0.04 X10(3) UL (ref 0–0.2)
BASOPHILS NFR BLD AUTO: 0.5 %
BUN BLD-MCNC: 5 MG/DL (ref 9–23)
BUN/CREAT SERPL: 10.4 (ref 10–20)
CALCIUM BLD-MCNC: 7.2 MG/DL (ref 8.7–10.4)
CHLORIDE SERPL-SCNC: 118 MMOL/L (ref 98–112)
CO2 SERPL-SCNC: 23 MMOL/L (ref 21–32)
CREAT BLD-MCNC: 0.48 MG/DL
DEPRECATED RDW RBC AUTO: 52.8 FL (ref 35.1–46.3)
EGFRCR SERPLBLD CKD-EPI 2021: 97 ML/MIN/1.73M2 (ref 60–?)
EOSINOPHIL # BLD AUTO: 0.27 X10(3) UL (ref 0–0.7)
EOSINOPHIL NFR BLD AUTO: 3.6 %
ERYTHROCYTE [DISTWIDTH] IN BLOOD BY AUTOMATED COUNT: 14.1 % (ref 11–15)
GLUCOSE BLD-MCNC: 99 MG/DL (ref 70–99)
HCT VFR BLD AUTO: 22.7 %
HGB BLD-MCNC: 7.4 G/DL
IMM GRANULOCYTES # BLD AUTO: 0.03 X10(3) UL (ref 0–1)
IMM GRANULOCYTES NFR BLD: 0.4 %
LYMPHOCYTES # BLD AUTO: 0.99 X10(3) UL (ref 1–4)
LYMPHOCYTES NFR BLD AUTO: 13.2 %
MCH RBC QN AUTO: 34.1 PG (ref 26–34)
MCHC RBC AUTO-ENTMCNC: 32.6 G/DL (ref 31–37)
MCV RBC AUTO: 104.6 FL
MONOCYTES # BLD AUTO: 0.78 X10(3) UL (ref 0.1–1)
MONOCYTES NFR BLD AUTO: 10.4 %
NEUTROPHILS # BLD AUTO: 5.4 X10 (3) UL (ref 1.5–7.7)
NEUTROPHILS # BLD AUTO: 5.4 X10(3) UL (ref 1.5–7.7)
NEUTROPHILS NFR BLD AUTO: 71.9 %
OSMOLALITY SERPL CALC.SUM OF ELEC: 301 MOSM/KG (ref 275–295)
PLATELET # BLD AUTO: 231 10(3)UL (ref 150–450)
POTASSIUM SERPL-SCNC: 2.8 MMOL/L (ref 3.5–5.1)
RBC # BLD AUTO: 2.17 X10(6)UL
SODIUM SERPL-SCNC: 147 MMOL/L (ref 136–145)
WBC # BLD AUTO: 7.5 X10(3) UL (ref 4–11)

## 2024-09-21 PROCEDURE — 99233 SBSQ HOSP IP/OBS HIGH 50: CPT | Performed by: HOSPITALIST

## 2024-09-21 RX ORDER — DEXTROSE MONOHYDRATE AND SODIUM CHLORIDE 5; .45 G/100ML; G/100ML
INJECTION, SOLUTION INTRAVENOUS CONTINUOUS
Status: DISCONTINUED | OUTPATIENT
Start: 2024-09-21 | End: 2024-09-22

## 2024-09-21 RX ORDER — POTASSIUM CHLORIDE 14.9 MG/ML
20 INJECTION INTRAVENOUS ONCE
Status: COMPLETED | OUTPATIENT
Start: 2024-09-21 | End: 2024-09-21

## 2024-09-21 RX ORDER — POTASSIUM CHLORIDE 1500 MG/1
40 TABLET, EXTENDED RELEASE ORAL EVERY 4 HOURS
Status: DISCONTINUED | OUTPATIENT
Start: 2024-09-21 | End: 2024-09-21

## 2024-09-21 NOTE — PLAN OF CARE
Patient alert, oriented, up walking in hallway, tolerates soft/low fiber diet well, medicated for headache with tylenol, k=2.8, replaced per protocol, continue IV ABX, fluid changed as ordered, family at bedside.  Problem: Patient Centered Care  Goal: Patient preferences are identified and integrated in the patient's plan of care  Description: Interventions:  - What would you like us to know as we care for you? I am from home with my .   - Provide timely, complete, and accurate information to patient/family  - Incorporate patient and family knowledge, values, beliefs, and cultural backgrounds into the planning and delivery of care  - Encourage patient/family to participate in care and decision-making at the level they choose  - Honor patient and family perspectives and choices  Outcome: Progressing     Problem: Patient/Family Goals  Goal: Patient/Family Long Term Goal  Description: Patient's Long Term Goal: Discharge home     Interventions:  - Monitor vitals  - Monitor labs  - IVF  - IV abx  - Full liquid diet  - Monitor and manage wound dressing/incision  - VTE prophylaxis   - Gen sx on consult   - Rehab on consult   - See additional Care Plan goals for specific interventions  Outcome: Progressing  Goal: Patient/Family Short Term Goal  Description: Patient's Short Term Goal: Manage pain     Interventions:   - Frequent pain assessments  - Non-pharmacological interventions  - Pain medications as needed/indicated  - See additional Care Plan goals for specific interventions  Outcome: Progressing     Problem: PAIN - ADULT  Goal: Verbalizes/displays adequate comfort level or patient's stated pain goal  Description: INTERVENTIONS:  - Encourage pt to monitor pain and request assistance  - Assess pain using appropriate pain scale  - Administer analgesics based on type and severity of pain and evaluate response  - Implement non-pharmacological measures as appropriate and evaluate response  - Consider cultural and  social influences on pain and pain management  - Manage/alleviate anxiety  - Utilize distraction and/or relaxation techniques  - Monitor for opioid side effects  - Notify MD/LIP if interventions unsuccessful or patient reports new pain  - Anticipate increased pain with activity and pre-medicate as appropriate  Outcome: Progressing

## 2024-09-21 NOTE — PROGRESS NOTES
[facility]  Progress Note    Coni Gandhi Patient Status:  Inpatient    1945 MRN [id number]   Location [facility] 4W/SW/SE Attending Falguni Amor, DO   Hosp Day # 6 PCP Freedom Hickey MD     Subjective:  Patient feels well today, she has no abdominal pain.  She has little bit of nausea and headache, has not vomited.  She is passing gas and having bowel movements.  She is ambulating and tolerating soft diet well.    Objective/Physical Exam:  General: Alert, orientated x3.  Cooperative.  No apparent distress.  Vital Signs:  Blood pressure 119/53, pulse 80, temperature 99 °F (37.2 °C), temperature source Oral, resp. rate 20, height 5' 2\" (1.575 m), weight 178 lb (80.7 kg), SpO2 100%, not currently breastfeeding.  Wt Readings from Last 3 Encounters:   09/15/24 178 lb (80.7 kg)   24 178 lb (80.7 kg)   24 178 lb (80.7 kg)     Lungs: No respiratory distress.  Cardiac: Regular rate and rhythm.   Abdomen:  Soft, not distended, not tender, with no rebound or guarding.  No peritoneal signs.   Extremities:  No lower extremity edema noted.    Incision: Clean, dry, intact, no erythema. Telfa strips in place.      Intake/Output:    Intake/Output Summary (Last 24 hours) at 2024 0914  Last data filed at 2024 0600  Gross per 24 hour   Intake 1545 ml   Output --   Net 1545 ml     I/O last 3 completed shifts:  In: 3710.5 [P.O.:345; I.V.:3065.5; IV PIGGYBACK:300]  Out: 975 [Urine:975]  No intake/output data recorded.    Medications:    FLUoxetine  20 mg Oral Nightly    levothyroxine  50 mcg Oral Daily @ 0700    [Transfer Hold] potassium chloride  20 mEq Intravenous Once    heparin  5,000 Units Subcutaneous 2 times per day    pantoprazole  40 mg Intravenous Daily    piperacillin-tazobactam  3.375 g Intravenous Q8H       Labs:  Lab Results   Component Value Date    WBC 7.5 2024    HGB 7.4 2024    HCT 22.7 2024    .0 2024        Lab Results    Component Value Date    INR 1.13 08/28/2020    INR 1.1 (L) 10/23/2007         Assessment  Patient Active Problem List   Diagnosis    Hypothyroidism    Peripheral venous insufficiency    Colon cancer screening    Osteoporosis    Wedge compression fracture of second lumbar vertebra with routine healing    Adenocarcinoma of endometrium (HCC)    Macrocytosis    Risk for falls    SOPHIA (generalized anxiety disorder)    Vasomotor rhinitis    Medicare annual wellness visit, subsequent    Breast screening    Eye exam, routine    History of endometrial cancer    Lower abdominal pain    Nausea and vomiting in adult    Complete intestinal obstruction, unspecified cause (HCC)     POD 3: Exploratory laparotomy via wound exploration, closure of fascia.  Evacuation of hematoma  S/p exploratory laparotomy, bowel resection (9/15/2024)      Plan:  Continue soft diet as tolerated  Antiemetics and analgesics as needed  Continue dressing changes  Ambulate as able  DVT prophylaxis with heparin  Encouraged incentive spirometer      Quality:  DVT Mechanical Prophylaxis:   SCDs, Early ambuation  DVT Pharmacologic Prophylaxis   Medication    heparin (Porcine) 5000 UNIT/ML injection 5,000 Units                Code Status: Full Code  Larson: No urinary catheter in place  Larson Duration (in days):   Central line:    AQUILES: 9/21/2024        JOSH Alas  9/21/2024  9:14 AM

## 2024-09-21 NOTE — PLAN OF CARE
Patient is alert and oriented x4. Patient is on room air and VSS. Patient ambulates independently. Patient is on a full liquid diet. Patient is on IVF and IV abx. Patient denies n/v/d, pain and sob. PRN zolpidem 5mg x1 was administered per pt request and was effective. Patient is voiding freely and last had a bm 9/21/24. Patient has a midline incision, dressing is clean, dry and intact. VTE prophylaxis and safety measures are in place.     Problem: Patient Centered Care  Goal: Patient preferences are identified and integrated in the patient's plan of care  Description: Interventions:  - What would you like us to know as we care for you? I am from home with my .   - Provide timely, complete, and accurate information to patient/family  - Incorporate patient and family knowledge, values, beliefs, and cultural backgrounds into the planning and delivery of care  - Encourage patient/family to participate in care and decision-making at the level they choose  - Honor patient and family perspectives and choices  Outcome: Progressing     Problem: Patient/Family Goals  Goal: Patient/Family Long Term Goal  Description: Patient's Long Term Goal: Discharge home     Interventions:  - Monitor vitals  - Monitor labs  - IVF  - IV abx  - Full liquid diet  - Monitor and manage wound dressing/incision  - VTE prophylaxis   - Gen sx on consult   - Rehab on consult   - See additional Care Plan goals for specific interventions  Outcome: Progressing  Goal: Patient/Family Short Term Goal  Description: Patient's Short Term Goal: Manage pain     Interventions:   - Frequent pain assessments  - Non-pharmacological interventions  - Pain medications as needed/indicated  - See additional Care Plan goals for specific interventions  Outcome: Progressing     Problem: PAIN - ADULT  Goal: Verbalizes/displays adequate comfort level or patient's stated pain goal  Description: INTERVENTIONS:  - Encourage pt to monitor pain and request assistance  -  Assess pain using appropriate pain scale  - Administer analgesics based on type and severity of pain and evaluate response  - Implement non-pharmacological measures as appropriate and evaluate response  - Consider cultural and social influences on pain and pain management  - Manage/alleviate anxiety  - Utilize distraction and/or relaxation techniques  - Monitor for opioid side effects  - Notify MD/LIP if interventions unsuccessful or patient reports new pain  - Anticipate increased pain with activity and pre-medicate as appropriate  Outcome: Progressing     Problem: GASTROINTESTINAL - ADULT  Goal: Minimal or absence of nausea and vomiting  Description: INTERVENTIONS:  - Maintain adequate hydration with IV or PO as ordered and tolerated  - Nasogastric tube to low intermittent suction as ordered  - Evaluate effectiveness of ordered antiemetic medications  - Provide nonpharmacologic comfort measures as appropriate  - Advance diet as tolerated, if ordered  - Obtain nutritional consult as needed  - Evaluate fluid balance  Outcome: Progressing  Goal: Maintains or returns to baseline bowel function  Description: INTERVENTIONS:  - Assess bowel function  - Maintain adequate hydration with IV or PO as ordered and tolerated  - Evaluate effectiveness of GI medications  - Encourage mobilization and activity  - Obtain nutritional consult as needed  - Establish a toileting routine/schedule  - Consider collaborating with pharmacy to review patient's medication profile  Outcome: Progressing  Goal: Maintains adequate nutritional intake (undernourished)  Description: INTERVENTIONS:  - Monitor percentage of each meal consumed  - Identify factors contributing to decreased intake, treat as appropriate  - Assist with meals as needed  - Monitor I&O, WT and lab values  - Obtain nutritional consult as needed  - Optimize oral hygiene and moisture  - Encourage food from home; allow for food preferences  - Enhance eating environment  Outcome:  Progressing     Problem: RISK FOR INFECTION - ADULT  Goal: Absence of fever/infection during anticipated neutropenic period  Description: INTERVENTIONS  - Monitor WBC  - Administer growth factors as ordered  - Implement neutropenic guidelines  Outcome: Progressing     Problem: SAFETY ADULT - FALL  Goal: Free from fall injury  Description: INTERVENTIONS:  - Assess pt frequently for physical needs  - Identify cognitive and physical deficits and behaviors that affect risk of falls.  - [place] fall precautions as indicated by assessment.  - Educate pt/family on patient safety including physical limitations  - Instruct pt to call for assistance with activity based on assessment  - Modify environment to reduce risk of injury  - Provide assistive devices as appropriate  - Consider OT/PT consult to assist with strengthening/mobility  - Encourage toileting schedule  Outcome: Progressing     Problem: DISCHARGE PLANNING  Goal: Discharge to home or other facility with appropriate resources  Description: INTERVENTIONS:  - Identify barriers to discharge w/pt and caregiver  - Include patient/family/discharge partner in discharge planning  - Arrange for needed discharge resources and transportation as appropriate  - Identify discharge learning needs (meds, wound care, etc)  - Arrange for interpreters to assist at discharge as needed  - Consider post-discharge preferences of patient/family/discharge partner  - Complete POLST form as appropriate  - Assess patient's ability to be responsible for managing their own health  - Refer to Case Management Department for coordinating discharge planning if the patient needs post-hospital services based on physician/LIP order or complex needs related to functional status, cognitive ability or social support system  Outcome: Progressing     Problem: SKIN/TISSUE INTEGRITY - ADULT  Goal: Skin integrity remains intact  Description: INTERVENTIONS  - Assess and document risk factors for pressure  ulcer development  - Assess and document skin integrity  - Monitor for areas of redness and/or skin breakdown  - Initiate interventions, skin care algorithm/standards of care as needed  Outcome: Progressing  Goal: Incision(s), wounds(s) or drain site(s) healing without S/S of infection  Description: INTERVENTIONS:  - Assess and document risk factors for pressure ulcer development  - Assess and document skin integrity  - Assess and document dressing/incision, wound bed, drain sites and surrounding tissue  - Implement wound care per orders  - Initiate isolation precautions as appropriate  - Initiate Pressure Ulcer prevention bundle as indicated  Outcome: Progressing     Problem: HEMATOLOGIC - ADULT  Goal: Maintains hematologic stability  Description: INTERVENTIONS  - Assess for signs and symptoms of bleeding or hemorrhage  - Monitor labs and vital signs for trends  - Administer supportive blood products/factors, fluids and medications as ordered and appropriate  - Administer supportive blood products/factors as ordered and appropriate  Outcome: Progressing  Goal: Free from bleeding injury  Description: (Example usage: patient with low platelets)  INTERVENTIONS:  - Avoid intramuscular injections, enemas and rectal medication administration  - Ensure safe mobilization of patient  - Hold pressure on venipuncture sites to achieve adequate hemostasis  - Assess for signs and symptoms of internal bleeding  - Monitor lab trends  - Patient is to report abnormal signs of bleeding to staff  - Avoid use of toothpicks and dental floss  - Use electric shaver for shaving  - Use soft bristle tooth brush  - Limit straining and forceful nose blowing  Outcome: Progressing

## 2024-09-21 NOTE — PROGRESS NOTES
[facility]  part of [facility]    Progress Note    Coni Gandhi Patient Status:  Inpatient    1945 MRN [id number]   Location [facility] 4W/SW/SE Attending Falguni Amor,    Hosp Day # 6 PCP Freedom Hickey MD     Chief complaint abd pain     Subjective:   Coni Gandhi is a(n) 78 year old female pt doing better today. Pain controlled     ROS:   No cp, sob   No c/d   No n/v     Objective:   Blood pressure 105/65, pulse 66, temperature 98.7 °F (37.1 °C), temperature source Oral, resp. rate 18, height 5' 2\" (1.575 m), weight 178 lb (80.7 kg), SpO2 98%, not currently breastfeeding.      Intake/Output Summary (Last 24 hours) at 2024 1658  Last data filed at 2024 0600  Gross per 24 hour   Intake 1200 ml   Output --   Net 1200 ml       Patient Weight(s) for the past 336 hrs:   Weight   09/15/24 0323 178 lb (80.7 kg)   09/15/24 0258 178 lb (80.7 kg)           General appearance: alert, appears stated age and cooperative  Pulmonary:  clear to auscultation bilaterally  Cardiovascular: S1, S2 normal, no murmur, click, rub or gallop, regular rate and rhythm  Abdominal: soft, non-tender; bowel sounds normal; no masses,  no organomegaly  Extremities: extremities normal, atraumatic, no cyanosis or edema        Medicines:     Current Facility-Administered Medications   Medication Dose Route Frequency    potassium chloride 20 mEq/100mL IVPB premix 20 mEq  20 mEq Intravenous Once    FLUoxetine (PROzac) cap 20 mg  20 mg Oral Nightly    levothyroxine (Synthroid) tab 50 mcg  50 mcg Oral Daily @ 0700    acetaminophen (Tylenol) tab 650 mg  650 mg Oral Q6H PRN    [Transfer Hold] potassium chloride 20 mEq/100mL IVPB premix 20 mEq  20 mEq Intravenous Once    heparin (Porcine) 5000 UNIT/ML injection 5,000 Units  5,000 Units Subcutaneous 2 times per day    oxyCODONE immediate release tab 2.5 mg  2.5 mg Oral Q4H PRN    Or    oxyCODONE immediate release tab 5 mg  5 mg Oral Q4H PRN     HYDROmorphone (Dilaudid) 1 MG/ML injection 0.2 mg  0.2 mg Intravenous Q2H PRN    Or    HYDROmorphone (Dilaudid) 1 MG/ML injection 0.4 mg  0.4 mg Intravenous Q2H PRN    polyethylene glycol (PEG 3350) (Miralax) 17 g oral packet 17 g  17 g Oral Daily PRN    sennosides (Senokot) tab 17.2 mg  17.2 mg Oral Nightly PRN    bisacodyl (Dulcolax) 10 MG rectal suppository 10 mg  10 mg Rectal Daily PRN    fleet enema (Fleet) rectal enema 133 mL  1 enema Rectal Once PRN    ondansetron (Zofran) 4 MG/2ML injection 4 mg  4 mg Intravenous Q6H PRN    metoclopramide (Reglan) 5 mg/mL injection 10 mg  10 mg Intravenous Q8H PRN    pantoprazole (Protonix) 40 mg in sodium chloride 0.9% PF 10 mL IV push  40 mg Intravenous Daily    zolpidem (Ambien) tab 5 mg  5 mg Oral Nightly PRN    dextrose 5%-sodium chloride 0.9% infusion   Intravenous Continuous    piperacillin-tazobactam (Zosyn) 3.375 g in dextrose 5% 100 mL IVPB-ADDV  3.375 g Intravenous Q8H                        dextrose 5%-sodium chloride 0.9% 100 mL/hr at 09/21/24 0352           Lab Results   Component Value Date    WBC 7.5 09/21/2024    HGB 7.4 (L) 09/21/2024    HCT 22.7 (L) 09/21/2024    .0 09/21/2024    CREATSERUM 0.48 (L) 09/21/2024    BUN 5 (L) 09/21/2024     (H) 09/21/2024    K 2.8 (LL) 09/21/2024     (H) 09/21/2024    CO2 23.0 09/21/2024    GLU 99 09/21/2024    CA 7.2 (L) 09/21/2024    ALB 3.1 (L) 09/16/2024    ALKPHO 42 (L) 09/16/2024    BILT 0.5 09/16/2024    TP 5.2 (L) 09/16/2024    AST 19 09/16/2024    ALT 12 09/16/2024    PTT 33.1 08/28/2020    INR 1.13 08/28/2020    TSH 3.161 07/01/2024    LIP 42 09/14/2024    MG 2.1 09/19/2024    PHOS 1.7 (L) 09/19/2024    TROP <0.045 11/29/2019    B12 543 12/04/2019       No results found.        Results:     CBC:    Lab Results   Component Value Date    WBC 7.5 09/21/2024    WBC 8.1 09/19/2024    WBC 8.6 09/18/2024     Lab Results   Component Value Date    HEMOGLOBIN 13.3 10/27/2008    HEMOGLOBIN 13.0 10/23/2007     HGB 7.4 (L) 09/21/2024    HGB 7.6 (L) 09/19/2024    HGB 7.6 (L) 09/18/2024      Lab Results   Component Value Date    .0 09/21/2024    .0 09/19/2024    .0 09/18/2024       Recent Labs   Lab 09/18/24  0231 09/18/24  1256 09/19/24  0255 09/21/24  0837   *  --  147* 99   BUN <5*  --  6* 5*   CREATSERUM 0.50*  --  0.50* 0.48*   CA 6.9*  --  7.0* 7.2*   *  --  145 147*   K 2.9* 3.2* 3.6  3.6 2.8*   *  --  118* 118*   CO2 23.0  --  22.0 23.0         @[email or web address]@    Assessment and Plan:      Small bowel obstruction status post exploratory laparotomy with bowel resection secondary to necrotic ileum.  Postoperative acute blood loss anemia   Post operative wound dehiscence     Plan  -change fluids to d5/1/2 ns   -sp iv Zosyn now off   -OOB to chair, ambulate, PT/OT  -low fiber diet   - follow H/H and transfuse if Hb<7gm/dl  -NG tube discontinued  -General Surgery consulted and following   -Pain control manage at this time.   -Status post ex lap via wound exploration, closure of fascia and evacuation of hematoma on 9/18/2024               Hypothyroidism  -IV levothyroxine--> po levothyroxine            Quality:  DVT Prophylaxis: heparin  CODE status: full   Larson:  present   DISPO: Upon surgical clearance          Falguni Amor DO         Chart reviewed, including current vitals, notes, labs and imaging  Labs ordered and medications adjusted as outlined above  Coordinate care with care team/consultants  Discussed with patient results of tests, management plan as outlined above, and the need for ongoing hospitalization  D/w RN     [facility]        9/21/2024     **Certification      PHYSICIAN Certification of Need for Inpatient Hospitalization - Initial Certification    Patient will require inpatient services that will reasonably be expected to span two midnight's based on the clinical documentation in H+P.   Based on patients current state of illness, I anticipate that, after  discharge, patient will require TBD.

## 2024-09-22 LAB
ANION GAP SERPL CALC-SCNC: 6 MMOL/L (ref 0–18)
ANTIBODY SCREEN: POSITIVE
BUN BLD-MCNC: <5 MG/DL (ref 9–23)
CALCIUM BLD-MCNC: 7.9 MG/DL (ref 8.7–10.4)
CHLORIDE SERPL-SCNC: 117 MMOL/L (ref 98–112)
CO2 SERPL-SCNC: 23 MMOL/L (ref 21–32)
CREAT BLD-MCNC: 0.5 MG/DL
DIRECT COOMBS POLY: NEGATIVE
EGFRCR SERPLBLD CKD-EPI 2021: 97 ML/MIN/1.73M2 (ref 60–?)
GLUCOSE BLD-MCNC: 96 MG/DL (ref 70–99)
HCT VFR BLD AUTO: 25.9 %
HGB BLD-MCNC: 8.5 G/DL
POTASSIUM SERPL-SCNC: 3 MMOL/L (ref 3.5–5.1)
POTASSIUM SERPL-SCNC: 3.3 MMOL/L (ref 3.5–5.1)
POTASSIUM SERPL-SCNC: 3.4 MMOL/L (ref 3.5–5.1)
RH BLOOD TYPE: POSITIVE
SODIUM SERPL-SCNC: 146 MMOL/L (ref 136–145)

## 2024-09-22 PROCEDURE — 30233N1 TRANSFUSION OF NONAUTOLOGOUS RED BLOOD CELLS INTO PERIPHERAL VEIN, PERCUTANEOUS APPROACH: ICD-10-PCS | Performed by: HOSPITALIST

## 2024-09-22 PROCEDURE — 99233 SBSQ HOSP IP/OBS HIGH 50: CPT | Performed by: HOSPITALIST

## 2024-09-22 RX ORDER — TEMAZEPAM 7.5 MG/1
7.5 CAPSULE ORAL NIGHTLY
Status: DISCONTINUED | OUTPATIENT
Start: 2024-09-22 | End: 2024-09-23

## 2024-09-22 RX ORDER — POTASSIUM CHLORIDE 1500 MG/1
40 TABLET, EXTENDED RELEASE ORAL ONCE
Status: COMPLETED | OUTPATIENT
Start: 2024-09-22 | End: 2024-09-22

## 2024-09-22 RX ORDER — DIPHENHYDRAMINE HCL 25 MG
25 CAPSULE ORAL NIGHTLY PRN
Status: DISCONTINUED | OUTPATIENT
Start: 2024-09-22 | End: 2024-09-23

## 2024-09-22 RX ORDER — SODIUM CHLORIDE 9 MG/ML
INJECTION, SOLUTION INTRAVENOUS ONCE
Status: DISCONTINUED | OUTPATIENT
Start: 2024-09-22 | End: 2024-09-22

## 2024-09-22 RX ORDER — POTASSIUM CHLORIDE 1500 MG/1
40 TABLET, EXTENDED RELEASE ORAL EVERY 4 HOURS
Status: COMPLETED | OUTPATIENT
Start: 2024-09-22 | End: 2024-09-22

## 2024-09-22 NOTE — PLAN OF CARE
Patient alert, oriented, up walking in hallway, hgb 6.9 today, 1 unit rbc transfused, pt tolerated it well, IV ABX completed, fluids discontinued, pt tolerates soft diet well, K replaced per protocol, possible discharge home tomorrow.  Problem: Patient Centered Care  Goal: Patient preferences are identified and integrated in the patient's plan of care  Description: Interventions:  - What would you like us to know as we care for you? I am from home with my .   - Provide timely, complete, and accurate information to patient/family  - Incorporate patient and family knowledge, values, beliefs, and cultural backgrounds into the planning and delivery of care  - Encourage patient/family to participate in care and decision-making at the level they choose  - Honor patient and family perspectives and choices  Outcome: Progressing     Problem: Patient/Family Goals  Goal: Patient/Family Long Term Goal  Description: Patient's Long Term Goal: Discharge home     Interventions:  - Monitor vitals  - Monitor labs  - IVF  - IV abx  - Full liquid diet  - Monitor and manage wound dressing/incision  - VTE prophylaxis   - Gen sx on consult   - Rehab on consult   - See additional Care Plan goals for specific interventions  Outcome: Progressing  Goal: Patient/Family Short Term Goal  Description: Patient's Short Term Goal: Manage pain     Interventions:   - Frequent pain assessments  - Non-pharmacological interventions  - Pain medications as needed/indicated  - See additional Care Plan goals for specific interventions  Outcome: Progressing     Problem: PAIN - ADULT  Goal: Verbalizes/displays adequate comfort level or patient's stated pain goal  Description: INTERVENTIONS:  - Encourage pt to monitor pain and request assistance  - Assess pain using appropriate pain scale  - Administer analgesics based on type and severity of pain and evaluate response  - Implement non-pharmacological measures as appropriate and evaluate response  -  Consider cultural and social influences on pain and pain management  - Manage/alleviate anxiety  - Utilize distraction and/or relaxation techniques  - Monitor for opioid side effects  - Notify MD/LIP if interventions unsuccessful or patient reports new pain  - Anticipate increased pain with activity and pre-medicate as appropriate  Outcome: Progressing

## 2024-09-22 NOTE — PLAN OF CARE
Patient is alert and oriented x4. Patient is on room air and VSS. Patient ambulates independently. Patient is on a low fiber/soft diet. Patient is on IVF. Patient denies n/v/d, pain and sob. PRN zolpidem 5mg x1 was administered per pt request and was ineffective. PRN po benadryl 25mg was administered for sleep and was effective.  Patient is voiding freely and last had a bm 9/22/24. Patient has a midline incision, dressing was changed. Patient tolerated dressing change well. VTE prophylaxis and safety measures are in place. K replaced during the shift for a level of 3.0 .     Problem: Patient Centered Care  Goal: Patient preferences are identified and integrated in the patient's plan of care  Description: Interventions:  - What would you like us to know as we care for you? I am from home with my .   - Provide timely, complete, and accurate information to patient/family  - Incorporate patient and family knowledge, values, beliefs, and cultural backgrounds into the planning and delivery of care  - Encourage patient/family to participate in care and decision-making at the level they choose  - Honor patient and family perspectives and choices  Outcome: Progressing     Problem: Patient/Family Goals  Goal: Patient/Family Long Term Goal  Description: Patient's Long Term Goal: Discharge home     Interventions:  - Monitor vitals  - Monitor labs  - IVF  - IV abx  - Full liquid diet  - Monitor and manage wound dressing/incision  - VTE prophylaxis   - Gen sx on consult   - Rehab on consult   - See additional Care Plan goals for specific interventions  Outcome: Progressing  Goal: Patient/Family Short Term Goal  Description: Patient's Short Term Goal: Manage pain     Interventions:   - Frequent pain assessments  - Non-pharmacological interventions  - Pain medications as needed/indicated  - See additional Care Plan goals for specific interventions  Outcome: Progressing     Problem: PAIN - ADULT  Goal: Verbalizes/displays  adequate comfort level or patient's stated pain goal  Description: INTERVENTIONS:  - Encourage pt to monitor pain and request assistance  - Assess pain using appropriate pain scale  - Administer analgesics based on type and severity of pain and evaluate response  - Implement non-pharmacological measures as appropriate and evaluate response  - Consider cultural and social influences on pain and pain management  - Manage/alleviate anxiety  - Utilize distraction and/or relaxation techniques  - Monitor for opioid side effects  - Notify MD/LIP if interventions unsuccessful or patient reports new pain  - Anticipate increased pain with activity and pre-medicate as appropriate  Outcome: Progressing     Problem: GASTROINTESTINAL - ADULT  Goal: Minimal or absence of nausea and vomiting  Description: INTERVENTIONS:  - Maintain adequate hydration with IV or PO as ordered and tolerated  - Nasogastric tube to low intermittent suction as ordered  - Evaluate effectiveness of ordered antiemetic medications  - Provide nonpharmacologic comfort measures as appropriate  - Advance diet as tolerated, if ordered  - Obtain nutritional consult as needed  - Evaluate fluid balance  Outcome: Progressing  Goal: Maintains or returns to baseline bowel function  Description: INTERVENTIONS:  - Assess bowel function  - Maintain adequate hydration with IV or PO as ordered and tolerated  - Evaluate effectiveness of GI medications  - Encourage mobilization and activity  - Obtain nutritional consult as needed  - Establish a toileting routine/schedule  - Consider collaborating with pharmacy to review patient's medication profile  Outcome: Progressing  Goal: Maintains adequate nutritional intake (undernourished)  Description: INTERVENTIONS:  - Monitor percentage of each meal consumed  - Identify factors contributing to decreased intake, treat as appropriate  - Assist with meals as needed  - Monitor I&O, WT and lab values  - Obtain nutritional consult as  needed  - Optimize oral hygiene and moisture  - Encourage food from home; allow for food preferences  - Enhance eating environment  Outcome: Progressing     Problem: RISK FOR INFECTION - ADULT  Goal: Absence of fever/infection during anticipated neutropenic period  Description: INTERVENTIONS  - Monitor WBC  - Administer growth factors as ordered  - Implement neutropenic guidelines  Outcome: Progressing     Problem: SAFETY ADULT - FALL  Goal: Free from fall injury  Description: INTERVENTIONS:  - Assess pt frequently for physical needs  - Identify cognitive and physical deficits and behaviors that affect risk of falls.  - [place] fall precautions as indicated by assessment.  - Educate pt/family on patient safety including physical limitations  - Instruct pt to call for assistance with activity based on assessment  - Modify environment to reduce risk of injury  - Provide assistive devices as appropriate  - Consider OT/PT consult to assist with strengthening/mobility  - Encourage toileting schedule  Outcome: Progressing     Problem: DISCHARGE PLANNING  Goal: Discharge to home or other facility with appropriate resources  Description: INTERVENTIONS:  - Identify barriers to discharge w/pt and caregiver  - Include patient/family/discharge partner in discharge planning  - Arrange for needed discharge resources and transportation as appropriate  - Identify discharge learning needs (meds, wound care, etc)  - Arrange for interpreters to assist at discharge as needed  - Consider post-discharge preferences of patient/family/discharge partner  - Complete POLST form as appropriate  - Assess patient's ability to be responsible for managing their own health  - Refer to Case Management Department for coordinating discharge planning if the patient needs post-hospital services based on physician/LIP order or complex needs related to functional status, cognitive ability or social support system  Outcome: Progressing     Problem:  SKIN/TISSUE INTEGRITY - ADULT  Goal: Skin integrity remains intact  Description: INTERVENTIONS  - Assess and document risk factors for pressure ulcer development  - Assess and document skin integrity  - Monitor for areas of redness and/or skin breakdown  - Initiate interventions, skin care algorithm/standards of care as needed  Outcome: Progressing  Goal: Incision(s), wounds(s) or drain site(s) healing without S/S of infection  Description: INTERVENTIONS:  - Assess and document risk factors for pressure ulcer development  - Assess and document skin integrity  - Assess and document dressing/incision, wound bed, drain sites and surrounding tissue  - Implement wound care per orders  - Initiate isolation precautions as appropriate  - Initiate Pressure Ulcer prevention bundle as indicated  Outcome: Progressing     Problem: HEMATOLOGIC - ADULT  Goal: Maintains hematologic stability  Description: INTERVENTIONS  - Assess for signs and symptoms of bleeding or hemorrhage  - Monitor labs and vital signs for trends  - Administer supportive blood products/factors, fluids and medications as ordered and appropriate  - Administer supportive blood products/factors as ordered and appropriate  Outcome: Progressing  Goal: Free from bleeding injury  Description: (Example usage: patient with low platelets)  INTERVENTIONS:  - Avoid intramuscular injections, enemas and rectal medication administration  - Ensure safe mobilization of patient  - Hold pressure on venipuncture sites to achieve adequate hemostasis  - Assess for signs and symptoms of internal bleeding  - Monitor lab trends  - Patient is to report abnormal signs of bleeding to staff  - Avoid use of toothpicks and dental floss  - Use electric shaver for shaving  - Use soft bristle tooth brush  - Limit straining and forceful nose blowing  Outcome: Progressing

## 2024-09-22 NOTE — PROGRESS NOTES
[facility]  part of [facility]    Progress Note    Coni Gandhi Patient Status:  Inpatient    1945 MRN [id number]   Location [facility] 4W/SW/SE Attending Falguni Amor, DO   Hosp Day # 7 PCP Freedom Hickey MD     Chief complaint abd pain     Subjective:   Coni Gandhi is a(n) 78 year old female pt doing better today. Pain controlled     ROS:   No cp, sob   No c/d   No n/v     Objective:   Blood pressure 127/64, pulse 72, temperature 98.7 °F (37.1 °C), temperature source Oral, resp. rate 18, height 5' 2\" (1.575 m), weight 178 lb (80.7 kg), SpO2 100%, not currently breastfeeding.      Intake/Output Summary (Last 24 hours) at 2024 1435  Last data filed at 2024 0600  Gross per 24 hour   Intake 2113 ml   Output --   Net 2113 ml       Patient Weight(s) for the past 336 hrs:   Weight   09/15/24 0323 178 lb (80.7 kg)   09/15/24 0258 178 lb (80.7 kg)           General appearance: alert, appears stated age and cooperative  Pulmonary:  clear to auscultation bilaterally  Cardiovascular: S1, S2 normal, no murmur, click, rub or gallop, regular rate and rhythm  Abdominal: soft, non-tender; bowel sounds normal; no masses,  no organomegaly  Extremities: extremities normal, atraumatic, no cyanosis or edema        Medicines:     Current Facility-Administered Medications   Medication Dose Route Frequency    diphenhydrAMINE (Benadryl) cap/tab 25 mg  25 mg Oral Nightly PRN    temazepam (Restoril) cap 7.5 mg  7.5 mg Oral Nightly    FLUoxetine (PROzac) cap 20 mg  20 mg Oral Nightly    levothyroxine (Synthroid) tab 50 mcg  50 mcg Oral Daily @ 0700    acetaminophen (Tylenol) tab 650 mg  650 mg Oral Q6H PRN    [Transfer Hold] potassium chloride 20 mEq/100mL IVPB premix 20 mEq  20 mEq Intravenous Once    heparin (Porcine) 5000 UNIT/ML injection 5,000 Units  5,000 Units Subcutaneous 2 times per day    oxyCODONE immediate release tab 2.5 mg  2.5 mg Oral Q4H PRN    Or    oxyCODONE  immediate release tab 5 mg  5 mg Oral Q4H PRN    HYDROmorphone (Dilaudid) 1 MG/ML injection 0.2 mg  0.2 mg Intravenous Q2H PRN    Or    HYDROmorphone (Dilaudid) 1 MG/ML injection 0.4 mg  0.4 mg Intravenous Q2H PRN    polyethylene glycol (PEG 3350) (Miralax) 17 g oral packet 17 g  17 g Oral Daily PRN    sennosides (Senokot) tab 17.2 mg  17.2 mg Oral Nightly PRN    bisacodyl (Dulcolax) 10 MG rectal suppository 10 mg  10 mg Rectal Daily PRN    fleet enema (Fleet) rectal enema 133 mL  1 enema Rectal Once PRN    ondansetron (Zofran) 4 MG/2ML injection 4 mg  4 mg Intravenous Q6H PRN    metoclopramide (Reglan) 5 mg/mL injection 10 mg  10 mg Intravenous Q8H PRN    pantoprazole (Protonix) 40 mg in sodium chloride 0.9% PF 10 mL IV push  40 mg Intravenous Daily                                 Lab Results   Component Value Date    WBC 7.5 09/22/2024    HGB 6.9 (LL) 09/22/2024    HCT 21.2 (L) 09/22/2024    .0 09/22/2024    CREATSERUM 0.50 (L) 09/22/2024    BUN <5 (L) 09/22/2024     (H) 09/22/2024    K 3.4 (L) 09/22/2024     (H) 09/22/2024    CO2 23.0 09/22/2024    GLU 96 09/22/2024    CA 7.9 (L) 09/22/2024    ALB 3.1 (L) 09/16/2024    ALKPHO 42 (L) 09/16/2024    BILT 0.5 09/16/2024    TP 5.2 (L) 09/16/2024    AST 19 09/16/2024    ALT 12 09/16/2024    PTT 33.1 08/28/2020    INR 1.13 08/28/2020    TSH 3.161 07/01/2024    LIP 42 09/14/2024    MG 2.1 09/19/2024    PHOS 1.7 (L) 09/19/2024    TROP <0.045 11/29/2019    B12 543 12/04/2019       No results found.        Results:     CBC:    Lab Results   Component Value Date    WBC 7.5 09/22/2024    WBC 7.5 09/21/2024    WBC 8.1 09/19/2024     Lab Results   Component Value Date    HEMOGLOBIN 13.3 10/27/2008    HEMOGLOBIN 13.0 10/23/2007    HGB 6.9 (LL) 09/22/2024    HGB 7.4 (L) 09/21/2024    HGB 7.6 (L) 09/19/2024      Lab Results   Component Value Date    .0 09/22/2024    .0 09/21/2024    .0 09/19/2024       Recent Labs   Lab 09/19/24  0255  09/21/24  0837 09/22/24  0047 09/22/24  0557 09/22/24  0941   * 99  --  96  --    BUN 6* 5*  --  <5*  --    CREATSERUM 0.50* 0.48*  --  0.50*  --    CA 7.0* 7.2*  --  7.9*  --     147*  --  146*  --    K 3.6  3.6 2.8* 3.0* 3.3* 3.4*   * 118*  --  117*  --    CO2 22.0 23.0  --  23.0  --          @[email or web address]@    Assessment and Plan:      Small bowel obstruction status post exploratory laparotomy with bowel resection secondary to necrotic ileum.  Postoperative acute blood loss anemia   Post operative wound dehiscence     Plan  -saline lock   -sp iv Zosyn now off   -OOB to chair, ambulate, PT/OT  -low fiber diet   - follow H/H and transfuse if Hb<7gm/dl-> transfuse one unit prbcs   -NG tube discontinued  -General Surgery consulted and following   -Pain control manage at this time.   -Status post ex lap via wound exploration, closure of fascia and evacuation of hematoma on 9/18/2024               Hypothyroidism  -IV levothyroxine--> po levothyroxine            Quality:  DVT Prophylaxis: heparin  CODE status: full   Larson:  present   DISPO: Upon surgical clearance          Falguni Amor DO         Chart reviewed, including current vitals, notes, labs and imaging  Labs ordered and medications adjusted as outlined above  Coordinate care with care team/consultants  Discussed with patient results of tests, management plan as outlined above, and the need for ongoing hospitalization  D/w RN     [facility]          PHYSICIAN Certification of Need for Inpatient Hospitalization - Initial Certification    Patient will require inpatient services that will reasonably be expected to span two midnight's based on the clinical documentation in H+P.   Based on patients current state of illness, I anticipate that, after discharge, patient will require TBD.

## 2024-09-22 NOTE — PROGRESS NOTES
[facility]  Progress Note    Coni Gandhi Patient Status:  Inpatient    1945 MRN [id number]   Location [facility] 4W/SW/SE Attending Falguni Amor, DO   Hosp Day # 7 PCP Freedom Hickey MD     Subjective:  Patient resting in bed,  bedside.  She does not have abdominal pain today.  She denies nausea or vomiting.  She has been able to pass gas and had bowel movements.  She is ambulating and tolerating soft diet well.  She denies chest pain, shortness of breath, dizziness.  She denies melena or hematochezia. she does have headaches for which Aleve provides relief.    Objective/Physical Exam:  General: Alert, orientated x3.  Cooperative.  No apparent distress.  Vital Signs:  Blood pressure 124/72, pulse 80, temperature 98.2 °F (36.8 °C), temperature source Oral, resp. rate 16, height 5' 2\" (1.575 m), weight 178 lb (80.7 kg), SpO2 100%, not currently breastfeeding.  Wt Readings from Last 3 Encounters:   09/15/24 178 lb (80.7 kg)   24 178 lb (80.7 kg)   24 178 lb (80.7 kg)     Lungs: No respiratory distress.  Cardiac: Regular rate and rhythm.   Abdomen:  Soft, not distended, not tender, with no rebound or guarding.  No peritoneal signs.   Extremities:  No lower extremity edema noted.    Incision: Clean, dry, intact, no erythema.  Telfa strips removed    Intake/Output:    Intake/Output Summary (Last 24 hours) at 2024 0936  Last data filed at 2024 0600  Gross per 24 hour   Intake 2113 ml   Output --   Net 2113 ml     I/O last 3 completed shifts:  In: 3313 [I.V.:3213; IV PIGGYBACK:100]  Out: -   No intake/output data recorded.    Medications:    sodium chloride   Intravenous Once    FLUoxetine  20 mg Oral Nightly    levothyroxine  50 mcg Oral Daily @ 0700    [Transfer Hold] potassium chloride  20 mEq Intravenous Once    heparin  5,000 Units Subcutaneous 2 times per day    pantoprazole  40 mg Intravenous Daily       Labs:  Lab Results   Component Value  Date    WBC 7.5 09/22/2024    HGB 6.9 09/22/2024    HCT 21.2 09/22/2024    .0 09/22/2024     Lab Results   Component Value Date     09/22/2024    K 3.3 09/22/2024     09/22/2024    CO2 23.0 09/22/2024    BUN <5 09/22/2024    CREATSERUM 0.50 09/22/2024    GLU 96 09/22/2024    CA 7.9 09/22/2024     Lab Results   Component Value Date    INR 1.13 08/28/2020    INR 1.1 (L) 10/23/2007         Assessment  Patient Active Problem List   Diagnosis    Hypothyroidism    Peripheral venous insufficiency    Colon cancer screening    Osteoporosis    Wedge compression fracture of second lumbar vertebra with routine healing    Adenocarcinoma of endometrium (HCC)    Macrocytosis    Risk for falls    SOPHIA (generalized anxiety disorder)    Vasomotor rhinitis    Medicare annual wellness visit, subsequent    Breast screening    Eye exam, routine    History of endometrial cancer    Lower abdominal pain    Nausea and vomiting in adult    Complete intestinal obstruction, unspecified cause (HCC)     POD 4: Exploratory laparotomy via wound exploration, closure of fascia.  Evacuation of hematoma  S/p exploratory laparotomy, bowel resection (9/15/2024)         Plan:  Continue soft diet as tolerated  Continue trending Hgb, transfuse if Hgb <  7.  Continue dressing changes  Ambulate and up to chair  DVT prophylaxis with heparin    Quality:  DVT Mechanical Prophylaxis:   SCDs, Early ambuation  DVT Pharmacologic Prophylaxis   Medication    heparin (Porcine) 5000 UNIT/ML injection 5,000 Units                Code Status: Full Code  Larson: No urinary catheter in place  Larson Duration (in days):   Central line:    AQUILES: 9/23/2024        JOSH Alas  9/22/2024  9:36 AM

## 2024-09-23 VITALS
HEIGHT: 62 IN | SYSTOLIC BLOOD PRESSURE: 117 MMHG | OXYGEN SATURATION: 99 % | DIASTOLIC BLOOD PRESSURE: 67 MMHG | BODY MASS INDEX: 32.76 KG/M2 | RESPIRATION RATE: 16 BRPM | TEMPERATURE: 99 F | HEART RATE: 58 BPM | WEIGHT: 178 LBS

## 2024-09-23 LAB
ANION GAP SERPL CALC-SCNC: 5 MMOL/L (ref 0–18)
BASOPHILS # BLD AUTO: 0.03 X10(3) UL (ref 0–0.2)
BASOPHILS # BLD AUTO: 0.07 X10(3) UL (ref 0–0.2)
BASOPHILS NFR BLD AUTO: 0.4 %
BASOPHILS NFR BLD AUTO: 0.8 %
BLOOD TYPE BARCODE: 5100
BUN BLD-MCNC: 5 MG/DL (ref 9–23)
BUN/CREAT SERPL: 9.1 (ref 10–20)
CALCIUM BLD-MCNC: 8.5 MG/DL (ref 8.7–10.4)
CHLORIDE SERPL-SCNC: 115 MMOL/L (ref 98–112)
CO2 SERPL-SCNC: 25 MMOL/L (ref 21–32)
CREAT BLD-MCNC: 0.55 MG/DL
DEPRECATED RDW RBC AUTO: 54.3 FL (ref 35.1–46.3)
DEPRECATED RDW RBC AUTO: 55.8 FL (ref 35.1–46.3)
EGFRCR SERPLBLD CKD-EPI 2021: 94 ML/MIN/1.73M2 (ref 60–?)
EOSINOPHIL # BLD AUTO: 0.28 X10(3) UL (ref 0–0.7)
EOSINOPHIL # BLD AUTO: 0.39 X10(3) UL (ref 0–0.7)
EOSINOPHIL NFR BLD AUTO: 3.7 %
EOSINOPHIL NFR BLD AUTO: 4.7 %
ERYTHROCYTE [DISTWIDTH] IN BLOOD BY AUTOMATED COUNT: 14.7 % (ref 11–15)
ERYTHROCYTE [DISTWIDTH] IN BLOOD BY AUTOMATED COUNT: 16 % (ref 11–15)
GLUCOSE BLD-MCNC: 87 MG/DL (ref 70–99)
HCT VFR BLD AUTO: 21.2 %
HCT VFR BLD AUTO: 26.1 %
HGB BLD-MCNC: 6.9 G/DL
HGB BLD-MCNC: 8.9 G/DL
IMM GRANULOCYTES # BLD AUTO: 0.04 X10(3) UL (ref 0–1)
IMM GRANULOCYTES # BLD AUTO: 0.1 X10(3) UL (ref 0–1)
IMM GRANULOCYTES NFR BLD: 0.5 %
IMM GRANULOCYTES NFR BLD: 1.2 %
LYMPHOCYTES # BLD AUTO: 1.04 X10(3) UL (ref 1–4)
LYMPHOCYTES # BLD AUTO: 1.42 X10(3) UL (ref 1–4)
LYMPHOCYTES NFR BLD AUTO: 13.9 %
LYMPHOCYTES NFR BLD AUTO: 17.1 %
MCH RBC QN AUTO: 34.2 PG (ref 26–34)
MCH RBC QN AUTO: 34.5 PG (ref 26–34)
MCHC RBC AUTO-ENTMCNC: 32.5 G/DL (ref 31–37)
MCHC RBC AUTO-ENTMCNC: 34.1 G/DL (ref 31–37)
MCV RBC AUTO: 101.2 FL
MCV RBC AUTO: 105 FL
MONOCYTES # BLD AUTO: 0.76 X10(3) UL (ref 0.1–1)
MONOCYTES # BLD AUTO: 0.83 X10(3) UL (ref 0.1–1)
MONOCYTES NFR BLD AUTO: 10 %
MONOCYTES NFR BLD AUTO: 10.2 %
NEUTROPHILS # BLD AUTO: 5.33 X10 (3) UL (ref 1.5–7.7)
NEUTROPHILS # BLD AUTO: 5.33 X10(3) UL (ref 1.5–7.7)
NEUTROPHILS # BLD AUTO: 5.51 X10 (3) UL (ref 1.5–7.7)
NEUTROPHILS # BLD AUTO: 5.51 X10(3) UL (ref 1.5–7.7)
NEUTROPHILS NFR BLD AUTO: 66.2 %
NEUTROPHILS NFR BLD AUTO: 71.3 %
OSMOLALITY SERPL CALC.SUM OF ELEC: 297 MOSM/KG (ref 275–295)
PLATELET # BLD AUTO: 250 10(3)UL (ref 150–450)
PLATELET # BLD AUTO: 277 10(3)UL (ref 150–450)
POTASSIUM SERPL-SCNC: 4 MMOL/L (ref 3.5–5.1)
POTASSIUM SERPL-SCNC: 4 MMOL/L (ref 3.5–5.1)
RBC # BLD AUTO: 2.02 X10(6)UL
RBC # BLD AUTO: 2.58 X10(6)UL
SODIUM SERPL-SCNC: 145 MMOL/L (ref 136–145)
UNIT VOLUME: 350 ML
WBC # BLD AUTO: 7.5 X10(3) UL (ref 4–11)
WBC # BLD AUTO: 8.3 X10(3) UL (ref 4–11)

## 2024-09-23 PROCEDURE — 99239 HOSP IP/OBS DSCHRG MGMT >30: CPT | Performed by: HOSPITALIST

## 2024-09-23 NOTE — SPIRITUAL CARE NOTE
Spiritual Care Visit Note    Patient Name: Coni Gandhi Date of Spiritual Care Visit: 24   : 1945 Primary Dx: Lower abdominal pain       Referred By: Referral From:     Spiritual Care Taxonomy:    Intended Effects: Demonstrate caring and concern    Methods: Collaborate with care team member;Offer support    Interventions: Active listening;Ask guided questions;Silent prayer;Share words of hope and inspiration    Visit Type/Summary:     - Spiritual Care: Consulted with RN prior to visit. Offered empathic listening and emotional support. Patient and family expressed appreciation for  visit. Provided information regarding how to contact Spiritual Care and left a Spiritual Care information card.  at bedside.     LAUREN Cooper CAMII   [id number]     Spiritual Care support can be requested via an Epic consult. For urgent/immediate needs, please contact the On Call  at: [place]: ext [phone]

## 2024-09-23 NOTE — CM/SW NOTE
CM f/u w/ pt re HHC.    Pt declined needs.    Plan  Home    / to remain available for support and/or discharge planning.     Tish Cook RN    Ext [phone]

## 2024-09-23 NOTE — PROGRESS NOTES
[facility]  Progress Note    Coni Gandhi Patient Status:  Inpatient    1945 MRN [id number]   Location [facility] 4W/SW/SE Attending Falguni Amor, DO   Hosp Day # 8 PCP Freedom Hickey MD     Subjective:  Pt seen laying in bed. Reports feeling well, no complaints at this time. Ambulating well, pain controlled. Tolerating diet, no nausea or vomiting. Eager to go home.    Objective/Physical Exam:  General: Alert, orientated x3.  Cooperative.  No apparent distress.  Vital Signs:  Blood pressure 117/67, pulse 58, temperature 98.8 °F (37.1 °C), resp. rate 16, height 5' 2\" (1.575 m), weight 178 lb (80.7 kg), SpO2 99%, not currently breastfeeding.  Wt Readings from Last 3 Encounters:   09/15/24 178 lb (80.7 kg)   24 178 lb (80.7 kg)   24 178 lb (80.7 kg)     Lungs: No respiratory distress.  Cardiac: Regular rate and rhythm.   Abdomen:  Soft, non distended, non tender, with no rebound or guarding.  No peritoneal signs.   Extremities:  No lower extremity edema noted.    Incision: Clean, dry, intact, no erythema    Intake/Output:    Intake/Output Summary (Last 24 hours) at 2024 1058  Last data filed at 2024 1018  Gross per 24 hour   Intake 861 ml   Output --   Net 861 ml     I/O last 3 completed shifts:  In: 1654 [P.O.:415; I.V.:913; Blood:326]  Out: -   I/O this shift:  In: 120 [P.O.:120]  Out: -     Medications:    temazepam  7.5 mg Oral Nightly    FLUoxetine  20 mg Oral Nightly    levothyroxine  50 mcg Oral Daily @ 0700    [Transfer Hold] potassium chloride  20 mEq Intravenous Once    heparin  5,000 Units Subcutaneous 2 times per day    pantoprazole  40 mg Intravenous Daily       Labs:  Lab Results   Component Value Date    WBC 8.3 2024    HGB 8.9 2024    HCT 26.1 2024    .0 2024     Lab Results   Component Value Date     2024    K 4.0 2024    K 4.0 2024     2024    CO2 25.0 2024    BUN  5 09/23/2024    CREATSERUM 0.55 09/23/2024    GLU 87 09/23/2024    CA 8.5 09/23/2024     Lab Results   Component Value Date    INR 1.13 08/28/2020    INR 1.1 (L) 10/23/2007         Assessment  Patient Active Problem List   Diagnosis    Hypothyroidism    Peripheral venous insufficiency    Colon cancer screening    Osteoporosis    Wedge compression fracture of second lumbar vertebra with routine healing    Adenocarcinoma of endometrium (HCC)    Macrocytosis    Risk for falls    SOPHIA (generalized anxiety disorder)    Vasomotor rhinitis    Medicare annual wellness visit, subsequent    Breast screening    Eye exam, routine    History of endometrial cancer    Lower abdominal pain    Nausea and vomiting in adult    Complete intestinal obstruction, unspecified cause (HCC)     POD 5: Exploratory laparotomy via wound exploration, closure of fascia.  Evacuation of hematoma  S/p exploratory laparotomy, bowel resection (9/15/2024)    Plan:  Continue soft diet. Stable for discharge home from surgical standpoint. Follow up in 1-2 weeks for staple removal and postoperative appointment  Hemoglobin stable today.  Ambulate and up to chair  DVT prophylaxis with heparin    Quality:  DVT Mechanical Prophylaxis:   SCDs, Early ambuation  DVT Pharmacologic Prophylaxis   Medication    heparin (Porcine) 5000 UNIT/ML injection 5,000 Units                Code Status: Full Code  Larson: No urinary catheter in place  Larson Duration (in days):   Central line:    AQUILES: 9/23/2024        Ramiro Dunaway PA-C  9/23/2024  10:58 AM

## 2024-09-23 NOTE — PLAN OF CARE
Orders received for patient discharge. Patient transported home with . All discharge instructions were discussed with patient and family. Patient verbalizes understanding and agreeable to plan of care & follow-up plan as outlined in discharge summary / AVS and had no further questions regarding discharge instruction. All patient belongings were transported with patient/family at time of discharge.     Coni is aox4, tolerating diet, ambulating ad may, voiding freely, BM today.  at bedside. Dressing change education provided. Denies pain. Call light within reach, calls appropriately. Safety plan in place.    Problem: Patient Centered Care  Goal: Patient preferences are identified and integrated in the patient's plan of care  Description: Interventions:  - What would you like us to know as we care for you? I am from home with my .   - Provide timely, complete, and accurate information to patient/family  - Incorporate patient and family knowledge, values, beliefs, and cultural backgrounds into the planning and delivery of care  - Encourage patient/family to participate in care and decision-making at the level they choose  - Honor patient and family perspectives and choices  Outcome: Adequate for Discharge     Problem: Patient/Family Goals  Goal: Patient/Family Long Term Goal  Description: Patient's Long Term Goal: Discharge home     Interventions:  - Monitor vitals  - Monitor labs  - Monitor and manage wound dressing/incision  - VTE prophylaxis   - Gen sx on consult   - Rehab on consult   - See additional Care Plan goals for specific interventions  Outcome: Adequate for Discharge  Goal: Patient/Family Short Term Goal  Description: Patient's Short Term Goal: Manage pain     Interventions:   - Frequent pain assessments  - Non-pharmacological interventions  - Pain medications as needed/indicated  - See additional Care Plan goals for specific interventions  Outcome: Adequate for Discharge     Problem:  PAIN - ADULT  Goal: Verbalizes/displays adequate comfort level or patient's stated pain goal  Description: INTERVENTIONS:  - Encourage pt to monitor pain and request assistance  - Assess pain using appropriate pain scale  - Administer analgesics based on type and severity of pain and evaluate response  - Implement non-pharmacological measures as appropriate and evaluate response  - Consider cultural and social influences on pain and pain management  - Manage/alleviate anxiety  - Utilize distraction and/or relaxation techniques  - Monitor for opioid side effects  - Notify MD/LIP if interventions unsuccessful or patient reports new pain  - Anticipate increased pain with activity and pre-medicate as appropriate  Outcome: Adequate for Discharge     Problem: GASTROINTESTINAL - ADULT  Goal: Minimal or absence of nausea and vomiting  Description: INTERVENTIONS:  - Maintain adequate hydration with IV or PO as ordered and tolerated  - Nasogastric tube to low intermittent suction as ordered  - Evaluate effectiveness of ordered antiemetic medications  - Provide nonpharmacologic comfort measures as appropriate  - Advance diet as tolerated, if ordered  - Obtain nutritional consult as needed  - Evaluate fluid balance  Outcome: Adequate for Discharge  Goal: Maintains or returns to baseline bowel function  Description: INTERVENTIONS:  - Assess bowel function  - Maintain adequate hydration with IV or PO as ordered and tolerated  - Evaluate effectiveness of GI medications  - Encourage mobilization and activity  - Obtain nutritional consult as needed  - Establish a toileting routine/schedule  - Consider collaborating with pharmacy to review patient's medication profile  Outcome: Adequate for Discharge  Goal: Maintains adequate nutritional intake (undernourished)  Description: INTERVENTIONS:  - Monitor percentage of each meal consumed  - Identify factors contributing to decreased intake, treat as appropriate  - Assist with meals as  needed  - Monitor I&O, WT and lab values  - Obtain nutritional consult as needed  - Optimize oral hygiene and moisture  - Encourage food from home; allow for food preferences  - Enhance eating environment  Outcome: Adequate for Discharge     Problem: RISK FOR INFECTION - ADULT  Goal: Absence of fever/infection during anticipated neutropenic period  Description: INTERVENTIONS  - Monitor WBC  - Administer growth factors as ordered  - Implement neutropenic guidelines  Outcome: Adequate for Discharge     Problem: SAFETY ADULT - FALL  Goal: Free from fall injury  Description: INTERVENTIONS:  - Assess pt frequently for physical needs  - Identify cognitive and physical deficits and behaviors that affect risk of falls.  - [place] fall precautions as indicated by assessment.  - Educate pt/family on patient safety including physical limitations  - Instruct pt to call for assistance with activity based on assessment  - Modify environment to reduce risk of injury  - Provide assistive devices as appropriate  - Consider OT/PT consult to assist with strengthening/mobility  - Encourage toileting schedule  Outcome: Adequate for Discharge     Problem: DISCHARGE PLANNING  Goal: Discharge to home or other facility with appropriate resources  Description: INTERVENTIONS:  - Identify barriers to discharge w/pt and caregiver  - Include patient/family/discharge partner in discharge planning  - Arrange for needed discharge resources and transportation as appropriate  - Identify discharge learning needs (meds, wound care, etc)  - Arrange for interpreters to assist at discharge as needed  - Consider post-discharge preferences of patient/family/discharge partner  - Complete POLST form as appropriate  - Assess patient's ability to be responsible for managing their own health  - Refer to Case Management Department for coordinating discharge planning if the patient needs post-hospital services based on physician/LIP order or complex needs  related to functional status, cognitive ability or social support system  Outcome: Adequate for Discharge     Problem: SKIN/TISSUE INTEGRITY - ADULT  Goal: Skin integrity remains intact  Description: INTERVENTIONS  - Assess and document risk factors for pressure ulcer development  - Assess and document skin integrity  - Monitor for areas of redness and/or skin breakdown  - Initiate interventions, skin care algorithm/standards of care as needed  Outcome: Adequate for Discharge  Goal: Incision(s), wounds(s) or drain site(s) healing without S/S of infection  Description: INTERVENTIONS:  - Assess and document risk factors for pressure ulcer development  - Assess and document skin integrity  - Assess and document dressing/incision, wound bed, drain sites and surrounding tissue  - Implement wound care per orders  - Initiate isolation precautions as appropriate  - Initiate Pressure Ulcer prevention bundle as indicated  Outcome: Adequate for Discharge     Problem: HEMATOLOGIC - ADULT  Goal: Maintains hematologic stability  Description: INTERVENTIONS  - Assess for signs and symptoms of bleeding or hemorrhage  - Monitor labs and vital signs for trends  - Administer supportive blood products/factors, fluids and medications as ordered and appropriate  - Administer supportive blood products/factors as ordered and appropriate  Outcome: Adequate for Discharge  Goal: Free from bleeding injury  Description: (Example usage: patient with low platelets)  INTERVENTIONS:  - Avoid intramuscular injections, enemas and rectal medication administration  - Ensure safe mobilization of patient  - Hold pressure on venipuncture sites to achieve adequate hemostasis  - Assess for signs and symptoms of internal bleeding  - Monitor lab trends  - Patient is to report abnormal signs of bleeding to staff  - Avoid use of toothpicks and dental floss  - Use electric shaver for shaving  - Use soft bristle tooth brush  - Limit straining and forceful nose  blowing  Outcome: Adequate for Discharge

## 2024-09-23 NOTE — PLAN OF CARE
Patient is alert and oriented x4. Patient is on room air and VSS. Patient ambulates independently. Patient is on a low fiber/soft diet. Patient denies n/v/d, pain and sob. Patient is voiding freely and last had a bm 9/22/24. Patient has a midline incision, dressing is c/d/I. VTE prophylaxis and safety measures are in place.     Problem: Patient Centered Care  Goal: Patient preferences are identified and integrated in the patient's plan of care  Description: Interventions:  - What would you like us to know as we care for you? I am from home with my .   - Provide timely, complete, and accurate information to patient/family  - Incorporate patient and family knowledge, values, beliefs, and cultural backgrounds into the planning and delivery of care  - Encourage patient/family to participate in care and decision-making at the level they choose  - Honor patient and family perspectives and choices  Outcome: Progressing     Problem: Patient/Family Goals  Goal: Patient/Family Long Term Goal  Description: Patient's Long Term Goal: Discharge home     Interventions:  - Monitor vitals  - Monitor labs  - Monitor and manage wound dressing/incision  - VTE prophylaxis   - Gen sx on consult   - Rehab on consult   - See additional Care Plan goals for specific interventions  Outcome: Progressing  Goal: Patient/Family Short Term Goal  Description: Patient's Short Term Goal: Manage pain     Interventions:   - Frequent pain assessments  - Non-pharmacological interventions  - Pain medications as needed/indicated  - See additional Care Plan goals for specific interventions  Outcome: Progressing     Problem: PAIN - ADULT  Goal: Verbalizes/displays adequate comfort level or patient's stated pain goal  Description: INTERVENTIONS:  - Encourage pt to monitor pain and request assistance  - Assess pain using appropriate pain scale  - Administer analgesics based on type and severity of pain and evaluate response  - Implement  non-pharmacological measures as appropriate and evaluate response  - Consider cultural and social influences on pain and pain management  - Manage/alleviate anxiety  - Utilize distraction and/or relaxation techniques  - Monitor for opioid side effects  - Notify MD/LIP if interventions unsuccessful or patient reports new pain  - Anticipate increased pain with activity and pre-medicate as appropriate  Outcome: Progressing     Problem: GASTROINTESTINAL - ADULT  Goal: Minimal or absence of nausea and vomiting  Description: INTERVENTIONS:  - Maintain adequate hydration with IV or PO as ordered and tolerated  - Nasogastric tube to low intermittent suction as ordered  - Evaluate effectiveness of ordered antiemetic medications  - Provide nonpharmacologic comfort measures as appropriate  - Advance diet as tolerated, if ordered  - Obtain nutritional consult as needed  - Evaluate fluid balance  Outcome: Progressing  Goal: Maintains or returns to baseline bowel function  Description: INTERVENTIONS:  - Assess bowel function  - Maintain adequate hydration with IV or PO as ordered and tolerated  - Evaluate effectiveness of GI medications  - Encourage mobilization and activity  - Obtain nutritional consult as needed  - Establish a toileting routine/schedule  - Consider collaborating with pharmacy to review patient's medication profile  Outcome: Progressing  Goal: Maintains adequate nutritional intake (undernourished)  Description: INTERVENTIONS:  - Monitor percentage of each meal consumed  - Identify factors contributing to decreased intake, treat as appropriate  - Assist with meals as needed  - Monitor I&O, WT and lab values  - Obtain nutritional consult as needed  - Optimize oral hygiene and moisture  - Encourage food from home; allow for food preferences  - Enhance eating environment  Outcome: Progressing     Problem: RISK FOR INFECTION - ADULT  Goal: Absence of fever/infection during anticipated neutropenic period  Description:  INTERVENTIONS  - Monitor WBC  - Administer growth factors as ordered  - Implement neutropenic guidelines  Outcome: Progressing     Problem: SAFETY ADULT - FALL  Goal: Free from fall injury  Description: INTERVENTIONS:  - Assess pt frequently for physical needs  - Identify cognitive and physical deficits and behaviors that affect risk of falls.  - [place] fall precautions as indicated by assessment.  - Educate pt/family on patient safety including physical limitations  - Instruct pt to call for assistance with activity based on assessment  - Modify environment to reduce risk of injury  - Provide assistive devices as appropriate  - Consider OT/PT consult to assist with strengthening/mobility  - Encourage toileting schedule  Outcome: Progressing     Problem: DISCHARGE PLANNING  Goal: Discharge to home or other facility with appropriate resources  Description: INTERVENTIONS:  - Identify barriers to discharge w/pt and caregiver  - Include patient/family/discharge partner in discharge planning  - Arrange for needed discharge resources and transportation as appropriate  - Identify discharge learning needs (meds, wound care, etc)  - Arrange for interpreters to assist at discharge as needed  - Consider post-discharge preferences of patient/family/discharge partner  - Complete POLST form as appropriate  - Assess patient's ability to be responsible for managing their own health  - Refer to Case Management Department for coordinating discharge planning if the patient needs post-hospital services based on physician/LIP order or complex needs related to functional status, cognitive ability or social support system  Outcome: Progressing     Problem: SKIN/TISSUE INTEGRITY - ADULT  Goal: Skin integrity remains intact  Description: INTERVENTIONS  - Assess and document risk factors for pressure ulcer development  - Assess and document skin integrity  - Monitor for areas of redness and/or skin breakdown  - Initiate interventions,  skin care algorithm/standards of care as needed  Outcome: Progressing  Goal: Incision(s), wounds(s) or drain site(s) healing without S/S of infection  Description: INTERVENTIONS:  - Assess and document risk factors for pressure ulcer development  - Assess and document skin integrity  - Assess and document dressing/incision, wound bed, drain sites and surrounding tissue  - Implement wound care per orders  - Initiate isolation precautions as appropriate  - Initiate Pressure Ulcer prevention bundle as indicated  Outcome: Progressing     Problem: HEMATOLOGIC - ADULT  Goal: Maintains hematologic stability  Description: INTERVENTIONS  - Assess for signs and symptoms of bleeding or hemorrhage  - Monitor labs and vital signs for trends  - Administer supportive blood products/factors, fluids and medications as ordered and appropriate  - Administer supportive blood products/factors as ordered and appropriate  Outcome: Progressing  Goal: Free from bleeding injury  Description: (Example usage: patient with low platelets)  INTERVENTIONS:  - Avoid intramuscular injections, enemas and rectal medication administration  - Ensure safe mobilization of patient  - Hold pressure on venipuncture sites to achieve adequate hemostasis  - Assess for signs and symptoms of internal bleeding  - Monitor lab trends  - Patient is to report abnormal signs of bleeding to staff  - Avoid use of toothpicks and dental floss  - Use electric shaver for shaving  - Use soft bristle tooth brush  - Limit straining and forceful nose blowing  Outcome: Progressing

## 2024-09-24 NOTE — PAYOR COMM NOTE
--------------  DISCHARGE REVIEW    Payor: [employer]  Subscriber #:  [id number]  Authorization Number: [id number]    Admit date: 9/15/24  Admit time:   2:48 AM  Discharge Date: 9/23/2024 12:14 PM     Admitting Physician: Catalina Brunner MD  Attending Physician:  Shameka att. providers found  Primary Care Physician: Freedom Hickey MD

## 2024-09-25 ENCOUNTER — PATIENT OUTREACH (OUTPATIENT)
Dept: CASE MANAGEMENT | Age: 79
End: 2024-09-25

## 2024-09-25 DIAGNOSIS — Z02.9 ENCOUNTERS FOR ADMINISTRATIVE PURPOSE: Primary | ICD-10-CM

## 2024-09-25 DIAGNOSIS — K56.601 COMPLETE INTESTINAL OBSTRUCTION, UNSPECIFIED CAUSE (HCC): ICD-10-CM

## 2024-09-25 PROCEDURE — 1159F MED LIST DOCD IN RCRD: CPT

## 2024-09-25 PROCEDURE — 1111F DSCHRG MED/CURRENT MED MERGE: CPT

## 2024-09-25 NOTE — PROGRESS NOTES
Transitional Care Management   Discharge Date: 24  Contact Date: 2024    Assessment:    TCM Initial Assessment    General:  Assessment completed with: Patient  Patient Subjective: Spoke with patient who reports she is doing good since being discharged from the hospital. Patient states she plans on calling the surgeon today to schedule her follow up. The patient reports the incision as clean, dry, and intact. The patient denies any separation of the incision, erythema, drainage, bleeding, or foul odor. Patient states her  helped her put a new dressing on for her. The patient denies chest pain, shortness of breath, fever, chills, nausea, vomiting, diarrhea. No concern for constipation. The patient does report she has had multiple loose bms since being home but not liquid. Patient states she has been up and walking. Tolerating a soft diet. [facility] confirmed the patient has an incentive spirometer at home and understands proper use. The patient denies any questions or concerns at this time.  Chief Complaint: Small bowel obstruction status post exploratory laparotomy with bowel resection secondary to necrotic ileum.  Verify patient name and  with patient/ caregiver: Yes    Hospital Stay/Discharge:  Tell me what you understand of why you were in the hospital or emergency department: worsening abdominal pain and I had to have surgery  Prior to leaving the hospital were your Discharge Instructions reviewed with you?: Yes  Did you receive a copy of your written Discharge Instructions?: Yes  What questions do you have about your Discharge Instructions?: patient denies questions at this time  Do you feel better or worse since you left the hospital or emergency department?: Better    Follow - Up Appointment:  Do you have a follow-up appointment?: No  Are there any barriers to getting to your follow-up appointment?: No    Home Health/DME:  Prior to leaving the hospital was Home Health (HH) arranged for you?:  N/A  Are HH needs identified by staff during the assessment?: No     Prior to leaving the hospital or emergency department was Durable Medical Equipment (DME), medical supplies, or infusions arranged for you?: Yes  Have you received your DME/supplies/infusions?: Yes  Do you have questions about using your DME/supplies/infusions?: No     Medications/Diet:  Did any of your medications change, during or after your hospital stay or ED visit?: No  Do you understand what your medications are for and possible side effects?: Yes  Are there any reasons that keep you from taking your medication as prescribed?: No  Any concerns about medication refills?: No    Were you given a different diet per your Discharge Instructions?: Yes  Diet Type: soft diet  Reason: bowel obstruction  Are there any barriers to following that diet?: No     Questions/Concerns:  Do you have any questions or concerns that have not been discussed?: No     Nursing Interventions:  NCM provided education signs and symptoms of blood clots and infection. NCM informed patient reason for the use of the Incentive Spirometer and advised patient to continue to use the Incentive Spirometer 10 times per hour while awake, if possible--If watching TV, use it at every commercial break. Advised to continue with a soft diet, to be up and walking as tolerated, and frequent position changes to promote blood flow. Patient agrees to contact her surgeon if any questions or concerns.     A TCM/HFU appointment has been scheduled with Dr. Hickey for 10/03/2024. [facility] transferred patient to General Surgery office at the end of the phone call for her to schedule her POSTOP appointment/staple removal.    All d/c instructions reviewed with pt.  Reviewed when to call MD vs when to go to ER/call 911.  Educated pt on the importance of taking all meds as prescribed as well as close f/u with PCP/specialists.  Pt verbalized understanding and will contact office with any further questions  or concerns.         Medication Review:   Current Outpatient Medications   Medication Sig Dispense Refill    FLUoxetine 20 MG Oral Cap Take 1 capsule (20 mg total) by mouth daily. ADDRESS ALL REFILLS AT UPCOMING APPOINTMENT- states she takes at hs      levothyroxine 50 MCG Oral Tab Take 1 tablet (50 mcg total) by mouth before breakfast. 90 tablet 3    fluorouracil 5 % External Cream Start using 3-4 weeks after biopsy. Use twice daily to spot on left cheek for 4 weeks. Please notify docot if spot opens up or begins to bleed. 40 g 0    Cholecalciferol (VITAMIN D) 50 MCG (2000 UT) Oral Cap Take by mouth.      Multiple Vitamins-Minerals (EYE VITAMINS & MINERALS OR) Take 1 tablet by mouth daily.      diphenhydrAMINE HCl, Sleep, (ZZZQUIL OR) Take by mouth.      VITAMIN B COMPLEX-C OR       Melatonin 5 MG Oral Tab Take 1 tablet (5 mg total) by mouth nightly.      Calcium Carbonate-Vitamin D (CALCIUM-VITAMIN D) 600-200 MG-UNIT Oral Cap Take by mouth daily. Takes 2 tabs in am and 1 tab at HS       Multiple Vitamins Oral Tab Take 1 tablet by mouth daily.      Omega-3 Fatty Acids ( OMEGA-3 FISH OIL) 1200 MG Oral Capsule Delayed Release Take by mouth daily.         Did patient review medications using current pill bottles and not just a medication list?  Yes  .No changes were made to your medications.  Discharge medications reviewed/discussed/and reconciled against outpatient medications with patient.  Any changes or updates to medications sent to primary care provider.  Patient Acknowledged    SDOH:   Transportation Needs: No Transportation Needs (9/18/2024)    Transportation Needs     Lack of Transportation: No     Car Seat: Not on file     Financial Resource Strain: Low Risk  (9/25/2024)    Financial Resource Strain     Difficulty of Paying Living Expenses: Not hard at all     Med Affordability: Not on file         Follow-up Appointments:  Your appointments       Date & Time Appointment Department ([place])    Dec 17, 2024  9:20 AM CST X-ray Bone Densitometry (Dexa) with ADO DEXA [address] DEXA - [place] ([facility])    It is recommended that you wear a 2 piece outfit that does not contain any metal such as snaps and zippers, etc.  Attention women: Underwire bras will need to be removed prior to the scan.    If you have the report from a prior DEXA scan performed elsewhere, please bring the report with you to your appointment.       Dec 17, 2024 9:20 AM CST X-ray Bone Densitometry (Dexa) with ADO GABBY RM1 [facility] - [place] ([facility])    It is recommended that you wear a 2 piece outfit that does not contain any metal such as snaps and zippers, etc.  Attention women: Underwire bras will need to be removed prior to the scan.    If you have the report from a prior DEXA scan performed elsewhere, please bring the report with you to your appointment.       Dec 20, 2024 9:40 AM CST Follow Up Visit with Nadiya Hylton MD [facility] DEXA - [address] [address]  [phone] [facility] - [address] [address]  [phone] [facility]  [address] [facility] [id number] [phone]            Transitional Care Clinic  Was TCC Ordered: No      Primary Care Provider (If no TCC appointment)  Does patient already have a PCP appointment scheduled? No  Nurse Care Manager Scheduled PCP office TCM appointment with patient       Specialist  Does the patient have any other follow-up appointment(s) that need to be scheduled? Yes   -If yes: Nurse Care Manager reviewed upcoming specialist appointments with patient: Yes-- NCM transferred patient to gen surg office at the end of the phone call  so she can schedule.    -Does the patient need assistance scheduling appointment(s): No    CCM referral placed:  No

## 2024-10-01 NOTE — OPERATIVE REPORT
[facility]    PATIENT'S NAME: NELA CARRASQUILLO   ATTENDING PHYSICIAN: Falguni Amor DO   OPERATING PHYSICIAN: Bebo Rodrigez MD   PATIENT ACCOUNT#:   [id number]    LOCATION:  [address] [place] [address]  MEDICAL RECORD #:   [id number]       YOB: 1945  ADMISSION DATE:       09/14/2024      OPERATION DATE:  09/15/2024    OPERATIVE REPORT      PREOPERATIVE DIAGNOSIS:  Small-bowel obstruction.  POSTOPERATIVE DIAGNOSIS:  Small-bowel obstruction with necrotic portion of ileum.  PROCEDURE:  Exploratory laparotomy, small bowel resection.    ASSISTANT:  Nikki Haddad MD.    ANESTHESIA:  General.    BLOOD LOSS:  30 mL.    SPECIMENS:  Segment of ileum.    DRAINS:  None.    COMPLICATIONS:  None.    DISPOSITION:  Stable.    INDICATIONS:  Patient presented with small-bowel obstruction with possibility of necrosis of distal small bowel.    FINDINGS:  Ischemic portion small bowel.    OPERATIVE TECHNIQUE:  Patient came in the operating room, underwent general endotracheal anesthesia.  Compression boots were placed.  Prepped and draped the abdomen.  Began by making a midline incision.  Continued dissection until we entered the peritoneal cavity.  There was dilated small bowel.  We traced this down to the ileum.  We could see a loop of ischemic small bowel.  Any adhesions were lysed.  The area of the small bowel was completely demarcated.  We therefore chose a portion distal to the demarcation proximally and distal.  Transected these with a BAILEY stapler bowel load.  We then removed the mesentery using passes of the LigaSure.  We then did a side-to-side ileoileal anastomosis using a stapled anastomosis with 100 mm staple used at the end.  We reinforced all staple lines with silks.  Mesentery closed with silks.  Had a good lumen.  Otherwise, irrigated the area.  Closed the abdomen with running PDS sutures double stranded, skin with staples.  No complications.  Patient tolerated the procedure well.    Dictated  By Bebo Rodrigez MD  d: 09/30/2024 12:49:31  t: 09/30/2024 18:51:35  [facility] [id number]  [facility]/

## 2024-10-02 NOTE — CDS QUERY
How to answer this Query:   1.) DON'T CLICK COSIGN BUTTON FIRST  2.) Click \"3 dots...\" to the right of cosign button and click EDIT on the toolbar.  2.) Type an \"X\" in the bracket for the diagnosis that applies. (You may also add additional clinical details as you feel necessary to substantiate your response).  3.) Finally click \"Sign\" to complete response.  Thank You       Dear Dr Phillips ,    Can  the intraabdominal hematoma be further clarified?    ( x )  Due to/related to the exploratory laparotomy, small bowel resection.    (  )  Not related to the Exploratory laparotomy,small bowel resection    (  ) Other: Please specify: ___________________________    _________________________________________________________________________    Clinical Indicator:    9/14  Preoperative Diagnosis:   Small-bowel obstruction.  Postoperative Diagnosis :  Small-bowel obstruction with necrotic portion of ileum.  Procedure:  Exploratory laparotomy, small bowel resection.    9/18: Preop Dx:  Fascia dehiscence  Postop Dx:  Fascia dehiscence, intra-abdominal hematoma   Procedure:    Exploratory laparotomy via Wound Exploration ,Closure of fascia    Evacuation of Hematoma   \"The previous small bowel anastomosis was identified. A moderate sized hematoma was seen underneath the anastomosis along the mesentery \"     9/15 H&P:  History of SBO due to internal hernia s/p addition of adhesions and resection of necrotic bowel with primary anastomosis as well as other abdominal surgery, who presents to the ED today with complaints of abdominal pain.     9/15 : CT Abdomen: 1. Fluid-filled thickened loops of small bowel are evident in the right hemiabdomen, which could reflect underlying infectious/inflammatory and/or ischemic enteritis or malabsorption in the appropriate clinical setting. Given the clustered appearance of   the bowel protruding laterally, these findings may reflect pericecal hernia, potentially with some degree of underlying  strangulation.       Risk Factor: 77 y/o Female with History of Small Bowel Obstruction ; Hx of Repair of Incisional hernia with mesh lysis     Treatment:  -Antiemetics and analgesics as needed   -Continue dressing changes   -Ambulate as able  -DVT prophylaxis with heparin  -Encouraged incentive spirometer        If you have any questions ,please call Clinical : Isidra Candelaria/ANITA-BSN  at cell # [phone]     THIS FORM IS A PERMANENT PART OF THE MEDICAL RECORD

## 2024-10-03 ENCOUNTER — OFFICE VISIT (OUTPATIENT)
Dept: INTERNAL MEDICINE CLINIC | Facility: CLINIC | Age: 79
End: 2024-10-03
Payer: COMMERCIAL

## 2024-10-03 VITALS
BODY MASS INDEX: 32 KG/M2 | DIASTOLIC BLOOD PRESSURE: 66 MMHG | WEIGHT: 176.25 LBS | SYSTOLIC BLOOD PRESSURE: 116 MMHG | HEART RATE: 61 BPM

## 2024-10-03 DIAGNOSIS — D64.9 POSTOPERATIVE ANEMIA: ICD-10-CM

## 2024-10-03 DIAGNOSIS — K56.609 SBO (SMALL BOWEL OBSTRUCTION) (HCC): Primary | ICD-10-CM

## 2024-10-03 DIAGNOSIS — T81.31XS POSTOPERATIVE WOUND DEHISCENCE, SEQUELA: ICD-10-CM

## 2024-10-03 DIAGNOSIS — E03.9 ACQUIRED HYPOTHYROIDISM: ICD-10-CM

## 2024-10-03 PROCEDURE — 3074F SYST BP LT 130 MM HG: CPT | Performed by: INTERNAL MEDICINE

## 2024-10-03 PROCEDURE — 3078F DIAST BP <80 MM HG: CPT | Performed by: INTERNAL MEDICINE

## 2024-10-03 PROCEDURE — 1159F MED LIST DOCD IN RCRD: CPT | Performed by: INTERNAL MEDICINE

## 2024-10-03 PROCEDURE — 1160F RVW MEDS BY RX/DR IN RCRD: CPT | Performed by: INTERNAL MEDICINE

## 2024-10-03 PROCEDURE — 99495 TRANSJ CARE MGMT MOD F2F 14D: CPT | Performed by: INTERNAL MEDICINE

## 2024-10-03 PROCEDURE — 1111F DSCHRG MED/CURRENT MED MERGE: CPT | Performed by: INTERNAL MEDICINE

## 2024-10-03 NOTE — DISCHARGE SUMMARY
[facility]  part of [facility]    Discharge Summary    Coni Gandhi Patient Status:  Inpatient    1945 MRN [id number]   Location [facility] 4W/SW/SE Attending No att. providers found   Hosp Day # 8 PCP Freedom Hickey MD     Date of Admission: 2024 Disposition: Home or Self Care     Date of Discharge: 2024      Admitting Diagnosis: Lower abdominal pain [R10.30]  Nausea and vomiting in adult [R11.2]  Complete intestinal obstruction, unspecified cause (HCC) [K56.601]    Hospital Discharge Diagnoses:  as above     Hospital Discharge Diagnoses:  as above     Lace+ Score: 77  59-90 High Risk  29-58 Medium Risk  0-28   Low Risk.    TCM Follow-Up Recommendation:  LACE > 58: High Risk of readmission after discharge from the hospital.          Lace+ Score: 77  59-90 High Risk  29-58 Medium Risk  0-28   Low Risk    Risk of readmission: Coni Gandhi has High Risk of readmission after discharge from the hospital.    Problem List:   Patient Active Problem List   Diagnosis    Hypothyroidism    Peripheral venous insufficiency    Colon cancer screening    Osteoporosis    Wedge compression fracture of second lumbar vertebra with routine healing    Adenocarcinoma of endometrium (HCC)    Macrocytosis    Risk for falls    SOPHIA (generalized anxiety disorder)    Vasomotor rhinitis    Medicare annual wellness visit, subsequent    Breast screening    Eye exam, routine    History of endometrial cancer    Lower abdominal pain    Nausea and vomiting in adult    Complete intestinal obstruction, unspecified cause (HCC)       Reason for Admission:     Physical Exam:   General appearance: alert, appears stated age and cooperative  Pulmonary:  clear to auscultation bilaterally  Cardiovascular: S1, S2 normal, no murmur, click, rub or gallop, regular rate and rhythm  Abdominal: soft, non-tender; bowel sounds normal; no masses,  no organomegaly  Extremities: extremities normal, atraumatic, no  cyanosis or edema  Psychiatric: calm        History of Present Illness:      Coni Gandhi is a 78 year old female with history of SBO due to internal hernia s/p addition of adhesions and resection of necrotic bowel with primary anastomosis as well as other abdominal surgery, who presents to the ED today with complaints of abdominal pain.  Sudden onset 9:30 PM last night.  Located in the mid to lower abdomen.  Associated with nausea and dry heaves.  No fever, chills, chest pain or shortness of breath.  No diarrhea or bloody stools.     Vital stable  Labs stable except CO2 17  CT abdomen showed findings concerning for pericecal hernia and possible bowel necrosis.     General surgery consulted, NG tube being place.  IV antibiotics started.            Hospital Course:       Small bowel obstruction status post exploratory laparotomy with bowel resection secondary to necrotic ileum.  Postoperative acute blood loss anemia   Post operative wound dehiscence     Plan  -saline lock   -sp iv Zosyn now off   -OOB to chair, ambulate, PT/OT  -low fiber diet   - follow H/H and transfuse if Hb<7gm/dl-> transfuse one unit prbcs   -NG tube discontinued  -General Surgery consulted and following   -Pain control manage at this time.   -Status post ex lap via wound exploration, closure of fascia and evacuation of hematoma on 9/18/2024               Hypothyroidism  -IV levothyroxine--> po levothyroxine            Quality:  DVT Prophylaxis: heparin  CODE status: full   [place]:  present   DISPO: Upon surgical clearance            Falguni Amor DO           Chart reviewed, including current vitals, notes, labs and imaging  Labs ordered and medications adjusted as outlined above  Coordinate care with care team/consultants  Discussed with patient results of tests, management plan as outlined above, and the need for ongoing hospitalization  D/w RN     [facility]            Consultations: Dr Phillips    Procedures: as above     Complications:  none    Discharge Condition: Good    Discharge Medications:      Discharge Medications        CONTINUE taking these medications        Instructions Prescription details   Calcium-Vitamin D 600-200 MG-UNIT Caps      Take by mouth daily. Takes 2 tabs in am and 1 tab at HS   Refills: 0     EYE VITAMINS & MINERALS OR      Take 1 tablet by mouth daily.   Refills: 0     FLUoxetine 20 MG Caps  Commonly known as: PROzac      Take 1 capsule (20 mg total) by mouth daily. ADDRESS ALL REFILLS AT UPCOMING APPOINTMENT- states she takes at hs   Refills: 0     KP Omega-3 Fish Oil 1200 MG Cpdr      Take by mouth daily.   Refills: 0     levothyroxine 50 MCG Tabs  Commonly known as: Synthroid      Take 1 tablet (50 mcg total) by mouth before breakfast.   Quantity: 90 tablet  Refills: 3     Melatonin 5 MG Tabs      Take 1 tablet (5 mg total) by mouth nightly.   Refills: 0     Multiple Vitamins Tabs      Take 1 tablet by mouth daily.   Refills: 0     VITAMIN B COMPLEX-C OR       Refills: 0     Vitamin D 50 MCG (2000 UT) Caps      Take by mouth.   Refills: 0     ZZZQUIL OR      Take by mouth.   Refills: 0              Follow up Visits: Follow-up with Dr samuel  in 1 week    Follow up Labs: none     Other Discharge Instructions: none    Falguni Amor DO  10/3/2024  3:54 PM    > 35 min

## 2024-10-04 NOTE — PROGRESS NOTES
Subjective:   Coni Gandhi is a 78 year old female who presents for hospital follow up.   She was discharged from [facility] to Home or Self Care  Admission Date: 9/14/24   Discharge Date: 9/23/24  Hospital Discharge Diagnosis: complete SBO s/p resection necrotic ileum; postop anemia, wound dehiscence, hypothyroidism    Interactive contact within 2 business days post discharge first initiated on Date: 9/25/2024    During the visit, the following was completed:  Obtained and reviewed   Reviewed Labs (CBC, CMP), Labs (Pathology), Labs (Microbiology), CT radiology results, and X-Ray radiology results    HPI: pt admitted for sudden onset abd pain, diagnosed with complete SBO, and had explore lap with resection of necrotic ileum.    Had developed wound dehiscence, that was also surgically repaired.     History/Other:   Current Medications:  Medication Reconciliation:  I am aware of an inpatient discharge within the last 30 days.  The discharge medication list has been reconciled with the patient's current medication list and reviewed by me.  See medication list for additions of new medication, and changes to current doses of medications and discontinued medications.  Outpatient Medications Marked as Taking for the 10/3/24 encounter (Office Visit) with Freedom Hickey MD   Medication Sig    FLUoxetine 20 MG Oral Cap Take 1 capsule (20 mg total) by mouth daily. ADDRESS ALL REFILLS AT UPCOMING APPOINTMENT- states she takes at hs    levothyroxine 50 MCG Oral Tab Take 1 tablet (50 mcg total) by mouth before breakfast.    Cholecalciferol (VITAMIN D) 50 MCG (2000 UT) Oral Cap Take by mouth.    Multiple Vitamins-Minerals (EYE VITAMINS & MINERALS OR) Take 1 tablet by mouth daily.    diphenhydrAMINE HCl, Sleep, (ZZZQUIL OR) Take by mouth.    VITAMIN B COMPLEX-C OR     Melatonin 5 MG Oral Tab Take 1 tablet (5 mg total) by mouth nightly.    Calcium Carbonate-Vitamin D (CALCIUM-VITAMIN D) 600-200 MG-UNIT Oral Cap Take by  mouth daily. Takes 2 tabs in am and 1 tab at HS     Multiple Vitamins Oral Tab Take 1 tablet by mouth daily.    Omega-3 Fatty Acids ( OMEGA-3 FISH OIL) 1200 MG Oral Capsule Delayed Release Take by mouth daily.       Current Facility-Administered Medications for the 10/3/24 encounter (Office Visit) with Freedom Hickey MD   Medication    denosumab (Prolia) 60 MG/ML SUBQ injection 60 mg       Review of Systems:  GENERAL: weight stable, energy stable, no sweating  SKIN: denies any unusual skin lesions  EYES: denies blurred vision or double vision  HEENT: denies nasal congestion, sinus pain or ST  LUNGS: denies shortness of breath with exertion  CARDIOVASCULAR: denies chest pain on exertion or palpitations  GI: denies abdominal pain, denies heartburn, denies diarrhea  MUSCULOSKELETAL: denies pain, normal range of motion of extremities  NEURO: denies headaches, denies dizziness, denies weakness  PSYCHE: denies depression or anxiety  HEMATOLOGIC: denies hx of anemia, or bruising, denies bleeding  ENDOCRINE: thyroid history  ALL/ASTHMA: denies hx of allergy or asthma    Objective:   No LMP recorded. Patient has had a hysterectomy.  Estimated body mass index is 32.24 kg/m² as calculated from the following:    Height as of 9/15/24: 5' 2\" (1.575 m).    Weight as of this encounter: 176 lb 4 oz (79.9 kg).   /66   Pulse 61   Wt 176 lb 4 oz (79.9 kg)   BMI 32.24 kg/m²    GENERAL: well developed, well nourished, in no apparent distress  SKIN: no rashes, no suspicious lesions  HEENT: atraumatic, normocephalic, ears and throat are clear  EYES: PERRLA, EOMI, conjunctiva are clear  NECK: supple, no adenopathy, no bruits  CHEST: no chest tenderness  LUNGS: clear to auscultation  CARDIO: RRR without murmur  GI: good BS's, no masses, HSM or tenderness  MUSCULOSKELETAL: back is not tender, FROM of the extremities  EXTREMITIES: no cyanosis, clubbing or edema  NEURO: Oriented times three, cranial nerves are intact, motor  and sensory are grossly intact    Assessment & Plan:   (K56.609) SBO (small bowel obstruction) (HCC)  (primary encounter diagnosis)  Plan: had explore lap with resection of necrotic ileum. Had wound dehiscence that had been surgically repaired otherwise done well since then.  Will have her outpt ffup with surgery in 1 to 2 weeks. She is eating well now and having good BM.     (E03.9) Acquired hypothyroidism  Plan: on LT4.    (D64.9) Postoperative anemia  Plan: hgb stable.     (T81.31XS) Postoperative wound dehiscence, sequela  Plan: repaired surgically.          No follow-ups on file.

## 2024-10-09 ENCOUNTER — OFFICE VISIT (OUTPATIENT)
Dept: SURGERY | Facility: CLINIC | Age: 79
End: 2024-10-09
Payer: COMMERCIAL

## 2024-10-09 DIAGNOSIS — Z48.02 ENCOUNTER FOR STAPLE REMOVAL: Primary | ICD-10-CM

## 2024-10-09 PROCEDURE — 99024 POSTOP FOLLOW-UP VISIT: CPT

## 2024-10-09 PROCEDURE — 1126F AMNT PAIN NOTED NONE PRSNT: CPT

## 2024-10-09 PROCEDURE — 1160F RVW MEDS BY RX/DR IN RCRD: CPT

## 2024-10-09 PROCEDURE — 1159F MED LIST DOCD IN RCRD: CPT

## 2024-10-09 PROCEDURE — 1111F DSCHRG MED/CURRENT MED MERGE: CPT

## 2024-10-09 NOTE — PROGRESS NOTES
S:  Coni Gandhi is a 78 year old female presenting for staple removal.   Doing well, no fevers, no chills, tolerating a general diet, generally normal bowel movements, no calf tenderness nor lower extremity edema, no shortness of breath, no chest pain.   She has no complaints at this time.    O:  There were no vitals taken for this visit.  GEN:  No acute distress  L: nonlabored respirations  H: reg rate  Abd:  Soft, NT,ND.  Skin: Incision C D I, no eryth, staples removed  Extr: No edema, no calf tenderness    Assessment   1. Encounter for staple removal        Doing well post op, staples removed  Continue to keep incision clean and dry.  Maintain a healthy diet.  Maintain good hydration.  F/u prn.         Ramiro Dunaway PA-C

## 2024-12-13 ENCOUNTER — TELEPHONE (OUTPATIENT)
Dept: RHEUMATOLOGY | Facility: CLINIC | Age: 79
End: 2024-12-13

## 2024-12-13 NOTE — TELEPHONE ENCOUNTER
Patient aware to have labs completed prior to appt 12/20/24 for Prolia Injection.    Benefits Investigation completed.

## 2024-12-16 NOTE — TELEPHONE ENCOUNTER
Called [facility] and they have an approved authorization on file.     See TE 6/13/2024    PA Approved     Prior authorization for: Prolia     Medication form: 60 mg PFS     Date received: 6/13/2024     Approval #: [id number]     Approved dates: 6/13/2024 to 6/13/2025

## 2024-12-17 ENCOUNTER — LAB ENCOUNTER (OUTPATIENT)
Dept: LAB | Age: 79
End: 2024-12-17
Attending: INTERNAL MEDICINE
Payer: MEDICARE

## 2024-12-17 ENCOUNTER — HOSPITAL ENCOUNTER (OUTPATIENT)
Dept: BONE DENSITY | Age: 79
Discharge: HOME | End: 2024-12-17
Attending: INTERNAL MEDICINE
Payer: MEDICARE

## 2024-12-17 DIAGNOSIS — M81.0 AGE-RELATED OSTEOPOROSIS WITHOUT CURRENT PATHOLOGICAL FRACTURE: ICD-10-CM

## 2024-12-17 LAB
ANION GAP SERPL CALC-SCNC: 3 MMOL/L (ref 0–18)
BUN BLD-MCNC: 23 MG/DL (ref 9–23)
BUN/CREAT SERPL: 25.3 (ref 10–20)
CALCIUM BLD-MCNC: 10.4 MG/DL (ref 8.7–10.4)
CHLORIDE SERPL-SCNC: 109 MMOL/L (ref 98–112)
CO2 SERPL-SCNC: 31 MMOL/L (ref 21–32)
CREAT BLD-MCNC: 0.91 MG/DL
EGFRCR SERPLBLD CKD-EPI 2021: 65 ML/MIN/1.73M2 (ref 60–?)
FASTING STATUS PATIENT QL REPORTED: YES
GLUCOSE BLD-MCNC: 84 MG/DL (ref 70–99)
OSMOLALITY SERPL CALC.SUM OF ELEC: 299 MOSM/KG (ref 275–295)
POTASSIUM SERPL-SCNC: 4.6 MMOL/L (ref 3.5–5.1)
SODIUM SERPL-SCNC: 143 MMOL/L (ref 136–145)

## 2024-12-17 PROCEDURE — 36415 COLL VENOUS BLD VENIPUNCTURE: CPT

## 2024-12-17 PROCEDURE — 80048 BASIC METABOLIC PNL TOTAL CA: CPT

## 2024-12-17 PROCEDURE — 77080 DXA BONE DENSITY AXIAL: CPT | Performed by: INTERNAL MEDICINE

## 2024-12-20 ENCOUNTER — OFFICE VISIT (OUTPATIENT)
Dept: RHEUMATOLOGY | Facility: CLINIC | Age: 79
End: 2024-12-20
Payer: COMMERCIAL

## 2024-12-20 VITALS
HEART RATE: 72 BPM | DIASTOLIC BLOOD PRESSURE: 67 MMHG | BODY MASS INDEX: 32 KG/M2 | SYSTOLIC BLOOD PRESSURE: 100 MMHG | RESPIRATION RATE: 20 BRPM | WEIGHT: 175 LBS

## 2024-12-20 DIAGNOSIS — Z51.81 MEDICATION MONITORING ENCOUNTER: ICD-10-CM

## 2024-12-20 DIAGNOSIS — M81.0 AGE-RELATED OSTEOPOROSIS WITHOUT CURRENT PATHOLOGICAL FRACTURE: Primary | ICD-10-CM

## 2024-12-20 PROBLEM — D69.6 THROMBOCYTOPENIA: Chronic | Status: ACTIVE | Noted: 2024-12-20

## 2024-12-20 PROBLEM — J41.0 SMOKERS' COUGH (HCC): Chronic | Status: ACTIVE | Noted: 2024-12-20

## 2024-12-20 PROBLEM — D69.6 THROMBOCYTOPENIA (HCC): Chronic | Status: ACTIVE | Noted: 2024-12-20

## 2024-12-20 PROCEDURE — 3078F DIAST BP <80 MM HG: CPT | Performed by: INTERNAL MEDICINE

## 2024-12-20 PROCEDURE — 1126F AMNT PAIN NOTED NONE PRSNT: CPT | Performed by: INTERNAL MEDICINE

## 2024-12-20 PROCEDURE — 3074F SYST BP LT 130 MM HG: CPT | Performed by: INTERNAL MEDICINE

## 2024-12-20 PROCEDURE — 99214 OFFICE O/P EST MOD 30 MIN: CPT | Performed by: INTERNAL MEDICINE

## 2024-12-20 PROCEDURE — 96372 THER/PROPH/DIAG INJ SC/IM: CPT | Performed by: INTERNAL MEDICINE

## 2024-12-20 PROCEDURE — 1159F MED LIST DOCD IN RCRD: CPT | Performed by: INTERNAL MEDICINE

## 2024-12-20 PROCEDURE — 1160F RVW MEDS BY RX/DR IN RCRD: CPT | Performed by: INTERNAL MEDICINE

## 2024-12-20 NOTE — PATIENT INSTRUCTIONS
You were seen today for osteoporosis  Bone density shows improvement in your hip and your spine  Continue calcium and vitamin D  Continue exercise  For the rash in your back, concerned about possible shingles  Please reach out to your PCP  Make sure to get Prolia every 6 months  You can see me in 1 year

## 2024-12-20 NOTE — PROGRESS NOTES
Airway  Performed by: Julita Barnett CRNA  Authorized by: Eduar Rodriguez MD     Final Airway Type:  Supraglottic airway  SGA Size*:  4  Attempts*:  1   Patient Identified, Procedure confirmed, Emergency equipment available and Safety protocols followed  Location:  OR  Urgency:  Elective  Difficult Airway: No    Indications for Airway Management:  Anesthesia  Sedation Level:  Anesthetized  Mask Difficulty Assessment:  1 - vent by mask  Performed By:  CRNA         Coni Gandhi is a 79 year old female.    HPI:     Chief Complaint   Patient presents with    Osteoporosis     Presents with follow up and Prolia injection.       I had the pleasure of seeing Coni Gandhi on 12/20/2024 for follow up osteoporosis.     Current Medications:  Prolia every 6 mos- 1st dose 6/15/2023  Calcium 1800 mg daily  Vitamin D 600 mg daily   Previous medications:  Actonel 35 mg daily- on it since [id number]  Bone density 2017-->11/2020-->12/2022-->12/2024  LFN  T-score   -2.3         -2.7         -2.8           -2.2  LTH                 -2.0         -2.3         -2.6           -2.3  LS                   -2.0         -1.6         -1.6           -1.2  Left forearm                    -2.4         -2.1          -1.9    Interval History:  78 yo F with hx of Thyroidectomy now Hypothyroid, Uterine cancer s/p hysterectomy, radiation (2022), osteoporosis presents to establish care.  She was following with Dr. Lima, last seen in 2020.  She underwent menopause around her mid to late 50s.  She was on hormone replacement therapy for about 1 to 2 years.  Currently takes calcium and vitamin D.  She fractured her right wrist in 1986 while slipping on ice.  In October 2019 she tripped on a rug in her house and fractured her L2 vertebral body.  She had kyphoplasty.  She also has had x-rays done showing multiple mild thoracic compression fractures.  She does not drink alcohol.  Stop smoking in 1975.  Recently diagnosed with uterine cancer and had a hysterectomy and radiation.  Now stable.  She has been on alendronate since 2019.  Np recent falls or fractures.    6/5/2023:   Presents for follow-up of osteoporosis  Last seen in 2020 by Dr. Lima  Has been on risedronate weekly since 2019.  Recent bone density shows worsening left total hip and left femoral neck.  Also takes calcium and vitamin D    6/18/2024:   Presents for follow-up of osteoporosis  She is on Prolia every 6 months.  Here for her third  injection  No s/e with prolia  Also takes calcium and vitamin D  No falls or fractures  Continue to walk and using weights  No joint pain or swelling     12/20/2024:   Presents for follow-up of osteoporosis  She is on Prolia every 6 months.  Here for her 4th injection  Recent bone density done showing improvement in her hip and spine  No recent falls or fractures  Also takes calcium and vitamin D  Having some pain in right hip and thigh, started after hip surgery 5 yrs ago  Continue to walk 2 miles a day and using weights  Has a rash on upper back, very itchy. Started about 2 weeks ago. She had the shingles vaccine         HISTORY:  Past Medical History:    Actinic keratosis    Left cheek    Anxiety    Back problem    compression fractures    Benign essential tremor    right hand    Cancer (HCC)    uterine    Disorder of thyroid    hypothyroid    Exposure to medical diagnostic radiation    Hypothyroidism    Lichenoid actinic keratosis    Left cheek    Nausea and vomiting in adult    Osteoarthritis    Osteoporosis    Post-operative hypothyroidism    Postmenopausal atrophic vaginitis    Visual impairment    glasses      Social Hx Reviewed   Family Hx Reviewed     Medications (Active prior to today's visit):  Current Outpatient Medications   Medication Sig Dispense Refill    FLUoxetine 20 MG Oral Cap Take 1 capsule (20 mg total) by mouth daily. ADDRESS ALL REFILLS AT UPCOMING APPOINTMENT- states she takes at hs      diphenhydrAMINE HCl, Sleep, (ZZZQUIL OR) Take by mouth.      Melatonin 5 MG Oral Tab Take 1 tablet (5 mg total) by mouth nightly.      Calcium Carbonate-Vitamin D (CALCIUM-VITAMIN D) 600-200 MG-UNIT Oral Cap Take by mouth daily. Takes 2 tabs in am and 1 tab at HS       levothyroxine 50 MCG Oral Tab Take 1 tablet (50 mcg total) by mouth before breakfast. 90 tablet 3    Cholecalciferol (VITAMIN D) 50 MCG (2000 UT) Oral Cap Take by mouth.      Multiple Vitamins-Minerals (EYE VITAMINS & MINERALS OR) Take 1  tablet by mouth daily.      VITAMIN B COMPLEX-C OR       Multiple Vitamins Oral Tab Take 1 tablet by mouth daily.      Omega-3 Fatty Acids (KP OMEGA-3 FISH OIL) 1200 MG Oral Capsule Delayed Release Take by mouth daily.         .cmed  Allergies:  No Known Allergies      ROS:   All other ROS are negative.     PHYSICAL EXAM:   GEN: AAOx3, NAD  HEENT: EOMI, PERRLA, no injection or icterus, oral mucosa moist, no oral lesions. No lymphadenopathy. No facial rash  CVS: RRR, no murmurs rubs or gallops. Equal 2+ distal pulses.   LUNGS: CTAB, no increased work of breathing  ABDOMEN:  soft NT/ND, +BS, no HSM  SKIN: Small circular rashes noted on upper back  MSK:  Cervical spine: FROM  Hands: no synovitis in DIP, PIP and MCP, strong full fists  Wrist: FROM, no pain or swelling or warmth on palpation  Elbow: FROM, no pain or swelling or warmth on palpation  Shoulders: FROM, no pain or swelling or warmth on palpation  Hip: normal log roll, no lateral hip pain, OBINNA test negative b/l  Knees: FROM, no warmth or effusion present. No pain with ROM.   Ankles: FROM, no pain or swelling or warmth on palpation  Feet: no pain with MTP squeeze, no toe swelling or pain or warmth on palpation with FROM  Spine: no lumbar or sacral pain on palpation.  NEURO: Cranial nerves II-XII intact grossly. 5/5 strength throughout in both upper and lower extremities, sensation intact.  PSYCH: normal mood       LABS:     Reviewed     Imaging:     Bone density 12/2022:  LEFT FEMORAL NECK   BMD: 0.536 gm/sq. cm. T SCORE: -2.8 Z SCORE: -0.7       LEFT TOTAL HIP   BMD: 0.626 gm/sq. cm. T SCORE: -2.6 Z SCORE: -0.8       PA LUMBAR SPINE (L3 - L4)   BMD: 0.922 gm/sq. cm. T SCORE: -1.6 Z SCORE: 1.0       DISTAL 1/3 OF THE LEFT FOREARM: BMD IS 0.568 GRAMS/CENTIMETER SQUARED, T-SCORE IS -2.1 AND Z-SCORE IS 0.6.     ASSESSMENT/PLAN:     Osteoporosis  - History of L2 vertebral fracture status post kyphoplasty  - She has been on resented 8 weekly since 2019, recent  bone density shows no improvement, slightly worsened since 2020  - She is now on Prolia every 6 months.  Here for her 4th injection  - She remains on calcium and vitamin D.  She also continues to walk and do weightbearing exercises  -Repeat bone density done December 2024 shows improvement in her lumbar spine and hip    Rash on upper back  -F indings are concerning for possible shingles.  She did have the shingles vaccine.  Advised to contact with her PCP    Pt will f/u in 6 mos with nurse and yearly with me    There is a longitudinal care relationship with me, the care plan reflects the ongoing nature of the continuous relationship of care, and the medical record indicates that there is ongoing treatment of a serious/complex medical condition which I am currently managing.  is Applicable.     Nadiya Hylton MD  12/20/2024  9:40 AM

## 2024-12-23 ENCOUNTER — OFFICE VISIT (OUTPATIENT)
Dept: INTERNAL MEDICINE CLINIC | Facility: CLINIC | Age: 79
End: 2024-12-23
Payer: COMMERCIAL

## 2024-12-23 VITALS
HEIGHT: 62 IN | BODY MASS INDEX: 32.76 KG/M2 | WEIGHT: 178 LBS | SYSTOLIC BLOOD PRESSURE: 108 MMHG | HEART RATE: 57 BPM | DIASTOLIC BLOOD PRESSURE: 70 MMHG

## 2024-12-23 DIAGNOSIS — B02.9 HERPES ZOSTER WITHOUT COMPLICATION: Primary | ICD-10-CM

## 2024-12-23 PROCEDURE — 1160F RVW MEDS BY RX/DR IN RCRD: CPT | Performed by: STUDENT IN AN ORGANIZED HEALTH CARE EDUCATION/TRAINING PROGRAM

## 2024-12-23 PROCEDURE — 3008F BODY MASS INDEX DOCD: CPT | Performed by: STUDENT IN AN ORGANIZED HEALTH CARE EDUCATION/TRAINING PROGRAM

## 2024-12-23 PROCEDURE — 99213 OFFICE O/P EST LOW 20 MIN: CPT | Performed by: STUDENT IN AN ORGANIZED HEALTH CARE EDUCATION/TRAINING PROGRAM

## 2024-12-23 PROCEDURE — 3078F DIAST BP <80 MM HG: CPT | Performed by: STUDENT IN AN ORGANIZED HEALTH CARE EDUCATION/TRAINING PROGRAM

## 2024-12-23 PROCEDURE — 1159F MED LIST DOCD IN RCRD: CPT | Performed by: STUDENT IN AN ORGANIZED HEALTH CARE EDUCATION/TRAINING PROGRAM

## 2024-12-23 PROCEDURE — 3074F SYST BP LT 130 MM HG: CPT | Performed by: STUDENT IN AN ORGANIZED HEALTH CARE EDUCATION/TRAINING PROGRAM

## 2024-12-23 RX ORDER — TRIAMCINOLONE ACETONIDE 5 MG/G
1 CREAM TOPICAL 2 TIMES DAILY
Qty: 15 G | Refills: 3 | Status: SHIPPED | OUTPATIENT
Start: 2024-12-23 | End: 2025-01-02

## 2024-12-23 RX ORDER — VALACYCLOVIR HYDROCHLORIDE 1 G/1
1000 TABLET, FILM COATED ORAL EVERY 8 HOURS SCHEDULED
Qty: 21 TABLET | Refills: 0 | Status: SHIPPED | OUTPATIENT
Start: 2024-12-23 | End: 2024-12-30

## 2024-12-23 RX ORDER — METHYLPREDNISOLONE 4 MG/1
TABLET ORAL
Qty: 1 EACH | Refills: 0 | Status: SHIPPED | OUTPATIENT
Start: 2024-12-23

## 2024-12-23 NOTE — PROGRESS NOTES
OFFICE NOTE     Patient ID: Coni Gandhi is a 79 year old female.  Today's Date: 12/23/24  Chief Complaint: Rash (Rash on upper back itching, scabbing x2 weeks)    Pt is a 78y/o female with PMHx of endometrial cancer(followed by [facility] Dr. Falguni Zepeda) , thrombocytopenia, hypothyroidism, lumbar compression fracture, osteoporosis on prolia (followed by rheumatology Dr. Hylton), Peripheral venous insufficiency, SOPHIA, recent SBO (10/2024)  whom presents to clinic with ongoing rash. Pt was seen by Rheum on 12/20 for Prolia injection and rash on upper back did have shingles vaccine and was told to contact PCP      2 shingles vaccine     Vitals:    12/23/24 1416   BP: 108/70   Pulse: 57   Weight: 178 lb (80.7 kg)   Height: 5' 2\" (1.575 m)     body mass index is 32.56 kg/m².  BP Readings from Last 3 Encounters:   12/23/24 108/70   12/20/24 100/67   10/03/24 116/66     The 10-year ASCVD risk score (Bruno CASTLE, et al., 2019) is: 16.3%    Values used to calculate the score:      Age: 79 years      Sex: Female      Is Non- : No      Diabetic: No      Tobacco smoker: No      Systolic Blood Pressure: 108 mmHg      Is BP treated: No      HDL Cholesterol: 53 mg/dL      Total Cholesterol: 131 mg/dL      Medications reviewed:  Current Outpatient Medications   Medication Sig Dispense Refill    FLUoxetine 20 MG Oral Cap Take 1 capsule (20 mg total) by mouth daily. ADDRESS ALL REFILLS AT UPCOMING APPOINTMENT- states she takes at hs      levothyroxine 50 MCG Oral Tab Take 1 tablet (50 mcg total) by mouth before breakfast. 90 tablet 3    Cholecalciferol (VITAMIN D) 50 MCG (2000 UT) Oral Cap Take by mouth.      Multiple Vitamins-Minerals (EYE VITAMINS & MINERALS OR) Take 1 tablet by mouth daily.      diphenhydrAMINE HCl, Sleep, (ZZZQUIL OR) Take by mouth.      VITAMIN B COMPLEX-C OR       Melatonin 5 MG Oral Tab Take 1 tablet (5 mg total) by mouth nightly.      Calcium Carbonate-Vitamin D  (CALCIUM-VITAMIN D) 600-200 MG-UNIT Oral Cap Take by mouth daily. Takes 2 tabs in am and 1 tab at HS       Multiple Vitamins Oral Tab Take 1 tablet by mouth daily.      Omega-3 Fatty Acids ( OMEGA-3 FISH OIL) 1200 MG Oral Capsule Delayed Release Take by mouth daily.             Assessment & Plan    There are no diagnoses linked to this encounter.  Pt with Herpez zoster  1.  Take Valtrex (1,000mg) 3 times a day for the next 7 days.  2    Start medrol dose pack  3.  Start Gabapentin :Take 1 capsule (300 mg total) by mouth 3 (three) times daily. 300 mg once on day one, 300 mg twice daily on day 2, and 300 mg 3 times daily on day 3, then increase as needed additional 300mg daily until you reach maximum of  900mg three times daily if needed. - Oral   4.   Keep area covered at all times as you are contagious currently.,You are no longer contagious once the last lesion crusted over.  5.  Advised sleeping in a separate room from your significant other.  Wash your close, bed sheets, and towels separately as well.    Follow Up: As needed/if symptoms worsen or No follow-ups on file..         Objective/ Results:   Physical Exam  Constitutional:       Appearance: She is well-developed.   Cardiovascular:      Rate and Rhythm: Normal rate and regular rhythm.      Heart sounds: Normal heart sounds.   Pulmonary:      Effort: Pulmonary effort is normal.      Breath sounds: Normal breath sounds.   Abdominal:      General: Bowel sounds are normal.      Palpations: Abdomen is soft.   Skin:     General: Skin is warm and dry.   Neurological:      Mental Status: She is alert and oriented to person, place, and time.      Deep Tendon Reflexes: Reflexes are normal and symmetric.        Reviewed:    Patient Active Problem List    Diagnosis    Thrombocytopenia (HCC)    Smokers' cough ([facility])    Lower abdominal pain    Nausea and vomiting in adult    Complete intestinal obstruction, unspecified cause ([facility])    Medicare annual wellness visit,  subsequent    Breast screening    Eye exam, routine    History of endometrial cancer    Vasomotor rhinitis    SOPHIA (generalized anxiety disorder)    Macrocytosis    Risk for falls    Adenocarcinoma of endometrium (HCC)    Wedge compression fracture of second lumbar vertebra with routine healing    Peripheral venous insufficiency    Osteoporosis    Colon cancer screening    Hypothyroidism     Stable.  Continue current medication.        Allergies[1]     Social History     Socioeconomic History    Marital status:     Number of children: 3   Occupational History    Occupation: Retired   Tobacco Use    Smoking status: Former     Current packs/day: 1.00     Types: Cigarettes     Passive exposure: Past    Smokeless tobacco: Never    Tobacco comments:     quit 40 yrs ago   Vaping Use    Vaping status: Never Used   Substance and Sexual Activity    Alcohol use: Yes     Alcohol/week: 0.0 standard drinks of alcohol     Comment: rarely    Drug use: No   Other Topics Concern    Caffeine Concern Yes     Comment: Coffee 1 cup daily; Soda    Grew up on a farm No    History of tanning No    Outdoor occupation No    Breast feeding No    Reaction to local anesthetic No    Pt has a pacemaker No    Pt has a defibrillator No     Social Drivers of Health     Financial Resource Strain: Low Risk  (9/25/2024)    Financial Resource Strain     Difficulty of Paying Living Expenses: Not hard at all   Food Insecurity: No Food Insecurity (9/18/2024)    Food Insecurity     Food Insecurity: Never true   Transportation Needs: No Transportation Needs (9/18/2024)    Transportation Needs     Lack of Transportation: No   Housing Stability: Low Risk  (9/18/2024)    Housing Stability     Housing Instability: No      Review of Systems   Constitutional: Negative.    Respiratory: Negative.     Cardiovascular: Negative.    Gastrointestinal: Negative.    Skin:  Positive for rash.   Neurological: Negative.      All other systems negative unless otherwise  stated in ROS or HPI above.       Mio Feliciano MD  Internal Medicine       Call office with any questions or seek emergency care if necessary.   Patient understands and agrees to follow directions and advice.      ----------------------------------------- PATIENT INSTRUCTIONS-----------------------------------------     There are no Patient Instructions on file for this visit.        The 21st Century Cures Act makes medical notes available to patients in the interest of transparency.  However, please be advised that this is a medical document.  It is intended as a peer to peer communication.  It is written in medical language and may contain abbreviations or verbiage that are technical and unfamiliar.  It may appear blunt or direct.  Medical documents are intended to carry relevant information, facts as evident, and the clinical opinion of the practitioner.          [1] No Known Allergies

## 2025-01-16 ENCOUNTER — APPOINTMENT (OUTPATIENT)
Dept: URBAN - METROPOLITAN AREA CLINIC 244 | Age: 80
Setting detail: DERMATOLOGY
End: 2025-01-17

## 2025-01-16 ENCOUNTER — RX ONLY (RX ONLY)
Age: 80
End: 2025-01-16

## 2025-01-16 DIAGNOSIS — L30.9 DERMATITIS, UNSPECIFIED: ICD-10-CM

## 2025-01-16 PROCEDURE — OTHER COUNSELING: OTHER

## 2025-01-16 PROCEDURE — 99204 OFFICE O/P NEW MOD 45 MIN: CPT

## 2025-01-16 PROCEDURE — OTHER DIAGNOSIS COMMENT: OTHER

## 2025-01-16 PROCEDURE — OTHER PRESCRIPTION: OTHER

## 2025-01-16 PROCEDURE — OTHER MEDICATION COUNSELING: OTHER

## 2025-01-16 RX ORDER — TACROLIMUS 1 MG/G
OINTMENT TOPICAL
Qty: 30 | Refills: 2 | Status: ERX | COMMUNITY
Start: 2025-01-16

## 2025-01-16 RX ORDER — TACROLIMUS 1 MG/G
OINTMENT TOPICAL
Qty: 30 | Refills: 2 | Status: ERX

## 2025-01-16 ASSESSMENT — LOCATION DETAILED DESCRIPTION DERM
LOCATION DETAILED: LEFT INFERIOR CENTRAL MALAR CHEEK
LOCATION DETAILED: LEFT SUPERIOR UPPER BACK

## 2025-01-16 ASSESSMENT — LOCATION ZONE DERM
LOCATION ZONE: FACE
LOCATION ZONE: TRUNK

## 2025-01-16 ASSESSMENT — LOCATION SIMPLE DESCRIPTION DERM
LOCATION SIMPLE: LEFT CHEEK
LOCATION SIMPLE: LEFT UPPER BACK

## 2025-01-16 NOTE — PROCEDURE: DIAGNOSIS COMMENT
Detail Level: Simple
Render Risk Assessment In Note?: no
Comment: Has had 2 biopsies done c/w Lichenoid Dermatitis per notes from Dr. Roberto Baum, seen on 3/11/24\\n- Clinically I favor DLE given the findings today which I discussed with her is a chronic disorder. I will need to see if it responds to treatment or not and re-evaluate after that. 
Comment: Treated for shingles on 12/23/24, will re-eval at next visit. offered bx. pt notes this began mid november. Will give it more time to resolve and re-eval per her preference .

## 2025-01-16 NOTE — PROCEDURE: MEDICATION COUNSELING
Cantharidin Pregnancy And Lactation Text: This medication has not been proven safe during pregnancy. It is unknown if this medication is excreted in breast milk.
Clindamycin Pregnancy And Lactation Text: This medication can be used in pregnancy if certain situations. Clindamycin is also present in breast milk.
Vtama Pregnancy And Lactation Text: It is unknown if this medication can cause problems during pregnancy and breastfeeding.
Topical Retinoid Pregnancy And Lactation Text: This medication is Pregnancy Category C. It is unknown if this medication is excreted in breast milk.
Propranolol Counseling:  I discussed with the patient the risks of propranolol including but not limited to low heart rate, low blood pressure, low blood sugar, restlessness and increased cold sensitivity. They should call the office if they experience any of these side effects.
Semaglutide Pregnancy And Lactation Text: The fetal risk of this medication is unknown and taking while pregnant is not recommended. It is unknown if this medication is present in breast milk.
Mirvaso Counseling: Mirvaso is a topical medication which can decrease superficial blood flow where applied. Side effects are uncommon and include stinging, redness and allergic reactions.
Cantharidin Counseling:  I discussed with the patient the risks of Cantharidin including but not limited to pain, redness, burning, itching, and blistering.
Xolair Pregnancy And Lactation Text: This medication is Pregnancy Category B and is considered safe during pregnancy. This medication is excreted in breast milk.
Rituxan Pregnancy And Lactation Text: This medication is Pregnancy Category C and it isn't know if it is safe during pregnancy. It is unknown if this medication is excreted in breast milk but similar antibodies are known to be excreted.
Doxepin Pregnancy And Lactation Text: This medication is Pregnancy Category C and it isn't known if it is safe during pregnancy. It is also excreted in breast milk and breast feeding isn't recommended.
VTAMA Counseling: I discussed with the patient that VTAMA is not for use in the eyes, mouth or mouth. They should call the office if they develop any signs of allergic reactions to VTAMA. The patient verbalized understanding of the proper use and possible adverse effects of VTAMA.  All of the patient's questions and concerns were addressed.
Glycopyrrolate Counseling:  I discussed with the patient the risks of glycopyrrolate including but not limited to skin rash, drowsiness, dry mouth, difficulty urinating, and blurred vision.
Cosentyx Pregnancy And Lactation Text: This medication is Pregnancy Category B and is considered safe during pregnancy. It is unknown if this medication is excreted in breast milk.
Methotrexate Counseling:  Patient counseled regarding adverse effects of methotrexate including but not limited to nausea, vomiting, abnormalities in liver function tests. Patients may develop mouth sores, rash, diarrhea, and abnormalities in blood counts. The patient understands that monitoring is required including LFT's and blood counts.  There is a rare possibility of scarring of the liver and lung problems that can occur when taking methotrexate. Persistent nausea, loss of appetite, pale stools, dark urine, cough, and shortness of breath should be reported immediately. Patient advised to discontinue methotrexate treatment at least three months before attempting to become pregnant.  I discussed the need for folate supplements while taking methotrexate.  These supplements can decrease side effects during methotrexate treatment. The patient verbalized understanding of the proper use and possible adverse effects of methotrexate.  All of the patient's questions and concerns were addressed.
Zoryve Counseling:  I discussed with the patient that Zoryve is not for use in the eyes, mouth or vagina. The most commonly reported side effects include diarrhea, headache, insomnia, application site pain, upper respiratory tract infections, and urinary tract infections.  All of the patient's questions and concerns were addressed.
Dupixent Counseling: I discussed with the patient the risks of dupilumab including but not limited to eye inflammation and irritation, cold sores, injection site reactions, allergic reactions and increased risk of parasitic infection. The patient understands that monitoring is required and they must alert us or the primary physician if symptoms of infection or other concerning signs are noted.
Prednisone Counseling:  I discussed with the patient the risks of prolonged use of prednisone including but not limited to weight gain, insomnia, osteoporosis, mood changes, diabetes, susceptibility to infection, glaucoma and high blood pressure.  In cases where prednisone use is prolonged, patients should be monitored with blood pressure checks, serum glucose levels and an eye exam.  Additionally, the patient may need to be placed on GI prophylaxis, PCP prophylaxis, and calcium and vitamin D supplementation and/or a bisphosphonate.  The patient verbalized understanding of the proper use and the possible adverse effects of prednisone.  All of the patient's questions and concerns were addressed.
Hydroxychloroquine Counseling:  I discussed with the patient that a baseline ophthalmologic exam is needed at the start of therapy and every year thereafter while on therapy. A CBC may also be warranted for monitoring.  The side effects of this medication were discussed with the patient, including but not limited to agranulocytosis, aplastic anemia, seizures, rashes, retinopathy, and liver toxicity. Patient instructed to call the office should any adverse effect occur.  The patient verbalized understanding of the proper use and possible adverse effects of Plaquenil.  All the patient's questions and concerns were addressed.
5-Fu Counseling: 5-Fluorouracil Counseling:  I discussed with the patient the risks of 5-fluorouracil including but not limited to erythema, scaling, itching, weeping, crusting, and pain.
Mirvaso Pregnancy And Lactation Text: This medication has not been assigned a Pregnancy Risk Category. It is unknown if the medication is excreted in breast milk.
Glycopyrrolate Pregnancy And Lactation Text: This medication is Pregnancy Category B and is considered safe during pregnancy. It is unknown if it is excreted breast milk.
Siliq Counseling:  I discussed with the patient the risks of Siliq including but not limited to new or worsening depression, suicidal thoughts and behavior, immunosuppression, malignancy, posterior leukoencephalopathy syndrome, and serious infections.  The patient understands that monitoring is required including a PPD at baseline and must alert us or the primary physician if symptoms of infection or other concerning signs are noted. There is also a special program designed to monitor depression which is required with Siliq.
Tazorac Counseling:  Patient advised that medication is irritating and drying.  Patient may need to apply sparingly and wash off after an hour before eventually leaving it on overnight.  The patient verbalized understanding of the proper use and possible adverse effects of tazorac.  All of the patient's questions and concerns were addressed.
Methotrexate Pregnancy And Lactation Text: This medication is Pregnancy Category X and is known to cause fetal harm. This medication is excreted in breast milk.
Hydroxyzine Counseling: Patient advised that the medication is sedating and not to drive a car after taking this medication.  Patient informed of potential adverse effects including but not limited to dry mouth, urinary retention, and blurry vision.  The patient verbalized understanding of the proper use and possible adverse effects of hydroxyzine.  All of the patient's questions and concerns were addressed.
Doxycycline Counseling: Patient counseled regarding possible photosensitivity and increased risk for sunburn. Patient instructed to avoid sunlight, if possible. When exposed to sunlight, patients should wear protective clothing, sunglasses, and sunscreen. Counseled on GI AE / can cause nausea/vomiting/abdominal pain among other symptoms. The patient verbalized understanding of the proper use and possible adverse effects of doxycycline. To take with light meal to avoid any upset stomach (nausea vomiting or ab pain). Not to take concurrently with vitamins or dairy products (take at separate times). Take with full glass of water. Do not lay down for at least 1 hour after taking. All of the patient's questions and concerns were addressed. Female patients should avoid pregnancy while on therapy due to potential birth defects.
Wegovy Counseling: I reviewed the possible side effects including: thyroid tumors, kidney disease, gallbladder disease, abdominal pain, constipation, diarrhea, nausea, vomiting and pancreatitis. Do not take this medication if you have a history or family history of multiple endocrine neoplasia syndrome type 2. Side effects reviewed, pt to contact office should one occur.
Prednisone Pregnancy And Lactation Text: This medication is Pregnancy Category C and it isn't know if it is safe during pregnancy. This medication is excreted in breast milk.
Doxycycline Pregnancy And Lactation Text: This medication is Pregnancy Category D and not consider safe during pregnancy. It is also excreted in breast milk but is considered safe for shorter treatment courses.
Finasteride Pregnancy And Lactation Text: This medication is absolutely contraindicated during pregnancy. It is unknown if it is excreted in breast milk.
Zepbound Counseling: I reviewed the possible side effects including: thyroid tumors, kidney disease, gallbladder disease, abdominal pain, constipation, diarrhea, nausea, vomiting and pancreatitis. Do not take this medication if you have a history or family history of multiple endocrine neoplasia syndrome type 2. Side effects reviewed, pt to contact office should one occur.
5-Fu Pregnancy And Lactation Text: This medication is Pregnancy Category X and contraindicated in pregnancy and in women who may become pregnant. It is unknown if this medication is excreted in breast milk.
Topical Clindamycin Counseling: Patient counseled that this medication may cause skin irritation or allergic reactions.  In the event of skin irritation, the patient was advised to reduce the amount of the drug applied or use it less frequently.   The patient verbalized understanding of the proper use and possible adverse effects of clindamycin.  All of the patient's questions and concerns were addressed.
Erivedge Counseling- I discussed with the patient the risks of Erivedge including but not limited to nausea, vomiting, diarrhea, constipation, weight loss, changes in the sense of taste, decreased appetite, muscle spasms, and hair loss.  The patient verbalized understanding of the proper use and possible adverse effects of Erivedge.  All of the patient's questions and concerns were addressed.
Opzelura Counseling:  I discussed with the patient the risks of Opzelura including but not limited to nasopharngitis, bronchitis, ear infection, eosinophila, hives, diarrhea, folliculitis, tonsillitis, and rhinorrhea.  Taken orally, this medication has been linked to serious infections; higher rate of mortality; malignancy and lymphoproliferative disorders; major adverse cardiovascular events; thrombosis; thrombocytopenia, anemia, and neutropenia; and lipid elevations.
Simlandi Counseling:  I discussed with the patient the risks of adalimumab including but not limited to myelosuppression, immunosuppression, autoimmune hepatitis, demyelinating diseases, lymphoma, and serious infections.  The patient understands that monitoring is required including a PPD at baseline and must alert us or the primary physician if symptoms of infection or other concerning signs are noted.
Hydroxychloroquine Pregnancy And Lactation Text: This medication has been shown to cause fetal harm but it isn't assigned a Pregnancy Risk Category. There are small amounts excreted in breast milk.
SSKI Counseling:  I discussed with the patient the risks of SSKI including but not limited to thyroid abnormalities, metallic taste, GI upset, fever, headache, acne, arthralgias, paraesthesias, lymphadenopathy, easy bleeding, arrhythmias, and allergic reaction.
Siliq Pregnancy And Lactation Text: The risk during pregnancy and breastfeeding is uncertain with this medication.
Propranolol Pregnancy And Lactation Text: This medication is Pregnancy Category C and it isn't known if it is safe during pregnancy. It is excreted in breast milk.
Dupixent Pregnancy And Lactation Text: This medication likely crosses the placenta but the risk for the fetus is uncertain. This medication is excreted in breast milk.
Acitretin Counseling:  I discussed with the patient the risks of acitretin including but not limited to hair loss, dry lips/skin/eyes, liver damage, hyperlipidemia, depression/suicidal ideation, photosensitivity.  Serious rare side effects can include but are not limited to pancreatitis, pseudotumor cerebri, bony changes, clot formation/stroke/heart attack.  Patient understands that alcohol is contraindicated since it can result in liver toxicity and significantly prolong the elimination of the drug by many years.
Fluconazole Counseling:  Patient counseled regarding adverse effects of fluconazole including but not limited to headache, diarrhea, nausea, upset stomach, liver function test abnormalities, taste disturbance, and stomach pain.  There is a rare possibility of liver failure that can occur when taking fluconazole.  The patient understands that monitoring of LFTs and kidney function test may be required, especially at baseline. The patient verbalized understanding of the proper use and possible adverse effects of fluconazole.  All of the patient's questions and concerns were addressed.
Hydroxyzine Pregnancy And Lactation Text: This medication is not safe during pregnancy and should not be taken. It is also excreted in breast milk and breast feeding isn't recommended.
Tazorac Pregnancy And Lactation Text: This medication is not safe during pregnancy. It is unknown if this medication is excreted in breast milk.
Zyclara Counseling:  I discussed with the patient the risks of imiquimod including but not limited to erythema, scaling, itching, weeping, crusting, and pain.  Patient understands that the inflammatory response to imiquimod is variable from person to person and was educated regarded proper titration schedule.  If flu-like symptoms develop, patient knows to discontinue the medication and contact us.
Sski Pregnancy And Lactation Text: This medication is Pregnancy Category D and isn't considered safe during pregnancy. It is excreted in breast milk.
Griseofulvin Counseling:  I discussed with the patient the risks of griseofulvin including but not limited to photosensitivity, cytopenia, liver damage, nausea/vomiting and severe allergy.  The patient understands that this medication is best absorbed when taken with a fatty meal (e.g., ice cream or french fries).
Drysol Counseling:  I discussed with the patient the risks of drysol/aluminum chloride including but not limited to skin rash, itching, irritation, burning.
Erivedge Pregnancy And Lactation Text: This medication is Pregnancy Category X and is absolutely contraindicated during pregnancy. It is unknown if it is excreted in breast milk.
Acitretin Pregnancy And Lactation Text: This medication is Pregnancy Category X and should not be given to women who are pregnant or may become pregnant in the future. This medication is excreted in breast milk.
Opzelura Pregnancy And Lactation Text: There is insufficient data to evaluate drug-associated risk for major birth defects, miscarriage, or other adverse maternal or fetal outcomes.  There is a pregnancy registry that monitors pregnancy outcomes in pregnant persons exposed to the medication during pregnancy.  It is unknown if this medication is excreted in breast milk.  Do not breastfeed during treatment and for about 4 weeks after the last dose.
Ebglyss Counseling: I discussed with the patient the risks of lebrikizumab including but not limited to eye inflammation and irritation, cold sores, injection site reactions, allergic reactions and increased risk of parasitic infection. The patient understands that monitoring is required and they must alert us or the primary physician if symptoms of infection or other concerning signs are noted.
Fluconazole Pregnancy And Lactation Text: This medication is Pregnancy Category C and it isn't know if it is safe during pregnancy. It is also excreted in breast milk.
Detail Level: Simple
Birth Control Pills Counseling: Birth Control Pill Counseling: I discussed with the patient the potential side effects of OCPs including but not limited to increased risk of stroke, heart attack, thrombophlebitis, deep venous thrombosis, hepatic adenomas, breast changes, GI upset, headaches, and depression.  The patient verbalized understanding of the proper use and possible adverse effects of OCPs. All of the patient's questions and concerns were addressed.
Erythromycin Counseling:  I discussed with the patient the risks of erythromycin including but not limited to GI upset, allergic reaction, drug rash, diarrhea, increase in liver enzymes, and yeast infections.
Libtayo Counseling- I discussed with the patient the risks of Libtayo including but not limited to nausea, vomiting, diarrhea, and bone or muscle pain.  The patient verbalized understanding of the proper use and possible adverse effects of Libtayo.  All of the patient's questions and concerns were addressed.
Simponi Counseling:  I discussed with the patient the risks of golimumab including but not limited to myelosuppression, immunosuppression, autoimmune hepatitis, demyelinating diseases, lymphoma, and serious infections.  The patient understands that monitoring is required including a PPD at baseline and must alert us or the primary physician if symptoms of infection or other concerning signs are noted.
Drysol Pregnancy And Lactation Text: This medication is considered safe during pregnancy and breast feeding.
Albendazole Counseling:  I discussed with the patient the risks of albendazole including but not limited to cytopenia, kidney damage, nausea/vomiting and severe allergy.  The patient understands that this medication is being used in an off-label manner.
Enbrel Counseling:  I discussed with the patient the risks of etanercept including but not limited to myelosuppression, immunosuppression, autoimmune hepatitis, demyelinating diseases, lymphoma, and infections.  The patient understands that monitoring is required including a PPD at baseline and must alert us or the primary physician if symptoms of infection or other concerning signs are noted.
Low Dose Naltrexone Pregnancy And Lactation Text: Naltrexone is pregnancy category C.  There have been no adequate and well-controlled studies in pregnant women.  It should be used in pregnancy only if the potential benefit justifies the potential risk to the fetus.   Limited data indicates that naltrexone is minimally excreted into breastmilk.
Ebglyss Pregnancy And Lactation Text: This medication likely crosses the placenta but the risk for the fetus is uncertain. It is unknown if this medication is excreted in breast milk.
Low Dose Naltrexone Counseling- I discussed with the patient the potential risks and side effects of low dose naltrexone including but not limited to: more vivid dreams, headaches, nausea, vomiting, abdominal pain, fatigue, dizziness, and anxiety.
Opioid Pregnancy And Lactation Text: These medications can lead to premature delivery and should be avoided during pregnancy. These medications are also present in breast milk in small amounts.
Bexarotene Counseling:  I discussed with the patient the risks of bexarotene including but not limited to hair loss, dry lips/skin/eyes, liver abnormalities, hyperlipidemia, pancreatitis, depression/suicidal ideation, photosensitivity, drug rash/allergic reactions, hypothyroidism, anemia, leukopenia, infection, cataracts, and teratogenicity.  Patient understands that they will need regular blood tests to check lipid profile, liver function tests, white blood cell count, thyroid function tests and pregnancy test if applicable.
Thalidomide Counseling: I discussed with the patient the risks of thalidomide including but not limited to birth defects, anxiety, weakness, chest pain, dizziness, cough and severe allergy.
Picato Counseling:  I discussed with the patient the risks of Picato including but not limited to erythema, scaling, itching, weeping, crusting, and pain.
Birth Control Pills Pregnancy And Lactation Text: This medication should be avoided if pregnant and for the first 30 days post-partum.
Erythromycin Pregnancy And Lactation Text: This medication is Pregnancy Category B and is considered safe during pregnancy. It is also excreted in breast milk.
Topical Clindamycin Pregnancy And Lactation Text: This medication is Pregnancy Category B and is considered safe during pregnancy. It is unknown if it is excreted in breast milk.
Niacinamide Counseling: I recommended taking niacin or niacinamide, also know as vitamin B3, twice daily. Recent evidence suggests that taking vitamin B3 (500 mg twice daily) can reduce the risk of actinic keratoses and non-melanoma skin cancers. Side effects of vitamin B3 include flushing and headache.
Cibinqo Counseling: I discussed with the patient the risks of Cibinqo therapy including but not limited to common cold, nausea, headache, cold sores, increased blood CPK levels, dizziness, UTIs, fatigue, acne, and vomitting. Live vaccines should be avoided.  This medication has been linked to serious infections; higher rate of mortality; malignancy and lymphoproliferative disorders; major adverse cardiovascular events; thrombosis; thrombocytopenia and lymphopenia; lipid elevations; and retinal detachment.
Spironolactone Counseling: Patient advised regarding risks of diarrhea, abdominal pain, hyperkalemia, birth defects (for female patients), liver toxicity and renal toxicity. The patient may need blood work to monitor liver and kidney function and potassium levels while on therapy. The patient verbalized understanding of the proper use and possible adverse effects of spironolactone.  All of the patient's questions and concerns were addressed.
Itraconazole Counseling:  I discussed with the patient the risks of itraconazole including but not limited to liver damage, nausea/vomiting, neuropathy, and severe allergy.  The patient understands that this medication is best absorbed when taken with acidic beverages such as non-diet cola or ginger ale.  The patient understands that monitoring is required including baseline LFTs and repeat LFTs at intervals.  The patient understands that they are to contact us or the primary physician if concerning signs are noted.
Libtayo Pregnancy And Lactation Text: This medication is contraindicated in pregnancy and when breast feeding.
Griseofulvin Pregnancy And Lactation Text: This medication is Pregnancy Category X and is known to cause serious birth defects. It is unknown if this medication is excreted in breast milk but breast feeding should be avoided.
Elidel Counseling: Patient may experience a mild burning sensation during topical application. Elidel is not approved in children less than 2 years of age. There have been case reports of hematologic and skin malignancies in patients using topical calcineurin inhibitors although causality is questionable.
Include Pregnancy/Lactation Warning?: No
Metronidazole Counseling:  I discussed with the patient the risks of metronidazole including but not limited to seizures, nausea/vomiting, a metallic taste in the mouth, nausea/vomiting and severe allergy.
Topical Ketoconazole Counseling: Patient counseled that this medication may cause skin irritation or allergic reactions.  In the event of skin irritation, the patient was advised to reduce the amount of the drug applied or use it less frequently.   The patient verbalized understanding of the proper use and possible adverse effects of ketoconazole.  All of the patient's questions and concerns were addressed.
Bexarotene Pregnancy And Lactation Text: This medication is Pregnancy Category X and should not be given to women who are pregnant or may become pregnant. This medication should not be used if you are breast feeding.
Odomzo Counseling- I discussed with the patient the risks of Odomzo including but not limited to nausea, vomiting, diarrhea, constipation, weight loss, changes in the sense of taste, decreased appetite, muscle spasms, and hair loss.  The patient verbalized understanding of the proper use and possible adverse effects of Odomzo.  All of the patient's questions and concerns were addressed.
Niacinamide Pregnancy And Lactation Text: These medications are considered safe during pregnancy.
Humira Counseling:  I discussed with the patient the risks of adalimumab including but not limited to myelosuppression, immunosuppression, autoimmune hepatitis, demyelinating diseases, lymphoma, and serious infections.  The patient understands that monitoring is required including a PPD at baseline and must alert us or the primary physician if symptoms of infection or other concerning signs are noted.
Skyrizi Counseling: I discussed with the patient the risks of risankizumab-rzaa including but not limited to immunosuppression, and serious infections.  The patient understands that monitoring is required including a PPD at baseline and must alert us or the primary physician if symptoms of infection or other concerning signs are noted.
Isotretinoin Pregnancy And Lactation Text: This medication is Pregnancy Category X and is considered extremely dangerous during pregnancy. It is unknown if it is excreted in breast milk.
Aklief counseling:  Patient advised to apply a pea-sized amount only at bedtime and wait 30 minutes after washing their face before applying.  If too drying, patient may add a non-comedogenic moisturizer.  The most commonly reported side effects including irritation, redness, scaling, dryness, stinging, burning, itching, and increased risk of sunburn.  The patient verbalized understanding of the proper use and possible adverse effects of retinoids.  All of the patient's questions and concerns were addressed.
Ivermectin Counseling:  Patient instructed to take medication on an empty stomach with a full glass of water.  Patient informed of potential adverse effects including but not limited to nausea, diarrhea, dizziness, itching, and swelling of the extremities or lymph nodes.  The patient verbalized understanding of the proper use and possible adverse effects of ivermectin.  All of the patient's questions and concerns were addressed.
Topical Metronidazole Counseling: Metronidazole is a topical antibiotic medication. You may experience burning, stinging, redness, or allergic reactions.  Please call our office if you develop any problems from using this medication.
Arava Counseling:  Patient counseled regarding adverse effects of Arava including but not limited to nausea, vomiting, abnormalities in liver function tests. Patients may develop mouth sores, rash, diarrhea, and abnormalities in blood counts. The patient understands that monitoring is required including LFTs and blood counts.  There is a rare possibility of scarring of the liver and lung problems that can occur when taking methotrexate. Persistent nausea, loss of appetite, pale stools, dark urine, cough, and shortness of breath should be reported immediately. Patient advised to discontinue Arava treatment and consult with a physician prior to attempting conception. The patient will have to undergo a treatment to eliminate Arava from the body prior to conception.
Tranexamic Acid Counseling:  Patient advised of the small risk of bleeding problems with tranexamic acid. They were also instructed to call if they developed any nausea, vomiting or diarrhea. All of the patient's questions and concerns were addressed.
Albendazole Pregnancy And Lactation Text: This medication is Pregnancy Category C and it isn't known if it is safe during pregnancy. It is also excreted in breast milk.
Cibinqo Pregnancy And Lactation Text: It is unknown if this medication will adversely affect pregnancy or breast feeding.  You should not take this medication if you are currently pregnant or planning a pregnancy or while breastfeeding.
Isotretinoin Counseling: Patient should get monthly blood tests, not donate blood, not drive at night if vision affected, not share medication, and not undergo elective surgery for 6 months after tx completed. Side effects reviewed, pt to contact office should one occur.
Spironolactone Pregnancy And Lactation Text: This medication can cause feminization of the male fetus and should be avoided during pregnancy. The active metabolite is also found in breast milk.
Metronidazole Pregnancy And Lactation Text: This medication is Pregnancy Category B and considered safe during pregnancy.  It is also excreted in breast milk.
Protopic Counseling: \\nPatient Education Handout provided to pt regarding medication:\\n Topical Tacrolimus (Brand name: Protopic)  \\n- Dermatologists prefer topical tacrolimus for treating certain rashes on sensitive areas of your body such as the face (including eyelids), groin, & body folds (such as the armpits). \\n- It reduces inflammation on the skin. This improves the itching, flaking, and/or redness. \\n- Since it is not a steroid, it won’t thin the skin and is safe for sensitive body areas.\\n- At most pharmacies it is dispensed in a Vaseline-like form. \\n- At a specialty pharmacy it can be compounded into a cream for a fixed cash price. \\nCost: If your insurance denies coverage, please use [employer] to obtain a coupon to purchase it for cheaper. We also may send this prescription to a specialty pharmacy to get it for the cheapest price possible.\\nAlternatives: Hydrocortisone 2.5% (a prescription topical steroid) is an alternative, but it should not be used around the eyes due to the risk of causing glaucoma. \\nUse: Tacrolimus is FDA approved for use with eczema (atopic dermatitis). It is also commonly used “off-label” for other rashes that affect sensitive areas of the body. \\nPlease use this twice a day to affected areas until clear or symptoms resolve. Repeat as needed. \\nIf not improved after using for 4-6 weeks continuously, please stop use and follow up with me in clinic.  \\nNotable Side Effects: You may feel a warm, burning, or stinging sensation when applied to the skin. This usually improves as the rash improves. Alcohol intake may worsen the burning sensation as well as cause redness and flushing.\\nTo reduce the risk of a burning sensation: \\n1. Place the tube in the fridge before first time use (off-label).\\n2. First test it on the forearm before applying it to sensitive areas. \\n          Once tolerated, you may then try storing it at room temperature. \\nBox Warning: Rare cases of malignancy/cancer have been reported in patients treated with topical calcineurin inhibitors, but the relationship has not been proven. A large-scale study known as the APPLES study1 monitored children who used the medication and did not see an increased risk of cancer.  Do not use it continuously for a long time and limit use to only the affected areas.\\[id number] AS, Reyes R, Elaine SC, et al. No evidence of increased cancer incidence in children using topical tacrolimus for atopic dermatitis. J Am Acad Dermatol. 2020;83(2):375-381
Aklief Pregnancy And Lactation Text: It is unknown if this medication is safe to use during pregnancy.  It is unknown if this medication is excreted in breast milk.  Breastfeeding women should use the topical cream on the smallest area of the skin for the shortest time needed while breastfeeding.  Do not apply to nipple and areola.
Nsaids Counseling: NSAID Counseling: I discussed with the patient that NSAIDs should be taken with food. Prolonged use of NSAIDs can result in the development of stomach ulcers.  Patient advised to stop taking NSAIDs if abdominal pain occurs.  The patient verbalized understanding of the proper use and possible adverse effects of NSAIDs.  All of the patient's questions and concerns were addressed.
Topical Metronidazole Pregnancy And Lactation Text: This medication is Pregnancy Category B and considered safe during pregnancy.  It is also considered safe to use while breastfeeding.
Qbrexza Counseling:  I discussed with the patient the risks of Qbrexza including but not limited to headache, mydriasis, blurred vision, dry eyes, nasal dryness, dry mouth, dry throat, dry skin, urinary hesitation, and constipation.  Local skin reactions including erythema, burning, stinging, and itching can also occur.
Minocycline Pregnancy And Lactation Text: This medication is Pregnancy Category D and not consider safe during pregnancy. It is also excreted in breast milk.
Opioid Counseling: I discussed with the patient the potential side effects of opioids including but not limited to addiction, altered mental status, and depression. I stressed avoiding alcohol, benzodiazepines, muscle relaxants and sleep aids unless specifically okayed by a physician. The patient verbalized understanding of the proper use and possible adverse effects of opioids. All of the patient's questions and concerns were addressed. They were instructed to flush the remaining pills down the toilet if they did not need them for pain.
Minocycline Counseling: Patient advised regarding possible photosensitivity and discoloration of the teeth, skin, lips, tongue and gums.  Patient instructed to avoid sunlight, if possible.  When exposed to sunlight, patients should wear protective clothing, sunglasses, and sunscreen.  The patient was instructed to call the office immediately if the following severe adverse effects occur:  hearing changes, easy bruising/bleeding, severe headache, or vision changes.  The patient verbalized understanding of the proper use and possible adverse effects of minocycline.  All of the patient's questions and concerns were addressed.
Eucrisa Counseling: Patient may experience a mild burning sensation during topical application. Eucrisa is not approved in children less than 3 months of age.
Protopic Pregnancy And Lactation Text: This medication is Pregnancy Category C. It is unknown if this medication is excreted in breast milk when applied topically.
Litfulo Counseling: I discussed with the patient the risks of Litfulo therapy including but not limited to upper respiratory tract infections, shingles, cold sores, and nausea. Live vaccines should be avoided.  This medication has been linked to serious infections; higher rate of mortality; malignancy and lymphoproliferative disorders; major adverse cardiovascular events; thrombosis; gastrointestinal perforations; neutropenia; lymphopenia; anemia; liver enzyme elevations; and lipid elevations.
Tranexamic Acid Pregnancy And Lactation Text: It is unknown if this medication is safe during pregnancy or breast feeding.
Nsaids Pregnancy And Lactation Text: These medications are considered safe up to 30 weeks gestation. It is excreted in breast milk.
High Dose Vitamin A Pregnancy And Lactation Text: High dose vitamin A therapy is contraindicated during pregnancy and breast feeding.
Hyrimoz Counseling:  I discussed with the patient the risks of adalimumab including but not limited to myelosuppression, immunosuppression, autoimmune hepatitis, demyelinating diseases, lymphoma, and serious infections.  The patient understands that monitoring is required including a PPD at baseline and must alert us or the primary physician if symptoms of infection or other concerning signs are noted.
Clofazimine Counseling:  I discussed with the patient the risks of clofazimine including but not limited to skin and eye pigmentation, liver damage, nausea/vomiting, gastrointestinal bleeding and allergy.
Azelaic Acid Counseling: Patient counseled that medicine may cause skin irritation and to avoid applying near the eyes.  In the event of skin irritation, the patient was advised to reduce the amount of the drug applied or use it less frequently.   The patient verbalized understanding of the proper use and possible adverse effects of azelaic acid.  All of the patient's questions and concerns were addressed.
Azathioprine Counseling:  I discussed with the patient the risks of azathioprine including but not limited to myelosuppression, immunosuppression, hepatotoxicity, lymphoma, and infections.  The patient understands that monitoring is required including baseline LFTs, Creatinine, possible TPMP genotyping and weekly CBCs for the first month and then every 2 weeks thereafter.  The patient verbalized understanding of the proper use and possible adverse effects of azathioprine.  All of the patient's questions and concerns were addressed.
Ketoconazole Pregnancy And Lactation Text: This medication is Pregnancy Category C and it isn't know if it is safe during pregnancy. It is also excreted in breast milk and breast feeding isn't recommended.
Topical Steroids Counseling: I discussed with the patient that prolonged use of topical steroids can result in the increased appearance of superficial blood vessels (telangiectasias), lightening (hypopigmentation) and thinning of the skin (atrophy).  Patient understands to avoid using high potency steroids in skin folds, the groin or the face.  The patient verbalized understanding of the proper use and possible adverse effects of topical steroids.  All of the patient's questions and concerns were addressed.
Quinolones Counseling:  I discussed with the patient the risks of fluoroquinolones including but not limited to GI upset, allergic reaction, drug rash, diarrhea, dizziness, photosensitivity, yeast infections, liver function test abnormalities, tendonitis/tendon rupture.
Valtrex Counseling: I discussed with the patient the risks of valacyclovir including but not limited to kidney damage, nausea, vomiting and severe allergy.  The patient understands that if the infection seems to be worsening or is not improving, they are to call.
Ketoconazole Counseling:   Patient counseled regarding improving absorption with orange juice.  Adverse effects include but are not limited to breast enlargement, headache, diarrhea, nausea, upset stomach, liver function test abnormalities, taste disturbance, and stomach pain.  There is a rare possibility of liver failure that can occur when taking ketoconazole. The patient understands that monitoring of LFTs may be required, especially at baseline. The patient verbalized understanding of the proper use and possible adverse effects of ketoconazole.  All of the patient's questions and concerns were addressed.
Litfulo Pregnancy And Lactation Text: Based on animal studies, Lifulo may cause embryo-fetal harm when administered to pregnant women.  The medication should not be used in pregnancy.  Breastfeeding is not recommended during treatment.
High Dose Vitamin A Counseling: Side effects reviewed, pt to contact office should one occur.
Spevigo Counseling: I discussed with the patient the risks of Spevigo including but not limited to fatigue, nasuea, vomiting, headache, pruritus, urinary tract infection, an infusion related reactions.  The patient understands that monitoring is required including screening for tuberculosis at baseline and yearly screening thereafter while continuing Spevigo therapy. They should contact us if symptoms of infection or other concerning signs are noted.
Eucrisa Pregnancy And Lactation Text: This medication has not been assigned a Pregnancy Risk Category but animal studies failed to show danger with the topical medication. It is unknown if the medication is excreted in breast milk.
Terbinafine Counseling: Patient counseling regarding adverse effects of terbinafine including but not limited to headache, diarrhea, rash, upset stomach, liver function test abnormalities, itching, taste/smell disturbance, nausea, abdominal pain, and flatulence.  There is a rare possibility of liver failure that can occur when taking terbinafine.  The patient understands that a baseline LFT and kidney function test may be required. The patient verbalized understanding of the proper use and possible adverse effects of terbinafine.  All of the patient's questions and concerns were addressed.
Azathioprine Pregnancy And Lactation Text: This medication is Pregnancy Category D and isn't considered safe during pregnancy. It is unknown if this medication is excreted in breast milk.
Clofazimine Pregnancy And Lactation Text: This medication is Pregnancy Category C and isn't considered safe during pregnancy. It is excreted in breast milk.
Rhofade Counseling: Rhofade is a topical medication which can decrease superficial blood flow where applied. Side effects are uncommon and include stinging, redness and allergic reactions.
Dutasteride Male Counseling: Dustasteride Counseling:  I discussed with the patient the risks of use of dutasteride including but not limited to decreased libido, decreased ejaculate volume, and gynecomastia. Women who can become pregnant should not handle medication.  All of the patient's questions and concerns were addressed.
Topical Steroids Applications Pregnancy And Lactation Text: Most topical steroids are considered safe to use during pregnancy and lactation.  Any topical steroid applied to the breast or nipple should be washed off before breastfeeding.
Use Enhanced Medication Counseling?: Yes
Spevigo Pregnancy And Lactation Text: The risk during pregnancy and breastfeeding is uncertain with this medication. This medication does cross the placenta. It is unknown if this medication is found in breast milk.
Valtrex Pregnancy And Lactation Text: this medication is Pregnancy Category B and is considered safe during pregnancy. This medication is not directly found in breast milk but it's metabolite acyclovir is present.
Olanzapine Counseling- I discussed with the patient the common side effects of olanzapine including but are not limited to: lack of energy, dry mouth, increased appetite, sleepiness, tremor, constipation, dizziness, changes in behavior, or restlessness.  Explained that teenagers are more likely to experience headaches, abdominal pain, pain in the arms or legs, tiredness, and sleepiness.  Serious side effects include but are not limited: increased risk of death in elderly patients who are confused, have memory loss, or dementia-related psychosis; hyperglycemia; increased cholesterol and triglycerides; and weight gain.
Qbrexza Pregnancy And Lactation Text: There is no available data on Qbrexza use in pregnant women.  There is no available data on Qbrexza use in lactation.
Hydroquinone Counseling:  Patient advised that medication may result in skin irritation, lightening (hypopigmentation), dryness, and burning.  In the event of skin irritation, the patient was advised to reduce the amount of the drug applied or use it less frequently.  Rarely, spots that are treated with hydroquinone can become darker (pseudoochronosis).  Should this occur, patient instructed to stop medication and call the office. The patient verbalized understanding of the proper use and possible adverse effects of hydroquinone.  All of the patient's questions and concerns were addressed.
Olumiant Counseling: I discussed with the patient the risks of Olumiant therapy including but not limited to upper respiratory tract infections, shingles, cold sores, and nausea. Live vaccines should be avoided.  This medication has been linked to serious infections; higher rate of mortality; malignancy and lymphoproliferative disorders; major adverse cardiovascular events; thrombosis; gastrointestinal perforations; neutropenia; lymphopenia; anemia; liver enzyme elevations; and lipid elevations.
Colchicine Counseling:  Patient counseled regarding adverse effects including but not limited to stomach upset (nausea, vomiting, stomach pain, or diarrhea).  Patient instructed to limit alcohol consumption while taking this medication.  Colchicine may reduce blood counts especially with prolonged use.  The patient understands that monitoring of kidney function and blood counts may be required, especially at baseline. The patient verbalized understanding of the proper use and possible adverse effects of colchicine.  All of the patient's questions and concerns were addressed.
Cellcept Counseling:  I discussed with the patient the risks of mycophenolate mofetil including but not limited to infection/immunosuppression, GI upset, hypokalemia, hypercholesterolemia, bone marrow suppression, lymphoproliferative disorders, malignancy, GI ulceration/bleed/perforation, colitis, interstitial lung disease, kidney failure, progressive multifocal leukoencephalopathy, and birth defects.  The patient understands that monitoring is required including a baseline creatinine and regular CBC testing. In addition, patient must alert us immediately if symptoms of infection or other concerning signs are noted.
Rinvoq Counseling: I discussed with the patient the risks of Rinvoq therapy including but not limited to upper respiratory tract infections, shingles, cold sores, bronchitis, nausea, cough, fever, acne, and headache. Live vaccines should be avoided.  This medication has been linked to serious infections; higher rate of mortality; malignancy and lymphoproliferative disorders; major adverse cardiovascular events; thrombosis; thrombocytopenia, anemia, and neutropenia; lipid elevations; liver enzyme elevations; and gastrointestinal perforations.
Terbinafine Pregnancy And Lactation Text: This medication is Pregnancy Category B and is considered safe during pregnancy. It is also excreted in breast milk and breast feeding isn't recommended.
Dutasteride Female Counseling: Dutasteride Counseling:  I discussed with the patient the risks of use of dutasteride including but not limited to decreased libido and sexual dysfunction. Explained the teratogenic nature of the medication and stressed the importance of not getting pregnant during treatment. All of the patient's questions and concerns were addressed.
Rifampin Counseling: I discussed with the patient the risks of rifampin including but not limited to liver damage, kidney damage, red-orange body fluids, nausea/vomiting and severe allergy.
Benzoyl Peroxide Counseling: Patient counseled that medicine may cause skin irritation and bleach clothing.  In the event of skin irritation, the patient was advised to reduce the amount of the drug applied or use it less frequently.   The patient verbalized understanding of the proper use and possible adverse effects of benzoyl peroxide.  All of the patient's questions and concerns were addressed.
Adbry Counseling: I discussed with the patient the risks of tralokinumab including but not limited to eye infection and irritation, cold sores, injection site reactions, worsening of asthma, allergic reactions and increased risk of parasitic infection.  Live vaccines should be avoided while taking tralokinumab. The patient understands that monitoring is required and they must alert us or the primary physician if symptoms of infection or other concerning signs are noted.
Olanzapine Pregnancy And Lactation Text: This medication is pregnancy category C.   There are no adequate and well controlled trials with olanzapine in pregnant females.  Olanzapine should be used during pregnancy only if the potential benefit justifies the potential risk to the fetus.   In a study in lactating healthy women, olanzapine was excreted in breast milk.  It is recommended that women taking olanzapine should not breast feed.
Ilumya Counseling: I discussed with the patient the risks of tildrakizumab including but not limited to immunosuppression, malignancy, posterior leukoencephalopathy syndrome, and serious infections.  The patient understands that monitoring is required including a PPD at baseline and must alert us or the primary physician if symptoms of infection or other concerning signs are noted.
Olumiant Pregnancy And Lactation Text: Based on animal studies, Olumiant may cause embryo-fetal harm when administered to pregnant women.  The medication should not be used in pregnancy.  Breastfeeding is not recommended during treatment.
Topical Sulfur Applications Counseling: Topical Sulfur Counseling: Patient counseled that this medication may cause skin irritation or allergic reactions.  In the event of skin irritation, the patient was advised to reduce the amount of the drug applied or use it less frequently.   The patient verbalized understanding of the proper use and possible adverse effects of topical sulfur application.  All of the patient's questions and concerns were addressed.
Azithromycin Counseling:  I discussed with the patient the risks of azithromycin including but not limited to GI upset, allergic reaction, drug rash, diarrhea, and yeast infections.
Stelara Counseling:  I discussed with the patient the risks of ustekinumab including but not limited to immunosuppression, malignancy, posterior leukoencephalopathy syndrome, and serious infections.  The patient understands that monitoring is required including a PPD at baseline and must alert us or the primary physician if symptoms of infection or other concerning signs are noted.
Rifampin Pregnancy And Lactation Text: This medication is Pregnancy Category C and it isn't know if it is safe during pregnancy. It is also excreted in breast milk and should not be used if you are breast feeding.
Taltz Counseling: I discussed with the patient the risks of ixekizumab including but not limited to immunosuppression, serious infections, worsening of inflammatory bowel disease and drug reactions.  The patient understands that monitoring is required including a PPD at baseline and must alert us or the primary physician if symptoms of infection or other concerning signs are noted.
Infliximab Counseling:  I discussed with the patient the risks of infliximab including but not limited to myelosuppression, immunosuppression, autoimmune hepatitis, demyelinating diseases, lymphoma, and serious infections.  The patient understands that monitoring is required including a PPD at baseline and must alert us or the primary physician if symptoms of infection or other concerning signs are noted.
Topical Sulfur Applications Pregnancy And Lactation Text: This medication is considered safe during pregnancy and breast feeding secondary to limited systemic absorption.
Solaraze Counseling:  I discussed with the patient the risks of Solaraze including but not limited to erythema, scaling, itching, weeping, crusting, and pain.
Azithromycin Pregnancy And Lactation Text: This medication is considered safe during pregnancy and is also secreted in breast milk.
Ozempic Counseling: I reviewed the possible side effects including: thyroid tumors, kidney disease, gallbladder disease, abdominal pain, constipation, diarrhea, nausea, vomiting and pancreatitis. Do not take this medication if you have a history or family history of multiple endocrine neoplasia syndrome type 2. Side effects reviewed, pt to contact office should one occur.
Benzoyl Peroxide Pregnancy And Lactation Text: This medication is Pregnancy Category C. It is unknown if benzoyl peroxide is excreted in breast milk.
Oral Minoxidil Counseling- I discussed with the patient the risks of oral minoxidil including but not limited to shortness of breath, swelling of the feet or ankles, dizziness, lightheadedness, unwanted hair growth and allergic reaction.  The patient verbalized understanding of the proper use and possible adverse effects of oral minoxidil.  All of the patient's questions and concerns were addressed.
Imiquimod Counseling:  I discussed with the patient the risks of imiquimod including but not limited to erythema, scaling, itching, weeping, crusting, and pain.  Patient understands that the inflammatory response to imiquimod is variable from person to person and was educated regarded proper titration schedule.  If flu-like symptoms develop, patient knows to discontinue the medication and contact us.
Adbry Pregnancy And Lactation Text: It is unknown if this medication will adversely affect pregnancy or breast feeding.
Solaraze Pregnancy And Lactation Text: This medication is Pregnancy Category B and is considered safe. There is some data to suggest avoiding during the third trimester. It is unknown if this medication is excreted in breast milk.
Klisyri Counseling:  I discussed with the patient the risks of Klisyri including but not limited to erythema, scaling, itching, weeping, crusting, and pain.
Sarecycline Counseling: Patient advised regarding possible photosensitivity and discoloration of the teeth, skin, lips, tongue and gums.  Patient instructed to avoid sunlight, if possible.  When exposed to sunlight, patients should wear protective clothing, sunglasses, and sunscreen.  The patient was instructed to call the office immediately if the following severe adverse effects occur:  hearing changes, easy bruising/bleeding, severe headache, or vision changes.  The patient verbalized understanding of the proper use and possible adverse effects of sarecycline.  All of the patient's questions and concerns were addressed.
Cyclophosphamide Counseling:  I discussed with the patient the risks of cyclophosphamide including but not limited to hair loss, hormonal abnormalities, decreased fertility, abdominal pain, diarrhea, nausea and vomiting, bone marrow suppression and infection. The patient understands that monitoring is required while taking this medication.
Dapsone Counseling: I discussed with the patient the risks of dapsone including but not limited to hemolytic anemia, agranulocytosis, rashes, methemoglobinemia, kidney failure, peripheral neuropathy, headaches, GI upset, and liver toxicity.  Patients who start dapsone require monitoring including baseline LFTs and weekly CBCs for the first month, then every month thereafter.  The patient verbalized understanding of the proper use and possible adverse effects of dapsone.  All of the patient's questions and concerns were addressed.
Finasteride Male Counseling: Finasteride Counseling:  I discussed with the patient the risks of use of finasteride including but not limited to decreased libido, decreased ejaculate volume, gynecomastia, and depression. Women should not handle medication.  All of the patient's questions and concerns were addressed.
Carac Counseling:  I discussed with the patient the risks of Carac including but not limited to erythema, scaling, itching, weeping, crusting, and pain.
Rinvoq Pregnancy And Lactation Text: Based on animal studies, Rinvoq may cause embryo-fetal harm when administered to pregnant women.  The medication should not be used in pregnancy.  Breastfeeding is not recommended during treatment and for 6 days after the last dose.
Bimzelx Counseling:  I discussed with the patient the risks of Bimzelx including but not limited to depression, immunosuppression, allergic reactions and infections.  The patient understands that monitoring is required including a PPD at baseline and must alert us or the primary physician if symptoms of infection or other concerning signs are noted.
Dutasteride Pregnancy And Lactation Text: This medication is absolutely contraindicated in women, especially during pregnancy and breast feeding. Feminization of male fetuses is possible if taking while pregnant.
Wartpeel Counseling:  I discussed with the patient the risks of Wartpeel including but not limited to erythema, scaling, itching, weeping, crusting, and pain.
Bactrim Counseling:  I discussed with the patient the risks of sulfa antibiotics including but not limited to GI upset, allergic reaction, drug rash, diarrhea, dizziness, photosensitivity, and yeast infections.  Rarely, more serious reactions can occur including but not limited to aplastic anemia, agranulocytosis, methemoglobinemia, blood dyscrasias, liver or kidney failure, lung infiltrates or desquamative/blistering drug rashes.
Oral Minoxidil Pregnancy And Lactation Text: This medication should only be used when clearly needed if you are pregnant, attempting to become pregnant or breast feeding.
Finasteride Female Counseling: Finasteride Counseling:  I discussed with the patient the risks of use of finasteride including but not limited to decreased libido and sexual dysfunction. Explained the teratogenic nature of the medication and stressed the importance of not getting pregnant during treatment. All of the patient's questions and concerns were addressed.
Tremfya Counseling: I discussed with the patient the risks of guselkumab including but not limited to immunosuppression, serious infections, and drug reactions.  The patient understands that monitoring is required including a PPD at baseline and must alert us or the primary physician if symptoms of infection or other concerning signs are noted.
Calcipotriene Counseling:  I discussed with the patient the risks of calcipotriene including but not limited to erythema, scaling, itching, and irritation.
Klisyri Pregnancy And Lactation Text: It is unknown if this medication can harm a developing fetus or if it is excreted in breast milk.
Otezla Pregnancy And Lactation Text: This medication is Pregnancy Category C and it isn't known if it is safe during pregnancy. It is unknown if it is excreted in breast milk.
Nemluvio Counseling: I discussed with the patient the risks of nemolizumab including but not limited to headache, gastrointestinal complaints, nasopharyngitis, musculoskeletal complaints, injection site reactions, and allergic reactions. The patient understands that monitoring is required and they must alert us or the primary physician if any side effects are noted.
Sotyktu Pregnancy And Lactation Text: There is insufficient data to evaluate whether or not Sotyktu is safe to use during pregnancy.   It is not known if Sotyktu passes into breast milk and whether or not it is safe to use when breastfeeding.  
Soolantra Counseling: I discussed with the patients the risks of topial Soolantra. This is a medicine which decreases the number of mites and inflammation in the skin. You experience burning, stinging, eye irritation or allergic reactions.  Please call our office if you develop any problems from using this medication.
Cimzia Counseling:  I discussed with the patient the risks of Cimzia including but not limited to immunosuppression, allergic reactions and infections.  The patient understands that monitoring is required including a PPD at baseline and must alert us or the primary physician if symptoms of infection or other concerning signs are noted.
Bimzelx Pregnancy And Lactation Text: This medication crosses the placenta and the safety is uncertain during pregnancy. It is unknown if this medication is present in breast milk.
Otezla Counseling: The side effects of Otezla were discussed with the patient, including but not limited to worsening or new depression, weight loss, diarrhea, nausea, upper respiratory tract infection, and headache. Patient instructed to call the office should any adverse effect occur.  The patient verbalized understanding of the proper use and possible adverse effects of Otezla.  All the patient's questions and concerns were addressed.
Sotyktu Counseling:  I discussed the most common side effects of Sotyktu including: common cold, sore throat, sinus infections, cold sores, canker sores, folliculitis, and acne.  I also discussed more serious side effects of Sotyktu including but not limited to: serious allergic reactions; increased risk for infections such as TB; cancers such as lymphomas; rhabdomyolysis and elevated CPK; and elevated triglycerides and liver enzymes. 
Saxenda Counseling: I reviewed the possible side effects including: thyroid tumors, kidney disease, gallbladder disease, abdominal pain, constipation, diarrhea, nausea, vomiting and pancreatitis. Do not take this medication if you have a history or family history of multiple endocrine neoplasia syndrome type 2. Side effects reviewed, pt to contact office should one occur.
Cimetidine Counseling:  I discussed with the patient the risks of Cimetidine including but not limited to gynecomastia, headache, diarrhea, nausea, drowsiness, arrhythmias, pancreatitis, skin rashes, psychosis, bone marrow suppression and kidney toxicity.
Dapsone Pregnancy And Lactation Text: This medication is Pregnancy Category C and is not considered safe during pregnancy or breast feeding.
Cyclophosphamide Pregnancy And Lactation Text: This medication is Pregnancy Category D and it isn't considered safe during pregnancy. This medication is excreted in breast milk.
Bactrim Pregnancy And Lactation Text: This medication is Pregnancy Category D and is known to cause fetal risk.  It is also excreted in breast milk.
Cimzia Pregnancy And Lactation Text: This medication crosses the placenta but can be considered safe in certain situations. Cimzia may be excreted in breast milk.
Calcipotriene Pregnancy And Lactation Text: The use of this medication during pregnancy or lactation is not recommended as there is insufficient data.
Xeljanz Counseling: I discussed with the patient the risks of Xeljanz therapy including increased risk of infection, liver issues, headache, diarrhea, or cold symptoms. Live vaccines should be avoided. They were instructed to call if they have any problems.
Minoxidil Counseling: Minoxidil is a topical medication which can increase blood flow where it is applied. It is uncertain how this medication increases hair growth. Side effects are uncommon and include stinging and allergic reactions.
Tetracycline Counseling: Patient counseled regarding possible photosensitivity and increased risk for sunburn.  Patient instructed to avoid sunlight, if possible.  When exposed to sunlight, patients should wear protective clothing, sunglasses, and sunscreen.  The patient was instructed to call the office immediately if the following severe adverse effects occur:  hearing changes, easy bruising/bleeding, severe headache, or vision changes.  The patient verbalized understanding of the proper use and possible adverse effects of tetracycline.  All of the patient's questions and concerns were addressed. Patient understands to avoid pregnancy while on therapy due to potential birth defects.
Cephalexin Pregnancy And Lactation Text: This medication is Pregnancy Category B and considered safe during pregnancy.  It is also excreted in breast milk but can be used safely for shorter doses.
Nemluvio Pregnancy And Lactation Text: It is not known if Nemluvio causes fetal harm or is present in breast milk. Please proceed with caution if patients who are pregnant or breastfeeding.
Cyclosporine Counseling:  I discussed with the patient the risks of cyclosporine including but not limited to hypertension, gingival hyperplasia,myelosuppression, immunosuppression, liver damage, kidney damage, neurotoxicity, lymphoma, and serious infections. The patient understands that monitoring is required including baseline blood pressure, CBC, CMP, lipid panel and uric acid, and then 1-2 times monthly CMP and blood pressure.
Cephalexin Counseling: I counseled the patient regarding use of cephalexin as an antibiotic for prophylactic and/or therapeutic purposes. Cephalexin (commonly prescribed under brand name Keflex) is a cephalosporin antibiotic which is active against numerous classes of bacteria, including most skin bacteria. Side effects may include nausea, diarrhea, gastrointestinal upset, rash, hives, yeast infections, and in rare cases, hepatitis, kidney disease, seizures, fever, confusion, neurologic symptoms, and others. Patients with severe allergies to penicillin medications are cautioned that there is about a 10% incidence of cross-reactivity with cephalosporins. When possible, patients with penicillin allergies should use alternatives to cephalosporins for antibiotic therapy.
Soolantra Pregnancy And Lactation Text: This medication is Pregnancy Category C. This medication is considered safe during breast feeding.
Winlevi Counseling:  I discussed with the patient the risks of topical clascoterone including but not limited to erythema, scaling, itching, and stinging. Patient voiced their understanding.
Gabapentin Counseling: I discussed with the patient the risks of gabapentin including but not limited to dizziness, somnolence, fatigue and ataxia.
Xolair Counseling:  Patient informed of potential adverse effects including but not limited to fever, muscle aches, rash and allergic reactions.  The patient verbalized understanding of the proper use and possible adverse effects of Xolair.  All of the patient's questions and concerns were addressed.
Rituxan Counseling:  I discussed with the patient the risks of Rituxan infusions. Side effects can include infusion reactions, severe drug rashes including mucocutaneous reactions, reactivation of latent hepatitis and other infections and rarely progressive multifocal leukoencephalopathy.  All of the patient's questions and concerns were addressed.
Cosentyx Counseling:  I discussed with the patient the risks of Cosentyx including but not limited to worsening of Crohn's disease, immunosuppression, allergic reactions and infections.  The patient understands that monitoring is required including a PPD at baseline and must alert us or the primary physician if symptoms of infection or other concerning signs are noted.
Xeldebz Pregnancy And Lactation Text: This medication is Pregnancy Category D and is not considered safe during pregnancy.  The risk during breast feeding is also uncertain.
Clindamycin Counseling: I counseled the patient regarding use of clindamycin as an antibiotic for prophylactic and/or therapeutic purposes. Clindamycin is active against numerous classes of bacteria, including skin bacteria. Side effects may include nausea, diarrhea, gastrointestinal upset, rash, hives, yeast infections, and in rare cases, colitis.
Topical Retinoid counseling:  Patient advised to apply a pea-sized amount only at bedtime and wait 30 minutes after washing their face before applying.  If too drying, patient may add a non-comedogenic moisturizer. The patient verbalized understanding of the proper use and possible adverse effects of retinoids.  All of the patient's questions and concerns were addressed.
Oxybutynin Counseling:  I discussed with the patient the risks of oxybutynin including but not limited to skin rash, drowsiness, dry mouth, difficulty urinating, and blurred vision.
Winlevi Pregnancy And Lactation Text: This medication is considered safe during pregnancy and breastfeeding.
Doxepin Counseling:  Patient advised that the medication is sedating and not to drive a car after taking this medication. Patient informed of potential adverse effects including but not limited to dry mouth, urinary retention, and blurry vision.  The patient verbalized understanding of the proper use and possible adverse effects of doxepin.  All of the patient's questions and concerns were addressed.
Semaglutide Counseling: I reviewed the possible side effects including: thyroid tumors, kidney disease, gallbladder disease, abdominal pain, constipation, diarrhea, nausea, vomiting and pancreatitis. Do not take this medication if you have a history or family history of multiple endocrine neoplasia syndrome type 2. Side effects reviewed, pt to contact office should one occur.

## 2025-01-16 NOTE — HPI: SKIN LESION
How Severe Is Your Skin Lesion?: mild
Has Your Skin Lesion Been Treated?: not been treated
Is This A New Presentation, Or A Follow-Up?: Skin Lesion
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

## 2025-01-16 NOTE — HPI: SECONDARY COMPLAINT
How Severe Is This Condition?: mild
Additional History: Treated for shingles, rash was never painful, has improved but pt wants a second opinion. Rash was treated with triamcinolone, methylprednisolone, and valacyclovir

## 2025-02-07 NOTE — TELEPHONE ENCOUNTER
Duplicate request denied.   [employer] message sent to patient to inform.     Refill request dated 2/3/25 has been routed to Dr. Hickey for approval.

## 2025-02-07 NOTE — TELEPHONE ENCOUNTER
Dr. Hickey - please advise.    Last prescribed by hospitalist Falguni Amor DO on 9/23/24.     Last office visit 12/23/24 with Dr. Feliciano.   Last office visit with Dr. Hickey 10/3/24.    Fluoxetine 20 mg oral cap has not been prescribed by Dr. Hickey since 3/29/24.

## 2025-02-11 ENCOUNTER — MED REC SCAN ONLY (OUTPATIENT)
Dept: FAMILY MEDICINE CLINIC | Facility: CLINIC | Age: 80
End: 2025-02-11

## 2025-03-10 ENCOUNTER — PATIENT MESSAGE (OUTPATIENT)
Dept: INTERNAL MEDICINE CLINIC | Facility: CLINIC | Age: 80
End: 2025-03-10

## 2025-03-10 DIAGNOSIS — L60.2 OVERGROWN TOENAILS: Primary | ICD-10-CM

## 2025-03-12 ENCOUNTER — APPOINTMENT (OUTPATIENT)
Dept: URBAN - METROPOLITAN AREA CLINIC 244 | Age: 80
Setting detail: DERMATOLOGY
End: 2025-03-12

## 2025-03-12 DIAGNOSIS — L21.8 OTHER SEBORRHEIC DERMATITIS: ICD-10-CM

## 2025-03-12 DIAGNOSIS — L30.9 DERMATITIS, UNSPECIFIED: ICD-10-CM

## 2025-03-12 DIAGNOSIS — L82.1 OTHER SEBORRHEIC KERATOSIS: ICD-10-CM

## 2025-03-12 PROCEDURE — OTHER MEDICATION COUNSELING: OTHER

## 2025-03-12 PROCEDURE — 99214 OFFICE O/P EST MOD 30 MIN: CPT | Mod: 25

## 2025-03-12 PROCEDURE — 11103 TANGNTL BX SKIN EA SEP/ADDL: CPT

## 2025-03-12 PROCEDURE — OTHER DIAGNOSIS COMMENT: OTHER

## 2025-03-12 PROCEDURE — OTHER PRESCRIPTION: OTHER

## 2025-03-12 PROCEDURE — 11102 TANGNTL BX SKIN SINGLE LES: CPT

## 2025-03-12 PROCEDURE — OTHER BIOPSY BY SHAVE METHOD: OTHER

## 2025-03-12 PROCEDURE — OTHER COUNSELING: OTHER

## 2025-03-12 RX ORDER — TACROLIMUS 1 MG/G
OINTMENT TOPICAL
Qty: 30 | Refills: 2 | Status: ACTIVE

## 2025-03-12 ASSESSMENT — LOCATION SIMPLE DESCRIPTION DERM
LOCATION SIMPLE: UPPER BACK
LOCATION SIMPLE: LEFT UPPER BACK
LOCATION SIMPLE: LEFT EAR
LOCATION SIMPLE: LEFT POSTERIOR UPPER ARM
LOCATION SIMPLE: RIGHT POSTERIOR UPPER ARM
LOCATION SIMPLE: LEFT CHEEK

## 2025-03-12 ASSESSMENT — LOCATION DETAILED DESCRIPTION DERM
LOCATION DETAILED: SUPERIOR THORACIC SPINE
LOCATION DETAILED: LEFT LATERAL MANDIBULAR CHEEK
LOCATION DETAILED: RIGHT PROXIMAL POSTERIOR UPPER ARM
LOCATION DETAILED: LEFT SUPERIOR UPPER BACK
LOCATION DETAILED: LEFT INFERIOR CENTRAL MALAR CHEEK
LOCATION DETAILED: LEFT CAVUM CONCHA
LOCATION DETAILED: LEFT DISTAL POSTERIOR UPPER ARM
LOCATION DETAILED: RIGHT DISTAL POSTERIOR UPPER ARM

## 2025-03-12 ASSESSMENT — LOCATION ZONE DERM
LOCATION ZONE: FACE
LOCATION ZONE: EAR
LOCATION ZONE: TRUNK
LOCATION ZONE: ARM

## 2025-03-12 NOTE — PROCEDURE: DIAGNOSIS COMMENT
Detail Level: Simple
Render Risk Assessment In Note?: no
Comment: Has had 2 biopsies done c/w Lichenoid Dermatitis per notes from Dr. Roberto Baum, seen on 3/11/24 \\n- improved with topical tacro but not resolved 
Comment: morphologically seems consistent with same findings on arms.\\nwe discussed possible arthropod on arms.

## 2025-03-12 NOTE — PROCEDURE: BIOPSY BY SHAVE METHOD
Detail Level: Detailed
Depth Of Biopsy: dermis
Was A Bandage Applied: Yes
Size Of Lesion In Cm: 0
Biopsy Type: H and E
Biopsy Method: Dermablade
Anesthesia Type: 1% lidocaine with epinephrine
Anesthesia Volume In Cc: 0.5
Hemostasis: Drysol
Wound Care: Petrolatum
Dressing: bandage
Destruction After The Procedure: No
Type Of Destruction Used: Curettage
Curettage Text: The wound bed was treated with curettage after the biopsy was performed.
Cryotherapy Text: The wound bed was treated with cryotherapy after the biopsy was performed.
Electrodesiccation Text: The wound bed was treated with electrodesiccation after the biopsy was performed.
Electrodesiccation And Curettage Text: The wound bed was treated with electrodesiccation and curettage after the biopsy was performed.
Silver Nitrate Text: The wound bed was treated with silver nitrate after the biopsy was performed.
Lab: [id number]
Medical Necessity Information: It is in your best interest to select a reason for this procedure from the list below. All of these items fulfill various CMS LCD requirements except the new and changing color options.
Consent: Written consent was obtained and risks were reviewed including but not limited to scarring, infection, bleeding, scabbing, incomplete removal, nerve damage and allergy to anesthesia.
Post-Care Instructions: I reviewed with the patient in detail post-care instructions. Patient is to keep the biopsy site dry overnight, and then apply bacitracin twice daily until healed. Patient may apply hydrogen peroxide soaks to remove any crusting.
Notification Instructions: Patient will be notified of biopsy results. However, patient instructed to call the office if not contacted within 2 weeks.
Billing Type: Third-Party Bill
Information: Selecting Yes will display possible errors in your note based on the variables you have selected. This validation is only offered as a suggestion for you. PLEASE NOTE THAT THE VALIDATION TEXT WILL BE REMOVED WHEN YOU FINALIZE YOUR NOTE. IF YOU WANT TO FAX A PRELIMINARY NOTE YOU WILL NEED TO TOGGLE THIS TO 'NO' IF YOU DO NOT WANT IT IN YOUR FAXED NOTE.

## 2025-03-12 NOTE — PROCEDURE: MEDICATION COUNSELING
Cantharidin Pregnancy And Lactation Text: This medication has not been proven safe during pregnancy. It is unknown if this medication is excreted in breast milk.
Clindamycin Pregnancy And Lactation Text: This medication can be used in pregnancy if certain situations. Clindamycin is also present in breast milk.
Vtama Pregnancy And Lactation Text: It is unknown if this medication can cause problems during pregnancy and breastfeeding.
Topical Retinoid Pregnancy And Lactation Text: This medication is Pregnancy Category C. It is unknown if this medication is excreted in breast milk.
Propranolol Counseling:  I discussed with the patient the risks of propranolol including but not limited to low heart rate, low blood pressure, low blood sugar, restlessness and increased cold sensitivity. They should call the office if they experience any of these side effects.
Semaglutide Pregnancy And Lactation Text: The fetal risk of this medication is unknown and taking while pregnant is not recommended. It is unknown if this medication is present in breast milk.
Mirvaso Counseling: Mirvaso is a topical medication which can decrease superficial blood flow where applied. Side effects are uncommon and include stinging, redness and allergic reactions.
Cantharidin Counseling:  I discussed with the patient the risks of Cantharidin including but not limited to pain, redness, burning, itching, and blistering.
Xolair Pregnancy And Lactation Text: This medication is Pregnancy Category B and is considered safe during pregnancy. This medication is excreted in breast milk.
Rituxan Pregnancy And Lactation Text: This medication is Pregnancy Category C and it isn't know if it is safe during pregnancy. It is unknown if this medication is excreted in breast milk but similar antibodies are known to be excreted.
Doxepin Pregnancy And Lactation Text: This medication is Pregnancy Category C and it isn't known if it is safe during pregnancy. It is also excreted in breast milk and breast feeding isn't recommended.
VTAMA Counseling: I discussed with the patient that VTAMA is not for use in the eyes, mouth or mouth. They should call the office if they develop any signs of allergic reactions to VTAMA. The patient verbalized understanding of the proper use and possible adverse effects of VTAMA.  All of the patient's questions and concerns were addressed.
Glycopyrrolate Counseling:  I discussed with the patient the risks of glycopyrrolate including but not limited to skin rash, drowsiness, dry mouth, difficulty urinating, and blurred vision.
Cosentyx Pregnancy And Lactation Text: This medication is Pregnancy Category B and is considered safe during pregnancy. It is unknown if this medication is excreted in breast milk.
Methotrexate Counseling:  Patient counseled regarding adverse effects of methotrexate including but not limited to nausea, vomiting, abnormalities in liver function tests. Patients may develop mouth sores, rash, diarrhea, and abnormalities in blood counts. The patient understands that monitoring is required including LFT's and blood counts.  There is a rare possibility of scarring of the liver and lung problems that can occur when taking methotrexate. Persistent nausea, loss of appetite, pale stools, dark urine, cough, and shortness of breath should be reported immediately. Patient advised to discontinue methotrexate treatment at least three months before attempting to become pregnant.  I discussed the need for folate supplements while taking methotrexate.  These supplements can decrease side effects during methotrexate treatment. The patient verbalized understanding of the proper use and possible adverse effects of methotrexate.  All of the patient's questions and concerns were addressed.
Zoryve Counseling:  I discussed with the patient that Zoryve is not for use in the eyes, mouth or vagina. The most commonly reported side effects include diarrhea, headache, insomnia, application site pain, upper respiratory tract infections, and urinary tract infections.  All of the patient's questions and concerns were addressed.
Dupixent Counseling: I discussed with the patient the risks of dupilumab including but not limited to eye inflammation and irritation, cold sores, injection site reactions, allergic reactions and increased risk of parasitic infection. The patient understands that monitoring is required and they must alert us or the primary physician if symptoms of infection or other concerning signs are noted.
Prednisone Counseling:  I discussed with the patient the risks of prolonged use of prednisone including but not limited to weight gain, insomnia, osteoporosis, mood changes, diabetes, susceptibility to infection, glaucoma and high blood pressure.  In cases where prednisone use is prolonged, patients should be monitored with blood pressure checks, serum glucose levels and an eye exam.  Additionally, the patient may need to be placed on GI prophylaxis, PCP prophylaxis, and calcium and vitamin D supplementation and/or a bisphosphonate.  The patient verbalized understanding of the proper use and the possible adverse effects of prednisone.  All of the patient's questions and concerns were addressed.
Hydroxychloroquine Counseling:  I discussed with the patient that a baseline ophthalmologic exam is needed at the start of therapy and every year thereafter while on therapy. A CBC may also be warranted for monitoring.  The side effects of this medication were discussed with the patient, including but not limited to agranulocytosis, aplastic anemia, seizures, rashes, retinopathy, and liver toxicity. Patient instructed to call the office should any adverse effect occur.  The patient verbalized understanding of the proper use and possible adverse effects of Plaquenil.  All the patient's questions and concerns were addressed.
5-Fu Counseling: 5-Fluorouracil Counseling:  I discussed with the patient the risks of 5-fluorouracil including but not limited to erythema, scaling, itching, weeping, crusting, and pain.
Mirvaso Pregnancy And Lactation Text: This medication has not been assigned a Pregnancy Risk Category. It is unknown if the medication is excreted in breast milk.
Glycopyrrolate Pregnancy And Lactation Text: This medication is Pregnancy Category B and is considered safe during pregnancy. It is unknown if it is excreted breast milk.
Siliq Counseling:  I discussed with the patient the risks of Siliq including but not limited to new or worsening depression, suicidal thoughts and behavior, immunosuppression, malignancy, posterior leukoencephalopathy syndrome, and serious infections.  The patient understands that monitoring is required including a PPD at baseline and must alert us or the primary physician if symptoms of infection or other concerning signs are noted. There is also a special program designed to monitor depression which is required with Siliq.
Tazorac Counseling:  Patient advised that medication is irritating and drying.  Patient may need to apply sparingly and wash off after an hour before eventually leaving it on overnight.  The patient verbalized understanding of the proper use and possible adverse effects of tazorac.  All of the patient's questions and concerns were addressed.
Methotrexate Pregnancy And Lactation Text: This medication is Pregnancy Category X and is known to cause fetal harm. This medication is excreted in breast milk.
Hydroxyzine Counseling: Patient advised that the medication is sedating and not to drive a car after taking this medication.  Patient informed of potential adverse effects including but not limited to dry mouth, urinary retention, and blurry vision.  The patient verbalized understanding of the proper use and possible adverse effects of hydroxyzine.  All of the patient's questions and concerns were addressed.
Doxycycline Counseling: Patient counseled regarding possible photosensitivity and increased risk for sunburn. Patient instructed to avoid sunlight, if possible. When exposed to sunlight, patients should wear protective clothing, sunglasses, and sunscreen. Counseled on GI AE / can cause nausea/vomiting/abdominal pain among other symptoms. The patient verbalized understanding of the proper use and possible adverse effects of doxycycline. To take with light meal to avoid any upset stomach (nausea vomiting or ab pain). Not to take concurrently with vitamins or dairy products (take at separate times). Take with full glass of water. Do not lay down for at least 1 hour after taking. All of the patient's questions and concerns were addressed. Female patients should avoid pregnancy while on therapy due to potential birth defects.
Wegovy Counseling: I reviewed the possible side effects including: thyroid tumors, kidney disease, gallbladder disease, abdominal pain, constipation, diarrhea, nausea, vomiting and pancreatitis. Do not take this medication if you have a history or family history of multiple endocrine neoplasia syndrome type 2. Side effects reviewed, pt to contact office should one occur.
Prednisone Pregnancy And Lactation Text: This medication is Pregnancy Category C and it isn't know if it is safe during pregnancy. This medication is excreted in breast milk.
Doxycycline Pregnancy And Lactation Text: This medication is Pregnancy Category D and not consider safe during pregnancy. It is also excreted in breast milk but is considered safe for shorter treatment courses.
Finasteride Pregnancy And Lactation Text: This medication is absolutely contraindicated during pregnancy. It is unknown if it is excreted in breast milk.
Zepbound Counseling: I reviewed the possible side effects including: thyroid tumors, kidney disease, gallbladder disease, abdominal pain, constipation, diarrhea, nausea, vomiting and pancreatitis. Do not take this medication if you have a history or family history of multiple endocrine neoplasia syndrome type 2. Side effects reviewed, pt to contact office should one occur.
5-Fu Pregnancy And Lactation Text: This medication is Pregnancy Category X and contraindicated in pregnancy and in women who may become pregnant. It is unknown if this medication is excreted in breast milk.
Topical Clindamycin Counseling: Patient counseled that this medication may cause skin irritation or allergic reactions.  In the event of skin irritation, the patient was advised to reduce the amount of the drug applied or use it less frequently.   The patient verbalized understanding of the proper use and possible adverse effects of clindamycin.  All of the patient's questions and concerns were addressed.
Erivedge Counseling- I discussed with the patient the risks of Erivedge including but not limited to nausea, vomiting, diarrhea, constipation, weight loss, changes in the sense of taste, decreased appetite, muscle spasms, and hair loss.  The patient verbalized understanding of the proper use and possible adverse effects of Erivedge.  All of the patient's questions and concerns were addressed.
Opzelura Counseling:  I discussed with the patient the risks of Opzelura including but not limited to nasopharngitis, bronchitis, ear infection, eosinophila, hives, diarrhea, folliculitis, tonsillitis, and rhinorrhea.  Taken orally, this medication has been linked to serious infections; higher rate of mortality; malignancy and lymphoproliferative disorders; major adverse cardiovascular events; thrombosis; thrombocytopenia, anemia, and neutropenia; and lipid elevations.
Simlandi Counseling:  I discussed with the patient the risks of adalimumab including but not limited to myelosuppression, immunosuppression, autoimmune hepatitis, demyelinating diseases, lymphoma, and serious infections.  The patient understands that monitoring is required including a PPD at baseline and must alert us or the primary physician if symptoms of infection or other concerning signs are noted.
Hydroxychloroquine Pregnancy And Lactation Text: This medication has been shown to cause fetal harm but it isn't assigned a Pregnancy Risk Category. There are small amounts excreted in breast milk.
SSKI Counseling:  I discussed with the patient the risks of SSKI including but not limited to thyroid abnormalities, metallic taste, GI upset, fever, headache, acne, arthralgias, paraesthesias, lymphadenopathy, easy bleeding, arrhythmias, and allergic reaction.
Siliq Pregnancy And Lactation Text: The risk during pregnancy and breastfeeding is uncertain with this medication.
Propranolol Pregnancy And Lactation Text: This medication is Pregnancy Category C and it isn't known if it is safe during pregnancy. It is excreted in breast milk.
Dupixent Pregnancy And Lactation Text: This medication likely crosses the placenta but the risk for the fetus is uncertain. This medication is excreted in breast milk.
Acitretin Counseling:  I discussed with the patient the risks of acitretin including but not limited to hair loss, dry lips/skin/eyes, liver damage, hyperlipidemia, depression/suicidal ideation, photosensitivity.  Serious rare side effects can include but are not limited to pancreatitis, pseudotumor cerebri, bony changes, clot formation/stroke/heart attack.  Patient understands that alcohol is contraindicated since it can result in liver toxicity and significantly prolong the elimination of the drug by many years.
Fluconazole Counseling:  Patient counseled regarding adverse effects of fluconazole including but not limited to headache, diarrhea, nausea, upset stomach, liver function test abnormalities, taste disturbance, and stomach pain.  There is a rare possibility of liver failure that can occur when taking fluconazole.  The patient understands that monitoring of LFTs and kidney function test may be required, especially at baseline. The patient verbalized understanding of the proper use and possible adverse effects of fluconazole.  All of the patient's questions and concerns were addressed.
Hydroxyzine Pregnancy And Lactation Text: This medication is not safe during pregnancy and should not be taken. It is also excreted in breast milk and breast feeding isn't recommended.
Tazorac Pregnancy And Lactation Text: This medication is not safe during pregnancy. It is unknown if this medication is excreted in breast milk.
Zyclara Counseling:  I discussed with the patient the risks of imiquimod including but not limited to erythema, scaling, itching, weeping, crusting, and pain.  Patient understands that the inflammatory response to imiquimod is variable from person to person and was educated regarded proper titration schedule.  If flu-like symptoms develop, patient knows to discontinue the medication and contact us.
Sski Pregnancy And Lactation Text: This medication is Pregnancy Category D and isn't considered safe during pregnancy. It is excreted in breast milk.
Griseofulvin Counseling:  I discussed with the patient the risks of griseofulvin including but not limited to photosensitivity, cytopenia, liver damage, nausea/vomiting and severe allergy.  The patient understands that this medication is best absorbed when taken with a fatty meal (e.g., ice cream or french fries).
Drysol Counseling:  I discussed with the patient the risks of drysol/aluminum chloride including but not limited to skin rash, itching, irritation, burning.
Erivedge Pregnancy And Lactation Text: This medication is Pregnancy Category X and is absolutely contraindicated during pregnancy. It is unknown if it is excreted in breast milk.
Acitretin Pregnancy And Lactation Text: This medication is Pregnancy Category X and should not be given to women who are pregnant or may become pregnant in the future. This medication is excreted in breast milk.
Opzelura Pregnancy And Lactation Text: There is insufficient data to evaluate drug-associated risk for major birth defects, miscarriage, or other adverse maternal or fetal outcomes.  There is a pregnancy registry that monitors pregnancy outcomes in pregnant persons exposed to the medication during pregnancy.  It is unknown if this medication is excreted in breast milk.  Do not breastfeed during treatment and for about 4 weeks after the last dose.
Ebglyss Counseling: I discussed with the patient the risks of lebrikizumab including but not limited to eye inflammation and irritation, cold sores, injection site reactions, allergic reactions and increased risk of parasitic infection. The patient understands that monitoring is required and they must alert us or the primary physician if symptoms of infection or other concerning signs are noted.
Fluconazole Pregnancy And Lactation Text: This medication is Pregnancy Category C and it isn't know if it is safe during pregnancy. It is also excreted in breast milk.
Detail Level: Simple
Birth Control Pills Counseling: Birth Control Pill Counseling: I discussed with the patient the potential side effects of OCPs including but not limited to increased risk of stroke, heart attack, thrombophlebitis, deep venous thrombosis, hepatic adenomas, breast changes, GI upset, headaches, and depression.  The patient verbalized understanding of the proper use and possible adverse effects of OCPs. All of the patient's questions and concerns were addressed.
Erythromycin Counseling:  I discussed with the patient the risks of erythromycin including but not limited to GI upset, allergic reaction, drug rash, diarrhea, increase in liver enzymes, and yeast infections.
Libtayo Counseling- I discussed with the patient the risks of Libtayo including but not limited to nausea, vomiting, diarrhea, and bone or muscle pain.  The patient verbalized understanding of the proper use and possible adverse effects of Libtayo.  All of the patient's questions and concerns were addressed.
Simponi Counseling:  I discussed with the patient the risks of golimumab including but not limited to myelosuppression, immunosuppression, autoimmune hepatitis, demyelinating diseases, lymphoma, and serious infections.  The patient understands that monitoring is required including a PPD at baseline and must alert us or the primary physician if symptoms of infection or other concerning signs are noted.
Drysol Pregnancy And Lactation Text: This medication is considered safe during pregnancy and breast feeding.
Albendazole Counseling:  I discussed with the patient the risks of albendazole including but not limited to cytopenia, kidney damage, nausea/vomiting and severe allergy.  The patient understands that this medication is being used in an off-label manner.
Enbrel Counseling:  I discussed with the patient the risks of etanercept including but not limited to myelosuppression, immunosuppression, autoimmune hepatitis, demyelinating diseases, lymphoma, and infections.  The patient understands that monitoring is required including a PPD at baseline and must alert us or the primary physician if symptoms of infection or other concerning signs are noted.
Low Dose Naltrexone Pregnancy And Lactation Text: Naltrexone is pregnancy category C.  There have been no adequate and well-controlled studies in pregnant women.  It should be used in pregnancy only if the potential benefit justifies the potential risk to the fetus.   Limited data indicates that naltrexone is minimally excreted into breastmilk.
Ebglyss Pregnancy And Lactation Text: This medication likely crosses the placenta but the risk for the fetus is uncertain. It is unknown if this medication is excreted in breast milk.
Low Dose Naltrexone Counseling- I discussed with the patient the potential risks and side effects of low dose naltrexone including but not limited to: more vivid dreams, headaches, nausea, vomiting, abdominal pain, fatigue, dizziness, and anxiety.
Opioid Pregnancy And Lactation Text: These medications can lead to premature delivery and should be avoided during pregnancy. These medications are also present in breast milk in small amounts.
Bexarotene Counseling:  I discussed with the patient the risks of bexarotene including but not limited to hair loss, dry lips/skin/eyes, liver abnormalities, hyperlipidemia, pancreatitis, depression/suicidal ideation, photosensitivity, drug rash/allergic reactions, hypothyroidism, anemia, leukopenia, infection, cataracts, and teratogenicity.  Patient understands that they will need regular blood tests to check lipid profile, liver function tests, white blood cell count, thyroid function tests and pregnancy test if applicable.
Thalidomide Counseling: I discussed with the patient the risks of thalidomide including but not limited to birth defects, anxiety, weakness, chest pain, dizziness, cough and severe allergy.
Picato Counseling:  I discussed with the patient the risks of Picato including but not limited to erythema, scaling, itching, weeping, crusting, and pain.
Birth Control Pills Pregnancy And Lactation Text: This medication should be avoided if pregnant and for the first 30 days post-partum.
Erythromycin Pregnancy And Lactation Text: This medication is Pregnancy Category B and is considered safe during pregnancy. It is also excreted in breast milk.
Topical Clindamycin Pregnancy And Lactation Text: This medication is Pregnancy Category B and is considered safe during pregnancy. It is unknown if it is excreted in breast milk.
Niacinamide Counseling: I recommended taking niacin or niacinamide, also know as vitamin B3, twice daily. Recent evidence suggests that taking vitamin B3 (500 mg twice daily) can reduce the risk of actinic keratoses and non-melanoma skin cancers. Side effects of vitamin B3 include flushing and headache.
Cibinqo Counseling: I discussed with the patient the risks of Cibinqo therapy including but not limited to common cold, nausea, headache, cold sores, increased blood CPK levels, dizziness, UTIs, fatigue, acne, and vomitting. Live vaccines should be avoided.  This medication has been linked to serious infections; higher rate of mortality; malignancy and lymphoproliferative disorders; major adverse cardiovascular events; thrombosis; thrombocytopenia and lymphopenia; lipid elevations; and retinal detachment.
Spironolactone Counseling: Patient advised regarding risks of diarrhea, abdominal pain, hyperkalemia, birth defects (for female patients), liver toxicity and renal toxicity. The patient may need blood work to monitor liver and kidney function and potassium levels while on therapy. The patient verbalized understanding of the proper use and possible adverse effects of spironolactone.  All of the patient's questions and concerns were addressed.
Itraconazole Counseling:  I discussed with the patient the risks of itraconazole including but not limited to liver damage, nausea/vomiting, neuropathy, and severe allergy.  The patient understands that this medication is best absorbed when taken with acidic beverages such as non-diet cola or ginger ale.  The patient understands that monitoring is required including baseline LFTs and repeat LFTs at intervals.  The patient understands that they are to contact us or the primary physician if concerning signs are noted.
Libtayo Pregnancy And Lactation Text: This medication is contraindicated in pregnancy and when breast feeding.
Griseofulvin Pregnancy And Lactation Text: This medication is Pregnancy Category X and is known to cause serious birth defects. It is unknown if this medication is excreted in breast milk but breast feeding should be avoided.
Elidel Counseling: Patient may experience a mild burning sensation during topical application. Elidel is not approved in children less than 2 years of age. There have been case reports of hematologic and skin malignancies in patients using topical calcineurin inhibitors although causality is questionable.
Include Pregnancy/Lactation Warning?: No
Metronidazole Counseling:  I discussed with the patient the risks of metronidazole including but not limited to seizures, nausea/vomiting, a metallic taste in the mouth, nausea/vomiting and severe allergy.
Topical Ketoconazole Counseling: Patient counseled that this medication may cause skin irritation or allergic reactions.  In the event of skin irritation, the patient was advised to reduce the amount of the drug applied or use it less frequently.   The patient verbalized understanding of the proper use and possible adverse effects of ketoconazole.  All of the patient's questions and concerns were addressed.
Bexarotene Pregnancy And Lactation Text: This medication is Pregnancy Category X and should not be given to women who are pregnant or may become pregnant. This medication should not be used if you are breast feeding.
Odomzo Counseling- I discussed with the patient the risks of Odomzo including but not limited to nausea, vomiting, diarrhea, constipation, weight loss, changes in the sense of taste, decreased appetite, muscle spasms, and hair loss.  The patient verbalized understanding of the proper use and possible adverse effects of Odomzo.  All of the patient's questions and concerns were addressed.
Niacinamide Pregnancy And Lactation Text: These medications are considered safe during pregnancy.
Humira Counseling:  I discussed with the patient the risks of adalimumab including but not limited to myelosuppression, immunosuppression, autoimmune hepatitis, demyelinating diseases, lymphoma, and serious infections.  The patient understands that monitoring is required including a PPD at baseline and must alert us or the primary physician if symptoms of infection or other concerning signs are noted.
Skyrizi Counseling: I discussed with the patient the risks of risankizumab-rzaa including but not limited to immunosuppression, and serious infections.  The patient understands that monitoring is required including a PPD at baseline and must alert us or the primary physician if symptoms of infection or other concerning signs are noted.
Isotretinoin Pregnancy And Lactation Text: This medication is Pregnancy Category X and is considered extremely dangerous during pregnancy. It is unknown if it is excreted in breast milk.
Aklief counseling:  Patient advised to apply a pea-sized amount only at bedtime and wait 30 minutes after washing their face before applying.  If too drying, patient may add a non-comedogenic moisturizer.  The most commonly reported side effects including irritation, redness, scaling, dryness, stinging, burning, itching, and increased risk of sunburn.  The patient verbalized understanding of the proper use and possible adverse effects of retinoids.  All of the patient's questions and concerns were addressed.
Ivermectin Counseling:  Patient instructed to take medication on an empty stomach with a full glass of water.  Patient informed of potential adverse effects including but not limited to nausea, diarrhea, dizziness, itching, and swelling of the extremities or lymph nodes.  The patient verbalized understanding of the proper use and possible adverse effects of ivermectin.  All of the patient's questions and concerns were addressed.
Topical Metronidazole Counseling: Metronidazole is a topical antibiotic medication. You may experience burning, stinging, redness, or allergic reactions.  Please call our office if you develop any problems from using this medication.
Arava Counseling:  Patient counseled regarding adverse effects of Arava including but not limited to nausea, vomiting, abnormalities in liver function tests. Patients may develop mouth sores, rash, diarrhea, and abnormalities in blood counts. The patient understands that monitoring is required including LFTs and blood counts.  There is a rare possibility of scarring of the liver and lung problems that can occur when taking methotrexate. Persistent nausea, loss of appetite, pale stools, dark urine, cough, and shortness of breath should be reported immediately. Patient advised to discontinue Arava treatment and consult with a physician prior to attempting conception. The patient will have to undergo a treatment to eliminate Arava from the body prior to conception.
Tranexamic Acid Counseling:  Patient advised of the small risk of bleeding problems with tranexamic acid. They were also instructed to call if they developed any nausea, vomiting or diarrhea. All of the patient's questions and concerns were addressed.
Albendazole Pregnancy And Lactation Text: This medication is Pregnancy Category C and it isn't known if it is safe during pregnancy. It is also excreted in breast milk.
Cibinqo Pregnancy And Lactation Text: It is unknown if this medication will adversely affect pregnancy or breast feeding.  You should not take this medication if you are currently pregnant or planning a pregnancy or while breastfeeding.
Isotretinoin Counseling: Patient should get monthly blood tests, not donate blood, not drive at night if vision affected, not share medication, and not undergo elective surgery for 6 months after tx completed. Side effects reviewed, pt to contact office should one occur.
Spironolactone Pregnancy And Lactation Text: This medication can cause feminization of the male fetus and should be avoided during pregnancy. The active metabolite is also found in breast milk.
Metronidazole Pregnancy And Lactation Text: This medication is Pregnancy Category B and considered safe during pregnancy.  It is also excreted in breast milk.
Protopic Counseling: \\nPatient Education Handout provided to pt regarding medication:\\n Topical Tacrolimus (Brand name: Protopic)  \\n- Dermatologists prefer topical tacrolimus for treating certain rashes on sensitive areas of your body such as the face (including eyelids), groin, & body folds (such as the armpits). \\n- It reduces inflammation on the skin. This improves the itching, flaking, and/or redness. \\n- Since it is not a steroid, it won’t thin the skin and is safe for sensitive body areas.\\n- At most pharmacies it is dispensed in a Vaseline-like form. \\n- At a specialty pharmacy it can be compounded into a cream for a fixed cash price. \\nCost: If your insurance denies coverage, please use [employer] to obtain a coupon to purchase it for cheaper. We also may send this prescription to a specialty pharmacy to get it for the cheapest price possible.\\nAlternatives: Hydrocortisone 2.5% (a prescription topical steroid) is an alternative, but it should not be used around the eyes due to the risk of causing glaucoma. \\nUse: Tacrolimus is FDA approved for use with eczema (atopic dermatitis). It is also commonly used “off-label” for other rashes that affect sensitive areas of the body. \\nPlease use this twice a day to affected areas until clear or symptoms resolve. Repeat as needed. \\nIf not improved after using for 4-6 weeks continuously, please stop use and follow up with me in clinic.  \\nNotable Side Effects: You may feel a warm, burning, or stinging sensation when applied to the skin. This usually improves as the rash improves. Alcohol intake may worsen the burning sensation as well as cause redness and flushing.\\nTo reduce the risk of a burning sensation: \\n1. Place the tube in the fridge before first time use (off-label).\\n2. First test it on the forearm before applying it to sensitive areas. \\n          Once tolerated, you may then try storing it at room temperature. \\nBox Warning: Rare cases of malignancy/cancer have been reported in patients treated with topical calcineurin inhibitors, but the relationship has not been proven. A large-scale study known as the APPLES study1 monitored children who used the medication and did not see an increased risk of cancer.  Do not use it continuously for a long time and limit use to only the affected areas.\\[id number] AS, Reyes R, Elaine SC, et al. No evidence of increased cancer incidence in children using topical tacrolimus for atopic dermatitis. J Am Acad Dermatol. 2020;83(2):375-381
Aklief Pregnancy And Lactation Text: It is unknown if this medication is safe to use during pregnancy.  It is unknown if this medication is excreted in breast milk.  Breastfeeding women should use the topical cream on the smallest area of the skin for the shortest time needed while breastfeeding.  Do not apply to nipple and areola.
Nsaids Counseling: NSAID Counseling: I discussed with the patient that NSAIDs should be taken with food. Prolonged use of NSAIDs can result in the development of stomach ulcers.  Patient advised to stop taking NSAIDs if abdominal pain occurs.  The patient verbalized understanding of the proper use and possible adverse effects of NSAIDs.  All of the patient's questions and concerns were addressed.
Topical Metronidazole Pregnancy And Lactation Text: This medication is Pregnancy Category B and considered safe during pregnancy.  It is also considered safe to use while breastfeeding.
Qbrexza Counseling:  I discussed with the patient the risks of Qbrexza including but not limited to headache, mydriasis, blurred vision, dry eyes, nasal dryness, dry mouth, dry throat, dry skin, urinary hesitation, and constipation.  Local skin reactions including erythema, burning, stinging, and itching can also occur.
Minocycline Pregnancy And Lactation Text: This medication is Pregnancy Category D and not consider safe during pregnancy. It is also excreted in breast milk.
Opioid Counseling: I discussed with the patient the potential side effects of opioids including but not limited to addiction, altered mental status, and depression. I stressed avoiding alcohol, benzodiazepines, muscle relaxants and sleep aids unless specifically okayed by a physician. The patient verbalized understanding of the proper use and possible adverse effects of opioids. All of the patient's questions and concerns were addressed. They were instructed to flush the remaining pills down the toilet if they did not need them for pain.
Minocycline Counseling: Patient advised regarding possible photosensitivity and discoloration of the teeth, skin, lips, tongue and gums.  Patient instructed to avoid sunlight, if possible.  When exposed to sunlight, patients should wear protective clothing, sunglasses, and sunscreen.  The patient was instructed to call the office immediately if the following severe adverse effects occur:  hearing changes, easy bruising/bleeding, severe headache, or vision changes.  The patient verbalized understanding of the proper use and possible adverse effects of minocycline.  All of the patient's questions and concerns were addressed.
Eucrisa Counseling: Patient may experience a mild burning sensation during topical application. Eucrisa is not approved in children less than 3 months of age.
Protopic Pregnancy And Lactation Text: This medication is Pregnancy Category C. It is unknown if this medication is excreted in breast milk when applied topically.
Litfulo Counseling: I discussed with the patient the risks of Litfulo therapy including but not limited to upper respiratory tract infections, shingles, cold sores, and nausea. Live vaccines should be avoided.  This medication has been linked to serious infections; higher rate of mortality; malignancy and lymphoproliferative disorders; major adverse cardiovascular events; thrombosis; gastrointestinal perforations; neutropenia; lymphopenia; anemia; liver enzyme elevations; and lipid elevations.
Tranexamic Acid Pregnancy And Lactation Text: It is unknown if this medication is safe during pregnancy or breast feeding.
Nsaids Pregnancy And Lactation Text: These medications are considered safe up to 30 weeks gestation. It is excreted in breast milk.
High Dose Vitamin A Pregnancy And Lactation Text: High dose vitamin A therapy is contraindicated during pregnancy and breast feeding.
Hyrimoz Counseling:  I discussed with the patient the risks of adalimumab including but not limited to myelosuppression, immunosuppression, autoimmune hepatitis, demyelinating diseases, lymphoma, and serious infections.  The patient understands that monitoring is required including a PPD at baseline and must alert us or the primary physician if symptoms of infection or other concerning signs are noted.
Clofazimine Counseling:  I discussed with the patient the risks of clofazimine including but not limited to skin and eye pigmentation, liver damage, nausea/vomiting, gastrointestinal bleeding and allergy.
Azelaic Acid Counseling: Patient counseled that medicine may cause skin irritation and to avoid applying near the eyes.  In the event of skin irritation, the patient was advised to reduce the amount of the drug applied or use it less frequently.   The patient verbalized understanding of the proper use and possible adverse effects of azelaic acid.  All of the patient's questions and concerns were addressed.
Azathioprine Counseling:  I discussed with the patient the risks of azathioprine including but not limited to myelosuppression, immunosuppression, hepatotoxicity, lymphoma, and infections.  The patient understands that monitoring is required including baseline LFTs, Creatinine, possible TPMP genotyping and weekly CBCs for the first month and then every 2 weeks thereafter.  The patient verbalized understanding of the proper use and possible adverse effects of azathioprine.  All of the patient's questions and concerns were addressed.
Ketoconazole Pregnancy And Lactation Text: This medication is Pregnancy Category C and it isn't know if it is safe during pregnancy. It is also excreted in breast milk and breast feeding isn't recommended.
Topical Steroids Counseling: I discussed with the patient that prolonged use of topical steroids can result in the increased appearance of superficial blood vessels (telangiectasias), lightening (hypopigmentation) and thinning of the skin (atrophy).  Patient understands to avoid using high potency steroids in skin folds, the groin or the face.  The patient verbalized understanding of the proper use and possible adverse effects of topical steroids.  All of the patient's questions and concerns were addressed.
Quinolones Counseling:  I discussed with the patient the risks of fluoroquinolones including but not limited to GI upset, allergic reaction, drug rash, diarrhea, dizziness, photosensitivity, yeast infections, liver function test abnormalities, tendonitis/tendon rupture.
Valtrex Counseling: I discussed with the patient the risks of valacyclovir including but not limited to kidney damage, nausea, vomiting and severe allergy.  The patient understands that if the infection seems to be worsening or is not improving, they are to call.
Ketoconazole Counseling:   Patient counseled regarding improving absorption with orange juice.  Adverse effects include but are not limited to breast enlargement, headache, diarrhea, nausea, upset stomach, liver function test abnormalities, taste disturbance, and stomach pain.  There is a rare possibility of liver failure that can occur when taking ketoconazole. The patient understands that monitoring of LFTs may be required, especially at baseline. The patient verbalized understanding of the proper use and possible adverse effects of ketoconazole.  All of the patient's questions and concerns were addressed.
Litfulo Pregnancy And Lactation Text: Based on animal studies, Lifulo may cause embryo-fetal harm when administered to pregnant women.  The medication should not be used in pregnancy.  Breastfeeding is not recommended during treatment.
High Dose Vitamin A Counseling: Side effects reviewed, pt to contact office should one occur.
Spevigo Counseling: I discussed with the patient the risks of Spevigo including but not limited to fatigue, nasuea, vomiting, headache, pruritus, urinary tract infection, an infusion related reactions.  The patient understands that monitoring is required including screening for tuberculosis at baseline and yearly screening thereafter while continuing Spevigo therapy. They should contact us if symptoms of infection or other concerning signs are noted.
Eucrisa Pregnancy And Lactation Text: This medication has not been assigned a Pregnancy Risk Category but animal studies failed to show danger with the topical medication. It is unknown if the medication is excreted in breast milk.
Terbinafine Counseling: Patient counseling regarding adverse effects of terbinafine including but not limited to headache, diarrhea, rash, upset stomach, liver function test abnormalities, itching, taste/smell disturbance, nausea, abdominal pain, and flatulence.  There is a rare possibility of liver failure that can occur when taking terbinafine.  The patient understands that a baseline LFT and kidney function test may be required. The patient verbalized understanding of the proper use and possible adverse effects of terbinafine.  All of the patient's questions and concerns were addressed.
Azathioprine Pregnancy And Lactation Text: This medication is Pregnancy Category D and isn't considered safe during pregnancy. It is unknown if this medication is excreted in breast milk.
Clofazimine Pregnancy And Lactation Text: This medication is Pregnancy Category C and isn't considered safe during pregnancy. It is excreted in breast milk.
Rhofade Counseling: Rhofade is a topical medication which can decrease superficial blood flow where applied. Side effects are uncommon and include stinging, redness and allergic reactions.
Dutasteride Male Counseling: Dustasteride Counseling:  I discussed with the patient the risks of use of dutasteride including but not limited to decreased libido, decreased ejaculate volume, and gynecomastia. Women who can become pregnant should not handle medication.  All of the patient's questions and concerns were addressed.
Topical Steroids Applications Pregnancy And Lactation Text: Most topical steroids are considered safe to use during pregnancy and lactation.  Any topical steroid applied to the breast or nipple should be washed off before breastfeeding.
Use Enhanced Medication Counseling?: Yes
Spevigo Pregnancy And Lactation Text: The risk during pregnancy and breastfeeding is uncertain with this medication. This medication does cross the placenta. It is unknown if this medication is found in breast milk.
Valtrex Pregnancy And Lactation Text: this medication is Pregnancy Category B and is considered safe during pregnancy. This medication is not directly found in breast milk but it's metabolite acyclovir is present.
Olanzapine Counseling- I discussed with the patient the common side effects of olanzapine including but are not limited to: lack of energy, dry mouth, increased appetite, sleepiness, tremor, constipation, dizziness, changes in behavior, or restlessness.  Explained that teenagers are more likely to experience headaches, abdominal pain, pain in the arms or legs, tiredness, and sleepiness.  Serious side effects include but are not limited: increased risk of death in elderly patients who are confused, have memory loss, or dementia-related psychosis; hyperglycemia; increased cholesterol and triglycerides; and weight gain.
Qbrexza Pregnancy And Lactation Text: There is no available data on Qbrexza use in pregnant women.  There is no available data on Qbrexza use in lactation.
Hydroquinone Counseling:  Patient advised that medication may result in skin irritation, lightening (hypopigmentation), dryness, and burning.  In the event of skin irritation, the patient was advised to reduce the amount of the drug applied or use it less frequently.  Rarely, spots that are treated with hydroquinone can become darker (pseudoochronosis).  Should this occur, patient instructed to stop medication and call the office. The patient verbalized understanding of the proper use and possible adverse effects of hydroquinone.  All of the patient's questions and concerns were addressed.
Olumiant Counseling: I discussed with the patient the risks of Olumiant therapy including but not limited to upper respiratory tract infections, shingles, cold sores, and nausea. Live vaccines should be avoided.  This medication has been linked to serious infections; higher rate of mortality; malignancy and lymphoproliferative disorders; major adverse cardiovascular events; thrombosis; gastrointestinal perforations; neutropenia; lymphopenia; anemia; liver enzyme elevations; and lipid elevations.
Colchicine Counseling:  Patient counseled regarding adverse effects including but not limited to stomach upset (nausea, vomiting, stomach pain, or diarrhea).  Patient instructed to limit alcohol consumption while taking this medication.  Colchicine may reduce blood counts especially with prolonged use.  The patient understands that monitoring of kidney function and blood counts may be required, especially at baseline. The patient verbalized understanding of the proper use and possible adverse effects of colchicine.  All of the patient's questions and concerns were addressed.
Cellcept Counseling:  I discussed with the patient the risks of mycophenolate mofetil including but not limited to infection/immunosuppression, GI upset, hypokalemia, hypercholesterolemia, bone marrow suppression, lymphoproliferative disorders, malignancy, GI ulceration/bleed/perforation, colitis, interstitial lung disease, kidney failure, progressive multifocal leukoencephalopathy, and birth defects.  The patient understands that monitoring is required including a baseline creatinine and regular CBC testing. In addition, patient must alert us immediately if symptoms of infection or other concerning signs are noted.
Rinvoq Counseling: I discussed with the patient the risks of Rinvoq therapy including but not limited to upper respiratory tract infections, shingles, cold sores, bronchitis, nausea, cough, fever, acne, and headache. Live vaccines should be avoided.  This medication has been linked to serious infections; higher rate of mortality; malignancy and lymphoproliferative disorders; major adverse cardiovascular events; thrombosis; thrombocytopenia, anemia, and neutropenia; lipid elevations; liver enzyme elevations; and gastrointestinal perforations.
Terbinafine Pregnancy And Lactation Text: This medication is Pregnancy Category B and is considered safe during pregnancy. It is also excreted in breast milk and breast feeding isn't recommended.
Dutasteride Female Counseling: Dutasteride Counseling:  I discussed with the patient the risks of use of dutasteride including but not limited to decreased libido and sexual dysfunction. Explained the teratogenic nature of the medication and stressed the importance of not getting pregnant during treatment. All of the patient's questions and concerns were addressed.
Rifampin Counseling: I discussed with the patient the risks of rifampin including but not limited to liver damage, kidney damage, red-orange body fluids, nausea/vomiting and severe allergy.
Benzoyl Peroxide Counseling: Patient counseled that medicine may cause skin irritation and bleach clothing.  In the event of skin irritation, the patient was advised to reduce the amount of the drug applied or use it less frequently.   The patient verbalized understanding of the proper use and possible adverse effects of benzoyl peroxide.  All of the patient's questions and concerns were addressed.
Adbry Counseling: I discussed with the patient the risks of tralokinumab including but not limited to eye infection and irritation, cold sores, injection site reactions, worsening of asthma, allergic reactions and increased risk of parasitic infection.  Live vaccines should be avoided while taking tralokinumab. The patient understands that monitoring is required and they must alert us or the primary physician if symptoms of infection or other concerning signs are noted.
Olanzapine Pregnancy And Lactation Text: This medication is pregnancy category C.   There are no adequate and well controlled trials with olanzapine in pregnant females.  Olanzapine should be used during pregnancy only if the potential benefit justifies the potential risk to the fetus.   In a study in lactating healthy women, olanzapine was excreted in breast milk.  It is recommended that women taking olanzapine should not breast feed.
Ilumya Counseling: I discussed with the patient the risks of tildrakizumab including but not limited to immunosuppression, malignancy, posterior leukoencephalopathy syndrome, and serious infections.  The patient understands that monitoring is required including a PPD at baseline and must alert us or the primary physician if symptoms of infection or other concerning signs are noted.
Olumiant Pregnancy And Lactation Text: Based on animal studies, Olumiant may cause embryo-fetal harm when administered to pregnant women.  The medication should not be used in pregnancy.  Breastfeeding is not recommended during treatment.
Topical Sulfur Applications Counseling: Topical Sulfur Counseling: Patient counseled that this medication may cause skin irritation or allergic reactions.  In the event of skin irritation, the patient was advised to reduce the amount of the drug applied or use it less frequently.   The patient verbalized understanding of the proper use and possible adverse effects of topical sulfur application.  All of the patient's questions and concerns were addressed.
Azithromycin Counseling:  I discussed with the patient the risks of azithromycin including but not limited to GI upset, allergic reaction, drug rash, diarrhea, and yeast infections.
Stelara Counseling:  I discussed with the patient the risks of ustekinumab including but not limited to immunosuppression, malignancy, posterior leukoencephalopathy syndrome, and serious infections.  The patient understands that monitoring is required including a PPD at baseline and must alert us or the primary physician if symptoms of infection or other concerning signs are noted.
Rifampin Pregnancy And Lactation Text: This medication is Pregnancy Category C and it isn't know if it is safe during pregnancy. It is also excreted in breast milk and should not be used if you are breast feeding.
Taltz Counseling: I discussed with the patient the risks of ixekizumab including but not limited to immunosuppression, serious infections, worsening of inflammatory bowel disease and drug reactions.  The patient understands that monitoring is required including a PPD at baseline and must alert us or the primary physician if symptoms of infection or other concerning signs are noted.
Infliximab Counseling:  I discussed with the patient the risks of infliximab including but not limited to myelosuppression, immunosuppression, autoimmune hepatitis, demyelinating diseases, lymphoma, and serious infections.  The patient understands that monitoring is required including a PPD at baseline and must alert us or the primary physician if symptoms of infection or other concerning signs are noted.
Topical Sulfur Applications Pregnancy And Lactation Text: This medication is considered safe during pregnancy and breast feeding secondary to limited systemic absorption.
Solaraze Counseling:  I discussed with the patient the risks of Solaraze including but not limited to erythema, scaling, itching, weeping, crusting, and pain.
Azithromycin Pregnancy And Lactation Text: This medication is considered safe during pregnancy and is also secreted in breast milk.
Ozempic Counseling: I reviewed the possible side effects including: thyroid tumors, kidney disease, gallbladder disease, abdominal pain, constipation, diarrhea, nausea, vomiting and pancreatitis. Do not take this medication if you have a history or family history of multiple endocrine neoplasia syndrome type 2. Side effects reviewed, pt to contact office should one occur.
Benzoyl Peroxide Pregnancy And Lactation Text: This medication is Pregnancy Category C. It is unknown if benzoyl peroxide is excreted in breast milk.
Oral Minoxidil Counseling- I discussed with the patient the risks of oral minoxidil including but not limited to shortness of breath, swelling of the feet or ankles, dizziness, lightheadedness, unwanted hair growth and allergic reaction.  The patient verbalized understanding of the proper use and possible adverse effects of oral minoxidil.  All of the patient's questions and concerns were addressed.
Imiquimod Counseling:  I discussed with the patient the risks of imiquimod including but not limited to erythema, scaling, itching, weeping, crusting, and pain.  Patient understands that the inflammatory response to imiquimod is variable from person to person and was educated regarded proper titration schedule.  If flu-like symptoms develop, patient knows to discontinue the medication and contact us.
Adbry Pregnancy And Lactation Text: It is unknown if this medication will adversely affect pregnancy or breast feeding.
Solaraze Pregnancy And Lactation Text: This medication is Pregnancy Category B and is considered safe. There is some data to suggest avoiding during the third trimester. It is unknown if this medication is excreted in breast milk.
Klisyri Counseling:  I discussed with the patient the risks of Klisyri including but not limited to erythema, scaling, itching, weeping, crusting, and pain.
Sarecycline Counseling: Patient advised regarding possible photosensitivity and discoloration of the teeth, skin, lips, tongue and gums.  Patient instructed to avoid sunlight, if possible.  When exposed to sunlight, patients should wear protective clothing, sunglasses, and sunscreen.  The patient was instructed to call the office immediately if the following severe adverse effects occur:  hearing changes, easy bruising/bleeding, severe headache, or vision changes.  The patient verbalized understanding of the proper use and possible adverse effects of sarecycline.  All of the patient's questions and concerns were addressed.
Cyclophosphamide Counseling:  I discussed with the patient the risks of cyclophosphamide including but not limited to hair loss, hormonal abnormalities, decreased fertility, abdominal pain, diarrhea, nausea and vomiting, bone marrow suppression and infection. The patient understands that monitoring is required while taking this medication.
Dapsone Counseling: I discussed with the patient the risks of dapsone including but not limited to hemolytic anemia, agranulocytosis, rashes, methemoglobinemia, kidney failure, peripheral neuropathy, headaches, GI upset, and liver toxicity.  Patients who start dapsone require monitoring including baseline LFTs and weekly CBCs for the first month, then every month thereafter.  The patient verbalized understanding of the proper use and possible adverse effects of dapsone.  All of the patient's questions and concerns were addressed.
Finasteride Male Counseling: Finasteride Counseling:  I discussed with the patient the risks of use of finasteride including but not limited to decreased libido, decreased ejaculate volume, gynecomastia, and depression. Women should not handle medication.  All of the patient's questions and concerns were addressed.
Carac Counseling:  I discussed with the patient the risks of Carac including but not limited to erythema, scaling, itching, weeping, crusting, and pain.
Rinvoq Pregnancy And Lactation Text: Based on animal studies, Rinvoq may cause embryo-fetal harm when administered to pregnant women.  The medication should not be used in pregnancy.  Breastfeeding is not recommended during treatment and for 6 days after the last dose.
Bimzelx Counseling:  I discussed with the patient the risks of Bimzelx including but not limited to depression, immunosuppression, allergic reactions and infections.  The patient understands that monitoring is required including a PPD at baseline and must alert us or the primary physician if symptoms of infection or other concerning signs are noted.
Dutasteride Pregnancy And Lactation Text: This medication is absolutely contraindicated in women, especially during pregnancy and breast feeding. Feminization of male fetuses is possible if taking while pregnant.
Wartpeel Counseling:  I discussed with the patient the risks of Wartpeel including but not limited to erythema, scaling, itching, weeping, crusting, and pain.
Bactrim Counseling:  I discussed with the patient the risks of sulfa antibiotics including but not limited to GI upset, allergic reaction, drug rash, diarrhea, dizziness, photosensitivity, and yeast infections.  Rarely, more serious reactions can occur including but not limited to aplastic anemia, agranulocytosis, methemoglobinemia, blood dyscrasias, liver or kidney failure, lung infiltrates or desquamative/blistering drug rashes.
Oral Minoxidil Pregnancy And Lactation Text: This medication should only be used when clearly needed if you are pregnant, attempting to become pregnant or breast feeding.
Finasteride Female Counseling: Finasteride Counseling:  I discussed with the patient the risks of use of finasteride including but not limited to decreased libido and sexual dysfunction. Explained the teratogenic nature of the medication and stressed the importance of not getting pregnant during treatment. All of the patient's questions and concerns were addressed.
Tremfya Counseling: I discussed with the patient the risks of guselkumab including but not limited to immunosuppression, serious infections, and drug reactions.  The patient understands that monitoring is required including a PPD at baseline and must alert us or the primary physician if symptoms of infection or other concerning signs are noted.
Calcipotriene Counseling:  I discussed with the patient the risks of calcipotriene including but not limited to erythema, scaling, itching, and irritation.
Klisyri Pregnancy And Lactation Text: It is unknown if this medication can harm a developing fetus or if it is excreted in breast milk.
Otezla Pregnancy And Lactation Text: This medication is Pregnancy Category C and it isn't known if it is safe during pregnancy. It is unknown if it is excreted in breast milk.
Nemluvio Counseling: I discussed with the patient the risks of nemolizumab including but not limited to headache, gastrointestinal complaints, nasopharyngitis, musculoskeletal complaints, injection site reactions, and allergic reactions. The patient understands that monitoring is required and they must alert us or the primary physician if any side effects are noted.
Sotyktu Pregnancy And Lactation Text: There is insufficient data to evaluate whether or not Sotyktu is safe to use during pregnancy.   It is not known if Sotyktu passes into breast milk and whether or not it is safe to use when breastfeeding.  
Soolantra Counseling: I discussed with the patients the risks of topial Soolantra. This is a medicine which decreases the number of mites and inflammation in the skin. You experience burning, stinging, eye irritation or allergic reactions.  Please call our office if you develop any problems from using this medication.
Cimzia Counseling:  I discussed with the patient the risks of Cimzia including but not limited to immunosuppression, allergic reactions and infections.  The patient understands that monitoring is required including a PPD at baseline and must alert us or the primary physician if symptoms of infection or other concerning signs are noted.
Bimzelx Pregnancy And Lactation Text: This medication crosses the placenta and the safety is uncertain during pregnancy. It is unknown if this medication is present in breast milk.
Otezla Counseling: The side effects of Otezla were discussed with the patient, including but not limited to worsening or new depression, weight loss, diarrhea, nausea, upper respiratory tract infection, and headache. Patient instructed to call the office should any adverse effect occur.  The patient verbalized understanding of the proper use and possible adverse effects of Otezla.  All the patient's questions and concerns were addressed.
Sotyktu Counseling:  I discussed the most common side effects of Sotyktu including: common cold, sore throat, sinus infections, cold sores, canker sores, folliculitis, and acne.  I also discussed more serious side effects of Sotyktu including but not limited to: serious allergic reactions; increased risk for infections such as TB; cancers such as lymphomas; rhabdomyolysis and elevated CPK; and elevated triglycerides and liver enzymes. 
Saxenda Counseling: I reviewed the possible side effects including: thyroid tumors, kidney disease, gallbladder disease, abdominal pain, constipation, diarrhea, nausea, vomiting and pancreatitis. Do not take this medication if you have a history or family history of multiple endocrine neoplasia syndrome type 2. Side effects reviewed, pt to contact office should one occur.
Cimetidine Counseling:  I discussed with the patient the risks of Cimetidine including but not limited to gynecomastia, headache, diarrhea, nausea, drowsiness, arrhythmias, pancreatitis, skin rashes, psychosis, bone marrow suppression and kidney toxicity.
Dapsone Pregnancy And Lactation Text: This medication is Pregnancy Category C and is not considered safe during pregnancy or breast feeding.
Cyclophosphamide Pregnancy And Lactation Text: This medication is Pregnancy Category D and it isn't considered safe during pregnancy. This medication is excreted in breast milk.
Bactrim Pregnancy And Lactation Text: This medication is Pregnancy Category D and is known to cause fetal risk.  It is also excreted in breast milk.
Cimzia Pregnancy And Lactation Text: This medication crosses the placenta but can be considered safe in certain situations. Cimzia may be excreted in breast milk.
Calcipotriene Pregnancy And Lactation Text: The use of this medication during pregnancy or lactation is not recommended as there is insufficient data.
Xeljanz Counseling: I discussed with the patient the risks of Xeljanz therapy including increased risk of infection, liver issues, headache, diarrhea, or cold symptoms. Live vaccines should be avoided. They were instructed to call if they have any problems.
Minoxidil Counseling: Minoxidil is a topical medication which can increase blood flow where it is applied. It is uncertain how this medication increases hair growth. Side effects are uncommon and include stinging and allergic reactions.
Tetracycline Counseling: Patient counseled regarding possible photosensitivity and increased risk for sunburn.  Patient instructed to avoid sunlight, if possible.  When exposed to sunlight, patients should wear protective clothing, sunglasses, and sunscreen.  The patient was instructed to call the office immediately if the following severe adverse effects occur:  hearing changes, easy bruising/bleeding, severe headache, or vision changes.  The patient verbalized understanding of the proper use and possible adverse effects of tetracycline.  All of the patient's questions and concerns were addressed. Patient understands to avoid pregnancy while on therapy due to potential birth defects.
Cephalexin Pregnancy And Lactation Text: This medication is Pregnancy Category B and considered safe during pregnancy.  It is also excreted in breast milk but can be used safely for shorter doses.
Nemluvio Pregnancy And Lactation Text: It is not known if Nemluvio causes fetal harm or is present in breast milk. Please proceed with caution if patients who are pregnant or breastfeeding.
Cyclosporine Counseling:  I discussed with the patient the risks of cyclosporine including but not limited to hypertension, gingival hyperplasia,myelosuppression, immunosuppression, liver damage, kidney damage, neurotoxicity, lymphoma, and serious infections. The patient understands that monitoring is required including baseline blood pressure, CBC, CMP, lipid panel and uric acid, and then 1-2 times monthly CMP and blood pressure.
Cephalexin Counseling: I counseled the patient regarding use of cephalexin as an antibiotic for prophylactic and/or therapeutic purposes. Cephalexin (commonly prescribed under brand name Keflex) is a cephalosporin antibiotic which is active against numerous classes of bacteria, including most skin bacteria. Side effects may include nausea, diarrhea, gastrointestinal upset, rash, hives, yeast infections, and in rare cases, hepatitis, kidney disease, seizures, fever, confusion, neurologic symptoms, and others. Patients with severe allergies to penicillin medications are cautioned that there is about a 10% incidence of cross-reactivity with cephalosporins. When possible, patients with penicillin allergies should use alternatives to cephalosporins for antibiotic therapy.
Soolantra Pregnancy And Lactation Text: This medication is Pregnancy Category C. This medication is considered safe during breast feeding.
Winlevi Counseling:  I discussed with the patient the risks of topical clascoterone including but not limited to erythema, scaling, itching, and stinging. Patient voiced their understanding.
Gabapentin Counseling: I discussed with the patient the risks of gabapentin including but not limited to dizziness, somnolence, fatigue and ataxia.
Xolair Counseling:  Patient informed of potential adverse effects including but not limited to fever, muscle aches, rash and allergic reactions.  The patient verbalized understanding of the proper use and possible adverse effects of Xolair.  All of the patient's questions and concerns were addressed.
Rituxan Counseling:  I discussed with the patient the risks of Rituxan infusions. Side effects can include infusion reactions, severe drug rashes including mucocutaneous reactions, reactivation of latent hepatitis and other infections and rarely progressive multifocal leukoencephalopathy.  All of the patient's questions and concerns were addressed.
Cosentyx Counseling:  I discussed with the patient the risks of Cosentyx including but not limited to worsening of Crohn's disease, immunosuppression, allergic reactions and infections.  The patient understands that monitoring is required including a PPD at baseline and must alert us or the primary physician if symptoms of infection or other concerning signs are noted.
Xeldebz Pregnancy And Lactation Text: This medication is Pregnancy Category D and is not considered safe during pregnancy.  The risk during breast feeding is also uncertain.
Clindamycin Counseling: I counseled the patient regarding use of clindamycin as an antibiotic for prophylactic and/or therapeutic purposes. Clindamycin is active against numerous classes of bacteria, including skin bacteria. Side effects may include nausea, diarrhea, gastrointestinal upset, rash, hives, yeast infections, and in rare cases, colitis.
Topical Retinoid counseling:  Patient advised to apply a pea-sized amount only at bedtime and wait 30 minutes after washing their face before applying.  If too drying, patient may add a non-comedogenic moisturizer. The patient verbalized understanding of the proper use and possible adverse effects of retinoids.  All of the patient's questions and concerns were addressed.
Oxybutynin Counseling:  I discussed with the patient the risks of oxybutynin including but not limited to skin rash, drowsiness, dry mouth, difficulty urinating, and blurred vision.
Winlevi Pregnancy And Lactation Text: This medication is considered safe during pregnancy and breastfeeding.
Doxepin Counseling:  Patient advised that the medication is sedating and not to drive a car after taking this medication. Patient informed of potential adverse effects including but not limited to dry mouth, urinary retention, and blurry vision.  The patient verbalized understanding of the proper use and possible adverse effects of doxepin.  All of the patient's questions and concerns were addressed.
Semaglutide Counseling: I reviewed the possible side effects including: thyroid tumors, kidney disease, gallbladder disease, abdominal pain, constipation, diarrhea, nausea, vomiting and pancreatitis. Do not take this medication if you have a history or family history of multiple endocrine neoplasia syndrome type 2. Side effects reviewed, pt to contact office should one occur.

## 2025-03-17 RX ORDER — LEVOTHYROXINE SODIUM 50 UG/1
50 TABLET ORAL
Qty: 90 TABLET | Refills: 3 | Status: SHIPPED | OUTPATIENT
Start: 2025-03-17

## 2025-03-17 RX ORDER — LEVOTHYROXINE SODIUM 50 UG/1
50 TABLET ORAL
Qty: 90 TABLET | Refills: 3 | OUTPATIENT
Start: 2025-03-17

## 2025-03-24 ENCOUNTER — OFFICE VISIT (OUTPATIENT)
Dept: PODIATRY CLINIC | Facility: CLINIC | Age: 80
End: 2025-03-24
Payer: COMMERCIAL

## 2025-03-24 ENCOUNTER — APPOINTMENT (OUTPATIENT)
Dept: URBAN - METROPOLITAN AREA CLINIC 244 | Age: 80
Setting detail: DERMATOLOGY
End: 2025-03-24

## 2025-03-24 DIAGNOSIS — M79.672 PAIN IN BOTH FEET: ICD-10-CM

## 2025-03-24 DIAGNOSIS — B35.1 ONYCHOMYCOSIS: Primary | ICD-10-CM

## 2025-03-24 DIAGNOSIS — L93.0 DISCOID LUPUS ERYTHEMATOSUS: ICD-10-CM

## 2025-03-24 DIAGNOSIS — M79.671 PAIN IN BOTH FEET: ICD-10-CM

## 2025-03-24 DIAGNOSIS — M79.609 PAIN DUE TO ONYCHOMYCOSIS OF NAIL: ICD-10-CM

## 2025-03-24 DIAGNOSIS — B35.1 PAIN DUE TO ONYCHOMYCOSIS OF NAIL: ICD-10-CM

## 2025-03-24 PROCEDURE — OTHER DIAGNOSIS COMMENT: OTHER

## 2025-03-24 PROCEDURE — OTHER PATHOLOGY DISCUSSION: OTHER

## 2025-03-24 PROCEDURE — 1159F MED LIST DOCD IN RCRD: CPT | Performed by: STUDENT IN AN ORGANIZED HEALTH CARE EDUCATION/TRAINING PROGRAM

## 2025-03-24 PROCEDURE — OTHER ORDER TESTS: OTHER

## 2025-03-24 PROCEDURE — 99214 OFFICE O/P EST MOD 30 MIN: CPT

## 2025-03-24 PROCEDURE — OTHER PRESCRIPTION MEDICATION MANAGEMENT: OTHER

## 2025-03-24 PROCEDURE — 99204 OFFICE O/P NEW MOD 45 MIN: CPT | Performed by: STUDENT IN AN ORGANIZED HEALTH CARE EDUCATION/TRAINING PROGRAM

## 2025-03-24 PROCEDURE — OTHER COUNSELING: OTHER

## 2025-03-24 PROCEDURE — OTHER MEDICATION COUNSELING: OTHER

## 2025-03-24 ASSESSMENT — LOCATION DETAILED DESCRIPTION DERM
LOCATION DETAILED: LEFT PROXIMAL DORSAL FOREARM
LOCATION DETAILED: LEFT INFERIOR LATERAL MALAR CHEEK
LOCATION DETAILED: RIGHT PROXIMAL DORSAL FOREARM

## 2025-03-24 ASSESSMENT — LOCATION SIMPLE DESCRIPTION DERM
LOCATION SIMPLE: RIGHT FOREARM
LOCATION SIMPLE: LEFT FOREARM
LOCATION SIMPLE: LEFT CHEEK

## 2025-03-24 ASSESSMENT — LOCATION ZONE DERM
LOCATION ZONE: ARM
LOCATION ZONE: FACE

## 2025-03-24 NOTE — PROCEDURE: MEDICATION COUNSELING
Isotretinoin Counseling: Patient should get monthly blood tests, not donate blood, not drive at night if vision affected, not share medication, and not undergo elective surgery for 6 months after tx completed. Side effects reviewed, pt to contact office should one occur.
Opzelura Counseling:  I discussed with the patient the risks of Opzelura including but not limited to nasopharngitis, bronchitis, ear infection, eosinophila, hives, diarrhea, folliculitis, tonsillitis, and rhinorrhea.  Taken orally, this medication has been linked to serious infections; higher rate of mortality; malignancy and lymphoproliferative disorders; major adverse cardiovascular events; thrombosis; thrombocytopenia, anemia, and neutropenia; and lipid elevations.
Tranexamic Acid Counseling:  Patient advised of the small risk of bleeding problems with tranexamic acid. They were also instructed to call if they developed any nausea, vomiting or diarrhea. All of the patient's questions and concerns were addressed.
Erythromycin Counseling:  I discussed with the patient the risks of erythromycin including but not limited to GI upset, allergic reaction, drug rash, diarrhea, increase in liver enzymes, and yeast infections.
Semaglutide Counseling: I reviewed the possible side effects including: thyroid tumors, kidney disease, gallbladder disease, abdominal pain, constipation, diarrhea, nausea, vomiting and pancreatitis. Do not take this medication if you have a history or family history of multiple endocrine neoplasia syndrome type 2. Side effects reviewed, pt to contact office should one occur.
Rifampin Pregnancy And Lactation Text: This medication is Pregnancy Category C and it isn't know if it is safe during pregnancy. It is also excreted in breast milk and should not be used if you are breast feeding.
Erythromycin Pregnancy And Lactation Text: This medication is Pregnancy Category B and is considered safe during pregnancy. It is also excreted in breast milk.
Propranolol Pregnancy And Lactation Text: This medication is Pregnancy Category C and it isn't known if it is safe during pregnancy. It is excreted in breast milk.
Oxybutynin Counseling:  I discussed with the patient the risks of oxybutynin including but not limited to skin rash, drowsiness, dry mouth, difficulty urinating, and blurred vision.
Ivermectin Counseling:  Patient instructed to take medication on an empty stomach with a full glass of water.  Patient informed of potential adverse effects including but not limited to nausea, diarrhea, dizziness, itching, and swelling of the extremities or lymph nodes.  The patient verbalized understanding of the proper use and possible adverse effects of ivermectin.  All of the patient's questions and concerns were addressed.
Wegovy Pregnancy And Lactation Text: The fetal risk of this medication is unknown and taking while pregnant is not recommended. It is unknown if this medication is present in breast milk.
Odomzo Counseling- I discussed with the patient the risks of Odomzo including but not limited to nausea, vomiting, diarrhea, constipation, weight loss, changes in the sense of taste, decreased appetite, muscle spasms, and hair loss.  The patient verbalized understanding of the proper use and possible adverse effects of Odomzo.  All of the patient's questions and concerns were addressed.
Thalidomide Counseling: I discussed with the patient the risks of thalidomide including but not limited to birth defects, anxiety, weakness, chest pain, dizziness, cough and severe allergy.
Quinolones Pregnancy And Lactation Text: This medication is Pregnancy Category C and it isn't know if it is safe during pregnancy. It is also excreted in breast milk.
Tazorac Pregnancy And Lactation Text: This medication is not safe during pregnancy. It is unknown if this medication is excreted in breast milk.
Niacinamide Counseling: I recommended taking niacin or niacinamide, also know as vitamin B3, twice daily. Recent evidence suggests that taking vitamin B3 (500 mg twice daily) can reduce the risk of actinic keratoses and non-melanoma skin cancers. Side effects of vitamin B3 include flushing and headache.
Litfulo Counseling: I discussed with the patient the risks of Litfulo therapy including but not limited to upper respiratory tract infections, shingles, cold sores, and nausea. Live vaccines should be avoided.  This medication has been linked to serious infections; higher rate of mortality; malignancy and lymphoproliferative disorders; major adverse cardiovascular events; thrombosis; gastrointestinal perforations; neutropenia; lymphopenia; anemia; liver enzyme elevations; and lipid elevations.
Skyrizi Counseling: I discussed with the patient the risks of risankizumab-rzaa including but not limited to immunosuppression, and serious infections.  The patient understands that monitoring is required including a PPD at baseline and must alert us or the primary physician if symptoms of infection or other concerning signs are noted.
Adbry Pregnancy And Lactation Text: It is unknown if this medication will adversely affect pregnancy or breast feeding.
Birth Control Pills Counseling: Birth Control Pill Counseling: I discussed with the patient the potential side effects of OCPs including but not limited to increased risk of stroke, heart attack, thrombophlebitis, deep venous thrombosis, hepatic adenomas, breast changes, GI upset, headaches, and depression.  The patient verbalized understanding of the proper use and possible adverse effects of OCPs. All of the patient's questions and concerns were addressed.
Sarecycline Pregnancy And Lactation Text: This medication is Pregnancy Category D and not consider safe during pregnancy. It is also excreted in breast milk.
Bimzelx Counseling:  I discussed with the patient the risks of Bimzelx including but not limited to depression, immunosuppression, allergic reactions and infections.  The patient understands that monitoring is required including a PPD at baseline and must alert us or the primary physician if symptoms of infection or other concerning signs are noted.
Stelara Counseling:  I discussed with the patient the risks of ustekinumab including but not limited to immunosuppression, malignancy, posterior leukoencephalopathy syndrome, and serious infections.  The patient understands that monitoring is required including a PPD at baseline and must alert us or the primary physician if symptoms of infection or other concerning signs are noted.
Gabapentin Counseling: I discussed with the patient the risks of gabapentin including but not limited to dizziness, somnolence, fatigue and ataxia.
Metronidazole Pregnancy And Lactation Text: This medication is Pregnancy Category B and considered safe during pregnancy.  It is also excreted in breast milk.
Humira Pregnancy And Lactation Text: This medication is Pregnancy Category B and is considered safe during pregnancy. It is unknown if this medication is excreted in breast milk.
Imiquimod Counseling:  I discussed with the patient the risks of imiquimod including but not limited to erythema, scaling, itching, weeping, crusting, and pain.  Patient understands that the inflammatory response to imiquimod is variable from person to person and was educated regarded proper titration schedule.  If flu-like symptoms develop, patient knows to discontinue the medication and contact us.
Otezla Counseling: The side effects of Otezla were discussed with the patient, including but not limited to worsening or new depression, weight loss, diarrhea, nausea, upper respiratory tract infection, and headache. Patient instructed to call the office should any adverse effect occur.  The patient verbalized understanding of the proper use and possible adverse effects of Otezla.  All the patient's questions and concerns were addressed.
Nemluvio Pregnancy And Lactation Text: It is not known if Nemluvio causes fetal harm or is present in breast milk. Please proceed with caution if patients who are pregnant or breastfeeding.
Bexarotene Pregnancy And Lactation Text: This medication is Pregnancy Category X and should not be given to women who are pregnant or may become pregnant. This medication should not be used if you are breast feeding.
Glycopyrrolate Counseling:  I discussed with the patient the risks of glycopyrrolate including but not limited to skin rash, drowsiness, dry mouth, difficulty urinating, and blurred vision.
Opzelura Pregnancy And Lactation Text: There is insufficient data to evaluate drug-associated risk for major birth defects, miscarriage, or other adverse maternal or fetal outcomes.  There is a pregnancy registry that monitors pregnancy outcomes in pregnant persons exposed to the medication during pregnancy.  It is unknown if this medication is excreted in breast milk.  Do not breastfeed during treatment and for about 4 weeks after the last dose.
Litfulo Pregnancy And Lactation Text: Based on animal studies, Lifulo may cause embryo-fetal harm when administered to pregnant women.  The medication should not be used in pregnancy.  Breastfeeding is not recommended during treatment.
Doxycycline Counseling: Patient counseled regarding possible photosensitivity and increased risk for sunburn. Patient instructed to avoid sunlight, if possible. When exposed to sunlight, patients should wear protective clothing, sunglasses, and sunscreen. Counseled on GI AE / can cause nausea/vomiting/abdominal pain among other symptoms. The patient verbalized understanding of the proper use and possible adverse effects of doxycycline. To take with light meal to avoid any upset stomach (nausea vomiting or ab pain). Not to take concurrently with vitamins or dairy products (take at separate times). Take with full glass of water. Do not lay down for at least 1 hour after taking. All of the patient's questions and concerns were addressed. Female patients should avoid pregnancy while on therapy due to potential birth defects.
Opioid Pregnancy And Lactation Text: These medications can lead to premature delivery and should be avoided during pregnancy. These medications are also present in breast milk in small amounts.
Low Dose Naltrexone Counseling- I discussed with the patient the potential risks and side effects of low dose naltrexone including but not limited to: more vivid dreams, headaches, nausea, vomiting, abdominal pain, fatigue, dizziness, and anxiety.
Azelaic Acid Counseling: Patient counseled that medicine may cause skin irritation and to avoid applying near the eyes.  In the event of skin irritation, the patient was advised to reduce the amount of the drug applied or use it less frequently.   The patient verbalized understanding of the proper use and possible adverse effects of azelaic acid.  All of the patient's questions and concerns were addressed.
Zoryve Pregnancy And Lactation Text: It is unknown if this medication can cause problems during pregnancy and breastfeeding.
Opioid Counseling: I discussed with the patient the potential side effects of opioids including but not limited to addiction, altered mental status, and depression. I stressed avoiding alcohol, benzodiazepines, muscle relaxants and sleep aids unless specifically okayed by a physician. The patient verbalized understanding of the proper use and possible adverse effects of opioids. All of the patient's questions and concerns were addressed. They were instructed to flush the remaining pills down the toilet if they did not need them for pain.
Doxepin Pregnancy And Lactation Text: This medication is Pregnancy Category C and it isn't known if it is safe during pregnancy. It is also excreted in breast milk and breast feeding isn't recommended.
Valtrex Counseling: I discussed with the patient the risks of valacyclovir including but not limited to kidney damage, nausea, vomiting and severe allergy.  The patient understands that if the infection seems to be worsening or is not improving, they are to call.
Hydroquinone Pregnancy And Lactation Text: This medication has not been assigned a Pregnancy Risk Category but animal studies failed to show danger with the topical medication. It is unknown if the medication is excreted in breast milk.
Cimetidine Counseling:  I discussed with the patient the risks of Cimetidine including but not limited to gynecomastia, headache, diarrhea, nausea, drowsiness, arrhythmias, pancreatitis, skin rashes, psychosis, bone marrow suppression and kidney toxicity.
Topical Steroids Applications Pregnancy And Lactation Text: Most topical steroids are considered safe to use during pregnancy and lactation.  Any topical steroid applied to the breast or nipple should be washed off before breastfeeding.
Nsaids Pregnancy And Lactation Text: These medications are considered safe up to 30 weeks gestation. It is excreted in breast milk.
Elidel Pregnancy And Lactation Text: This medication is Pregnancy Category C. It is unknown if this medication is excreted in breast milk.
Skyrizi Pregnancy And Lactation Text: The risk during pregnancy and breastfeeding is uncertain with this medication.
Prednisone Pregnancy And Lactation Text: This medication is Pregnancy Category C and it isn't know if it is safe during pregnancy. This medication is excreted in breast milk.
Klisyri Counseling:  I discussed with the patient the risks of Klisyri including but not limited to erythema, scaling, itching, weeping, crusting, and pain.
High Dose Vitamin A Counseling: Side effects reviewed, pt to contact office should one occur.
Ebglyss Counseling: I discussed with the patient the risks of lebrikizumab including but not limited to eye inflammation and irritation, cold sores, injection site reactions, allergic reactions and increased risk of parasitic infection. The patient understands that monitoring is required and they must alert us or the primary physician if symptoms of infection or other concerning signs are noted.
Hydroquinone Counseling:  Patient advised that medication may result in skin irritation, lightening (hypopigmentation), dryness, and burning.  In the event of skin irritation, the patient was advised to reduce the amount of the drug applied or use it less frequently.  Rarely, spots that are treated with hydroquinone can become darker (pseudoochronosis).  Should this occur, patient instructed to stop medication and call the office. The patient verbalized understanding of the proper use and possible adverse effects of hydroquinone.  All of the patient's questions and concerns were addressed.
Fluconazole Counseling:  Patient counseled regarding adverse effects of fluconazole including but not limited to headache, diarrhea, nausea, upset stomach, liver function test abnormalities, taste disturbance, and stomach pain.  There is a rare possibility of liver failure that can occur when taking fluconazole.  The patient understands that monitoring of LFTs and kidney function test may be required, especially at baseline. The patient verbalized understanding of the proper use and possible adverse effects of fluconazole.  All of the patient's questions and concerns were addressed.
SSKI Counseling:  I discussed with the patient the risks of SSKI including but not limited to thyroid abnormalities, metallic taste, GI upset, fever, headache, acne, arthralgias, paraesthesias, lymphadenopathy, easy bleeding, arrhythmias, and allergic reaction.
Hyrimoz Counseling:  I discussed with the patient the risks of adalimumab including but not limited to myelosuppression, immunosuppression, autoimmune hepatitis, demyelinating diseases, lymphoma, and serious infections.  The patient understands that monitoring is required including a PPD at baseline and must alert us or the primary physician if symptoms of infection or other concerning signs are noted.
5-Fu Pregnancy And Lactation Text: This medication is Pregnancy Category X and contraindicated in pregnancy and in women who may become pregnant. It is unknown if this medication is excreted in breast milk.
Infliximab Counseling:  I discussed with the patient the risks of infliximab including but not limited to myelosuppression, immunosuppression, autoimmune hepatitis, demyelinating diseases, lymphoma, and serious infections.  The patient understands that monitoring is required including a PPD at baseline and must alert us or the primary physician if symptoms of infection or other concerning signs are noted.
Erivedge Counseling- I discussed with the patient the risks of Erivedge including but not limited to nausea, vomiting, diarrhea, constipation, weight loss, changes in the sense of taste, decreased appetite, muscle spasms, and hair loss.  The patient verbalized understanding of the proper use and possible adverse effects of Erivedge.  All of the patient's questions and concerns were addressed.
Elidel Counseling: \\n \\nPatient Education Handout provided to pt regarding medication:\\n -	Dermatologists prefer topical elidel for treating certain rashes on sensitive areas of your body such as the face (including eyelids), groin, & body folds (such as the armpits). \\n-	It reduces inflammation on the skin. This improves the itching, flaking, and/or redness. \\n-	Since it is not a steroid, it won’t thin the skin and is safe for sensitive body areas.\\n-	At most pharmacies it is dispensed in a Vaseline-like form. \\n-	At a specialty pharmacy it can be compounded into a cream for a fixed cash price. \\nCost: If your insurance denies coverage, please use [employer] to obtain a coupon to purchase it for cheaper. We also may send this prescription to a specialty pharmacy to get it for the cheapest price possible.\\nAlternatives: Hydrocortisone 2.5% (a prescription topical steroid) is an alternative, but it should not be used around the eyes due to the risk of causing glaucoma. \\n \\nPlease use this twice a day to affected areas until clear or symptoms resolve. Repeat as needed. \\nIf not improved after using for 4-6 weeks continuously, please stop use and follow up with me in clinic.  \\nNotable Side Effects: You may feel a warm, burning, or stinging sensation when applied to the skin. This usually improves as the rash improves. Alcohol intake may worsen the burning sensation as well as cause redness and flushing.\\nTo reduce the risk of a burning sensation: \\n1.	Place the tube in the fridge before first time use (off-label).\\n2.	First test it on the forearm before applying it to sensitive areas. \\n          Once tolerated, you may then try storing it at room temperature. \\nBox Warning: Rare cases of malignancy/cancer have been reported in patients treated with topical calcineurin inhibitors, but the relationship has not been proven. A large-scale study known as the APPLES study1 monitored children who used the medication and did not see an increased risk of cancer. I do NOT recommend the use of this medication if there is any concern, history, or treatment of an underlying cancer such as lymphoma or leukemia. Do not use it continuously for a long time and limit use to only the affected areas.\\[id number] AS, Rexer-Deyanira R, Elaine SC, et al. No evidence of increased cancer incidence in children using topical tacrolimus for atopic dermatitis. J Am Acad Dermatol. 2020;83(2):375-381
Cyclosporine Counseling:  I discussed with the patient the risks of cyclosporine including but not limited to hypertension, gingival hyperplasia,myelosuppression, immunosuppression, liver damage, kidney damage, neurotoxicity, lymphoma, and serious infections. The patient understands that monitoring is required including baseline blood pressure, CBC, CMP, lipid panel and uric acid, and then 1-2 times monthly CMP and blood pressure.
Cellcept Counseling:  I discussed with the patient the risks of mycophenolate mofetil including but not limited to infection/immunosuppression, GI upset, hypokalemia, hypercholesterolemia, bone marrow suppression, lymphoproliferative disorders, malignancy, GI ulceration/bleed/perforation, colitis, interstitial lung disease, kidney failure, progressive multifocal leukoencephalopathy, and birth defects.  The patient understands that monitoring is required including a baseline creatinine and regular CBC testing. In addition, patient must alert us immediately if symptoms of infection or other concerning signs are noted.
Tranexamic Acid Pregnancy And Lactation Text: It is unknown if this medication is safe during pregnancy or breast feeding.
Cosentyx Counseling:  I discussed with the patient the risks of Cosentyx including but not limited to worsening of Crohn's disease, immunosuppression, allergic reactions and infections.  The patient understands that monitoring is required including a PPD at baseline and must alert us or the primary physician if symptoms of infection or other concerning signs are noted.
Odomzo Pregnancy And Lactation Text: This medication is Pregnancy Category X and is absolutely contraindicated during pregnancy. It is unknown if it is excreted in breast milk.
Picato Counseling:  I discussed with the patient the risks of Picato including but not limited to erythema, scaling, itching, weeping, crusting, and pain.
Spironolactone Pregnancy And Lactation Text: This medication can cause feminization of the male fetus and should be avoided during pregnancy. The active metabolite is also found in breast milk.
Finasteride Male Counseling: Finasteride Counseling:  I discussed with the patient the risks of use of finasteride including but not limited to decreased libido, decreased ejaculate volume, gynecomastia, and depression. Women should not handle medication.  All of the patient's questions and concerns were addressed.
Olanzapine Counseling- I discussed with the patient the common side effects of olanzapine including but are not limited to: lack of energy, dry mouth, increased appetite, sleepiness, tremor, constipation, dizziness, changes in behavior, or restlessness.  Explained that teenagers are more likely to experience headaches, abdominal pain, pain in the arms or legs, tiredness, and sleepiness.  Serious side effects include but are not limited: increased risk of death in elderly patients who are confused, have memory loss, or dementia-related psychosis; hyperglycemia; increased cholesterol and triglycerides; and weight gain.
Bexarotene Counseling:  I discussed with the patient the risks of bexarotene including but not limited to hair loss, dry lips/skin/eyes, liver abnormalities, hyperlipidemia, pancreatitis, depression/suicidal ideation, photosensitivity, drug rash/allergic reactions, hypothyroidism, anemia, leukopenia, infection, cataracts, and teratogenicity.  Patient understands that they will need regular blood tests to check lipid profile, liver function tests, white blood cell count, thyroid function tests and pregnancy test if applicable.
Solaraze Pregnancy And Lactation Text: This medication is Pregnancy Category B and is considered safe. There is some data to suggest avoiding during the third trimester. It is unknown if this medication is excreted in breast milk.
Topical Metronidazole Pregnancy And Lactation Text: This medication is Pregnancy Category B and considered safe during pregnancy.  It is also considered safe to use while breastfeeding.
Ozempic Counseling: I reviewed the possible side effects including: thyroid tumors, kidney disease, gallbladder disease, abdominal pain, constipation, diarrhea, nausea, vomiting and pancreatitis. Do not take this medication if you have a history or family history of multiple endocrine neoplasia syndrome type 2. Side effects reviewed, pt to contact office should one occur.
Cyclophosphamide Pregnancy And Lactation Text: This medication is Pregnancy Category D and it isn't considered safe during pregnancy. This medication is excreted in breast milk.
Arava Counseling:  Patient counseled regarding adverse effects of Arava including but not limited to nausea, vomiting, abnormalities in liver function tests. Patients may develop mouth sores, rash, diarrhea, and abnormalities in blood counts. The patient understands that monitoring is required including LFTs and blood counts.  There is a rare possibility of scarring of the liver and lung problems that can occur when taking methotrexate. Persistent nausea, loss of appetite, pale stools, dark urine, cough, and shortness of breath should be reported immediately. Patient advised to discontinue Arava treatment and consult with a physician prior to attempting conception. The patient will have to undergo a treatment to eliminate Arava from the body prior to conception.
Cephalexin Counseling: I counseled the patient regarding use of cephalexin as an antibiotic for prophylactic and/or therapeutic purposes. Cephalexin (commonly prescribed under brand name Keflex) is a cephalosporin antibiotic which is active against numerous classes of bacteria, including most skin bacteria. Side effects may include nausea, diarrhea, gastrointestinal upset, rash, hives, yeast infections, and in rare cases, hepatitis, kidney disease, seizures, fever, confusion, neurologic symptoms, and others. Patients with severe allergies to penicillin medications are cautioned that there is about a 10% incidence of cross-reactivity with cephalosporins. When possible, patients with penicillin allergies should use alternatives to cephalosporins for antibiotic therapy.
Propranolol Counseling:  I discussed with the patient the risks of propranolol including but not limited to low heart rate, low blood pressure, low blood sugar, restlessness and increased cold sensitivity. They should call the office if they experience any of these side effects.
Aklief Pregnancy And Lactation Text: It is unknown if this medication is safe to use during pregnancy.  It is unknown if this medication is excreted in breast milk.  Breastfeeding women should use the topical cream on the smallest area of the skin for the shortest time needed while breastfeeding.  Do not apply to nipple and areola.
Dutasteride Pregnancy And Lactation Text: This medication is absolutely contraindicated in women, especially during pregnancy and breast feeding. Feminization of male fetuses is possible if taking while pregnant.
Solaraze Counseling:  I discussed with the patient the risks of Solaraze including but not limited to erythema, scaling, itching, weeping, crusting, and pain.
Doxepin Counseling:  Patient advised that the medication is sedating and not to drive a car after taking this medication. Patient informed of potential adverse effects including but not limited to dry mouth, urinary retention, and blurry vision.  The patient verbalized understanding of the proper use and possible adverse effects of doxepin.  All of the patient's questions and concerns were addressed.
Adbry Counseling: I discussed with the patient the risks of tralokinumab including but not limited to eye infection and irritation, cold sores, injection site reactions, worsening of asthma, allergic reactions and increased risk of parasitic infection.  Live vaccines should be avoided while taking tralokinumab. The patient understands that monitoring is required and they must alert us or the primary physician if symptoms of infection or other concerning signs are noted.
Xeldebz Pregnancy And Lactation Text: This medication is Pregnancy Category D and is not considered safe during pregnancy.  The risk during breast feeding is also uncertain.
5-Fu Counseling: 5-Fluorouracil Counseling:  I discussed with the patient the risks of 5-fluorouracil including but not limited to erythema, scaling, itching, weeping, crusting, and pain.
Niacinamide Pregnancy And Lactation Text: These medications are considered safe during pregnancy.
Calcipotriene Counseling:  I discussed with the patient the risks of calcipotriene including but not limited to erythema, scaling, itching, and irritation.
Cibinqo Pregnancy And Lactation Text: It is unknown if this medication will adversely affect pregnancy or breast feeding.  You should not take this medication if you are currently pregnant or planning a pregnancy or while breastfeeding.
Libtayo Counseling- I discussed with the patient the risks of Libtayo including but not limited to nausea, vomiting, diarrhea, and bone or muscle pain.  The patient verbalized understanding of the proper use and possible adverse effects of Libtayo.  All of the patient's questions and concerns were addressed.
Xeljanz Counseling: I discussed with the patient the risks of Xeljanz therapy including increased risk of infection, liver issues, headache, diarrhea, or cold symptoms. Live vaccines should be avoided. They were instructed to call if they have any problems.
Low Dose Naltrexone Pregnancy And Lactation Text: Naltrexone is pregnancy category C.  There have been no adequate and well-controlled studies in pregnant women.  It should be used in pregnancy only if the potential benefit justifies the potential risk to the fetus.   Limited data indicates that naltrexone is minimally excreted into breastmilk.
Soolantra Pregnancy And Lactation Text: This medication is Pregnancy Category C. This medication is considered safe during breast feeding.
Cibinqo Counseling: I discussed with the patient the risks of Cibinqo therapy including but not limited to common cold, nausea, headache, cold sores, increased blood CPK levels, dizziness, UTIs, fatigue, acne, and vomitting. Live vaccines should be avoided.  This medication has been linked to serious infections; higher rate of mortality; malignancy and lymphoproliferative disorders; major adverse cardiovascular events; thrombosis; thrombocytopenia and lymphopenia; lipid elevations; and retinal detachment.
Cantharidin Pregnancy And Lactation Text: This medication has not been proven safe during pregnancy. It is unknown if this medication is excreted in breast milk.
Carac Counseling:  I discussed with the patient the risks of Carac including but not limited to erythema, scaling, itching, weeping, crusting, and pain.
Topical Sulfur Applications Pregnancy And Lactation Text: This medication is considered safe during pregnancy and breast feeding secondary to limited systemic absorption.
Olumiant Pregnancy And Lactation Text: Based on animal studies, Olumiant may cause embryo-fetal harm when administered to pregnant women.  The medication should not be used in pregnancy.  Breastfeeding is not recommended during treatment.
Methotrexate Pregnancy And Lactation Text: This medication is Pregnancy Category X and is known to cause fetal harm. This medication is excreted in breast milk.
Methotrexate Counseling:  Patient counseled regarding adverse effects of methotrexate including but not limited to nausea, vomiting, abnormalities in liver function tests. Patients may develop mouth sores, rash, diarrhea, and abnormalities in blood counts. The patient understands that monitoring is required including LFT's and blood counts.  There is a rare possibility of scarring of the liver and lung problems that can occur when taking methotrexate. Persistent nausea, loss of appetite, pale stools, dark urine, cough, and shortness of breath should be reported immediately. Patient advised to discontinue methotrexate treatment at least three months before attempting to become pregnant.  I discussed the need for folate supplements while taking methotrexate.  These supplements can decrease side effects during methotrexate treatment. The patient verbalized understanding of the proper use and possible adverse effects of methotrexate.  All of the patient's questions and concerns were addressed.
Dutasteride Male Counseling: Dustasteride Counseling:  I discussed with the patient the risks of use of dutasteride including but not limited to decreased libido, decreased ejaculate volume, and gynecomastia. Women who can become pregnant should not handle medication.  All of the patient's questions and concerns were addressed.
Topical Ketoconazole Counseling: Patient counseled that this medication may cause skin irritation or allergic reactions.  In the event of skin irritation, the patient was advised to reduce the amount of the drug applied or use it less frequently.   The patient verbalized understanding of the proper use and possible adverse effects of ketoconazole.  All of the patient's questions and concerns were addressed.
Isotretinoin Pregnancy And Lactation Text: This medication is Pregnancy Category X and is considered extremely dangerous during pregnancy. It is unknown if it is excreted in breast milk.
Mirvaso Counseling: Mirvaso is a topical medication which can decrease superficial blood flow where applied. Side effects are uncommon and include stinging, redness and allergic reactions.
Ketoconazole Counseling:   Patient counseled regarding improving absorption with orange juice.  Adverse effects include but are not limited to breast enlargement, headache, diarrhea, nausea, upset stomach, liver function test abnormalities, taste disturbance, and stomach pain.  There is a rare possibility of liver failure that can occur when taking ketoconazole. The patient understands that monitoring of LFTs may be required, especially at baseline. The patient verbalized understanding of the proper use and possible adverse effects of ketoconazole.  All of the patient's questions and concerns were addressed.
Siliq Counseling:  I discussed with the patient the risks of Siliq including but not limited to new or worsening depression, suicidal thoughts and behavior, immunosuppression, malignancy, posterior leukoencephalopathy syndrome, and serious infections.  The patient understands that monitoring is required including a PPD at baseline and must alert us or the primary physician if symptoms of infection or other concerning signs are noted. There is also a special program designed to monitor depression which is required with Siliq.
Spironolactone Counseling: Patient advised regarding risks of diarrhea, abdominal pain, hyperkalemia, birth defects (for female patients), liver toxicity and renal toxicity. The patient may need blood work to monitor liver and kidney function and potassium levels while on therapy. The patient verbalized understanding of the proper use and possible adverse effects of spironolactone.  All of the patient's questions and concerns were addressed.
Quinolones Counseling:  I discussed with the patient the risks of fluoroquinolones including but not limited to GI upset, allergic reaction, drug rash, diarrhea, dizziness, photosensitivity, yeast infections, liver function test abnormalities, tendonitis/tendon rupture.
Nsaids Counseling: NSAID Counseling: I discussed with the patient that NSAIDs should be taken with food. Prolonged use of NSAIDs can result in the development of stomach ulcers.  Patient advised to stop taking NSAIDs if abdominal pain occurs.  The patient verbalized understanding of the proper use and possible adverse effects of NSAIDs.  All of the patient's questions and concerns were addressed.
Terbinafine Pregnancy And Lactation Text: This medication is Pregnancy Category B and is considered safe during pregnancy. It is also excreted in breast milk and breast feeding isn't recommended.
Clofazimine Counseling:  I discussed with the patient the risks of clofazimine including but not limited to skin and eye pigmentation, liver damage, nausea/vomiting, gastrointestinal bleeding and allergy.
Bimzelx Pregnancy And Lactation Text: This medication crosses the placenta and the safety is uncertain during pregnancy. It is unknown if this medication is present in breast milk.
Oxybutynin Pregnancy And Lactation Text: This medication is Pregnancy Category B and is considered safe during pregnancy. It is unknown if it is excreted in breast milk.
Bactrim Pregnancy And Lactation Text: This medication is Pregnancy Category D and is known to cause fetal risk.  It is also excreted in breast milk.
Soolantra Counseling: I discussed with the patients the risks of topial Soolantra. This is a medicine which decreases the number of mites and inflammation in the skin. You experience burning, stinging, eye irritation or allergic reactions.  Please call our office if you develop any problems from using this medication.
Terbinafine Counseling: Patient counseling regarding adverse effects of terbinafine including but not limited to headache, diarrhea, rash, upset stomach, liver function test abnormalities, itching, taste/smell disturbance, nausea, abdominal pain, and flatulence.  There is a rare possibility of liver failure that can occur when taking terbinafine.  The patient understands that a baseline LFT and kidney function test may be required. The patient verbalized understanding of the proper use and possible adverse effects of terbinafine.  All of the patient's questions and concerns were addressed.
Tetracycline Counseling: Patient counseled regarding possible photosensitivity and increased risk for sunburn.  Patient instructed to avoid sunlight, if possible.  When exposed to sunlight, patients should wear protective clothing, sunglasses, and sunscreen.  The patient was instructed to call the office immediately if the following severe adverse effects occur:  hearing changes, easy bruising/bleeding, severe headache, or vision changes.  The patient verbalized understanding of the proper use and possible adverse effects of tetracycline.  All of the patient's questions and concerns were addressed. Patient understands to avoid pregnancy while on therapy due to potential birth defects.
Simlandi Counseling:  I discussed with the patient the risks of adalimumab including but not limited to myelosuppression, immunosuppression, autoimmune hepatitis, demyelinating diseases, lymphoma, and serious infections.  The patient understands that monitoring is required including a PPD at baseline and must alert us or the primary physician if symptoms of infection or other concerning signs are noted.
Topical Retinoid counseling:  Patient advised to apply a pea-sized amount only at bedtime and wait 30 minutes after washing their face before applying.  If too drying, patient may add a non-comedogenic moisturizer. The patient verbalized understanding of the proper use and possible adverse effects of retinoids.  All of the patient's questions and concerns were addressed.
Zyclara Counseling:  I discussed with the patient the risks of imiquimod including but not limited to erythema, scaling, itching, weeping, crusting, and pain.  Patient understands that the inflammatory response to imiquimod is variable from person to person and was educated regarded proper titration schedule.  If flu-like symptoms develop, patient knows to discontinue the medication and contact us.
Hydroxyzine Pregnancy And Lactation Text: This medication is not safe during pregnancy and should not be taken. It is also excreted in breast milk and breast feeding isn't recommended.
Simponi Counseling:  I discussed with the patient the risks of golimumab including but not limited to myelosuppression, immunosuppression, autoimmune hepatitis, demyelinating diseases, lymphoma, and serious infections.  The patient understands that monitoring is required including a PPD at baseline and must alert us or the primary physician if symptoms of infection or other concerning signs are noted.
Dupixent Pregnancy And Lactation Text: This medication likely crosses the placenta but the risk for the fetus is uncertain. This medication is excreted in breast milk.
Tazorac Counseling:  Patient advised that medication is irritating and drying.  Patient may need to apply sparingly and wash off after an hour before eventually leaving it on overnight.  The patient verbalized understanding of the proper use and possible adverse effects of tazorac.  All of the patient's questions and concerns were addressed.
Zoryve Counseling:  I discussed with the patient that Zoryve is not for use in the eyes, mouth or vagina. The most commonly reported side effects include diarrhea, headache, insomnia, application site pain, upper respiratory tract infections, and urinary tract infections.  All of the patient's questions and concerns were addressed.
Olumiant Counseling: I discussed with the patient the risks of Olumiant therapy including but not limited to upper respiratory tract infections, shingles, cold sores, and nausea. Live vaccines should be avoided.  This medication has been linked to serious infections; higher rate of mortality; malignancy and lymphoproliferative disorders; major adverse cardiovascular events; thrombosis; gastrointestinal perforations; neutropenia; lymphopenia; anemia; liver enzyme elevations; and lipid elevations.
Calcipotriene Pregnancy And Lactation Text: The use of this medication during pregnancy or lactation is not recommended as there is insufficient data.
Xolair Counseling:  Patient informed of potential adverse effects including but not limited to fever, muscle aches, rash and allergic reactions.  The patient verbalized understanding of the proper use and possible adverse effects of Xolair.  All of the patient's questions and concerns were addressed.
Minoxidil Counseling: Minoxidil is a topical medication which can increase blood flow where it is applied. It is uncertain how this medication increases hair growth. Side effects are uncommon and include stinging and allergic reactions.
Rinvoq Counseling: I discussed with the patient the risks of Rinvoq therapy including but not limited to upper respiratory tract infections, shingles, cold sores, bronchitis, nausea, cough, fever, acne, and headache. Live vaccines should be avoided.  This medication has been linked to serious infections; higher rate of mortality; malignancy and lymphoproliferative disorders; major adverse cardiovascular events; thrombosis; thrombocytopenia, anemia, and neutropenia; lipid elevations; liver enzyme elevations; and gastrointestinal perforations.
Oral Minoxidil Pregnancy And Lactation Text: This medication should only be used when clearly needed if you are pregnant, attempting to become pregnant or breast feeding.
Eucrisa Counseling: Patient may experience a mild burning sensation during topical application. Eucrisa is not approved in children less than 3 months of age.
Finasteride Pregnancy And Lactation Text: This medication is absolutely contraindicated during pregnancy. It is unknown if it is excreted in breast milk.
Gabapentin Pregnancy And Lactation Text: This medication is Pregnancy Category C and isn't considered safe during pregnancy. It is excreted in breast milk.
Protopic Pregnancy And Lactation Text: This medication is Pregnancy Category C. It is unknown if this medication is excreted in breast milk when applied topically.
Rifampin Counseling: I discussed with the patient the risks of rifampin including but not limited to liver damage, kidney damage, red-orange body fluids, nausea/vomiting and severe allergy.
Nemluvio Counseling: I discussed with the patient the risks of nemolizumab including but not limited to headache, gastrointestinal complaints, nasopharyngitis, musculoskeletal complaints, injection site reactions, and allergic reactions. The patient understands that monitoring is required and they must alert us or the primary physician if any side effects are noted.
Olanzapine Pregnancy And Lactation Text: This medication is pregnancy category C.   There are no adequate and well controlled trials with olanzapine in pregnant females.  Olanzapine should be used during pregnancy only if the potential benefit justifies the potential risk to the fetus.   In a study in lactating healthy women, olanzapine was excreted in breast milk.  It is recommended that women taking olanzapine should not breast feed.
Drysol Pregnancy And Lactation Text: This medication is considered safe during pregnancy and breast feeding.
Prednisone Counseling:  I discussed with the patient the risks of prolonged use of prednisone including but not limited to weight gain, insomnia, osteoporosis, mood changes, diabetes, susceptibility to infection, glaucoma and high blood pressure.  In cases where prednisone use is prolonged, patients should be monitored with blood pressure checks, serum glucose levels and an eye exam.  Additionally, the patient may need to be placed on GI prophylaxis, PCP prophylaxis, and calcium and vitamin D supplementation and/or a bisphosphonate.  The patient verbalized understanding of the proper use and the possible adverse effects of prednisone.  All of the patient's questions and concerns were addressed.
High Dose Vitamin A Pregnancy And Lactation Text: High dose vitamin A therapy is contraindicated during pregnancy and breast feeding.
Griseofulvin Counseling:  I discussed with the patient the risks of griseofulvin including but not limited to photosensitivity, cytopenia, liver damage, nausea/vomiting and severe allergy.  The patient understands that this medication is best absorbed when taken with a fatty meal (e.g., ice cream or french fries).
Saxenda Counseling: I reviewed the possible side effects including: thyroid tumors, kidney disease, gallbladder disease, abdominal pain, constipation, diarrhea, nausea, vomiting and pancreatitis. Do not take this medication if you have a history or family history of multiple endocrine neoplasia syndrome type 2. Side effects reviewed, pt to contact office should one occur.
Finasteride Female Counseling: Finasteride Counseling:  I discussed with the patient the risks of use of finasteride including but not limited to decreased libido and sexual dysfunction. Explained the teratogenic nature of the medication and stressed the importance of not getting pregnant during treatment. All of the patient's questions and concerns were addressed.
Spevigo Pregnancy And Lactation Text: The risk during pregnancy and breastfeeding is uncertain with this medication. This medication does cross the placenta. It is unknown if this medication is found in breast milk.
Rhofade Pregnancy And Lactation Text: This medication has not been assigned a Pregnancy Risk Category. It is unknown if the medication is excreted in breast milk.
Libtayo Pregnancy And Lactation Text: This medication is contraindicated in pregnancy and when breast feeding.
Albendazole Counseling:  I discussed with the patient the risks of albendazole including but not limited to cytopenia, kidney damage, nausea/vomiting and severe allergy.  The patient understands that this medication is being used in an off-label manner.
Clindamycin Pregnancy And Lactation Text: This medication can be used in pregnancy if certain situations. Clindamycin is also present in breast milk.
Topical Clindamycin Counseling: Patient counseled that this medication may cause skin irritation or allergic reactions.  In the event of skin irritation, the patient was advised to reduce the amount of the drug applied or use it less frequently.   The patient verbalized understanding of the proper use and possible adverse effects of clindamycin.  All of the patient's questions and concerns were addressed.
Rinvoq Pregnancy And Lactation Text: Based on animal studies, Rinvoq may cause embryo-fetal harm when administered to pregnant women.  The medication should not be used in pregnancy.  Breastfeeding is not recommended during treatment and for 6 days after the last dose.
Albendazole Pregnancy And Lactation Text: This medication is Pregnancy Category C and it isn't known if it is safe during pregnancy. It is also excreted in breast milk.
Sski Pregnancy And Lactation Text: This medication is Pregnancy Category D and isn't considered safe during pregnancy. It is excreted in breast milk.
Dapsone Pregnancy And Lactation Text: This medication is Pregnancy Category C and is not considered safe during pregnancy or breast feeding.
Glycopyrrolate Pregnancy And Lactation Text: This medication is Pregnancy Category B and is considered safe during pregnancy. It is unknown if it is excreted breast milk.
Aklief counseling:  Patient advised to apply a pea-sized amount only at bedtime and wait 30 minutes after washing their face before applying.  If too drying, patient may add a non-comedogenic moisturizer.  The most commonly reported side effects including irritation, redness, scaling, dryness, stinging, burning, itching, and increased risk of sunburn.  The patient verbalized understanding of the proper use and possible adverse effects of retinoids.  All of the patient's questions and concerns were addressed.
Sarecycline Counseling: Patient advised regarding possible photosensitivity and discoloration of the teeth, skin, lips, tongue and gums.  Patient instructed to avoid sunlight, if possible.  When exposed to sunlight, patients should wear protective clothing, sunglasses, and sunscreen.  The patient was instructed to call the office immediately if the following severe adverse effects occur:  hearing changes, easy bruising/bleeding, severe headache, or vision changes.  The patient verbalized understanding of the proper use and possible adverse effects of sarecycline.  All of the patient's questions and concerns were addressed.
Humira Counseling:  I discussed with the patient the risks of adalimumab including but not limited to myelosuppression, immunosuppression, autoimmune hepatitis, demyelinating diseases, lymphoma, and serious infections.  The patient understands that monitoring is required including a PPD at baseline and must alert us or the primary physician if symptoms of infection or other concerning signs are noted.
Xolair Pregnancy And Lactation Text: This medication is Pregnancy Category B and is considered safe during pregnancy. This medication is excreted in breast milk.
Spevigo Counseling: I discussed with the patient the risks of Spevigo including but not limited to fatigue, nasuea, vomiting, headache, pruritus, urinary tract infection, an infusion related reactions.  The patient understands that monitoring is required including screening for tuberculosis at baseline and yearly screening thereafter while continuing Spevigo therapy. They should contact us if symptoms of infection or other concerning signs are noted.
Dutasteride Female Counseling: Dutasteride Counseling:  I discussed with the patient the risks of use of dutasteride including but not limited to decreased libido and sexual dysfunction. Explained the teratogenic nature of the medication and stressed the importance of not getting pregnant during treatment. All of the patient's questions and concerns were addressed.
Birth Control Pills Pregnancy And Lactation Text: This medication should be avoided if pregnant and for the first 30 days post-partum.
Bactrim Counseling:  I discussed with the patient the risks of sulfa antibiotics including but not limited to GI upset, allergic reaction, drug rash, diarrhea, dizziness, photosensitivity, and yeast infections.  Rarely, more serious reactions can occur including but not limited to aplastic anemia, agranulocytosis, methemoglobinemia, blood dyscrasias, liver or kidney failure, lung infiltrates or desquamative/blistering drug rashes.
Topical Steroids Counseling: I discussed with the patient that prolonged use of topical steroids can result in the increased appearance of superficial blood vessels (telangiectasias), lightening (hypopigmentation) and thinning of the skin (atrophy).  Patient understands to avoid using high potency steroids in skin folds, the groin or the face.  The patient verbalized understanding of the proper use and possible adverse effects of topical steroids.  All of the patient's questions and concerns were addressed.
Acitretin Pregnancy And Lactation Text: This medication is Pregnancy Category X and should not be given to women who are pregnant or may become pregnant in the future. This medication is excreted in breast milk.
Valtrex Pregnancy And Lactation Text: this medication is Pregnancy Category B and is considered safe during pregnancy. This medication is not directly found in breast milk but it's metabolite acyclovir is present.
Topical Metronidazole Counseling: Metronidazole is a topical antibiotic medication. You may experience burning, stinging, redness, or allergic reactions.  Please call our office if you develop any problems from using this medication.
Dapsone Counseling: I discussed with the patient the risks of dapsone including but not limited to hemolytic anemia, agranulocytosis, rashes, methemoglobinemia, kidney failure, peripheral neuropathy, headaches, GI upset, and liver toxicity.  Patients who start dapsone require monitoring including baseline LFTs and weekly CBCs for the first month, then every month thereafter.  The patient verbalized understanding of the proper use and possible adverse effects of dapsone.  All of the patient's questions and concerns were addressed.
Cimzia Counseling:  I discussed with the patient the risks of Cimzia including but not limited to immunosuppression, allergic reactions and infections.  The patient understands that monitoring is required including a PPD at baseline and must alert us or the primary physician if symptoms of infection or other concerning signs are noted.
Wartpeel Counseling:  I discussed with the patient the risks of Wartpeel including but not limited to erythema, scaling, itching, weeping, crusting, and pain.
Hydroxychloroquine Counseling:  I discussed with the patient that a baseline ophthalmologic exam is needed at the start of therapy and every year thereafter while on therapy. A CBC may also be warranted for monitoring.  The side effects of this medication were discussed with the patient, including but not limited to agranulocytosis, aplastic anemia, seizures, rashes, retinopathy, and liver toxicity. Patient instructed to call the office should any adverse effect occur.  The patient verbalized understanding of the proper use and possible adverse effects of Plaquenil.  All the patient's questions and concerns were addressed.
Wegovy Counseling: I reviewed the possible side effects including: thyroid tumors, kidney disease, gallbladder disease, abdominal pain, constipation, diarrhea, nausea, vomiting and pancreatitis. Do not take this medication if you have a history or family history of multiple endocrine neoplasia syndrome type 2. Side effects reviewed, pt to contact office should one occur.
Azithromycin Counseling:  I discussed with the patient the risks of azithromycin including but not limited to GI upset, allergic reaction, drug rash, diarrhea, and yeast infections.
Drysol Counseling:  I discussed with the patient the risks of drysol/aluminum chloride including but not limited to skin rash, itching, irritation, burning.
Otezla Pregnancy And Lactation Text: This medication is Pregnancy Category C and it isn't known if it is safe during pregnancy. It is unknown if it is excreted in breast milk.
Azithromycin Pregnancy And Lactation Text: This medication is considered safe during pregnancy and is also secreted in breast milk.
Ebglyss Pregnancy And Lactation Text: This medication likely crosses the placenta but the risk for the fetus is uncertain. It is unknown if this medication is excreted in breast milk.
Itraconazole Counseling:  I discussed with the patient the risks of itraconazole including but not limited to liver damage, nausea/vomiting, neuropathy, and severe allergy.  The patient understands that this medication is best absorbed when taken with acidic beverages such as non-diet cola or ginger ale.  The patient understands that monitoring is required including baseline LFTs and repeat LFTs at intervals.  The patient understands that they are to contact us or the primary physician if concerning signs are noted.
Topical Sulfur Applications Counseling: Topical Sulfur Counseling: Patient counseled that this medication may cause skin irritation or allergic reactions.  In the event of skin irritation, the patient was advised to reduce the amount of the drug applied or use it less frequently.   The patient verbalized understanding of the proper use and possible adverse effects of topical sulfur application.  All of the patient's questions and concerns were addressed.
Griseofulvin Pregnancy And Lactation Text: This medication is Pregnancy Category X and is known to cause serious birth defects. It is unknown if this medication is excreted in breast milk but breast feeding should be avoided.
Qbrexza Counseling:  I discussed with the patient the risks of Qbrexza including but not limited to headache, mydriasis, blurred vision, dry eyes, nasal dryness, dry mouth, dry throat, dry skin, urinary hesitation, and constipation.  Local skin reactions including erythema, burning, stinging, and itching can also occur.
Cantharidin Counseling:  I discussed with the patient the risks of Cantharidin including but not limited to pain, redness, burning, itching, and blistering.
Azathioprine Pregnancy And Lactation Text: This medication is Pregnancy Category D and isn't considered safe during pregnancy. It is unknown if this medication is excreted in breast milk.
Cimzia Pregnancy And Lactation Text: This medication crosses the placenta but can be considered safe in certain situations. Cimzia may be excreted in breast milk.
Minocycline Counseling: Patient advised regarding possible photosensitivity and discoloration of the teeth, skin, lips, tongue and gums.  Patient instructed to avoid sunlight, if possible.  When exposed to sunlight, patients should wear protective clothing, sunglasses, and sunscreen.  The patient was instructed to call the office immediately if the following severe adverse effects occur:  hearing changes, easy bruising/bleeding, severe headache, or vision changes.  The patient verbalized understanding of the proper use and possible adverse effects of minocycline.  All of the patient's questions and concerns were addressed.
Winlevi Pregnancy And Lactation Text: This medication is considered safe during pregnancy and breastfeeding.
Use Enhanced Medication Counseling?: Yes
Acitretin Counseling:  I discussed with the patient the risks of acitretin including but not limited to hair loss, dry lips/skin/eyes, liver damage, hyperlipidemia, depression/suicidal ideation, photosensitivity.  Serious rare side effects can include but are not limited to pancreatitis, pseudotumor cerebri, bony changes, clot formation/stroke/heart attack.  Patient understands that alcohol is contraindicated since it can result in liver toxicity and significantly prolong the elimination of the drug by many years.
Dupixent Counseling: I discussed with the patient the risks of dupilumab including but not limited to eye inflammation and irritation, cold sores, injection site reactions, allergic reactions and increased risk of parasitic infection. The patient understands that monitoring is required and they must alert us or the primary physician if symptoms of infection or other concerning signs are noted.
Ilumya Counseling: I discussed with the patient the risks of tildrakizumab including but not limited to immunosuppression, malignancy, posterior leukoencephalopathy syndrome, and serious infections.  The patient understands that monitoring is required including a PPD at baseline and must alert us or the primary physician if symptoms of infection or other concerning signs are noted.
VTAMA Counseling: I discussed with the patient that VTAMA is not for use in the eyes, mouth or mouth. They should call the office if they develop any signs of allergic reactions to VTAMA. The patient verbalized understanding of the proper use and possible adverse effects of VTAMA.  All of the patient's questions and concerns were addressed.
Enbrel Counseling:  I discussed with the patient the risks of etanercept including but not limited to myelosuppression, immunosuppression, autoimmune hepatitis, demyelinating diseases, lymphoma, and infections.  The patient understands that monitoring is required including a PPD at baseline and must alert us or the primary physician if symptoms of infection or other concerning signs are noted.
Hydroxyzine Counseling: Patient advised that the medication is sedating and not to drive a car after taking this medication.  Patient informed of potential adverse effects including but not limited to dry mouth, urinary retention, and blurry vision.  The patient verbalized understanding of the proper use and possible adverse effects of hydroxyzine.  All of the patient's questions and concerns were addressed.
Sotyktu Pregnancy And Lactation Text: There is insufficient data to evaluate whether or not Sotyktu is safe to use during pregnancy.   It is not known if Sotyktu passes into breast milk and whether or not it is safe to use when breastfeeding.  
Tremfya Counseling: I discussed with the patient the risks of guselkumab including but not limited to immunosuppression, serious infections, and drug reactions.  The patient understands that monitoring is required including a PPD at baseline and must alert us or the primary physician if symptoms of infection or other concerning signs are noted.
Cyclophosphamide Counseling:  I discussed with the patient the risks of cyclophosphamide including but not limited to hair loss, hormonal abnormalities, decreased fertility, abdominal pain, diarrhea, nausea and vomiting, bone marrow suppression and infection. The patient understands that monitoring is required while taking this medication.
Benzoyl Peroxide Counseling: Patient counseled that medicine may cause skin irritation and bleach clothing.  In the event of skin irritation, the patient was advised to reduce the amount of the drug applied or use it less frequently.   The patient verbalized understanding of the proper use and possible adverse effects of benzoyl peroxide.  All of the patient's questions and concerns were addressed.
Doxycycline Pregnancy And Lactation Text: This medication is Pregnancy Category D and not consider safe during pregnancy. It is also excreted in breast milk but is considered safe for shorter treatment courses.
Azathioprine Counseling:  I discussed with the patient the risks of azathioprine including but not limited to myelosuppression, immunosuppression, hepatotoxicity, lymphoma, and infections.  The patient understands that monitoring is required including baseline LFTs, Creatinine, possible TPMP genotyping and weekly CBCs for the first month and then every 2 weeks thereafter.  The patient verbalized understanding of the proper use and possible adverse effects of azathioprine.  All of the patient's questions and concerns were addressed.
Metronidazole Counseling:  I discussed with the patient the risks of metronidazole including but not limited to seizures, nausea/vomiting, a metallic taste in the mouth, nausea/vomiting and severe allergy.
Rituxan Pregnancy And Lactation Text: This medication is Pregnancy Category C and it isn't know if it is safe during pregnancy. It is unknown if this medication is excreted in breast milk but similar antibodies are known to be excreted.
Detail Level: Simple
Zepbound Counseling: I reviewed the possible side effects including: thyroid tumors, kidney disease, gallbladder disease, abdominal pain, constipation, diarrhea, nausea, vomiting and pancreatitis. Do not take this medication if you have a history or family history of multiple endocrine neoplasia syndrome type 2. Side effects reviewed, pt to contact office should one occur.
Hydroxychloroquine Pregnancy And Lactation Text: This medication has been shown to cause fetal harm but it isn't assigned a Pregnancy Risk Category. There are small amounts excreted in breast milk.
Taltz Counseling: I discussed with the patient the risks of ixekizumab including but not limited to immunosuppression, serious infections, worsening of inflammatory bowel disease and drug reactions.  The patient understands that monitoring is required including a PPD at baseline and must alert us or the primary physician if symptoms of infection or other concerning signs are noted.
Klisyri Pregnancy And Lactation Text: It is unknown if this medication can harm a developing fetus or if it is excreted in breast milk.
Winlevi Counseling:  I discussed with the patient the risks of topical clascoterone including but not limited to erythema, scaling, itching, and stinging. Patient voiced their understanding.
Include Pregnancy/Lactation Warning?: No
Rhofade Counseling: Rhofade is a topical medication which can decrease superficial blood flow where applied. Side effects are uncommon and include stinging, redness and allergic reactions.
Qbrexza Pregnancy And Lactation Text: There is no available data on Qbrexza use in pregnant women.  There is no available data on Qbrexza use in lactation.
Sotyktu Counseling:  I discussed the most common side effects of Sotyktu including: common cold, sore throat, sinus infections, cold sores, canker sores, folliculitis, and acne.  I also discussed more serious side effects of Sotyktu including but not limited to: serious allergic reactions; increased risk for infections such as TB; cancers such as lymphomas; rhabdomyolysis and elevated CPK; and elevated triglycerides and liver enzymes. 
Protopic Counseling: \\nPatient Education Handout provided to pt regarding medication:\\n Topical Tacrolimus (Brand name: Protopic)  \\n-	Dermatologists prefer topical tacrolimus for treating certain rashes on sensitive areas of your body such as the face (including eyelids), groin, & body folds (such as the armpits). \\n-	It reduces inflammation on the skin. This improves the itching, flaking, and/or redness. \\n-	Since it is not a steroid, it won’t thin the skin and is safe for sensitive body areas.\\n-	At most pharmacies it is dispensed in a Vaseline-like form. \\n-	At a specialty pharmacy it can be compounded into a cream for a fixed cash price. \\nCost: If your insurance denies coverage, please use [employer] to obtain a coupon to purchase it for cheaper. We also may send this prescription to a specialty pharmacy to get it for the cheapest price possible.\\nAlternatives: Hydrocortisone 2.5% (a prescription topical steroid) is an alternative, but it should not be used around the eyes due to the risk of causing glaucoma. \\nUse: Tacrolimus is FDA approved for use with eczema (atopic dermatitis). It is also commonly used “off-label” for other rashes that affect sensitive areas of the body. \\nPlease use this twice a day to affected areas until clear or symptoms resolve. Repeat as needed. \\nIf not improved after using for 4-6 weeks continuously, please stop use and follow up with me in clinic.  \\nNotable Side Effects: You may feel a warm, burning, or stinging sensation when applied to the skin. This usually improves as the rash improves. Alcohol intake may worsen the burning sensation as well as cause redness and flushing.\\nTo reduce the risk of a burning sensation: \\n1.	Place the tube in the fridge before first time use (off-label).\\n2.	First test it on the forearm before applying it to sensitive areas. \\n          Once tolerated, you may then try storing it at room temperature. \\nBox Warning: Rare cases of malignancy/cancer have been reported in patients treated with topical calcineurin inhibitors, but the relationship has not been proven. A large-scale study known as the APPLES study1 monitored children who used the medication and did not see an increased risk of cancer. Do not use it continuously for a long time and limit use to only the affected areas.\\[id number] AS, Reyes R, Elaine SC, et al. No evidence of increased cancer incidence in children using topical tacrolimus for atopic dermatitis. J Am Acad Dermatol. 2020;83(2):375-381
Rituxan Counseling:  I discussed with the patient the risks of Rituxan infusions. Side effects can include infusion reactions, severe drug rashes including mucocutaneous reactions, reactivation of latent hepatitis and other infections and rarely progressive multifocal leukoencephalopathy.  All of the patient's questions and concerns were addressed.
Cephalexin Pregnancy And Lactation Text: This medication is Pregnancy Category B and considered safe during pregnancy.  It is also excreted in breast milk but can be used safely for shorter doses.
Clindamycin Counseling: I counseled the patient regarding use of clindamycin as an antibiotic for prophylactic and/or therapeutic purposes. Clindamycin is active against numerous classes of bacteria, including skin bacteria. Side effects may include nausea, diarrhea, gastrointestinal upset, rash, hives, yeast infections, and in rare cases, colitis.
Ketoconazole Pregnancy And Lactation Text: This medication is Pregnancy Category C and it isn't know if it is safe during pregnancy. It is also excreted in breast milk and breast feeding isn't recommended.
Colchicine Counseling:  Patient counseled regarding adverse effects including but not limited to stomach upset (nausea, vomiting, stomach pain, or diarrhea).  Patient instructed to limit alcohol consumption while taking this medication.  Colchicine may reduce blood counts especially with prolonged use.  The patient understands that monitoring of kidney function and blood counts may be required, especially at baseline. The patient verbalized understanding of the proper use and possible adverse effects of colchicine.  All of the patient's questions and concerns were addressed.
Benzoyl Peroxide Pregnancy And Lactation Text: This medication is Pregnancy Category C. It is unknown if benzoyl peroxide is excreted in breast milk.
Oral Minoxidil Counseling- I discussed with the patient the risks of oral minoxidil including but not limited to shortness of breath, swelling of the feet or ankles, dizziness, lightheadedness, unwanted hair growth and allergic reaction.  The patient verbalized understanding of the proper use and possible adverse effects of oral minoxidil.  All of the patient's questions and concerns were addressed.

## 2025-03-24 NOTE — PROCEDURE: ORDER TESTS
Billing Type: Third-Party Bill
Bill For Surgical Tray: no
Performing Laboratory: [id number]
Lab Facility: [id number]
Expected Date Of Service: 03/24/2025

## 2025-03-24 NOTE — PROCEDURE: DIAGNOSIS COMMENT
Render Risk Assessment In Note?: no
Detail Level: Simple
Comment: Has had 2 biopsies done c/w Lichenoid Dermatitis per notes from Dr. Roberto Baum, seen on 3/11/24 \\n \\n3/24/25 \\nDiscussed path results, Rec evaluation for systemic involvement by Rheumatologist, sees Dr. Pond\\nDiscussed treatment options, topicals and oral medication\\nStressed use of sun protection
None

## 2025-03-24 NOTE — PROGRESS NOTES
[facility] Podiatry  Progress Note      Coni Gandhi is a 79 year old female.   Chief Complaint   Patient presents with    Toenail Care     Pt here for nail trim and foot check. She is unable to trim them hereself. No numbness or tingling.              HPI:     Patient is a pleasant 79-year-old female presents to clinic accompanied by her  for bilateral foot evaluation.  She admits to having tremors in her right hand and is unable to trim her own toenails.  Relates that the toenails are long and thickened.  Denies any numbness or tingling.    Allergies: Patient has no known allergies.    Current Outpatient Medications   Medication Sig Dispense Refill    levothyroxine 50 MCG Oral Tab Take 1 tablet (50 mcg total) by mouth before breakfast. 90 tablet 3    FLUoxetine 20 MG Oral Cap TAKE 1 CAPSULE BY MOUTH DAILY 90 capsule 3    methylPREDNISolone 4 MG Oral Tablet Therapy Pack Take as directed with food. 1 each 0    Cholecalciferol (VITAMIN D) 50 MCG (2000 UT) Oral Cap Take by mouth.      Multiple Vitamins-Minerals (EYE VITAMINS & MINERALS OR) Take 1 tablet by mouth daily.      diphenhydrAMINE HCl, Sleep, (ZZZQUIL OR) Take by mouth.      VITAMIN B COMPLEX-C OR       Melatonin 5 MG Oral Tab Take 1 tablet (5 mg total) by mouth nightly.      Calcium Carbonate-Vitamin D (CALCIUM-VITAMIN D) 600-200 MG-UNIT Oral Cap Take by mouth daily. Takes 2 tabs in am and 1 tab at HS       Multiple Vitamins Oral Tab Take 1 tablet by mouth daily.      Omega-3 Fatty Acids ( OMEGA-3 FISH OIL) 1200 MG Oral Capsule Delayed Release Take by mouth daily.          Past Medical History:    Actinic keratosis    Left cheek    Anxiety    Back problem    compression fractures    Benign essential tremor    right hand    Cancer (HCC)    uterine    Disorder of thyroid    hypothyroid    Exposure to medical diagnostic radiation    Hypothyroidism    Lichenoid actinic keratosis    Left cheek    Nausea and vomiting in adult    Osteoarthritis     Osteoporosis    Post-operative hypothyroidism    Postmenopausal atrophic vaginitis    Visual impairment    glasses      Past Surgical History:   Procedure Laterality Date    Abdomen surgery proc unlisted  09/18/2024    1. Exploratory laparotomy via WOUND EXPLORATION, CLOSURE FASCIA  2. EVACUATION OF HEMATOMA    Back surgery      Breast biopsy Left 1990    Colectomy      Colonoscopy      2012     D & c  2013    \"mass\" inside uterus -- Dr. Napier    Hernia surgery  05/13/2021    Ventral Hernia Repair     Hip replacement surgery      Hysterectomy      Other surgical history  1986    s/p thyroidectomy for benign thyroid nodules    Other surgical history  11/29/2019    bowel obstruction    Spine surgery procedure unlisted  10/2019    kyphoplasty     Thyroidectomy  1986    nodules removed    Tonsillectomy      Total hip replacement Right       Family History   Problem Relation Age of Onset    Cancer Self     Other (Other) Mother         Heart attack    Stroke Father         brain aneurysm    No Known Problems Sister     Stroke Brother         brain aneurysm    No Known Problems Other     Breast Cancer Neg       Social History     Socioeconomic History    Marital status:     Number of children: 3   Occupational History    Occupation: Retired   Tobacco Use    Smoking status: Former     Current packs/day: 1.00     Types: Cigarettes     Passive exposure: Past    Smokeless tobacco: Never    Tobacco comments:     quit 40 yrs ago   Vaping Use    Vaping status: Never Used   Substance and Sexual Activity    Alcohol use: Yes     Alcohol/week: 0.0 standard drinks of alcohol     Comment: rarely    Drug use: No   Other Topics Concern    Caffeine Concern Yes     Comment: Coffee 1 cup daily; Soda    Grew up on a farm No    History of tanning No    Outdoor occupation No    Breast feeding No    Reaction to local anesthetic No    Pt has a pacemaker No    Pt has a defibrillator No           REVIEW OF SYSTEMS:     Denies nause, fever,  chills  No calf pain  Denies chest pain or SOB      EXAM:   There were no vitals taken for this visit.  GENERAL: well developed, well nourished, in no apparent distress  EXTREMITIES:   1. Integument: Normal skin temperature and turgor. Toenails x10 are elongated, thickened and discolored with subungal derbi.     2. Vascular: Dorsalis pedis two out of four bilateral and posterior tibial pulses two out of   four bilateral, capillary refill normal.   3. Musculoskeletal: All muscle groups are graded 5 out of 5 in the foot and ankle.   4. Neurological: Normal sharp dull sensation; reflexes normal.             ASSESSMENT AND PLAN:   Diagnoses and all orders for this visit:    Onychomycosis    Pain due to onychomycosis of nail    Pain in both feet        Plan:       -Patient examined, chart history reviewed.  -Discussed importance of proper pedal hygiene, regular foot checks, and tight glucose control.  -Sharply debrided nails x10 with a sterile nail nipper achieving a 20% reduction in thickness and length, without incident.   -Ambulate with supportive shoes and inserts and avoid walking barefoot.  -Educated patient on acute signs of infection advised patient to seek immediate medical attention if symptoms arise.    RTC in 3months    The patient indicates understanding of these issues and agrees to the plan.        Skye Wright DPM

## 2025-03-26 ENCOUNTER — LAB ENCOUNTER (OUTPATIENT)
Dept: LAB | Facility: HOSPITAL | Age: 80
End: 2025-03-26
Attending: STUDENT IN AN ORGANIZED HEALTH CARE EDUCATION/TRAINING PROGRAM
Payer: MEDICARE

## 2025-03-26 ENCOUNTER — LAB ENCOUNTER (OUTPATIENT)
Dept: LAB | Age: 80
End: 2025-03-26
Attending: STUDENT IN AN ORGANIZED HEALTH CARE EDUCATION/TRAINING PROGRAM
Payer: MEDICARE

## 2025-03-26 DIAGNOSIS — L93.0 LUPUS ERYTHEMATOSUS: Primary | ICD-10-CM

## 2025-03-26 LAB
ALBUMIN SERPL-MCNC: 3.8 G/DL (ref 3.2–4.8)
ALBUMIN/GLOB SERPL: 2.2 {RATIO} (ref 1–2)
ALP LIVER SERPL-CCNC: 65 U/L
ALT SERPL-CCNC: 17 U/L
ANION GAP SERPL CALC-SCNC: 8 MMOL/L (ref 0–18)
AST SERPL-CCNC: 24 U/L (ref ?–34)
BASOPHILS # BLD AUTO: 0.05 X10(3) UL (ref 0–0.2)
BASOPHILS NFR BLD AUTO: 1.1 %
BILIRUB SERPL-MCNC: 0.4 MG/DL (ref 0.2–1.1)
BUN BLD-MCNC: 22 MG/DL (ref 9–23)
BUN/CREAT SERPL: 27.5 (ref 10–20)
CALCIUM BLD-MCNC: 9 MG/DL (ref 8.7–10.4)
CHLORIDE SERPL-SCNC: 109 MMOL/L (ref 98–112)
CO2 SERPL-SCNC: 24 MMOL/L (ref 21–32)
CREAT BLD-MCNC: 0.8 MG/DL
DEPRECATED RDW RBC AUTO: 49.3 FL (ref 35.1–46.3)
EGFRCR SERPLBLD CKD-EPI 2021: 75 ML/MIN/1.73M2 (ref 60–?)
EOSINOPHIL # BLD AUTO: 0.11 X10(3) UL (ref 0–0.7)
EOSINOPHIL NFR BLD AUTO: 2.5 %
ERYTHROCYTE [DISTWIDTH] IN BLOOD BY AUTOMATED COUNT: 13.2 % (ref 11–15)
FASTING STATUS PATIENT QL REPORTED: YES
GLOBULIN PLAS-MCNC: 1.7 G/DL (ref 2–3.5)
GLUCOSE BLD-MCNC: 86 MG/DL (ref 70–99)
HCT VFR BLD AUTO: 35.5 %
HGB BLD-MCNC: 12.2 G/DL
IMM GRANULOCYTES # BLD AUTO: 0.01 X10(3) UL (ref 0–1)
IMM GRANULOCYTES NFR BLD: 0.2 %
LYMPHOCYTES # BLD AUTO: 1.34 X10(3) UL (ref 1–4)
LYMPHOCYTES NFR BLD AUTO: 30 %
MCH RBC QN AUTO: 35 PG (ref 26–34)
MCHC RBC AUTO-ENTMCNC: 34.4 G/DL (ref 31–37)
MCV RBC AUTO: 101.7 FL
MONOCYTES # BLD AUTO: 0.5 X10(3) UL (ref 0.1–1)
MONOCYTES NFR BLD AUTO: 11.2 %
NEUTROPHILS # BLD AUTO: 2.45 X10 (3) UL (ref 1.5–7.7)
NEUTROPHILS # BLD AUTO: 2.45 X10(3) UL (ref 1.5–7.7)
NEUTROPHILS NFR BLD AUTO: 55 %
OSMOLALITY SERPL CALC.SUM OF ELEC: 295 MOSM/KG (ref 275–295)
PLATELET # BLD AUTO: 203 10(3)UL (ref 150–450)
POTASSIUM SERPL-SCNC: 3.9 MMOL/L (ref 3.5–5.1)
PROT SERPL-MCNC: 5.5 G/DL (ref 5.7–8.2)
RBC # BLD AUTO: 3.49 X10(6)UL
SODIUM SERPL-SCNC: 141 MMOL/L (ref 136–145)
WBC # BLD AUTO: 4.5 X10(3) UL (ref 4–11)

## 2025-03-26 PROCEDURE — 36415 COLL VENOUS BLD VENIPUNCTURE: CPT

## 2025-03-26 PROCEDURE — 85613 RUSSELL VIPER VENOM DILUTED: CPT

## 2025-03-26 PROCEDURE — 85610 PROTHROMBIN TIME: CPT

## 2025-03-26 PROCEDURE — 85598 HEXAGNAL PHOSPH PLTLT NEUTRL: CPT

## 2025-03-26 PROCEDURE — 85025 COMPLETE CBC W/AUTO DIFF WBC: CPT

## 2025-03-26 PROCEDURE — 80053 COMPREHEN METABOLIC PANEL: CPT

## 2025-03-26 PROCEDURE — 85730 THROMBOPLASTIN TIME PARTIAL: CPT

## 2025-03-26 PROCEDURE — 85390 FIBRINOLYSINS SCREEN I&R: CPT

## 2025-03-28 LAB
APTT PPP: 29 SECONDS (ref 23–36)
CONFIRM APTT STACLOT: POSITIVE
CONFIRM DRVVT: 1.1 S (ref 0–1.1)
PROTHROMBIN TIME: 14.4 SECONDS (ref 11.6–14.8)

## 2025-04-04 ENCOUNTER — TELEPHONE (OUTPATIENT)
Dept: RHEUMATOLOGY | Facility: CLINIC | Age: 80
End: 2025-04-04

## 2025-04-04 NOTE — TELEPHONE ENCOUNTER
Reviewed dermatology's note, had 2 biopsies done for her rash consistent with lichenoid dermatitis, biopsy also showed cutaneous lupus erythematous    She was seen by derm Dr. Filiberto Case

## 2025-04-07 ENCOUNTER — APPOINTMENT (OUTPATIENT)
Dept: URBAN - METROPOLITAN AREA CLINIC 244 | Age: 80
Setting detail: DERMATOLOGY
End: 2025-04-07

## 2025-04-07 ENCOUNTER — MED REC SCAN ONLY (OUTPATIENT)
Age: 80
End: 2025-04-07

## 2025-04-07 DIAGNOSIS — L93.0 DISCOID LUPUS ERYTHEMATOSUS: ICD-10-CM

## 2025-04-07 PROCEDURE — OTHER ORDER TESTS: OTHER

## 2025-04-07 NOTE — PROCEDURE: ORDER TESTS
Billing Type: Third-Party Bill
Bill For Surgical Tray: no
Performing Laboratory: [id number]
Expected Date Of Service: 03/24/2025

## 2025-04-30 ENCOUNTER — PATIENT MESSAGE (OUTPATIENT)
Age: 80
End: 2025-04-30

## 2025-05-02 NOTE — TELEPHONE ENCOUNTER
Please see Med Rec Scan 4/7/25 for results from [facility] and patient message and advise.

## 2025-05-05 NOTE — TELEPHONE ENCOUNTER
I did receive his message and blood work.  Looks like he is concerned about a rash that could be consistent with lupus.    She can be seen sooner.  I have availability on March 15 at the times below if she would like to come in    10: 20  11: 40  12:00  12:20

## 2025-05-05 NOTE — TELEPHONE ENCOUNTER
Name and  verified. Pt scheduled appointment as requested with provider.       Future Appointments   Date Time Provider Department Center   5/15/2025 10:20 AM Nadiya Hylton MD [facility]   2025  8:00 AM Skye Wright DPM [facility]   2025 10:00 AM [facility] RN RHEUMATOLOGY [facility]   2025 10:00 AM Freedom Hickey MD [facility]  [facility]

## 2025-05-15 ENCOUNTER — LAB ENCOUNTER (OUTPATIENT)
Dept: LAB | Facility: HOSPITAL | Age: 80
End: 2025-05-15
Attending: INTERNAL MEDICINE
Payer: MEDICARE

## 2025-05-15 ENCOUNTER — OFFICE VISIT (OUTPATIENT)
Age: 80
End: 2025-05-15
Payer: COMMERCIAL

## 2025-05-15 VITALS
HEIGHT: 62 IN | SYSTOLIC BLOOD PRESSURE: 109 MMHG | WEIGHT: 176 LBS | HEART RATE: 59 BPM | DIASTOLIC BLOOD PRESSURE: 65 MMHG | BODY MASS INDEX: 32.39 KG/M2

## 2025-05-15 DIAGNOSIS — M81.0 AGE-RELATED OSTEOPOROSIS WITHOUT CURRENT PATHOLOGICAL FRACTURE: ICD-10-CM

## 2025-05-15 DIAGNOSIS — Z51.81 MEDICATION MONITORING ENCOUNTER: ICD-10-CM

## 2025-05-15 DIAGNOSIS — L93.2 CUTANEOUS LUPUS ERYTHEMATOSUS: Primary | ICD-10-CM

## 2025-05-15 LAB
C3 SERPL-MCNC: 76.7 MG/DL (ref 90–170)
C4 SERPL-MCNC: 5.4 MG/DL (ref 12–36)
CRP SERPL-MCNC: <0.4 MG/DL (ref ?–1)
ERYTHROCYTE [SEDIMENTATION RATE] IN BLOOD: 6 MM/HR (ref 0–30)
RHEUMATOID FACT SERPL-ACNC: 11.7 IU/ML (ref ?–14)

## 2025-05-15 PROCEDURE — 3008F BODY MASS INDEX DOCD: CPT | Performed by: INTERNAL MEDICINE

## 2025-05-15 PROCEDURE — 86160 COMPLEMENT ANTIGEN: CPT | Performed by: INTERNAL MEDICINE

## 2025-05-15 PROCEDURE — 1159F MED LIST DOCD IN RCRD: CPT | Performed by: INTERNAL MEDICINE

## 2025-05-15 PROCEDURE — 86039 ANTINUCLEAR ANTIBODIES (ANA): CPT | Performed by: INTERNAL MEDICINE

## 2025-05-15 PROCEDURE — 86140 C-REACTIVE PROTEIN: CPT | Performed by: INTERNAL MEDICINE

## 2025-05-15 PROCEDURE — 99215 OFFICE O/P EST HI 40 MIN: CPT | Performed by: INTERNAL MEDICINE

## 2025-05-15 PROCEDURE — 1126F AMNT PAIN NOTED NONE PRSNT: CPT | Performed by: INTERNAL MEDICINE

## 2025-05-15 PROCEDURE — 86038 ANTINUCLEAR ANTIBODIES: CPT | Performed by: INTERNAL MEDICINE

## 2025-05-15 PROCEDURE — 86225 DNA ANTIBODY NATIVE: CPT | Performed by: INTERNAL MEDICINE

## 2025-05-15 PROCEDURE — 86431 RHEUMATOID FACTOR QUANT: CPT | Performed by: INTERNAL MEDICINE

## 2025-05-15 PROCEDURE — G2211 COMPLEX E/M VISIT ADD ON: HCPCS | Performed by: INTERNAL MEDICINE

## 2025-05-15 PROCEDURE — 1160F RVW MEDS BY RX/DR IN RCRD: CPT | Performed by: INTERNAL MEDICINE

## 2025-05-15 PROCEDURE — 36415 COLL VENOUS BLD VENIPUNCTURE: CPT | Performed by: INTERNAL MEDICINE

## 2025-05-15 PROCEDURE — 3074F SYST BP LT 130 MM HG: CPT | Performed by: INTERNAL MEDICINE

## 2025-05-15 PROCEDURE — 3078F DIAST BP <80 MM HG: CPT | Performed by: INTERNAL MEDICINE

## 2025-05-15 PROCEDURE — 85652 RBC SED RATE AUTOMATED: CPT | Performed by: INTERNAL MEDICINE

## 2025-05-15 RX ORDER — METHYLPREDNISOLONE 4 MG/1
TABLET ORAL
Qty: 1 EACH | Refills: 0 | Status: SHIPPED | OUTPATIENT
Start: 2025-05-15 | End: 2025-05-15

## 2025-05-15 NOTE — PROGRESS NOTES
Coni Gandhi is a 79 year old female.    HPI:     Chief Complaint   Patient presents with    Follow - Up    Osteoporosis       I had the pleasure of seeing Coni Gandhi on 5/15/2025 for follow up osteoporosis and a new rash.    Current Medications:  Prolia every 6 mos- 1st dose 6/15/2023  Calcium 1800 mg daily  Vitamin D 600 mg daily   Previous medications:  Actonel 35 mg daily- on it since 2458-4079  Bone density 2017-->11/2020-->12/2022-->12/2024  LFN  T-score   -2.3         -2.7         -2.8           -2.2  LTH                 -2.0         -2.3         -2.6            -2.3  LS                   -2.0         -1.6         -1.6            -1.2  Left forearm                    -2.4         -2.1           -1.9    Interval History:  76 yo F with hx of Thyroidectomy now Hypothyroid, Uterine cancer s/p hysterectomy, radiation (2022), osteoporosis presents to establish care.  She was following with Dr. Lima, last seen in 2020.  She underwent menopause around her mid to late 50s.  She was on hormone replacement therapy for about 1 to 2 years.  Currently takes calcium and vitamin D.  She fractured her right wrist in 1986 while slipping on ice.  In October 2019 she tripped on a rug in her house and fractured her L2 vertebral body.  She had kyphoplasty.  She also has had x-rays done showing multiple mild thoracic compression fractures.  She does not drink alcohol.  Stop smoking in 1975.  Recently diagnosed with uterine cancer and had a hysterectomy and radiation.  Now stable.  She has been on alendronate since 2019.  Np recent falls or fractures.    6/5/2023:   Presents for follow-up of osteoporosis  Last seen in 2020 by Dr. Lima  Has been on risedronate weekly since 2019.  Recent bone density shows worsening left total hip and left femoral neck.  Also takes calcium and vitamin D    6/18/2024:   Presents for follow-up of osteoporosis  She is on Prolia every 6 months.  Here for her third injection  No s/e  with prolia  Also takes calcium and vitamin D  No falls or fractures  Continue to walk and using weights  No joint pain or swelling     12/20/2024:   Presents for follow-up of osteoporosis  She is on Prolia every 6 months.  Here for her 4th injection  Recent bone density done showing improvement in her hip and spine  No recent falls or fractures  Also takes calcium and vitamin D  Having some pain in right hip and thigh, started after hip surgery 5 yrs ago  Continue to walk 2 miles a day and using weights  Has a rash on upper back, very itchy. Started about 2 weeks ago. She had the shingles vaccine     5/15/2025:   Presents for follow-up of osteoporosis and a rash  She has been following with dermatology Dr. Cotto  Biopsy was done on left mandible region and upper back region  Rash on left mandible cutaneous SLE  Unsure of what the results of the bx are on the upper back   Also had a bx of left cheek in 2024 and showed lichenoid dermatitis   She also has small circular rash/lesion on arms  She has had the rash in the arms and upper back since Nov 2024, initially they thought shingles   Using tacrolimus cream   No joint pain or swelling.   No hx of sicca symptoms, oral ulcers, hair loss, lymphadenopathy, serositis, DVT/PE, miscarriages, raynaunds. No chest pain or sob.         HISTORY:  Past Medical History:    Actinic keratosis    Left cheek    Anxiety    Back problem    compression fractures    Benign essential tremor    right hand    Cancer (HCC)    uterine    Disorder of thyroid    hypothyroid    Exposure to medical diagnostic radiation    Hypothyroidism    Lichenoid actinic keratosis    Left cheek    Nausea and vomiting in adult    Osteoarthritis    Osteoporosis    Post-operative hypothyroidism    Postmenopausal atrophic vaginitis    Visual impairment    glasses      Social Hx Reviewed   Family Hx Reviewed     Medications (Active prior to today's visit):  Current Outpatient Medications   Medication Sig Dispense  Refill    levothyroxine 50 MCG Oral Tab Take 1 tablet (50 mcg total) by mouth before breakfast. 90 tablet 3    FLUoxetine 20 MG Oral Cap TAKE 1 CAPSULE BY MOUTH DAILY 90 capsule 3    Cholecalciferol (VITAMIN D) 50 MCG (2000 UT) Oral Cap Take by mouth.      Multiple Vitamins-Minerals (EYE VITAMINS & MINERALS OR) Take 1 tablet by mouth daily.      diphenhydrAMINE HCl, Sleep, (ZZZQUIL OR) Take by mouth.      VITAMIN B COMPLEX-C OR       Melatonin 5 MG Oral Tab Take 1 tablet (5 mg total) by mouth nightly.      Calcium Carbonate-Vitamin D (CALCIUM-VITAMIN D) 600-200 MG-UNIT Oral Cap Take by mouth daily. Takes 2 tabs in am and 1 tab at HS       Multiple Vitamins Oral Tab Take 1 tablet by mouth daily.      Omega-3 Fatty Acids ( OMEGA-3 FISH OIL) 1200 MG Oral Capsule Delayed Release Take by mouth daily.        methylPREDNISolone 4 MG Oral Tablet Therapy Pack Take as directed with food. (Patient not taking: Reported on 5/15/2025) 1 each 0     .cmed  Allergies:  No Known Allergies      ROS:   All other ROS are negative.     PHYSICAL EXAM:   GEN: AAOx3, NAD  HEENT: EOMI, PERRLA, no injection or icterus, oral mucosa moist, no oral lesions. No lymphadenopathy. No facial rash  CVS: RRR, no murmurs rubs or gallops. Equal 2+ distal pulses.   LUNGS: CTAB, no increased work of breathing  ABDOMEN:  soft NT/ND, +BS, no HSM  SKIN: Small circular rashes on legs, left cheek rash   MSK:  No swelling or synovitis on exam   +ROM in all joints   NEURO: Cranial nerves II-XII intact grossly. 5/5 strength throughout in both upper and lower extremities, sensation intact.  PSYCH: normal mood       LABS:     Reviewed     Imaging:     Bone density 12/2022:  LEFT FEMORAL NECK   BMD: 0.536 gm/sq. cm. T SCORE: -2.8 Z SCORE: -0.7       LEFT TOTAL HIP   BMD: 0.626 gm/sq. cm. T SCORE: -2.6 Z SCORE: -0.8       PA LUMBAR SPINE (L3 - L4)   BMD: 0.922 gm/sq. cm. T SCORE: -1.6 Z SCORE: 1.0       DISTAL 1/3 OF THE LEFT FOREARM: BMD IS 0.568 GRAMS/CENTIMETER  SQUARED, T-SCORE IS -2.1 AND Z-SCORE IS 0.6.     ASSESSMENT/PLAN:     Left cheek rash  - Following with dermatologist Dr. Cotto.  Biopsy was done around March 2025 showing findings of cutaneous lupus erythematous  - She is being treated with tacrolimus cream but not helping  - She has no symptoms consistent with systemic lupus  - Will obtain further blood work  - Could consider hydroxychloroquine  - Will discuss with dermatology    Rash on upper back and arms  - This rash started on November 2024.  Initially treated for shingles  - Rash is slowly improved  - She also did biopsy of the superior thoracic spine but I do not have those results, we will contact    Osteoporosis  - History of L2 vertebral fracture status post kyphoplasty  - She has been on resented 8 weekly since 2019, recent bone density shows no improvement, slightly worsened since 2020  - She is now on Prolia every 6 months.  Here for her 4th injection  - She remains on calcium and vitamin D.  She also continues to walk and do weightbearing exercises  - Repeat bone density done December 2024 shows improvement in her lumbar spine and hip    Spent 40 minutes obtaining history, evaluating patient, reviewing labs, discussing treatment options and completing documentation    Pt will f/u in 3 mos     There is a longitudinal care relationship with me, the care plan reflects the ongoing nature of the continuous relationship of care, and the medical record indicates that there is ongoing treatment of a serious/complex medical condition which I am currently managing.  is Applicable.     Nadiya Hylton MD  5/15/2025  10:30 AM

## 2025-05-15 NOTE — PATIENT INSTRUCTIONS
You were seen today for rashes  The rash on the cheek was concerning for cutaneous lupus  I would like to speak to the dermatologist,   In the meantime get further blood work

## 2025-05-20 LAB
ANA NUCLEOLAR TITR SER IF: 640 {TITER}
DSDNA AB TITR SER: <10 {TITER}
NUCLEAR IGG TITR SER IF: POSITIVE {TITER}

## 2025-05-21 LAB
CENTROMERE IGG SER-ACNC: 0.4 U/ML (ref ?–7)
ENA JO1 AB SER IA-ACNC: <0.3 U/ML (ref ?–7)
ENA RNP IGG SER IA-ACNC: 19.5 U/ML (ref ?–7)
ENA SCL70 IGG SER IA-ACNC: 0.6 U/ML (ref ?–7)
ENA SM IGG SER IA-ACNC: <0.7 U/ML (ref ?–7)
ENA SS-A IGG SER IA-ACNC: 1.7 U/ML (ref ?–7)
ENA SS-B IGG SER IA-ACNC: 0.4 U/ML (ref ?–7)
U1 SNRNP IGG SER IA-ACNC: 35.9 U/ML (ref ?–5)

## 2025-05-22 PROBLEM — L93.2 CUTANEOUS LUPUS ERYTHEMATOSUS: Status: ACTIVE | Noted: 2025-05-22

## 2025-05-28 ENCOUNTER — TELEPHONE (OUTPATIENT)
Dept: RHEUMATOLOGY | Facility: CLINIC | Age: 80
End: 2025-05-28

## 2025-05-28 RX ORDER — HYDROXYCHLOROQUINE SULFATE 200 MG/1
200 TABLET, FILM COATED ORAL DAILY
Qty: 90 TABLET | Refills: 1 | Status: SHIPPED | OUTPATIENT
Start: 2025-05-28

## 2025-05-28 NOTE — TELEPHONE ENCOUNTER
Called patient back, discussed results with her  Found have a positive MARGARETH, RNP, low C3 and C4, skin biopsy showed cutaneous lupus  She was starting hydroxychloroquine 200 mg daily  Risks/Benefits HCQ d/w patient which include: most common s/e are nausea and diarrhea, which improve over time. Less common s/e include rash, changes in skin pigment (such as darkening or dark spots), hair changes, muscle weakness and retinal toxicity. It is recommended that you have an eye exam within the first year of use, then repeat yearly.

## 2025-05-30 ENCOUNTER — OFFICE VISIT (OUTPATIENT)
Dept: PODIATRY CLINIC | Facility: CLINIC | Age: 80
End: 2025-05-30
Payer: COMMERCIAL

## 2025-05-30 DIAGNOSIS — M79.609 PAIN DUE TO ONYCHOMYCOSIS OF NAIL: ICD-10-CM

## 2025-05-30 DIAGNOSIS — M79.671 PAIN IN BOTH FEET: ICD-10-CM

## 2025-05-30 DIAGNOSIS — B35.1 ONYCHOMYCOSIS: Primary | ICD-10-CM

## 2025-05-30 DIAGNOSIS — M79.672 PAIN IN BOTH FEET: ICD-10-CM

## 2025-05-30 DIAGNOSIS — B35.1 PAIN DUE TO ONYCHOMYCOSIS OF NAIL: ICD-10-CM

## 2025-05-30 PROCEDURE — 99213 OFFICE O/P EST LOW 20 MIN: CPT | Performed by: STUDENT IN AN ORGANIZED HEALTH CARE EDUCATION/TRAINING PROGRAM

## 2025-05-30 PROCEDURE — 1159F MED LIST DOCD IN RCRD: CPT | Performed by: STUDENT IN AN ORGANIZED HEALTH CARE EDUCATION/TRAINING PROGRAM

## 2025-05-30 RX ORDER — TACROLIMUS 1 MG/G
1 OINTMENT TOPICAL 2 TIMES DAILY
COMMUNITY
Start: 2025-04-28

## 2025-05-30 RX ORDER — TRIAMCINOLONE ACETONIDE 5 MG/G
CREAM TOPICAL
COMMUNITY
Start: 2025-01-22

## 2025-05-31 NOTE — PROGRESS NOTES
[facility] Podiatry  Progress Note      Coni Gandhi is a 79 year old female.   Chief Complaint   Patient presents with    Toenail Care     Nail trim and foot check f/u- LOV w/ PCP Dr. Ji on 10/03/2024             HPI:     Patient is a pleasant 79-year-old female presents to clinic accompanied by her  for bilateral foot evaluation.  She admits to having tremors in her right hand and is unable to trim her own toenails.  Relates that the toenails are long and thickened.  Denies any numbness or tingling.    Allergies: Patient has no known allergies.    Current Outpatient Medications   Medication Sig Dispense Refill    tacrolimus 0.1 % External Ointment Apply 1 Application topically 2 (two) times daily.      triamcinolone 0.5 % External Cream Apply 1 Application topically 2 times daily for 7 to 10 days. Do NOT use on face or in folds of skin.      hydroxychloroquine 200 MG Oral Tab Take 1 tablet (200 mg total) by mouth daily. 90 tablet 1    levothyroxine 50 MCG Oral Tab Take 1 tablet (50 mcg total) by mouth before breakfast. 90 tablet 3    FLUoxetine 20 MG Oral Cap TAKE 1 CAPSULE BY MOUTH DAILY 90 capsule 3    methylPREDNISolone 4 MG Oral Tablet Therapy Pack Take as directed with food. (Patient not taking: Reported on 5/15/2025) 1 each 0    Cholecalciferol (VITAMIN D) 50 MCG (2000 UT) Oral Cap Take by mouth.      Multiple Vitamins-Minerals (EYE VITAMINS & MINERALS OR) Take 1 tablet by mouth daily.      diphenhydrAMINE HCl, Sleep, (ZZZQUIL OR) Take by mouth.      VITAMIN B COMPLEX-C OR       Melatonin 5 MG Oral Tab Take 1 tablet (5 mg total) by mouth nightly.      Calcium Carbonate-Vitamin D (CALCIUM-VITAMIN D) 600-200 MG-UNIT Oral Cap Take by mouth daily. Takes 2 tabs in am and 1 tab at HS       Multiple Vitamins Oral Tab Take 1 tablet by mouth daily.      Omega-3 Fatty Acids ( OMEGA-3 FISH OIL) 1200 MG Oral Capsule Delayed Release Take by mouth daily.          Past Medical History:    Actinic  keratosis    Left cheek    Anxiety    Back problem    compression fractures    Benign essential tremor    right hand    Cancer (HCC)    uterine    Disorder of thyroid    hypothyroid    Exposure to medical diagnostic radiation    Hypothyroidism    Lichenoid actinic keratosis    Left cheek    Nausea and vomiting in adult    Osteoarthritis    Osteoporosis    Post-operative hypothyroidism    Postmenopausal atrophic vaginitis    Visual impairment    glasses      Past Surgical History:   Procedure Laterality Date    Abdomen surgery proc unlisted  09/18/2024    1. Exploratory laparotomy via WOUND EXPLORATION, CLOSURE FASCIA  2. EVACUATION OF HEMATOMA    Back surgery      Breast biopsy Left 1990    Colectomy      Colonoscopy      2012     D & c  2013    \"mass\" inside uterus -- Dr. Napier    Hernia surgery  05/13/2021    Ventral Hernia Repair     Hip replacement surgery      Hysterectomy      Other surgical history  1986    s/p thyroidectomy for benign thyroid nodules    Other surgical history  11/29/2019    bowel obstruction    Spine surgery procedure unlisted  10/2019    kyphoplasty     Thyroidectomy  1986    nodules removed    Tonsillectomy      Total hip replacement Right       Family History   Problem Relation Age of Onset    Cancer Self     Other (Other) Mother         Heart attack    Stroke Father         brain aneurysm    No Known Problems Sister     Stroke Brother         brain aneurysm    No Known Problems Other     Breast Cancer Neg       Social History     Socioeconomic History    Marital status:     Number of children: 3   Occupational History    Occupation: Retired   Tobacco Use    Smoking status: Former     Current packs/day: 1.00     Types: Cigarettes     Passive exposure: Past    Smokeless tobacco: Never    Tobacco comments:     quit 40 yrs ago   Vaping Use    Vaping status: Never Used   Substance and Sexual Activity    Alcohol use: Yes     Alcohol/week: 0.0 standard drinks of alcohol     Comment: rarely     Drug use: No   Other Topics Concern    Caffeine Concern Yes     Comment: Coffee 1 cup daily; Soda    Grew up on a farm No    History of tanning No    Outdoor occupation No    Breast feeding No    Reaction to local anesthetic No    Pt has a pacemaker No    Pt has a defibrillator No           REVIEW OF SYSTEMS:     Denies nause, fever, chills  No calf pain  Denies chest pain or SOB      EXAM:   There were no vitals taken for this visit.  GENERAL: well developed, well nourished, in no apparent distress  EXTREMITIES:   1. Integument: Normal skin temperature and turgor. Toenails x10 are elongated, thickened and discolored with subungal derbi.     2. Vascular: Dorsalis pedis two out of four bilateral and posterior tibial pulses two out of   four bilateral, capillary refill normal.   3. Musculoskeletal: All muscle groups are graded 5 out of 5 in the foot and ankle.   4. Neurological: Normal sharp dull sensation; reflexes normal.             ASSESSMENT AND PLAN:   Diagnoses and all orders for this visit:    Onychomycosis    Pain due to onychomycosis of nail    Pain in both feet        Plan:       -Patient examined, chart history reviewed.  -Discussed importance of proper pedal hygiene, regular foot checks, and tight glucose control.  -Sharply debrided nails x10 with a sterile nail nipper achieving a 20% reduction in thickness and length, without incident.   -Ambulate with supportive shoes and inserts and avoid walking barefoot.  -Educated patient on acute signs of infection advised patient to seek immediate medical attention if symptoms arise.    RTC in 3months    The patient indicates understanding of these issues and agrees to the plan.        Skye Wright DPM

## 2025-06-13 ENCOUNTER — LAB ENCOUNTER (OUTPATIENT)
Dept: LAB | Age: 80
End: 2025-06-13
Attending: INTERNAL MEDICINE
Payer: MEDICARE

## 2025-06-13 DIAGNOSIS — M81.0 AGE-RELATED OSTEOPOROSIS WITHOUT CURRENT PATHOLOGICAL FRACTURE: ICD-10-CM

## 2025-06-13 LAB
ANION GAP SERPL CALC-SCNC: 7 MMOL/L (ref 0–18)
BUN BLD-MCNC: 25 MG/DL (ref 9–23)
BUN/CREAT SERPL: 26.6 (ref 10–20)
CALCIUM BLD-MCNC: 9.4 MG/DL (ref 8.7–10.4)
CHLORIDE SERPL-SCNC: 109 MMOL/L (ref 98–112)
CO2 SERPL-SCNC: 27 MMOL/L (ref 21–32)
CREAT BLD-MCNC: 0.94 MG/DL (ref 0.55–1.02)
EGFRCR SERPLBLD CKD-EPI 2021: 62 ML/MIN/1.73M2 (ref 60–?)
FASTING STATUS PATIENT QL REPORTED: NO
GLUCOSE BLD-MCNC: 82 MG/DL (ref 70–99)
OSMOLALITY SERPL CALC.SUM OF ELEC: 299 MOSM/KG (ref 275–295)
POTASSIUM SERPL-SCNC: 3.8 MMOL/L (ref 3.5–5.1)
SODIUM SERPL-SCNC: 143 MMOL/L (ref 136–145)

## 2025-06-13 PROCEDURE — 80048 BASIC METABOLIC PNL TOTAL CA: CPT

## 2025-06-13 PROCEDURE — 36415 COLL VENOUS BLD VENIPUNCTURE: CPT

## 2025-06-20 ENCOUNTER — TELEPHONE (OUTPATIENT)
Age: 80
End: 2025-06-20

## 2025-06-20 ENCOUNTER — NURSE ONLY (OUTPATIENT)
Age: 80
End: 2025-06-20
Payer: COMMERCIAL

## 2025-06-20 DIAGNOSIS — M81.0 AGE-RELATED OSTEOPOROSIS WITHOUT CURRENT PATHOLOGICAL FRACTURE: Primary | ICD-10-CM

## 2025-06-20 DIAGNOSIS — Z51.81 MEDICATION MONITORING ENCOUNTER: ICD-10-CM

## 2025-06-20 PROCEDURE — 96372 THER/PROPH/DIAG INJ SC/IM: CPT | Performed by: INTERNAL MEDICINE

## 2025-06-20 NOTE — PROGRESS NOTES
Patient name and date of birth confirmed. Patient aware they are here today for Prolia injection. Laboratory testing reviewed and verified okay to proceed with injection today. Injection given in  left upper arm without any concerns. Patient aware to follow up in 6 months for next injection and aware to get repeat lab testing beforehand.    Calcium:   Lab Results   Component Value Date    CA 9.4 06/13/2025          Future Appointments   Date Time Provider Department Center   6/20/2025 10:00 AM [facility] RN [facility]HEUMATOLOGY [facility]   7/1/2025 10:00 AM Freedom Hickey MD [facility]   8/4/2025  8:30 AM Skye Wright DPM [facility]   10/21/2025  9:20 AM Nadiya Hylton MD [facility]   12/29/2025  9:20 AM Nadiya Hytlon MD [facility]

## 2025-06-20 NOTE — TELEPHONE ENCOUNTER
PA Approved    Prior authorization for: Prolia    Medication form: 60 mg    Approval #: [id number]    Approved dates: 06/20/2025 to 06/20/2026    Called [facility] and spoke with Phil [facility]

## 2025-06-23 ENCOUNTER — TELEPHONE (OUTPATIENT)
Age: 80
End: 2025-06-23

## 2025-06-23 NOTE — TELEPHONE ENCOUNTER
Current Medications[1]     hydroxychloroquine 200 MG Oral Tab Take 1 tablet (200 mg total) by mouth daily. 90 tablet 1          [1]   Current Outpatient Medications   Medication Sig Dispense Refill    tacrolimus 0.1 % External Ointment Apply 1 Application topically 2 (two) times daily.      triamcinolone 0.5 % External Cream Apply 1 Application topically 2 times daily for 7 to 10 days. Do NOT use on face or in folds of skin.      hydroxychloroquine 200 MG Oral Tab Take 1 tablet (200 mg total) by mouth daily. 90 tablet 1    levothyroxine 50 MCG Oral Tab Take 1 tablet (50 mcg total) by mouth before breakfast. 90 tablet 3    FLUoxetine 20 MG Oral Cap TAKE 1 CAPSULE BY MOUTH DAILY 90 capsule 3    methylPREDNISolone 4 MG Oral Tablet Therapy Pack Take as directed with food. (Patient not taking: Reported on 5/15/2025) 1 each 0    Cholecalciferol (VITAMIN D) 50 MCG (2000 UT) Oral Cap Take by mouth.      Multiple Vitamins-Minerals (EYE VITAMINS & MINERALS OR) Take 1 tablet by mouth daily.      diphenhydrAMINE HCl, Sleep, (ZZZQUIL OR) Take by mouth.      VITAMIN B COMPLEX-C OR       Melatonin 5 MG Oral Tab Take 1 tablet (5 mg total) by mouth nightly.      Calcium Carbonate-Vitamin D (CALCIUM-VITAMIN D) 600-200 MG-UNIT Oral Cap Take by mouth daily. Takes 2 tabs in am and 1 tab at HS       Multiple Vitamins Oral Tab Take 1 tablet by mouth daily.      Omega-3 Fatty Acids ( OMEGA-3 FISH OIL) 1200 MG Oral Capsule Delayed Release Take by mouth daily.

## 2025-06-23 NOTE — TELEPHONE ENCOUNTER
Patient aware she still has 3 more refills left at [facility].Patient will call [facility] and let them know she doesn't need the refill until mid November.        Outpatient Medication Detail     Disp Refills Start End    hydroxychloroquine 200 MG Oral Tab 90 tablet 1 5/28/2025 --    Sig - Route: Take 1 tablet (200 mg total) by mouth daily. - Oral    Sent to pharmacy as: Hydroxychloroquine Sulfate 200 MG Oral Tablet (Plaquenil)    E-Prescribing Status: Receipt confirmed by pharmacy (5/28/2025 12:51 PM CDT)

## 2025-06-24 PROBLEM — C54.1 ADENOCARCINOMA OF ENDOMETRIUM (HCC): Status: RESOLVED | Noted: 2020-01-23 | Resolved: 2025-06-24

## 2025-06-25 ENCOUNTER — APPOINTMENT (OUTPATIENT)
Dept: URBAN - METROPOLITAN AREA CLINIC 244 | Age: 80
Setting detail: DERMATOLOGY
End: 2025-06-25

## 2025-06-25 DIAGNOSIS — L93.0 DISCOID LUPUS ERYTHEMATOSUS: ICD-10-CM

## 2025-06-25 PROCEDURE — OTHER DIAGNOSIS COMMENT: OTHER

## 2025-06-25 PROCEDURE — OTHER COUNSELING: OTHER

## 2025-06-25 PROCEDURE — 99214 OFFICE O/P EST MOD 30 MIN: CPT

## 2025-06-25 PROCEDURE — OTHER PRESCRIPTION MEDICATION MANAGEMENT: OTHER

## 2025-06-25 ASSESSMENT — LOCATION SIMPLE DESCRIPTION DERM
LOCATION SIMPLE: LEFT FOREARM
LOCATION SIMPLE: LEFT CHEEK
LOCATION SIMPLE: RIGHT FOREARM

## 2025-06-25 ASSESSMENT — LOCATION ZONE DERM
LOCATION ZONE: ARM
LOCATION ZONE: FACE

## 2025-06-25 ASSESSMENT — LOCATION DETAILED DESCRIPTION DERM
LOCATION DETAILED: RIGHT PROXIMAL DORSAL FOREARM
LOCATION DETAILED: LEFT PROXIMAL DORSAL FOREARM
LOCATION DETAILED: LEFT INFERIOR LATERAL MALAR CHEEK

## 2025-07-01 ENCOUNTER — OFFICE VISIT (OUTPATIENT)
Dept: INTERNAL MEDICINE CLINIC | Facility: CLINIC | Age: 80
End: 2025-07-01
Payer: COMMERCIAL

## 2025-07-01 VITALS
WEIGHT: 176 LBS | HEART RATE: 66 BPM | DIASTOLIC BLOOD PRESSURE: 68 MMHG | SYSTOLIC BLOOD PRESSURE: 110 MMHG | BODY MASS INDEX: 32.39 KG/M2 | HEIGHT: 62 IN

## 2025-07-01 DIAGNOSIS — L93.2 CUTANEOUS LUPUS ERYTHEMATOSUS: ICD-10-CM

## 2025-07-01 DIAGNOSIS — Z82.49 FAMILY HISTORY OF BRAIN ANEURYSM: ICD-10-CM

## 2025-07-01 DIAGNOSIS — E03.9 ACQUIRED HYPOTHYROIDISM: ICD-10-CM

## 2025-07-01 DIAGNOSIS — M81.0 AGE-RELATED OSTEOPOROSIS WITHOUT CURRENT PATHOLOGICAL FRACTURE: ICD-10-CM

## 2025-07-01 DIAGNOSIS — F41.1 GAD (GENERALIZED ANXIETY DISORDER): ICD-10-CM

## 2025-07-01 DIAGNOSIS — Z85.42 HISTORY OF ENDOMETRIAL CANCER: ICD-10-CM

## 2025-07-01 DIAGNOSIS — I87.2 PERIPHERAL VENOUS INSUFFICIENCY: ICD-10-CM

## 2025-07-01 DIAGNOSIS — D75.89 MACROCYTOSIS: ICD-10-CM

## 2025-07-01 DIAGNOSIS — Z00.00 MEDICARE ANNUAL WELLNESS VISIT, SUBSEQUENT: Primary | ICD-10-CM

## 2025-07-01 DIAGNOSIS — Z12.31 ENCOUNTER FOR SCREENING MAMMOGRAM FOR BREAST CANCER: ICD-10-CM

## 2025-07-01 DIAGNOSIS — Z91.81 RISK FOR FALLS: ICD-10-CM

## 2025-07-01 NOTE — PROGRESS NOTES
Subjective:   Coni Gandhi is a 79 year old female who presents for a  and scheduled follow up of multiple significant but stable problems.               History/Other:   Fall Risk Assessment:   She has been screened for Falls and is High Risk. Fall Prevention information provided to patient in After Visit Summary.    Do you feel unsteady when standing or walking?: (Patient-Rptd) Yes  Do you worry about falling?: (Patient-Rptd) Yes  Have you fallen in the past year?: (Patient-Rptd) No     Cognitive Assessment:   She had a completely normal cognitive assessment - see flowsheet entries     Functional Ability/Status:   Coni Gandhi has some abnormal functions as listed below:  She has Meal Preparation difficulties based on screening of functional status.She has Vision problems based on screening of functional status. She has Walking problems based on screening of functional status.       Depression Screening (PHQ):  PHQ-2 SCORE: 0  , done 6/24/2025            Advanced Directives:   She does have a Living Will but we do NOT have it on file in Epic.    She does NOT have a Power of  for Health Care. [Do you have a healthcare power of ?: (Patient-Rptd) No]  Discussed Advance Care Planning with patient (and family/surrogate if present). Standard forms made available to patient in After Visit Summary.      Problem List[1]  Allergies:  She has no known allergies.    Current Medications:  Active Meds, Sig Only[2]    Medical History:  She  has a past medical history of Actinic keratosis (01/2023), Anxiety, Back problem, Benign essential tremor, Cancer (HCC) (02/2020), Disorder of thyroid, Exposure to medical diagnostic radiation, Hypothyroidism, Lichenoid actinic keratosis (07/2023), Nausea and vomiting in adult (9/15/2024), Osteoarthritis, Osteoporosis, Post-operative hypothyroidism, Postmenopausal atrophic vaginitis (01/29/2015), and Visual impairment.  Surgical History:  She  has a past surgical  history that includes Breast biopsy (Left, 1990); colonoscopy; other surgical history (1986); tonsillectomy; d & c (2013); spine surgery procedure unlisted (10/2019); thyroidectomy (1986); other surgical history (11/29/2019); hysterectomy; Colectomy; hip replacement surgery (Right); back surgery; total hip replacement (Right); hernia surgery (05/13/2021); and abdomen surgery proc unlisted (09/18/2024).   Family History:  Her family history includes Cancer in her self; No Known Problems in her sister and another family member; Other in her mother; Stroke in her brother and father.  Social History:  She  reports that she has quit smoking. Her smoking use included cigarettes. She has been exposed to tobacco smoke. She has never used smokeless tobacco. She reports current alcohol use. She reports that she does not use drugs.    Tobacco:  She smoked tobacco in the past but quit greater than 12 months ago.  Tobacco Use[3]     CAGE Alcohol Screen:   CAGE screening score of 0 on 6/24/2025, showing low risk of alcohol abuse.      Patient Care Team:  Freedom Hickey MD as PCP - General (Internal Medicine)  Tasia Mosqueda (Physical Therapy)  Bebo Melendez PTA as Physical Therapist (Physical Therapy)    Review of Systems  GENERAL: feels well otherwise  SKIN: denies any unusual skin lesions  EYES: denies blurred vision or double vision  HEENT: denies nasal congestion, sinus pain or ST  LUNGS: denies shortness of breath with exertion  CARDIOVASCULAR: denies chest pain on exertion  GI: denies abdominal pain, denies heartburn  : denies dysuria, vaginal discharge or itching, no complaint of urinary incontinence   MUSCULOSKELETAL: denies back pain  NEURO: denies headaches  PSYCHE: denies depression or anxiety  HEMATOLOGIC: denies hx of anemia  ENDOCRINE:  thyroid history  ALL/ASTHMA: denies hx of allergy or asthma    Objective:   Physical Exam  General Appearance:  Alert, cooperative, no distress, appears stated age    Head:  Normocephalic, without obvious abnormality, atraumatic   Eyes:  PERRL, conjunctiva/corneas clear, EOM's intact both eyes   Ears:  Normal TM's and external ear canals, both ears   Nose: Nares normal, septum midline,mucosa normal, no drainage or sinus tenderness   Throat: Lips, mucosa, and tongue normal; teeth and gums normal   Neck: Supple, symmetrical, trachea midline, no adenopathy;  thyroid: not enlarged, symmetric, no tenderness/mass/nodules; no carotid bruit or JVD   Back:   Symmetric, no curvature, ROM normal, no CVA tenderness   Lungs:   Clear to auscultation bilaterally, respirations unlabored   Heart:  Regular rate and rhythm, S1 and S2 normal, no murmur, rub, or gallop   Abdomen:   Soft, non-tender, bowel sounds active all four quadrants,  no masses, no organomegaly   Pelvic: Deferred   Extremities: Extremities normal, atraumatic, no cyanosis or edema   Pulses: 2+ and symmetric   Skin: Skin color, texture, turgor normal, no rashes or lesions   Lymph nodes: Cervical, supraclavicular,  nodes normal   Neurologic: Normal       /68   Pulse 66   Ht 5' 2\" (1.575 m)   Wt 176 lb (79.8 kg)   BMI 32.19 kg/m²  Estimated body mass index is 32.19 kg/m² as calculated from the following:    Height as of this encounter: 5' 2\" (1.575 m).    Weight as of this encounter: 176 lb (79.8 kg).    Medicare Hearing Assessment:   Hearing Screening    Screening Method: Finger Rub  Finger Rub Result: Pass         Visual Acuity:   Right Eye Visual Acuity: Corrected Right Eye Chart Acuity: 20/20   Left Eye Visual Acuity: Corrected Left Eye Chart Acuity: 20/20   Both Eyes Visual Acuity: Corrected Both Eyes Chart Acuity: 20/15   Able To Tolerate Visual Acuity: Yes        Assessment & Plan:   Coni Gandhi is a 79 year old female who presents for a Medicare Assessment.     1. Medicare annual wellness visit, subsequent  Routine labs ordered.   - Comp Metabolic Panel (14); Future  - Hemoglobin A1C; Future  - Lipid Panel;  Future  - TSH W Reflex To Free T4; Future    2. Acquired hypothyroidism  Continue with LT4.         4. SOPHIA (generalized anxiety disorder)  Pt stable on flouxetine 20mg daily; cpm .    5. Risk for falls  Fall precautions.     6. Family history of brain aneurysm  Had negative MRA last year except radiologist recommended a ffup MRA given possible carotid artery infundibulum.     7. History of endometrial cancer  Had hx of TAHBSO, had been cancer free since and continues to ffup with her gyne onco.     8. Age-related osteoporosis without current pathological fracture  Pt currently being treated by rheum and on prolia.     9. Encounter for screening mammogram for breast cancer  Mammogram ordered.   - [facility] [place] 2D+3D SCREENING BILAT (CPT=77067/49666); Future    10. Macrocytosis  Had been evaluated by hematologist already before, had been stable.     11. Peripheral venous insufficiency  Advise to use compression stocking and leg elevation    12. Cutaneous lupus erythematosus  Pt already ffup with rheum and being treated with hydrochloroquine already.           The patient indicates understanding of these issues and agrees to the plan.  Reinforced healthy diet, lifestyle, and exercise.      No follow-ups on file.     Freedom Hickey MD, 7/1/2025     Supplementary Documentation:   General Health:  In the past six months, have you lost more than 10 pounds without trying?: (Patient-Rptd) 2 - No  Has your appetite been poor?: (Patient-Rptd) No  Type of Diet: (Patient-Rptd) Balanced  How does the patient maintain a good energy level?: (Patient-Rptd) Daily Walks  How would you describe your daily physical activity?: (Patient-Rptd) Light  How would you describe your current health state?: (Patient-Rptd) Fair  How do you maintain positive mental well-being?: (Patient-Rptd) Social Interaction, Puzzles, Games, Visiting Friends, Visiting Family  On a scale of 0 to 10, with 0 being no pain and 10 being severe pain, what is your  pain level?: (Patient-Rptd) 0 - (None)  In the past six months, have you experienced urine leakage?: (Patient-Rptd) 0-No  At any time do you feel concerned for the safety/well-being of yourself and/or your children, in your home or elsewhere?: (Patient-Rptd) No  Have you had any immunizations at another office such as Influenza, Hepatitis B, Tetanus, or Pneumococcal?: (Patient-Rptd) No    Health Maintenance   Topic Date Due    Annual Well Visit  01/01/2025    COVID-19 Vaccine (5 - 2024-25 season) 08/01/2026 (Originally 9/1/2024)    Influenza Vaccine (1) 10/01/2025    DEXA Scan  Completed    Annual Depression Screening  Completed    Fall Risk Screening (Annual)  Completed    Pneumococcal Vaccine: 50+ Years  Completed    Zoster Vaccines  Completed    Meningococcal B Vaccine  Aged Out    Colorectal Cancer Screening  Discontinued    Mammogram  Discontinued            [1]   Patient Active Problem List  Diagnosis    Hypothyroidism    Peripheral venous insufficiency    Colon cancer screening    Osteoporosis    Wedge compression fracture of second lumbar vertebra with routine healing    Macrocytosis without anemia    Risk for falls    SOPHIA (generalized anxiety disorder)    Vasomotor rhinitis    Medicare annual wellness visit, subsequent    Breast screening    Encounter for screening mammogram for breast cancer    History of endometrial cancer    Lower abdominal pain    Nausea and vomiting in adult    Complete intestinal obstruction, unspecified cause (HCC)    Thrombocytopenia    Smokers' cough (HCC)    Cutaneous lupus erythematosus    Family history of brain aneurysm   [2]   Outpatient Medications Marked as Taking for the 7/1/25 encounter (Office Visit) with Freedom Hickey MD   Medication Sig    hydroxychloroquine 200 MG Oral Tab Take 1 tablet (200 mg total) by mouth daily.    levothyroxine 50 MCG Oral Tab Take 1 tablet (50 mcg total) by mouth before breakfast.    FLUoxetine 20 MG Oral Cap TAKE 1 CAPSULE BY MOUTH  DAILY    Cholecalciferol (VITAMIN D) 50 MCG (2000 UT) Oral Cap Take by mouth.    Multiple Vitamins-Minerals (EYE VITAMINS & MINERALS OR) Take 1 tablet by mouth daily.    diphenhydrAMINE HCl, Sleep, (ZZZQUIL OR) Take by mouth.    VITAMIN B COMPLEX-C OR     Melatonin 5 MG Oral Tab Take 1 tablet (5 mg total) by mouth nightly.    Calcium Carbonate-Vitamin D (CALCIUM-VITAMIN D) 600-200 MG-UNIT Oral Cap Take by mouth daily. Takes 2 tabs in am and 1 tab at HS     Multiple Vitamins Oral Tab Take 1 tablet by mouth daily.    Omega-3 Fatty Acids ( OMEGA-3 FISH OIL) 1200 MG Oral Capsule Delayed Release Take by mouth daily.       Current Facility-Administered Medications for the 7/1/25 encounter (Office Visit) with Freedom Hickey MD   Medication    denosumab (Prolia) 60 MG/ML SUBQ injection 60 mg   [3]   Social History  Tobacco Use   Smoking Status Former    Current packs/day: 1.00    Types: Cigarettes    Passive exposure: Past   Smokeless Tobacco Never   Tobacco Comments    quit 40 yrs ago

## 2025-07-02 ENCOUNTER — LAB ENCOUNTER (OUTPATIENT)
Dept: LAB | Age: 80
End: 2025-07-02
Attending: INTERNAL MEDICINE
Payer: MEDICARE

## 2025-07-02 DIAGNOSIS — Z00.00 MEDICARE ANNUAL WELLNESS VISIT, SUBSEQUENT: ICD-10-CM

## 2025-07-02 LAB
ALBUMIN SERPL-MCNC: 4.1 G/DL (ref 3.2–4.8)
ALBUMIN/GLOB SERPL: 1.7 {RATIO} (ref 1–2)
ALP LIVER SERPL-CCNC: 71 U/L (ref 55–142)
ALT SERPL-CCNC: 15 U/L (ref 10–49)
ANION GAP SERPL CALC-SCNC: 5 MMOL/L (ref 0–18)
AST SERPL-CCNC: 25 U/L (ref ?–34)
BILIRUB SERPL-MCNC: 0.3 MG/DL (ref 0.2–1.1)
BUN BLD-MCNC: 24 MG/DL (ref 9–23)
BUN/CREAT SERPL: 27 (ref 10–20)
CALCIUM BLD-MCNC: 8.8 MG/DL (ref 8.7–10.4)
CHLORIDE SERPL-SCNC: 109 MMOL/L (ref 98–112)
CHOLEST SERPL-MCNC: 106 MG/DL (ref ?–200)
CO2 SERPL-SCNC: 28 MMOL/L (ref 21–32)
CREAT BLD-MCNC: 0.89 MG/DL (ref 0.55–1.02)
EGFRCR SERPLBLD CKD-EPI 2021: 66 ML/MIN/1.73M2 (ref 60–?)
EST. AVERAGE GLUCOSE BLD GHB EST-MCNC: 100 MG/DL (ref 68–126)
FASTING PATIENT LIPID ANSWER: YES
FASTING STATUS PATIENT QL REPORTED: YES
GLOBULIN PLAS-MCNC: 2.4 G/DL (ref 2–3.5)
GLUCOSE BLD-MCNC: 81 MG/DL (ref 70–99)
HBA1C MFR BLD: 5.1 % (ref ?–5.7)
HDLC SERPL-MCNC: 60 MG/DL (ref 40–59)
LDLC SERPL CALC-MCNC: 32 MG/DL (ref ?–100)
NONHDLC SERPL-MCNC: 46 MG/DL (ref ?–130)
OSMOLALITY SERPL CALC.SUM OF ELEC: 297 MOSM/KG (ref 275–295)
POTASSIUM SERPL-SCNC: 4.2 MMOL/L (ref 3.5–5.1)
PROT SERPL-MCNC: 6.5 G/DL (ref 5.7–8.2)
SODIUM SERPL-SCNC: 142 MMOL/L (ref 136–145)
TRIGL SERPL-MCNC: 65 MG/DL (ref 30–149)
TSI SER-ACNC: 3.95 UIU/ML (ref 0.55–4.78)
VLDLC SERPL CALC-MCNC: 9 MG/DL (ref 0–30)

## 2025-07-02 PROCEDURE — 80053 COMPREHEN METABOLIC PANEL: CPT

## 2025-07-02 PROCEDURE — 83036 HEMOGLOBIN GLYCOSYLATED A1C: CPT

## 2025-07-02 PROCEDURE — 36415 COLL VENOUS BLD VENIPUNCTURE: CPT

## 2025-07-02 PROCEDURE — 84443 ASSAY THYROID STIM HORMONE: CPT

## 2025-07-02 PROCEDURE — 80061 LIPID PANEL: CPT

## 2025-07-05 PROBLEM — D75.89 MACROCYTOSIS WITHOUT ANEMIA: Status: ACTIVE | Noted: 2020-10-30

## 2025-07-05 PROBLEM — Z82.49 FAMILY HISTORY OF BRAIN ANEURYSM: Status: ACTIVE | Noted: 2025-07-05

## 2025-07-05 PROBLEM — Z12.31 ENCOUNTER FOR SCREENING MAMMOGRAM FOR BREAST CANCER: Status: ACTIVE | Noted: 2023-06-26

## 2025-07-05 PROBLEM — J41.0 SMOKERS' COUGH (HCC): Chronic | Status: RESOLVED | Noted: 2024-12-20 | Resolved: 2025-07-05

## 2025-07-11 NOTE — TELEPHONE ENCOUNTER
LOV: 5/15/25  Future Appointments   Date Time Provider Department Center   8/4/2025  8:30 AM Skye Wright DPM [facility]   8/7/2025  1:20 PM ADO [facility]   10/21/2025  9:20 AM Nadiya Hylton MD [facility]  [facility]   12/29/2025  9:20 AM Nadiya Hylton MD [facility]     ASSESSMENT/PLAN:      Left cheek rash  - Following with dermatologist Dr. Cotto.  Biopsy was done around March 2025 showing findings of cutaneous lupus erythematous  - She is being treated with tacrolimus cream but not helping  - She has no symptoms consistent with systemic lupus  - Will obtain further blood work  - Could consider hydroxychloroquine  - Will discuss with dermatology     Rash on upper back and arms  - This rash started on November 2024.  Initially treated for shingles  - Rash is slowly improved  - She also did biopsy of the superior thoracic spine but I do not have those results, we will contact     Osteoporosis  - History of L2 vertebral fracture status post kyphoplasty  - She has been on resented 8 weekly since 2019, recent bone density shows no improvement, slightly worsened since 2020  - She is now on Prolia every 6 months.  Here for her 4th injection  - She remains on calcium and vitamin D.  She also continues to walk and do weightbearing exercises  - Repeat bone density done December 2024 shows improvement in her lumbar spine and hip     Spent 40 minutes obtaining history, evaluating patient, reviewing labs, discussing treatment options and completing documentation     Pt will f/u in 3 mos      There is a longitudinal care relationship with me, the care plan reflects the ongoing nature of the continuous relationship of care, and the medical record indicates that there is ongoing treatment of a serious/complex medical condition which I am currently managing.  is Applicable.      Nadiya Hylton MD  5/15/2025  10:30 AM

## 2025-07-13 RX ORDER — HYDROXYCHLOROQUINE SULFATE 200 MG/1
200 TABLET, FILM COATED ORAL DAILY
Qty: 90 TABLET | Refills: 1 | Status: SHIPPED | OUTPATIENT
Start: 2025-07-13

## 2025-08-04 ENCOUNTER — OFFICE VISIT (OUTPATIENT)
Dept: PODIATRY CLINIC | Facility: CLINIC | Age: 80
End: 2025-08-04

## 2025-08-04 DIAGNOSIS — B35.1 PAIN DUE TO ONYCHOMYCOSIS OF NAIL: ICD-10-CM

## 2025-08-04 DIAGNOSIS — L84 CALLUS OF FOOT: ICD-10-CM

## 2025-08-04 DIAGNOSIS — M20.41 HAMMER TOES OF BOTH FEET: ICD-10-CM

## 2025-08-04 DIAGNOSIS — M79.672 PAIN IN BOTH FEET: ICD-10-CM

## 2025-08-04 DIAGNOSIS — M79.609 PAIN DUE TO ONYCHOMYCOSIS OF NAIL: ICD-10-CM

## 2025-08-04 DIAGNOSIS — M20.42 HAMMER TOES OF BOTH FEET: ICD-10-CM

## 2025-08-04 DIAGNOSIS — M79.671 PAIN IN BOTH FEET: ICD-10-CM

## 2025-08-04 DIAGNOSIS — B35.1 ONYCHOMYCOSIS: Primary | ICD-10-CM

## 2025-08-04 PROCEDURE — 99214 OFFICE O/P EST MOD 30 MIN: CPT | Performed by: STUDENT IN AN ORGANIZED HEALTH CARE EDUCATION/TRAINING PROGRAM

## 2025-08-04 PROCEDURE — 1159F MED LIST DOCD IN RCRD: CPT | Performed by: STUDENT IN AN ORGANIZED HEALTH CARE EDUCATION/TRAINING PROGRAM

## 2025-08-06 ENCOUNTER — TELEPHONE (OUTPATIENT)
Age: 80
End: 2025-08-06

## 2025-08-07 ENCOUNTER — HOSPITAL ENCOUNTER (OUTPATIENT)
Dept: MAMMOGRAPHY | Age: 80
Discharge: HOME | End: 2025-08-07
Attending: INTERNAL MEDICINE

## 2025-08-07 DIAGNOSIS — Z12.31 ENCOUNTER FOR SCREENING MAMMOGRAM FOR BREAST CANCER: ICD-10-CM

## 2025-08-07 PROCEDURE — 77067 SCR MAMMO BI INCL CAD: CPT | Performed by: INTERNAL MEDICINE

## 2025-08-07 PROCEDURE — 77063 BREAST TOMOSYNTHESIS BI: CPT | Performed by: INTERNAL MEDICINE

## 2025-08-18 ENCOUNTER — PATIENT MESSAGE (OUTPATIENT)
Dept: INTERNAL MEDICINE CLINIC | Facility: CLINIC | Age: 80
End: 2025-08-18

## 2025-08-29 ENCOUNTER — HOSPITAL ENCOUNTER (OUTPATIENT)
Dept: MRI IMAGING | Age: 80
Discharge: HOME | End: 2025-08-29
Attending: INTERNAL MEDICINE

## 2025-08-29 DIAGNOSIS — Z82.49 FAMILY HISTORY OF BRAIN ANEURYSM: ICD-10-CM

## 2025-08-29 PROCEDURE — 70544 MR ANGIOGRAPHY HEAD W/O DYE: CPT | Performed by: INTERNAL MEDICINE

## (undated) DIAGNOSIS — M54.50 ACUTE BILATERAL LOW BACK PAIN WITHOUT SCIATICA: Primary | ICD-10-CM

## (undated) DEVICE — ANTERIOR HIP: Brand: MEDLINE INDUSTRIES, INC.

## (undated) DEVICE — BIPOLAR SEALER 23-112-1 AQM 6.0: Brand: AQUAMANTYS™

## (undated) DEVICE — ENCORE® LATEX MICRO SIZE 8.5, STERILE LATEX POWDER-FREE SURGICAL GLOVE: Brand: ENCORE

## (undated) DEVICE — TROCAR: Brand: KII SHIELDED BLADED ACCESS SYSTEM

## (undated) DEVICE — GAMMEX® PI HYBRID SIZE 9, STERILE POWDER-FREE SURGICAL GLOVE, POLYISOPRENE AND NEOPRENE BLEND: Brand: GAMMEX

## (undated) DEVICE — DV KIT ACCESSORY 3-ARM DISP

## (undated) DEVICE — SUT PERMA- 0 30IN NABSRB BLK TIE SILK

## (undated) DEVICE — MEGASUTURECUT ND: Brand: ENDOWRIST;DAVINCI SI

## (undated) DEVICE — PROXIMATE RELOADABLE LINEAR CUTTER WITH SAFETY LOCK-OUT, 75MM: Brand: PROXIMATE

## (undated) DEVICE — GAMMEX® NON-LATEX PI ORTHO SIZE 8.5, STERILE POLYISOPRENE POWDER-FREE SURGICAL GLOVE: Brand: GAMMEX

## (undated) DEVICE — SUCTION CANISTER, 3000CC,SAFELINER: Brand: DEROYAL

## (undated) DEVICE — SUT PERMA- 2-0 30IN SH NABSRB BLK L26MM 1/

## (undated) DEVICE — SUT PDS II 0 27IN CT-1 ABSRB VLT L36MM 1/2

## (undated) DEVICE — SUTURE VICRYL 2-0 FS-1

## (undated) DEVICE — NON-ADHERENT PAD PREPACK: Brand: TELFA

## (undated) DEVICE — SUT PDS II 4-0 27IN SH ABSRB VLT L26MM 1/2

## (undated) DEVICE — A P RESECTION: Brand: MEDLINE INDUSTRIES, INC.

## (undated) DEVICE — PROXIMATE RELOADABLE LINEAR STAPLER: Brand: PROXIMATE

## (undated) DEVICE — SUTURE PDS II 0 CTX

## (undated) DEVICE — GAMMEX® NON-LATEX PI ORTHO SIZE 9, STERILE POLYISOPRENE POWDER-FREE SURGICAL GLOVE: Brand: GAMMEX

## (undated) DEVICE — SUTURE VLOC 180 0 9\" 0346

## (undated) DEVICE — SOL  .9 1000ML BTL

## (undated) DEVICE — GAMMEX® PI HYBRID SIZE 7.5, STERILE POWDER-FREE SURGICAL GLOVE, POLYISOPRENE AND NEOPRENE BLEND: Brand: GAMMEX

## (undated) DEVICE — DRAPE,ABDOMINAL,MAJOR,STERILE: Brand: MEDLINE

## (undated) DEVICE — SUTURE SILK 3-0 SA84H

## (undated) DEVICE — VISUALIZATION SYSTEM: Brand: CLEARIFY

## (undated) DEVICE — SUT PERMA- 2-0 30IN NABSRB BLK TIE SILK

## (undated) DEVICE — SET TUBI Y FL CNTRL INFL/OTFL

## (undated) DEVICE — SUT PERMA- 3-0 18IN NABSRB BLK TIE

## (undated) DEVICE — PK INSTRUMENT CORD, G400 GENERATOR: Brand: PK

## (undated) DEVICE — SUTURE NUROLON 3-0 C553D

## (undated) DEVICE — [HIGH FLOW INSUFFLATOR,  DO NOT USE IF PACKAGE IS DAMAGED,  KEEP DRY,  KEEP AWAY FROM SUNLIGHT,  PROTECT FROM HEAT AND RADIOACTIVE SOURCES.]: Brand: PNEUMOSURE

## (undated) DEVICE — SUT PROL 1-0 30IN CTX NABSRB BLU L30MM 1/2 CI

## (undated) DEVICE — ENCORE® LATEX ACCLAIM SIZE 8, STERILE LATEX POWDER-FREE SURGICAL GLOVE: Brand: ENCORE

## (undated) DEVICE — SUTURE VICRYL 0 J340H

## (undated) DEVICE — SOLUTION IRRIG 1000ML 0.9% NACL USP BTL

## (undated) DEVICE — DRAIN RESERVOIR RELIAVAC 100CC

## (undated) DEVICE — GAUZE SPONGES,12 PLY: Brand: CURITY

## (undated) DEVICE — PROXIMATE SKIN STAPLERS (35 WIDE) CONTAINS 35 STAINLESS STEEL STAPLES (FIXED HEAD): Brand: PROXIMATE

## (undated) DEVICE — GAMMEX® PI HYBRID SIZE 7, STERILE POWDER-FREE SURGICAL GLOVE, POLYISOPRENE AND NEOPRENE BLEND: Brand: GAMMEX

## (undated) DEVICE — SOCK CNSTR 4IN TNPSL UNV SPEC

## (undated) DEVICE — SUTURE SILK 2-0 SA85H

## (undated) DEVICE — TISSUE RETRIEVAL SYSTEM: Brand: INZII RETRIEVAL SYSTEM

## (undated) DEVICE — 2T11 #2 PDO 36 X 36: Brand: 2T11 #2 PDO 36 X 36

## (undated) DEVICE — PK DISSECTING FORCEPS: Brand: ENDOWRIST;DAVINCI SI

## (undated) DEVICE — LIGASURE LAP MARYLAND 44CM

## (undated) DEVICE — STAPLER WITH DST SERIES TECHNOLOGY: Brand: GIA

## (undated) DEVICE — TRAP MCS 40ML 5IN PLS SCR CAP

## (undated) DEVICE — PROXIMATE RH ROTATING HEAD SKIN STAPLERS (35 WIDE) CONTAINS 35 STAINLESS STEEL STAPLES: Brand: PROXIMATE

## (undated) DEVICE — LEGGINGS SRG 48X31IN CUF STRL

## (undated) DEVICE — SOL H2O 3000ML IRRIG

## (undated) DEVICE — ARTICULATING RELOAD WITH TRI-STAPLE TECHNOLOGY: Brand: ENDO GIA

## (undated) DEVICE — DRAPE SRG 120INX104INX76IN 124

## (undated) DEVICE — SUTURE ETHILON 2-0 FS

## (undated) DEVICE — DRAPE SHEET LAVH 124X112X30

## (undated) DEVICE — 3M™ IOBAN™ 2 ANTIMICROBIAL INCISE DRAPE 6650EZ: Brand: IOBAN™ 2

## (undated) DEVICE — CHLORAPREP 26ML APPLICATOR

## (undated) DEVICE — SUT PLN GUT 3-0 27IN CT-1 ABSRB TAN YELLOWISH

## (undated) DEVICE — MYOSURE LITE BLADE

## (undated) DEVICE — VIOLET BRAIDED (POLYGLACTIN 910), SYNTHETIC ABSORBABLE SUTURE: Brand: COATED VICRYL

## (undated) DEVICE — LIGASURE LAP MARYLAND 37CM

## (undated) DEVICE — PREVENA PEEL & PLACE SYSTEM KIT- 13 CM: Brand: PREVENA™ PEEL & PLACE™

## (undated) DEVICE — SUTURE MONOCRYL 3-0 Y497G

## (undated) DEVICE — ELECTRO LUBE IS A SINGLE PATIENT USE DEVICE THAT IS INTENDED TO BE USED ON ELECTROSURGICAL ELECTRODES TO REDUCE STICKING.: Brand: KEY SURGICAL ELECTRO LUBE

## (undated) DEVICE — INSUFFLATION NEEDLE TO ESTABLISH PNEUMOPERITONEUM.: Brand: INSUFFLATION NEEDLE

## (undated) DEVICE — AIRSEAL 12 MM ACCESS PORT AND PALM GRIP OBTURATOR WITH BLADELESS OPTICAL TIP, 150 MM LENGTH: Brand: AIRSEAL

## (undated) DEVICE — POWER SHELL: Brand: SIGNIA

## (undated) DEVICE — GLOVE SUR 8.5 SENSICARE NEOPR PWD F

## (undated) DEVICE — NEEDLE SPINAL 18X3-1/2 PINK.

## (undated) DEVICE — GAMMEX® PI HYBRID SIZE 8.5, STERILE POWDER-FREE SURGICAL GLOVE, POLYISOPRENE AND NEOPRENE BLEND: Brand: GAMMEX

## (undated) DEVICE — SPONGE: SPECIALTY PEANUT XR 100/CS: Brand: MEDICAL ACTION INDUSTRIES

## (undated) DEVICE — TIP COVER ACCESSORY

## (undated) DEVICE — COVER SGL STRL LGHT HNDL BLU

## (undated) DEVICE — 2DE14 2-0 PDO 24 X 24: Brand: 2DE14 2-0 PDO 24 X 24

## (undated) DEVICE — SLIM BODY SKIN STAPLER: Brand: APPOSE ULC

## (undated) DEVICE — MONOPOLAR CURVED SCISSORS: Brand: ENDOWRIST;DAVINCI SI

## (undated) DEVICE — GAMMEX® PI HYBRID SIZE 6.5, STERILE POWDER-FREE SURGICAL GLOVE, POLYISOPRENE AND NEOPRENE BLEND: Brand: GAMMEX

## (undated) DEVICE — 12 ML SYRINGE LUER-LOCK TIP: Brand: MONOJECT

## (undated) DEVICE — SUTURE CHROMIC 3-0 LIGAPAK L

## (undated) DEVICE — DERMABOND LIQUID ADHESIVE

## (undated) DEVICE — TUBING CYSTO TUR DUAL

## (undated) DEVICE — LIGASURE IMPACT OPEN DEVICE

## (undated) DEVICE — BINDER ABD L/XL H12XL62IN 4 PNL UNIV PREM

## (undated) DEVICE — HOOD: Brand: FLYTE

## (undated) DEVICE — HYSTEROSCOPY: Brand: MEDLINE INDUSTRIES, INC.

## (undated) DEVICE — PHOTONSABER Y METAL TIP

## (undated) DEVICE — VCARE MEDIUM, UTERINE MANIPULATOR, VAGINAL-CERVICAL-AHLUWALIA'S-RETRACTOR-ELEVATOR: Brand: VCARE

## (undated) DEVICE — STERILE SURGICAL LUBRICANT, METAL TUBE: Brand: SURGILUBE

## (undated) DEVICE — 3M™ STERI-STRIP™ REINFORCED ADHESIVE SKIN CLOSURES, R1547, 1/2 IN X 4 IN (12 MM X 100 MM), 6 STRIPS/ENVELOPE: Brand: 3M™ STERI-STRIP™

## (undated) DEVICE — SUTURE PASSOR WITH GUIDE

## (undated) DEVICE — CURVED, LARGE JAW, OPEN SEALER/DIVIDER NANO-COATED: Brand: LIGASURE IMPACT

## (undated) DEVICE — 40580 - THE PINK PAD - ADVANCED TRENDELENBURG POSITIONING KIT: Brand: 40580 - THE PINK PAD - ADVANCED TRENDELENBURG POSITIONING KIT

## (undated) DEVICE — SUTURE CHROMIC 2-0 801H

## (undated) DEVICE — ELECTRODE ES 2.75IN PTFE BLDE MOD E-Z CLN

## (undated) DEVICE — TROCAR: Brand: KII® SLEEVE

## (undated) DEVICE — SOL  .9 3000ML

## (undated) DEVICE — GOWN SURG AERO BLUE PERF LG

## (undated) DEVICE — SUT PDS II 0 L60IN ABSRB VLT L48MM CTX 1/2

## (undated) DEVICE — LAP CHOLE: Brand: MEDLINE INDUSTRIES, INC.

## (undated) DEVICE — PAD ALC 43.5X24IN PREP  LF

## (undated) DEVICE — BATTERY

## (undated) DEVICE — MYOSURE SINGLE USE SEAL SET

## (undated) DEVICE — 1016 S-DRAPE IRRIG POUCH 10/BOX: Brand: STERI-DRAPE™

## (undated) DEVICE — SUTURE CHROMIC GUT 3-0 SH

## (undated) DEVICE — PAD,ABDOMINAL,8"X7.5",STERILE,LF,1/PK: Brand: MEDLINE

## (undated) DEVICE — PROGRASP FORCEPS: Brand: ENDOWRIST;DAVINCI SI

## (undated) DEVICE — TRAY SKIN PREP PVP-1

## (undated) DEVICE — ADHESIVE MASTISOL 2/3CC VL

## (undated) DEVICE — TRI-LUMEN FILTERED TUBE SET WITH ACTIVATED CHARCOAL FILTER: Brand: AIRSEAL

## (undated) DEVICE — TRAY FOLEY BDX 16F STATLOCK

## (undated) DEVICE — DRAIN INCS 7MM 20CMX7MM SIL

## (undated) DEVICE — PENROSE DRAIN 12" X 1/4: Brand: CARDINAL HEALTH

## (undated) DEVICE — LARGE NEEDLE DRIVER: Brand: ENDOWRIST;DAVINCI SI

## (undated) DEVICE — SUTURE MONOCRYL 4-0 PS-2

## (undated) DEVICE — DEVICE FIX.SECURESTRAP 5MM

## (undated) DEVICE — Device: Brand: STABLECUT®

## (undated) DEVICE — PROXIMATE LINEAR CUTTER RELOAD, BLUE, 75MM: Brand: PROXIMATE

## (undated) DEVICE — DV OBTURATOR BLADELESS 8MM

## (undated) DEVICE — ROBOTIC: Brand: MEDLINE INDUSTRIES, INC.

## (undated) NOTE — LETTER
AUTHORIZATION FOR SURGICAL OPERATION OR OTHER PROCEDURE    1. I hereby authorize Dr. Juliet Canchola, and [facility] staff assigned to my case to perform the following operation and/or procedure at the [facility]:         [place]     2.   My physician h Relationship to Patient:           []  Parent    Responsible person                          []  Spouse  In case of minor or                    [] Other  _____________   Incompetent name:  __________________________________________________

## (undated) NOTE — LETTER
MINOR CASE LETTER      12/10/2019      Dear Rebecca Norma are having a hysteroscopic polypectomy on 1/20/20 at 9:30am.     Do not eat or drink anything (including water) after midnight the night before surgery.     If your procedure is scheduled later in

## (undated) NOTE — LETTER
INSTRUCTIONS FOR PRE-SURGICAL   ANTIMICROBIAL BATH/SHOWER    Your doctor has recommended a pre-surgical CHG (chlorhexidine gluconate) shower/bath with Betasept (also sold as Hibiclens). It reduces bacteria that can potentially cause infection.   Betasept Keep out of reach of children. If swallowed get medica help or contact [facility]. Store between 60-80 degrees F. Fabric Warning! CHG WILL STAIN YOUR FABRICS! Use with care around shower curtains, towels washcloths rugs and clothes.   Wipe

## (undated) NOTE — LETTER
[facility]  [address], [place], [place]    Authorization for Surgical Operation and Procedure                               I hereby authorize Bebo Rodrigez MD, my physician and his/her assistants (if applicable), which may include medical students, residents, and/or fellows, to perform the following surgical operation/ procedure and administer such anesthesia as may be determined necessary by my physician: Operation/Procedure name (s) EXPLORATORY LAPAROTOMY POSSIBLE BOWEL RESECTION on Coni A Struc   2.   I recognize that during the surgical operation/procedure, unforeseen conditions may necessitate additional or different procedures than those listed above.  I, therefore, further authorize and request that the above-named surgeon, assistants, or designees perform such procedures as are, in their judgment, necessary and desirable.    3.   My surgeon/physician has discussed prior to my surgery the potential benefits, risks and side effects of this procedure; the likelihood of achieving goals; and potential problems that might occur during recuperation.  They also discussed reasonable alternatives to the procedure, including risks, benefits, and side effects related to the alternatives and risks related to not receiving this procedure.  I have had all my questions answered and I acknowledge that no guarantee has been made as to the result that may be obtained.    4.   Should the need arise during my operation/procedure, which includes change of level of care prior to discharge, I also consent to the administration of blood and/or blood products.  Further, I understand that despite careful testing and screening of blood or blood products by collecting agencies, I may still be subject to ill effects as a result of receiving a blood transfusion and/or blood products.  The following are some, but not all, of the potential risks that can occur: fever and allergic reactions, hemolytic  reactions, transmission of diseases such as Hepatitis, AIDS and Cytomegalovirus (CMV) and fluid overload.  In the event that I wish to have an autologous transfusion of my own blood, or a directed donor transfusion, I will discuss this with my physician.  Check only if Refusing Blood or Blood Products  I understand refusal of blood or blood products as deemed necessary by my physician may have serious consequences to my condition to include possible death. I hereby assume responsibility for my refusal and release the hospital, its personnel, and my physicians from any responsibility for the consequences of my refusal.    o  Refuse   5.   I authorize the use of any specimen, organs, tissues, body parts or foreign objects that may be removed from my body during the operation/procedure for diagnosis, research or teaching purposes and their subsequent disposal by hospital authorities.  I also authorize the release of specimen test results and/or written reports to my treating physician on the hospital medical staff or other referring or consulting physicians involved in my care, at the discretion of the Pathologist or my treating physician.    6.   I consent to the photographing or videotaping of the operations or procedures to be performed, including appropriate portions of my body for medical, scientific, or educational purposes, provided my identity is not revealed by the pictures or by descriptive texts accompanying them.  If the procedure has been photographed/videotaped, the surgeon will obtain the original picture, image, videotape or CD.  The hospital will not be responsible for storage, release or maintenance of the picture, image, tape or CD.    7.   I consent to the presence of a  or observers in the operating room as deemed necessary by my physician or their designees.    8.   I recognize that in the event my procedure results in extended X-Ray/fluoroscopy time, I may develop a skin  reaction.    9. If I have a Do Not Attempt Resuscitation (DNAR) order in place, that status will be suspended while in the operating room, procedural suite, and during the recovery period unless otherwise explicitly stated by me (or a person authorized to consent on my behalf). The surgeon or my attending physician will determine when the applicable recovery period ends for purposes of reinstating the DNAR order.  10. Patients having a sterilization procedure: I understand that if the procedure is successful the results will be permanent and it will therefore be impossible for me to inseminate, conceive, or bear children.  I also understand that the procedure is intended to result in sterility, although the result has not been guaranteed.   11. I acknowledge that my physician has explained sedation/analgesia administration to me including the risk and benefits I consent to the administration of sedation/analgesia as may be necessary or desirable in the judgment of my physician.    I CERTIFY THAT I HAVE READ AND FULLY UNDERSTAND THE ABOVE CONSENT TO OPERATION and/or OTHER PROCEDURE.     ____________________________________  _________________________________        ______________________________  Signature of Patient    Signature of Responsible Person                Printed Name of Responsible Person                                      ____________________________________  _____________________________                ________________________________  Signature of Witness        Date  Time         Relationship to Patient    STATEMENT OF PHYSICIAN My signature below affirms that prior to the time of the procedure; I have explained to the patient and/or his/her legal representative, the risks and benefits involved in the proposed treatment and any reasonable alternative to the proposed treatment. I have also explained the risks and benefits involved in refusal of the proposed treatment and alternatives to the proposed  treatment and have answered the patient's questions. If I have a significant financial interest in a co-management agreement or a significant financial interest in any product or implant, or other significant relationship used in this procedure/surgery, I have disclosed this and had a discussion with my patient.     _____________________________________________________              _____________________________  (Signature of Physician)                                                                                         (Date)                                   (Time)  Patient Name: Coni Gandhi      : 1945      Printed: 9/15/2024     Medical Record #: [id number]                                      Page 1 of 1

## (undated) NOTE — LETTER
[facility]  Administration of Anesthesia  Coni LAZARO agree to be cared for by a physician anesthesiologist alone and/or with a nurse anesthetist, who is specially trained to monitor me and give me medicine to put me to sleep or keep me comfortable during my procedure    I understand that my anesthesiologist and/or anesthetist is not an employee or agent of [facility] or [employer]. He or she works for [facility]    As the patient asking for anesthesia services, I agree to:  Allow the anesthesiologist (anesthesia doctor) to give me medicine and do additional procedures as necessary. Some examples are: Starting or using an “IV” to give me medicine, fluids or blood during my procedure, and having a breathing tube placed to help me breathe when I’m asleep (intubation). In the event that my heart stops working properly, I understand that my anesthesiologist will make every effort to sustain my life, unless otherwise directed by [facility] Do Not Resuscitate documents.  Tell my anesthesia doctor before my procedure:  If I am pregnant.  The last time that I ate or drank.  iii. All of the medicines I take (including prescriptions, herbal supplements, and pills I can buy without a prescription (including street drugs/illegal medications). Failure to inform my anesthesiologist about these medicines may increase my risk of anesthetic complications.  iv.If I am allergic to anything or have had a reaction to anesthesia before.  I understand how the anesthesia medicine will help me (benefits).  I understand that with any type of anesthesia medicine there are risks:  The most common risks are: nausea, vomiting, sore throat, muscle soreness, damage to my eyes, mouth, or teeth (from breathing tube placement).  Rare risks include: remembering what happened during my procedure, allergic reactions to medications, injury to my airway, heart, lungs, vision, nerves, or  muscles and in extremely rare instances death.  My doctor has explained to me other choices available to me for my care (alternatives).  Pregnant Patients (“epidural”):  I understand that the risks of having an epidural (medicine given into my back to help control pain during labor), include itching, low blood pressure, difficulty urinating, headache or slowing of the baby’s heart. Very rare risks include infection, bleeding, seizure, irregular heart rhythms and nerve injury.  Regional Anesthesia (“spinal”, “epidural”, & “nerve blocks”):  I understand that rare but potential complications include headache, bleeding, infection, seizure, irregular heart rhythms, and nerve injury.    _____________________________________________________________________________  Patient (or Representative) Signature/Relationship to Patient  Date   Time    _____________________________________________________________________________   Name (if used)    Language/Organization   Time    _____________________________________________________________________________  Nurse Anesthetist Signature     Date   Time  _____________________________________________________________________________  Anesthesiologist Signature     Date   Time  I have discussed the procedure and information above with the patient (or patient’s representative) and answered their questions. The patient or their representative has agreed to have anesthesia services.    _____________________________________________________________________________  Witness        Date   Time  I have verified that the signature is that of the patient or patient’s representative, and that it was signed before the procedure  Patient Name: Coni Gandhi     : 1945                 Printed: 2024 at 3:34 PM    Medical Record #: [id number]                                            Page 1 of 1  ----------ANESTHESIA CONSENT----------

## (undated) NOTE — LETTER
MAJOR CASE PREOPERATIVE INSTRUCTIONS    1/29/2020      Dear Rebecca Norma are having a total laparoscopic hysterectomy with bilateral salpingo-oophorectomy with staging on Tuesday, 2-4-20 at 7:30am.      Do not eat or drink anything (including water) afte Document electronically generated by:   Randye Sever, RN

## (undated) NOTE — LETTER
[facility]  Administration of Anesthesia  Coni LAZARO agree to be cared for by a physician anesthesiologist alone and/or with a nurse anesthetist, who is specially trained to monitor me and give me medicine to put me to sleep or keep me comfortable during my procedure    I understand that my anesthesiologist and/or anesthetist is not an employee or agent of [facility] or [employer]. He or she works for [facility]    As the patient asking for anesthesia services, I agree to:  Allow the anesthesiologist (anesthesia doctor) to give me medicine and do additional procedures as necessary. Some examples are: Starting or using an “IV” to give me medicine, fluids or blood during my procedure, and having a breathing tube placed to help me breathe when I’m asleep (intubation). In the event that my heart stops working properly, I understand that my anesthesiologist will make every effort to sustain my life, unless otherwise directed by [facility] Do Not Resuscitate documents.  Tell my anesthesia doctor before my procedure:  If I am pregnant.  The last time that I ate or drank.  iii. All of the medicines I take (including prescriptions, herbal supplements, and pills I can buy without a prescription (including street drugs/illegal medications). Failure to inform my anesthesiologist about these medicines may increase my risk of anesthetic complications.  iv.If I am allergic to anything or have had a reaction to anesthesia before.  I understand how the anesthesia medicine will help me (benefits).  I understand that with any type of anesthesia medicine there are risks:  The most common risks are: nausea, vomiting, sore throat, muscle soreness, damage to my eyes, mouth, or teeth (from breathing tube placement).  Rare risks include: remembering what happened during my procedure, allergic reactions to medications, injury to my airway, heart, lungs, vision, nerves, or  muscles and in extremely rare instances death.  My doctor has explained to me other choices available to me for my care (alternatives).  Pregnant Patients (“epidural”):  I understand that the risks of having an epidural (medicine given into my back to help control pain during labor), include itching, low blood pressure, difficulty urinating, headache or slowing of the baby’s heart. Very rare risks include infection, bleeding, seizure, irregular heart rhythms and nerve injury.  Regional Anesthesia (“spinal”, “epidural”, & “nerve blocks”):  I understand that rare but potential complications include headache, bleeding, infection, seizure, irregular heart rhythms, and nerve injury.    _____________________________________________________________________________  Patient (or Representative) Signature/Relationship to Patient  Date   Time    _____________________________________________________________________________   Name (if used)    Language/Organization   Time    _____________________________________________________________________________  Nurse Anesthetist Signature     Date   Time  _____________________________________________________________________________  Anesthesiologist Signature     Date   Time  I have discussed the procedure and information above with the patient (or patient’s representative) and answered their questions. The patient or their representative has agreed to have anesthesia services.    _____________________________________________________________________________  Witness        Date   Time  I have verified that the signature is that of the patient or patient’s representative, and that it was signed before the procedure  Patient Name: Coni Gandhi     : 1945                 Printed: 9/15/2024 at 8:31 AM    Medical Record #: [id number]                                            Page 1 of 1  ----------ANESTHESIA CONSENT----------

## (undated) NOTE — LETTER
[facility] 4W/SW/SE  [address]  [facility] [address]  [phone]    Blood Transfusion Consent    In the course of your treatment, it may become necessary to administer a transfusion of blood or blood components. This form provides basic information concerning this procedure and, if signed by you, authorizes its administration. By signing this form, you agree that all of your questions about the administration of blood or blood products have been answered by the ordering medical professional or designee.    Description of Procedure  Blood is introduced into one of your veins, commonly in the arm, using a sterilized disposable needle. The amount of blood transfused, and whether the transfusion will be of blood or blood components is a judgement the physician will make based on your particular needs.    Risks  The transfusion is a common procedure of low risk.  MINOR AND TEMPORARY REACTIONS ARE NOT UNCOMMON, including a slight bruise, swelling or local reaction in the area where the needle pierces your skin, or a nonserious reaction to the transfused material itself, including headache, fever or mild skin reaction, such as rash.  Serious reactions are possible, though very unlikely, and include severe allergic reaction (shock) and destruction (hemolysis) of transfused blood cells.  Infectious diseases which are known to be transmitted by blood transfusion include certain types of viral Hepatitis(liver infection from a virus), Human Immunodeficiency Virus (HIV-1,2) infection, a viral infection known to cause Acquired Immunodeficiency Syndrome (AIDS), as well as certain other bacterial, viral, and parasitic diseases. While a minimal risk of acquiring an infectious disease from transfused blood exists, in accordance with the Federal and State law, all due care has been taken in donor selection and testing to avoid transmission of disease.    Alternatives  If loss of blood poses serious threats during your  treatment, THERE IS NO EFFECTIVE ALTERNATIVE TO BLOOD TRANSFUSION. However, if you have any further questions on this matter, your provider will fully explain the alternatives to you if it has not already been done.    I, ______________________________, have read/had read to me the above. I understand the matters bearing on the decision whether or not to authorize a transfusion of blood or blood components. I have no questions which have not been answered to my full satisfaction. I hereby consent to such transfusion as my physician may deem necessary or advisable in the course of my treatment.    ______________________________________________                    ___________________________  (Signature of Patient or Responsible party in case of minor,                 (Printed Name of Patient or incompetent, or unconscious patient)              Responsible Party)    ___________________________               _____________________  (Relationship to Patient if not self)                                    (Date and Time)    __________________________                                                           ______________________              (Signature of Witness)               (Printed Name of Witness)     Language line ()    Telephone/Verbal/Video Consent    __________________________                     ____________________  (Signature of 2nd Witness           (Printed Name of 2nd  Telephone/Verbal/Video Consent)           Witness)    Patient Name: Coni Gandhi     : 1945                 Printed: 2024     Medical Record #: [id number]      Rev: 2023

## (undated) NOTE — LETTER
[facility]  [address], [place], [place]    Authorization for Surgical Operation and Procedure                               I hereby authorize Bebo Rodrigez MD, my physician and his/her assistants (if applicable), which may include medical students, residents, and/or fellows, to perform the following surgical operation/ procedure and administer such anesthesia as may be determined necessary by my physician: Operation/Procedure name (s) EXPLORATORY LAPAROTOMY POSSIBLE BOWEL RESECTION on Coni A Struc   2.   I recognize that during the surgical operation/procedure, unforeseen conditions may necessitate additional or different procedures than those listed above.  I, therefore, further authorize and request that the above-named surgeon, assistants, or designees perform such procedures as are, in their judgment, necessary and desirable.    3.   My surgeon/physician has discussed prior to my surgery the potential benefits, risks and side effects of this procedure; the likelihood of achieving goals; and potential problems that might occur during recuperation.  They also discussed reasonable alternatives to the procedure, including risks, benefits, and side effects related to the alternatives and risks related to not receiving this procedure.  I have had all my questions answered and I acknowledge that no guarantee has been made as to the result that may be obtained.    4.   Should the need arise during my operation/procedure, which includes change of level of care prior to discharge, I also consent to the administration of blood and/or blood products.  Further, I understand that despite careful testing and screening of blood or blood products by collecting agencies, I may still be subject to ill effects as a result of receiving a blood transfusion and/or blood products.  The following are some, but not all, of the potential risks that can occur: fever and allergic reactions, hemolytic  reactions, transmission of diseases such as Hepatitis, AIDS and Cytomegalovirus (CMV) and fluid overload.  In the event that I wish to have an autologous transfusion of my own blood, or a directed donor transfusion, I will discuss this with my physician.  Check only if Refusing Blood or Blood Products  I understand refusal of blood or blood products as deemed necessary by my physician may have serious consequences to my condition to include possible death. I hereby assume responsibility for my refusal and release the hospital, its personnel, and my physicians from any responsibility for the consequences of my refusal.    o  Refuse   5.   I authorize the use of any specimen, organs, tissues, body parts or foreign objects that may be removed from my body during the operation/procedure for diagnosis, research or teaching purposes and their subsequent disposal by hospital authorities.  I also authorize the release of specimen test results and/or written reports to my treating physician on the hospital medical staff or other referring or consulting physicians involved in my care, at the discretion of the Pathologist or my treating physician.    6.   I consent to the photographing or videotaping of the operations or procedures to be performed, including appropriate portions of my body for medical, scientific, or educational purposes, provided my identity is not revealed by the pictures or by descriptive texts accompanying them.  If the procedure has been photographed/videotaped, the surgeon will obtain the original picture, image, videotape or CD.  The hospital will not be responsible for storage, release or maintenance of the picture, image, tape or CD.    7.   I consent to the presence of a  or observers in the operating room as deemed necessary by my physician or their designees.    8.   I recognize that in the event my procedure results in extended X-Ray/fluoroscopy time, I may develop a skin  reaction.    9. If I have a Do Not Attempt Resuscitation (DNAR) order in place, that status will be suspended while in the operating room, procedural suite, and during the recovery period unless otherwise explicitly stated by me (or a person authorized to consent on my behalf). The surgeon or my attending physician will determine when the applicable recovery period ends for purposes of reinstating the DNAR order.  10. Patients having a sterilization procedure: I understand that if the procedure is successful the results will be permanent and it will therefore be impossible for me to inseminate, conceive, or bear children.  I also understand that the procedure is intended to result in sterility, although the result has not been guaranteed.   11. I acknowledge that my physician has explained sedation/analgesia administration to me including the risk and benefits I consent to the administration of sedation/analgesia as may be necessary or desirable in the judgment of my physician.    I CERTIFY THAT I HAVE READ AND FULLY UNDERSTAND THE ABOVE CONSENT TO OPERATION and/or OTHER PROCEDURE.     ____________________________________  _________________________________        ______________________________  Signature of Patient    Signature of Responsible Person                Printed Name of Responsible Person                                      ____________________________________  _____________________________                ________________________________  Signature of Witness        Date  Time         Relationship to Patient    STATEMENT OF PHYSICIAN My signature below affirms that prior to the time of the procedure; I have explained to the patient and/or his/her legal representative, the risks and benefits involved in the proposed treatment and any reasonable alternative to the proposed treatment. I have also explained the risks and benefits involved in refusal of the proposed treatment and alternatives to the proposed  treatment and have answered the patient's questions. If I have a significant financial interest in a co-management agreement or a significant financial interest in any product or implant, or other significant relationship used in this procedure/surgery, I have disclosed this and had a discussion with my patient.     _____________________________________________________              _____________________________  (Signature of Physician)                                                                                         (Date)                                   (Time)  Patient Name: Coni Gandhi      : 1945      Printed: 9/15/2024     Medical Record #: [id number]                                      Page 1 of 1

## (undated) NOTE — LETTER
[address], [place]  Authorization for Invasive Procedures  1.  I hereby authorize  Dr. Rufus Nye , my physician and whomever may be designated as the doctor's assistant, to perform the following operation and/or procedure: Lumbar 2 [facility] performed for the purposes of advancing medicine, science, and/or education, provided my identity is not revealed. If the procedure has been videotaped, the physician/surgeon will obtain the original videotape.  The hospital will not be responsible for stor My signature below affirms that prior to the time of the procedure, I have explained to the patient and/or her legal representative, the risks and benefits involved in the proposed treatment and any reasonable alternative to the proposed treatment.  I have

## (undated) NOTE — Clinical Note
TCM assessment completed.  The patient is scheduled for a TCM/HFU with you on 10/03/2024.  Thank you.